# Patient Record
Sex: MALE | Race: WHITE | NOT HISPANIC OR LATINO | Employment: PART TIME
[De-identification: names, ages, dates, MRNs, and addresses within clinical notes are randomized per-mention and may not be internally consistent; named-entity substitution may affect disease eponyms.]

---

## 2017-09-17 ENCOUNTER — HEALTH MAINTENANCE LETTER (OUTPATIENT)
Age: 22
End: 2017-09-17

## 2019-10-25 ENCOUNTER — TELEPHONE (OUTPATIENT)
Dept: PSYCHIATRY | Facility: CLINIC | Age: 24
End: 2019-10-25

## 2019-10-25 NOTE — TELEPHONE ENCOUNTER
"PSYCHIATRY CLINIC PHONE INTAKE     SERVICES REQUESTED / INTERESTED IN          Other:  Nueropsychological Testing    Presenting Problem and Brief History                              What would you like to be seen for? (brief description):  Patients mother conducted the phone screen, after verbal permission from patient was garnered. Patient started a program with [facility]. Patient was referred by them to complete neuropsychological testing. Patient completed TMS at [facility] last summer which resulted in changes physically, and mentally. Patient stated that when he attempts medications now for his anxiety and depression he now experiences aches, and pains to a degree where patient stated \"it feels like hot lava is pouring over my brain\". Patient experiences \"jolts\" in his brain where it feels as if he is being pushed physically forward. Patient feels muscle aches in his arms and quads as well while on medications. Patient will taper off, and discontinue medications due to this now. Patient left his job, and does not go out in public with friends any longer due to this. Patient is wanting to go back to work, and move out, but finds it almost impossible due to the sensory issues he is facing now. Patient has attempted talk therapy, DBT, and several differing medications including Fluoxetine & Buspar after the TMS to alleviate symptoms.         Have you received a mental health diagnosis? Yes   Which one (s): LANNY, OCD  Is there any history of developmental delay?  No   Are you currently seeing a mental health provider?  Yes            Who / month last seen:  [facility]   Do you have mental health records elsewhere?  Yes  Will you sign a release so we can obtain them?  Yes    Have you ever been hospitalized for psychiatric reasons?  No  Describe:  Has had ED visits due to anxiety attacks.     Do you have current thoughts of self-harm?  No /Although patient stated \"I cannot go on living this way\". "   Do you currently have thoughts of harming others?  No       Substance Use History     Do you have any history of alcohol / illicit drug use?  No  Describe:  N?A  Have you ever received treatment for this?  No    Describe:  N/A     Social History     Does the patient have a guardian?  No    Name / number: N/A  Have you had an ACT team in last 12 months?  No  Describe: N/A   Do you have any current or past legal issues?  No  Describe: N/A   OK to leave a detailed voicemail?  Yes    Medical/ Surgical History                                   Patient Active Problem List   Diagnosis     Constipation     Generalized anxiety disorder     Atypical chest pain     Dyspnea     Exertional dyspnea     ADHD (attention deficit hyperactivity disorder)     Major depression in complete remission (H)          Medications             Current Outpatient Medications   Medication Sig Dispense Refill     ALBUTEROL IN one puff every 2 hours as needed       CALCIUM MAGNESIUM OR 1 Tablet daily       FLUoxetine (PROZAC) 10 MG tablet Take 1 tablet by mouth daily. Patient needs to be seen prior to future refills of this medication.  Please call clinic to schedule.    30 tablet 0     methylphenidate (CONCERTA) 36 MG CR tablet Take 1 tablet by mouth every morning. 30 tablet 0     Multiple Vitamin (MULTIVITAMIN OR) Take  by mouth.       VITAMIN D OR 1 Tablet daily           DISPOSITION      Patients phone screen submitted to Dr. Hernández for review.    Landon Shepherd

## 2019-11-19 ENCOUNTER — TRANSFERRED RECORDS (OUTPATIENT)
Dept: HEALTH INFORMATION MANAGEMENT | Facility: CLINIC | Age: 24
End: 2019-11-19

## 2019-12-11 NOTE — TELEPHONE ENCOUNTER
RECORDS RECEIVED FROM: [facility] - Lyme Disease - Per pt    DATE RECEIVED: 01.03.2020   NOTES (Gather within 2 years) STATUS DETAILS   OFFICE NOTE from referring provider   N/A    OFFICE NOTE from other specialist Care Everywhere 11.29.2019   DISCHARGE SUMMARY from hospital N/A    DISCHARGE REPORT from the ER N/A    LABS (any labs) N/A    MEDICATION LIST Care Everywhere    IMAGING  (NEED IMAGES AND REPORTS)     Osteomyelitis: Foot imaging  N/A    Liver Abscess: Abdominal imaging N/A    Other (anything related to diagnoses N/A

## 2020-01-03 ENCOUNTER — PRE VISIT (OUTPATIENT)
Dept: INFECTIOUS DISEASES | Facility: CLINIC | Age: 25
End: 2020-01-03

## 2020-03-01 ENCOUNTER — TRANSFERRED RECORDS (OUTPATIENT)
Dept: HEALTH INFORMATION MANAGEMENT | Facility: CLINIC | Age: 25
End: 2020-03-01

## 2020-04-30 ENCOUNTER — TRANSFERRED RECORDS (OUTPATIENT)
Dept: HEALTH INFORMATION MANAGEMENT | Facility: CLINIC | Age: 25
End: 2020-04-30

## 2020-07-14 ENCOUNTER — TELEPHONE (OUTPATIENT)
Dept: OTHER | Facility: OUTPATIENT CENTER | Age: 25
End: 2020-07-14

## 2020-07-14 NOTE — TELEPHONE ENCOUNTER
Telephone Intake--REST  Date: 2020  Client Name: Domenico    MRN: [id number]  : 1995     Age:  24  Caller Name: Self        Presenting Problem / Reason for Assessment   (Clinical History &Symptoms):     Diagnosed with Lymes Disease 2018   Treated for proctitis 2020 with antibiotics   States since taking antibiotics for the proctitis they have been experiancing issues with urinary retention (pain) and pain with sexual activity (orgasm)  Along with the pain on orgasm they become lightheaded (fuzzy), arm numbness (weakness), metalic taste in mouth. States there has been on occasion where they have passed out.    Length of time experiencing symptoms: 2020 but has become worse in the last month      Seen Other Providers for Gender (if so, where):    M.D:  --If yes, request records from the provider at the 1st session.    Therapist:  [facility]                     [place]  --if yes, request a referral or summary letter from the therapist at the 1st session..    Psychiatrist:  Dr. Sabas Gonsales  --if yes,, request records from the provider at the 1st session..      Other Medical/Mental Health Diagnoses: Lymes Disease          If needed, clarify if patient calling from group home or other supervised living arrangement    Note if patient has no mental health or cognitive diagnosis, but phone behavior suggests impairment      Medications:  Prozac, doxyclcycline, flomax          Primary Care Provider: NO                  --If multiple medical conditions, request recent records from primary doctor at the 1st session..      Referral Source:        Follow Up:        Please Verify Registration    If patient has Trapit or Core Competence, send to .     Prep Welcome Packet and have patient come half hour beforehand to fill out forms in packet (health history form, transgender history, Safety Screening, PHQ9, and LANNY-7.       7/14 Paperwork sent

## 2020-07-24 DIAGNOSIS — R33.9 URINARY RETENTION: Primary | ICD-10-CM

## 2020-08-11 ENCOUNTER — TRANSFERRED RECORDS (OUTPATIENT)
Dept: HEALTH INFORMATION MANAGEMENT | Facility: CLINIC | Age: 25
End: 2020-08-11

## 2020-09-02 ENCOUNTER — TELEPHONE (OUTPATIENT)
Dept: UROLOGY | Facility: CLINIC | Age: 25
End: 2020-09-02

## 2020-09-02 NOTE — TELEPHONE ENCOUNTER
[facility] Call Center    Phone Message    May a detailed message be left on voicemail: no     Reason for Call: Other: Pt is looking to schedule for retention and bladder pain. Pt has had symptoms for 7 months and has had imaging and uro consults with outside health systems virtually, as well as rounds of antibiotics and other medications to help with urination flow. First available I can schedule is in October and they do not want to wait due to how long Pt has had symptoms. They also do not want to do a virtual appt as they state they exhausted all options of treatment virtually. Please call them back to discuss.      Action Taken: Message routed to:  [facility] URO/UA CLINICAL POOL    Travel Screening: Not Applicable

## 2020-09-02 NOTE — TELEPHONE ENCOUNTER
Patient should take the October opening for new patient  He has seen urologist and has been treated no emergency  He no showed in our department in [place] in July . Ene Sahni LPN Staff Nurse

## 2020-09-03 DIAGNOSIS — Z31.41 ENCOUNTER FOR SPERM COUNT FOR FERTILITY TESTING: Primary | ICD-10-CM

## 2020-09-10 DIAGNOSIS — Z31.41 ENCOUNTER FOR SPERM COUNT FOR FERTILITY TESTING: ICD-10-CM

## 2020-09-10 PROCEDURE — 89322 SEMEN ANAL STRICT CRITERIA: CPT

## 2020-09-10 PROCEDURE — 87070 CULTURE OTHR SPECIMN AEROBIC: CPT | Performed by: NURSE PRACTITIONER

## 2020-09-12 LAB
BACTERIA SPEC CULT: NORMAL
SPECIMEN SOURCE: NORMAL

## 2020-09-14 LAB
ABNORMAL SPERM: 97 MORPHOLOGY
ABSTINENCE DAYS: 6 DAYS (ref 2–7)
AGGLUTINATION: NO YES/NO
ANALYSIS TEMP - CENTIGRADE: 23 CENTIGRADE
CELL FRAGMENTS: ABNORMAL %
COLLECTION METHOD: ABNORMAL
COLLECTION SITE: ABNORMAL
CONSENT TO RELEASE TO PARTNER: NO
HEAD DEFECT: 97
IMMATURE SPERM: ABNORMAL %
IMMOTILE: 54 %
LAB RECEIPT TIME: ABNORMAL
LIQUEFIED: YES YES/NO
MIDPIECE DEFECT: 54
NON-PROGRESSIVE MOTILITY: 2 %
NORMAL SPERM: 3 % NORMAL FORMS (ref 4–?)
PROGRESSIVE MOTILITY: 44 % (ref 32–?)
ROUND CELLS: 0.3 MILLION/ML (ref ?–2)
SPECIMEN CONCENTRATION: 23 MILLION/ML (ref 15–?)
SPECIMEN PH: 7.2 PH (ref 7.2–?)
SPECIMEN TYPE: ABNORMAL
SPECIMEN VOL UR: 7 ML (ref 1.5–?)
TAIL DEFECT: 19
TIME OF ANALYSIS: ABNORMAL
TOTAL NUMBER: 161 MILLION (ref 39–?)
TOTAL PROGRESSIVE MOTILE: 71 MILLION (ref 15.6–?)
VISCOUS: NO YES/NO
VITALITY: ABNORMAL % (ref 58–?)
WBC SPECIMEN: ABNORMAL %

## 2020-10-28 NOTE — TELEPHONE ENCOUNTER
FUTURE VISIT INFORMATION      FUTURE VISIT INFORMATION:    Date: 11.17.20    Time: 2:30    [place] [facility] [place]ERRAL INFORMATION:    Referring provider:  N/A    Referring providers clinic:  N/A    Reason for visit/diagnosis  new Pt for alopecia per Pt    RECORDS REQUESTED FROM:       Clinic name Comments Records Status Imaging Status    No previous records related to hairloss

## 2020-11-17 ENCOUNTER — PRE VISIT (OUTPATIENT)
Dept: DERMATOLOGY | Facility: CLINIC | Age: 25
End: 2020-11-17

## 2021-02-03 ENCOUNTER — TRANSCRIBE ORDERS (OUTPATIENT)
Dept: OTHER | Age: 26
End: 2021-02-03

## 2021-02-03 ENCOUNTER — TELEPHONE (OUTPATIENT)
Dept: OTHER | Age: 26
End: 2021-02-03

## 2021-02-03 DIAGNOSIS — R49.0 DYSPHONIA: ICD-10-CM

## 2021-02-03 DIAGNOSIS — R13.19 OTHER DYSPHAGIA: Primary | ICD-10-CM

## 2021-02-03 NOTE — TELEPHONE ENCOUNTER
[facility] Call Center    Phone Message    May a detailed message be left on voicemail: no     Reason for Call: Appointment Intake    Referring Provider Name: Dr. Chon Hicks at [facility] for a consultation and treatment for speech therapy and also video stroboscopy.     Diagnosis and/or Symptoms: Other dysphagia, Dysphonia    Action Taken: Message routed to:  [facility] [phone]    Travel Screening: Not Applicable

## 2021-02-04 NOTE — TELEPHONE ENCOUNTER
FUTURE VISIT INFORMATION      FUTURE VISIT INFORMATION:    Date: 2/5/21    Time: 10 AM    [place] [facility]  REFERRAL INFORMATION:    Referring provider:  Dr. Chon Hicks    Referring providers clinic:  [facility]    Reason for visit/diagnosis: Dysphagia and Dyphonia    RECORDS REQUESTED FROM:       Clinic name Comments Records Status Imaging Status   [facility] 8/11/20 - ENT OV with Dr. Hicks Scanned In    [facility] 8/10/20 -  OV with Dr. Downey [facility] Everywhere

## 2021-02-04 NOTE — TELEPHONE ENCOUNTER
LVM offering next available New Video/Telephone visit with next available or pt choice SLP (Seymour Barfield, Dr. NINA Rodriguez Sarah Zach). *Do NOT schedule with Bruna Ley if [facility] has [facility] for insurance at this time*     Writer explained that SLPs are not currently seeing patients in clinic regularly at this time, however, if after virtual evaluation SLP determines pt needs to come in for strobe, it can be arranged.     Left call center number for scheduling.

## 2021-02-05 ENCOUNTER — VIRTUAL VISIT (OUTPATIENT)
Dept: OTOLARYNGOLOGY | Facility: CLINIC | Age: 26
End: 2021-02-05
Attending: OTOLARYNGOLOGY
Payer: MEDICAID

## 2021-02-05 ENCOUNTER — PRE VISIT (OUTPATIENT)
Dept: OTOLARYNGOLOGY | Facility: CLINIC | Age: 26
End: 2021-02-05

## 2021-02-05 DIAGNOSIS — R06.09 EXERTIONAL DYSPNEA: ICD-10-CM

## 2021-02-05 DIAGNOSIS — R13.19 OTHER DYSPHAGIA: ICD-10-CM

## 2021-02-05 DIAGNOSIS — R49.0 DYSPHONIA: ICD-10-CM

## 2021-02-05 DIAGNOSIS — J38.3 VOCAL CORD DYSFUNCTION: Primary | ICD-10-CM

## 2021-02-05 DIAGNOSIS — R06.09 DYSPNEA ON EXERTION: ICD-10-CM

## 2021-02-05 PROCEDURE — 99207 PR NO CHARGE LOS: CPT | Performed by: SPEECH-LANGUAGE PATHOLOGIST

## 2021-02-05 PROCEDURE — 92524 BEHAVRAL QUALIT ANALYS VOICE: CPT | Mod: GN | Performed by: SPEECH-LANGUAGE PATHOLOGIST

## 2021-02-05 NOTE — PROGRESS NOTES
"Jorge Denton is a 25 year old male who is being evaluated via a billable video visit.      Jorge has been notified and verbally consented to the following:     This video visit will be conducted between you and your provider.    Patient has opted to conduct today's video visit vs an in-person appointment.     Video visits are billed at different rates depending on your insurance coverage. Please reach out to your insurance provider with any questions.     If during the course of the call the provider feels the appointment is not appropriate, you will not be charged for this service.  Provider has received verbal consent for billable virtual visit from the patient? Yes  Will anyone else be joining your video visit? No  Preferred method for receiving information: Email    Call initiated at: 1000   Type of Visit Platform Used: [employer] Video  Location of provider: Home  Location of patient: [facility]  Lencho Loomis Jr., M.D., F.A.C.S.  Mariposa Monahan M.D., M.P.H.  Senait Flor M.D.  Stephie Bedolla, Ph.D., CCC-SLP  Kristen Pardo M.M. (voice), M.A., CCC-SLP  Seymour Barfield M.M. (voice), M.A., CCC-SLP  ELAINE Javed (voice), M.S., CCC-SLP    [facility]  BREATHING DISORDER EVALUATION AND TREATMENT REPORT    Patient: Jorge Denton  Date of Service: 2/5/2021    HISTORY OF PRESENT ILLNESS  Jogre Denton was seen for evaluation and treatment for Vocal Cord Dysfunction (VCD), also known as Paradoxical Vocal Fold Motion (PVFM), today.  He was referred for this visit by Dr Chon Hicks, ENT.  Please refer to Dr. Hicks's dictation elsewhere in this chart for a more complete history of his disorder.     Jorge is a 25 year old year old with a long history of intermittent chest pain, throat pain, and voice problems, but starting in June 2020 he started to feel like his throat muscles were strangling him and it was \"super hard to talk.\" He attempted to do some self massage and rest his voice, " "which seemed to help, but then returned with a vengence and he's had difficulty sleeping, talking, eating. He will try to swallow a sip of water and feel like it gets stuck going down his throat, feeling like his throat is closing. He has to be very careful to take smaller bites and swallow single pills at a time.     His voice impairment has been his longest symptom, stating he began singing in 10th grade (2014) and it would only take a few phrases of singing to \"blow out\" his voice and feel very sore and fatigued. By college, he started to lose his voice when talking at work or in class, and has limited his voice use over time and gave up singing. Domenico reports he's seen multiple ENTs over the years and been told his laryngeal exams were normal, other than inflammation. His voice gets worse with use, so he budgets his voice use for work or specific social activities. He's attempted to find Oceen videos or exercises online to treat him impairment without success.     Domenico also reports difficulty breathing \"all the time\" though it's been worse since June 2020.     Domenico suspects that his muscular hyperfunction is secondary to Lyme Disease and related neuromuscular issues since fall 2019.     The following is a brief synopsis of his reported symptom history:    Initial onset: June 2020    Description: \"feels like I'm being strangled\"    difficulty with inhalation    throat tightness/discomfort    worsened voice quality    difficulty swallowing    a lump in the throat     Episodes occur all the time, but can flare-up and become much worse    Symptoms have worsened in the past 8 months    Onset is typically within 1-2 minutes of triggers.    Episodes resolve within a few minutes.    Strategies that reduce symptoms: sit down, rest, stop activity    Activities/triggers include:  o physical activity (walking, running, stairs and household chores)  o voice use (talking, singing and laughing)  o heightened emotion " "(stress)  o reflux  o laying downwearing masks, random moments with no evident trigger    Additional symptoms include:  o dizziness or lightheadedness  o overall fatigue add nausea, separate dizziness & lightheadedness    Additional factors: Mucus, reflux, anxiety, headaches    Ongoing impact on ADLs includes unable exercise or be even moderately active. Talking, singing and laughing make him feel like he's going to suffocate.     Reason for seeking treatment now: Ongoing and worsening symptoms    Pertinent Medical Hx:     Unremarkable, healthy    Treatment hx of symptoms have included: PCP, ENT, GI, pulmonologist, and cardiology    Pulmonary workup included a viral asthma and CLIFTON, but was otherwise negative and patient reports both albuterol and xopenex don't seem to be helpful.     Cardiac workup is negative    ENT workup is negative by laryngoscopy    GI workup occurred several years ago unrelated to current symptoms, however Domenico endorses \"horrible heartburn\", burning in the back of his throat, and a \"puffed out\" feeling in his stomach. He also doesn't have an appetite.    Current medications which can affect laryngeal system: albuterol, xopenex    PATIENT REPORTED MEASURES  Dyspnea Index Questionnaire:  Dyspnea Index 2/5/2021   1. I have trouble getting air in. 4   2. I feel tightness in my throat when I am having jane breathing problem. 4   3. It takes more effort to breathe than it used to. 4   4. Change in weather affect my breathing problem. 1   5. My breathing gets worse with stress. 4   6. I make sound/noise breathing in 0   7. I have to strain to breathe. 4   8. My shortness of breath gets worse with exercise or physical activity 4   9. My breathing problem makes me feel stressed. 4   10. My breathing problem casuses me to restrict my personal and social life. 4   Dyspnea Index Total Score 33       Patient Specific Metrics:  Dysponia SLP Goals 2/5/2021   How much effort is it to speak, if 0 is no extra " "effort and 10 is maximum effort? 9   How much effort is it to sing, if 0 is no extra effort and 10 is maximum effort? 10   How would you rate your breathing, if 0 is the worst and 10 is the best? 9   How much does your voice problem bother you? Very Much   How much does your breathing problem bother you?         Very much     Patient Supplied Answers To VHI Questionnaire  Voice Handicap Index (VHI-10) 2/5/2021   My voice makes it difficult for people to hear me 4   People have difficulty understanding me in a noisy room 4   My voice difficulties restrict my personal and social life.  4   I feel left out of conversations because of my voice 4   My voice problem causes me to lose income 0   I feel as though I have to strain to produce voice 4   The clarity of my voice is unpredictable 4   My voice problem upsets me 4   My voice makes me feel handicapped 2   People ask, \"What's wrong with your voice?\" 3   VHI-10 33         PERCEPTUAL BREATHING EVALUATION (CPT 80834)  At rest: shallow, thoracic and neck activation, insufficient airflow for duration of phonation  When asked to take a volitional \"deep\" breath: excessive thoracic muscle use pattern, clavicular elevation during inspiration and overall shallow breath pattern  When asked to pant: shoulder and neck involvement  During exertion triggered by jogging around his room, Jorge demonstrated:    elevated and tense shoulders    clavicular elevation for inspiration    reported inability to inhale fully    increased coughing/throat clearing      THERAPEUTIC ACTIVITIES  Prior to eliciting symptoms with exertion a second time, Jorge learned:    Techniques for oral configurations to use during inspiration, to provide better abduction and arrest inhalatory stridor; these included: inhaling through rounded lips or through a straw; inhaling through the nose with closed lips; inhaling with tongue tip lightly touching the roof of his mouth, and inhaling with his tongue slightly " "curled down the center to create a pathway for airflow    Techniques for oral configurations to use during exhalation, to provide increased intra-oral and supraglottic air pressure to decrease vocal fold adduction; these included: a prolonged \"Shhhh\" on exhalation, and a sustained pursed lip exhalation with slight ballooning of his cheeks    During these probes, Jorge reported:   o He is not able to breath well through his nose and the straw breath seemed to trigger a dizzy/weak sensation that Domenico associates with singing and physical exertion.   o He found the tongue tuck and taco tongue with pursed lip exhalation to be most facilitative to improve air flow    Education and Counseling:    I provided education regarding laryngeal anatomy and physiology, including explanation of irritable larynx syndrome and the self-perpetuating cycle of laryngeal irritation and resulting symptoms.     I provided Domenico with an explanation of the therapy plan, as it specifically related to his individual symptoms, and therapeutic rationale and instruction of a practice regimen.     He found this information helpful and states he is excited to attend therapy.     IMPRESSIONS AND PLAN  Based on today's perceptual evaluation and therapy stimuli, it would seem likely that Janenes dyspnea on exertion has been due to both poor laryngeal mechanics (Vocal Cord Dysfunction, 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 His reported diagnosis of Lyme's Disease in fall 2019 may have further impacted his symptoms due to systemic inflammatory processes.    With training of techniques for laryngeal and respiratory mechanics, he reported slightly improvement in his breathing symptoms.  He will require reinforcement and additional respiratory  training, so we will plan on initiating speech therapy next week.   Frequency and " "Duration: Two weekly 1-hour sessions followed by two biweekly 1-hour sessions.     GOALS  Patient goal:   To have normal and comfortable breathing at all times, especially during exerted activities.    Long-term goal:  Within two months, Jorge will participate in an entire week of normal daily activities with no report of any difficulties breathing.  Goals:  Patient goal:   \"I'd like to learn how to breathe better so it doesn't always feel effortful and I don't feel tightness in my throat. I'd also like to learn how to use my voice so I can sing and talk without triggering my throat symptoms.\"    Short-term goal(s): Within the first 4 sessions, Mr. Denton:  will demonstrate assigned laryngeal massage techniques with 80% accuracy or better with no clinician support  will be able to independently list key factors in maintenance of good vocal hygiene with 80% accuracy, and report on their use outside the therapy room.  will utilize silent inhalation with good low-respiratory engagement 75% of the time during therapy tasks with minimal clinician support  will speak into expiratory reserve volume no more than 3 time(s) during a two minute conversation.  will initiate Resonant Voice Therapy (RVT)    Long-term goal(s): In 3 months, Mr. Denton will:  Report a week of typical activities, in which vocal strain and effort does not exceed a level of 3 out of 10, 80% of the time  Report a week of normal physical activity with breathing restriction that does not exceed a level of 3 out of 10, 80% of the time    This treatment plan was developed with the patient who agreed with the recommendations.    PRIMARY ICD-10 code:  J38.3 (Vocal Cord Dysfunction)  SECONDARY ICD-10 code: R06.02 (Dyspnea on Exertion)  TERTIARY ICD-10 code: R49.0 (Dysphonia)    TOTAL SERVICE TIME: 60 minutes  EVALUATION OF VOICE AND RESONANCE: (90749)   NO CHARGE FACILITY FEE (43698)    Chelsey Javed (voice), M.S., CCC-SLP  Speech-Language " Pathologist  [facility]  [phone]  [email or web address]  Pronouns: she/her/hers    *this report was created in part through the use of computerized dictation software, and though reviewed following completion, some typographic errors may persist.  If there is confusion regarding any of this notes contents, please contact me for clarification

## 2021-02-09 ENCOUNTER — TELEPHONE (OUTPATIENT)
Dept: OTOLARYNGOLOGY | Facility: CLINIC | Age: 26
End: 2021-02-09

## 2021-02-09 NOTE — TELEPHONE ENCOUNTER
LVM asking pt to call back and schedule follow-up video visits with ANA LUISA Javed, per message from provider.     Pt should be scheduled for:    2 weekly return video visits (can start this week)    Followed by, 2 biweekly (every other week) return video visits    Left call center number for scheduling.

## 2021-02-16 ENCOUNTER — VIRTUAL VISIT (OUTPATIENT)
Dept: OTOLARYNGOLOGY | Facility: CLINIC | Age: 26
End: 2021-02-16
Payer: MEDICAID

## 2021-02-16 DIAGNOSIS — J38.3 VOCAL CORD DYSFUNCTION: ICD-10-CM

## 2021-02-16 DIAGNOSIS — R49.0 DYSPHONIA: ICD-10-CM

## 2021-02-16 DIAGNOSIS — R06.09 DYSPNEA ON EXERTION: Primary | ICD-10-CM

## 2021-02-16 PROCEDURE — 92507 TX SP LANG VOICE COMM INDIV: CPT | Mod: GN | Performed by: SPEECH-LANGUAGE PATHOLOGIST

## 2021-02-16 PROCEDURE — 99207 PR NO CHARGE LOS: CPT | Performed by: SPEECH-LANGUAGE PATHOLOGIST

## 2021-02-16 NOTE — PATIENT INSTRUCTIONS
"REFLUX MANAGEMENT (ongoing as needed)  -- When reflux comes up the wrong way from your stomach and spills into your throat, you may not feel it come up if the acidity levels are low. However, liquid spilling onto the vocal cords can cause lots of throat irritation, cough and throat clearing, hoarseness, and even difficulty breathing.   1. Elevate the head of your bed about 4 inches, trying to keep the mattress straight to avoid any back or neck aches. Look into building a wood ramp that could either go on the floor or under your box spring/mattress. You can either glue or nail 1x4 and 2x4 under plywood.   2. Try to eat foods with a high PH level (less acidic). Possible reflux triggers include spicy foods, acidic foods, and foods that may relax the esophageal sphincters and allow back-splash, such as raw onions, garlic, or peppers, store-bought tomato sauces/salsas, pizza, greasy foods, orange or lemon juice, chocolate, caffeine, carbonation, alcohol, mints and menthol.   3. Avoid putting pressure on your stomach by slouching, wearing tight clothes, or carrying extra weight that pushes on your stomach.  4. Actively reduce stress. Stress can cause a fight or flight response in your body, even if you feel like you're able to handle stress. Try deep breathing, yoga, or mindfulness meditation to help calm your nervous system (Calm, Headspace, Yoga with Tiffanie on youtube, \"Progressive Body Scan\", etc).    5. See if gargling and sipping 8 oz of alkaline water in the evening makes a difference for you. Call around to [employer] until you find a location with an alkalizing machine where you can fill your own container.   6. Dr. Tim Andrea has some helpful books that explain symptoms and treatment really well.     RESCUE BREATHING (Practice 4-5 breaths every morning and 4-5 breaths every evening. Use if needed for shortness of breath).  1. You can play a large milkshake or boba straw on the center of your tongue, as far " "back as you can without gagging yourself. Keep your tongue sticking a little bit and let your lips close around the straw. Slowly breathe in and out.   2. Taco Tongue: You can mimic this without the straw, by curving your tongue into a hard taco shell shape, breathing as silently as possible.  3. Tongue Tuck: Touch the tip of your tongue to the roof of your mouth, breathing cool, quiet air in and out along the sides of your tongue.   4. Straw Sip: Round lips with a gentle \"sip\" in and out     "

## 2021-02-16 NOTE — LETTER
2/16/2021       RE: Jorge Denton  [address]  [place] [address]     Dear Colleague,    Thank you for referring your patient, Jorge Denton, to the [facility] VOICE CLIN[facility] [place] [facility]. Please see a copy of my visit note below.    Jorge Denton is a 25 year old male who is being evaluated via a billable video visit.      Jorge has been notified and verbally consented to the following:     This video visit will be conducted between you and your provider.    Patient has opted to conduct today's video visit vs an in-person appointment.     Video visits are billed at different rates depending on your insurance coverage. Please reach out to your insurance provider with any questions.     If during the course of the call the provider feels the appointment is not appropriate, you will not be charged for this service.  Provider has received verbal consent for billable virtual visit from the patient? Yes  Will anyone else be joining your video visit? No  Preferred method for receiving information: Email    Call initiated at: 1500   Type of Visit Platform Used: pbsi  Location of provider: Home  Location of patient: [facility] VOICE CLINIC  PROGRESS REPORT (CPT 54943)    Patient: Jorge Denton  Date of Service: 2/16/2021  Date of Last Service: 2/5/2021  Referring Physician: Chon Hicks MD  Impressions from evaluation on 2/5/2021 by Chelsey Javed (voice) MShariSShari, CCC-SLP:  Based on today's perceptual evaluation and therapy stimuli, it would seem likely that Janenes dyspnea on exertion has been due to both poor laryngeal mechanics (Vocal Cord Dysfunction, 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 His reported diagnosis of Lyme's Disease in fall 2019 may have further impacted his symptoms due to systemic inflammatory  processes.    SUBJECTIVE:  Since Jorge's last session, he reports the following:   o Overall symptoms continue to be intermittent, with Domenico stating some days are better than others.   o He is unsure whether he's noticed any difference with use of the tongue tuck and straw sip  o His primary complaint at this point is ongoing vocal fatigue and effort, and intermittent SOB and cough.     OBJECTIVE:  Patient Reported Measures:  Patient Supplied Answers To SLP QOL Questionnaire  Therapy Quality of Life 2/16/2021   Since my l ast session, I used the speech therapy exercises and strategies as recommended by my speech pathologist. Agree   I feel that using my therapy techniques has become a habit Agree   I feel confident in my ability to manage my current and future symptoms. Disagree strongly   Since my last session I feel my symptoms have --------. Improved   Overall, since starting therapy I feel my symptoms are --------. Worse   Overall, how much worse? Almost the same, hardly worse at all     Patient Specific Goal Metrics:  Dysponia SLP Goals 2/5/2021 2/16/2021   How much effort is it to speak, if 0 is no extra effort and 10 is maximum effort? 9 8   How much effort is it to sing, if 0 is no extra effort and 10 is maximum effort? 10 10   How would you rate your breathing, if 0 is the worst and 10 is the best? 9 9   How much does your voice problem bother you? Very Much Very Much   How much does your breathing problem bother you?         Very much Very much       THERAPEUTIC ACTIVITIES  Today Jorge participated in the following therapeutic activities:  Counseling and Education:    Asked questions about the nature of his symptoms, and I answered all of these thoroughly.    Negative practive was used to promote somatosensory awareness to good effect    I provided Jorge with explanation and skilled instruction of:    Concepts and strategies for managing laryngopharyngeal reflux disorder, to reduce laryngeal  "inflammation. This included education regarding the impact of reflux on the laryngeal system and management tasks including diet modification, head of bed elevation, avoidance of oral intake prior to laying down, and stress management.  Rescue breathing strategies using oral configurations to promote improved vocal fold abduction were instructed    Patient reported the tongue tuck, the \"taco tongue\" and pursed lip exhalation was most beneficial    A plan for when and how to implement these strategies was developed, and the patient was encouraged to practice the techniques independent of distress at least twice daily to habituate their use.     Instruction of Home Practice:    I instructed Jorge in the concepts of an optimal practice regimen, including use of an interval schedule with brief periods of practice frequently throughout each day, and concepts of volitional practice to facilitate motor learning.    I emphasized the therapeutic rational and provided an emailed After Visit Summary to reinforce today's therapeutic activities and facilitate home practice.    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  He will continue to work on his exercises on a daily basis, and work on incorporating the techniques into his daily vocal activities. Speech therapy continues to be medically necessary for Jorge to meet personal and professional demands and fully engage in activities of daily living.     Plan:   I will see Jorge in 1 weeks and will plan to reinforce " and modify reflux precautions and rescue breaths, as appropriate. I will also plan to introduce an additional rescue breath, the [place], and introduce phonatory tasks using semi-occlusion. I will provide education about mucus management as time allows.  For practice goals, see AVS.     TOTAL SERVICE TIME: 60 minutes  TREATMENT (46514)  NO CHARGE FACILITY FEE    Chelsey Javed (voice), M.S., CCC-SLP  Speech-Language Pathologist  [facility]  [phone]  [email or web address]  Pronouns: she/her/hers      *this report was created in part through the use of computerized dictation software, and though reviewed following completion, some typographic errors may persist.  If there is confusion regarding any of this notes contents, please contact me for clarification        Again, thank you for allowing me to participate in the care of your patient.      Sincerely,    Dora Ley, SLP

## 2021-02-16 NOTE — PROGRESS NOTES
Jorge Denton is a 25 year old male who is being evaluated via a billable video visit.      Jorge has been notified and verbally consented to the following:     This video visit will be conducted between you and your provider.    Patient has opted to conduct today's video visit vs an in-person appointment.     Video visits are billed at different rates depending on your insurance coverage. Please reach out to your insurance provider with any questions.     If during the course of the call the provider feels the appointment is not appropriate, you will not be charged for this service.  Provider has received verbal consent for billable virtual visit from the patient? Yes  Will anyone else be joining your video visit? No  Preferred method for receiving information: Email    Call initiated at: 1500   Type of Visit Platform Used: [employer] Video  Location of provider: Home  Location of patient: [facility] VOICE CLINIC  PROGRESS REPORT (CPT 46280)    Patient: Jorge Denton  Date of Service: 2/16/2021  Date of Last Service: 2/5/2021  Referring Physician: Chon Hicks MD  Impressions from evaluation on 2/5/2021 by Chelsey Javed (voice), MShariSShari, CCC-SLP:  Based on today's perceptual evaluation and therapy stimuli, it would seem likely that Janenes dyspnea on exertion has been due to both poor laryngeal mechanics (Vocal Cord Dysfunction, 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 His reported diagnosis of Lyme's Disease in fall 2019 may have further impacted his symptoms due to systemic inflammatory processes.    SUBJECTIVE:  Since Jorge's last session, he reports the following:   o Overall symptoms continue to be intermittent, with Domenico stating some days are better than others.   o He is unsure whether he's noticed any difference with use of the tongue tuck and straw sip  o His primary complaint at this  point is ongoing vocal fatigue and effort, and intermittent SOB and cough.     OBJECTIVE:  Patient Reported Measures:  Patient Supplied Answers To SLP QOL Questionnaire  Therapy Quality of Life 2/16/2021   Since my l ast session, I used the speech therapy exercises and strategies as recommended by my speech pathologist. Agree   I feel that using my therapy techniques has become a habit Agree   I feel confident in my ability to manage my current and future symptoms. Disagree strongly   Since my last session I feel my symptoms have --------. Improved   Overall, since starting therapy I feel my symptoms are --------. Worse   Overall, how much worse? Almost the same, hardly worse at all     Patient Specific Goal Metrics:  Dysponia SLP Goals 2/5/2021 2/16/2021   How much effort is it to speak, if 0 is no extra effort and 10 is maximum effort? 9 8   How much effort is it to sing, if 0 is no extra effort and 10 is maximum effort? 10 10   How would you rate your breathing, if 0 is the worst and 10 is the best? 9 9   How much does your voice problem bother you? Very Much Very Much   How much does your breathing problem bother you?         Very much Very much       THERAPEUTIC ACTIVITIES  Today Jorge participated in the following therapeutic activities:  Counseling and Education:    Asked questions about the nature of his symptoms, and I answered all of these thoroughly.    Negative practive was used to promote somatosensory awareness to good effect    I provided Jorge with explanation and skilled instruction of:    Concepts and strategies for managing laryngopharyngeal reflux disorder, to reduce laryngeal inflammation. This included education regarding the impact of reflux on the laryngeal system and management tasks including diet modification, head of bed elevation, avoidance of oral intake prior to laying down, and stress management.  Rescue breathing strategies using oral configurations to promote improved vocal fold  "abduction were instructed    Patient reported the tongue tuck, the \"taco tongue\" and pursed lip exhalation was most beneficial    A plan for when and how to implement these strategies was developed, and the patient was encouraged to practice the techniques independent of distress at least twice daily to habituate their use.     Instruction of Home Practice:    I instructed Jorge in the concepts of an optimal practice regimen, including use of an interval schedule with brief periods of practice frequently throughout each day, and concepts of volitional practice to facilitate motor learning.    I emphasized the therapeutic rational and provided an emailed After Visit Summary to reinforce today's therapeutic activities and facilitate home practice.    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  He will continue to work on his exercises on a daily basis, and work on incorporating the techniques into his daily vocal activities. Speech therapy continues to be medically necessary for Jorge to meet personal and professional demands and fully engage in activities of daily living.     Plan:   I will see Jorge in 1 weeks and will plan to reinforce and modify reflux precautions and rescue breaths, as appropriate. I will also plan to introduce an additional rescue breath, the [place], and introduce phonatory tasks using semi-occlusion. I will provide education about mucus management as time allows.  For practice goals, see AVS.     TOTAL SERVICE TIME: 60 " minutes  TREATMENT (38342)  NO CHARGE FACILITY FEE    Chelsey Javed (voice) M.S., CCC-SLP  Speech-Language Pathologist  [facility]  [phone]  [email or web address]  Pronouns: she/her/hers      *this report was created in part through the use of computerized dictation software, and though reviewed following completion, some typographic errors may persist.  If there is confusion regarding any of this notes contents, please contact me for clarification

## 2021-02-20 ENCOUNTER — HEALTH MAINTENANCE LETTER (OUTPATIENT)
Age: 26
End: 2021-02-20

## 2021-02-23 ENCOUNTER — VIRTUAL VISIT (OUTPATIENT)
Dept: OTOLARYNGOLOGY | Facility: CLINIC | Age: 26
End: 2021-02-23
Payer: MEDICAID

## 2021-02-23 DIAGNOSIS — J38.3 VOCAL CORD DYSFUNCTION: ICD-10-CM

## 2021-02-23 DIAGNOSIS — R06.09 DYSPNEA ON EXERTION: Primary | ICD-10-CM

## 2021-02-23 DIAGNOSIS — R49.0 DYSPHONIA: ICD-10-CM

## 2021-02-23 PROCEDURE — 92507 TX SP LANG VOICE COMM INDIV: CPT | Mod: GN | Performed by: SPEECH-LANGUAGE PATHOLOGIST

## 2021-02-23 PROCEDURE — 99207 PR NO CHARGE LOS: CPT | Performed by: SPEECH-LANGUAGE PATHOLOGIST

## 2021-02-23 NOTE — LETTER
2/23/2021       RE: Jorge Denton  [address]  [place] [address]     Dear Colleague,    Thank you for referring your patient, Jorge Denton, to the [facility] VOICE CLINIC [place] [facility]. Please see a copy of my visit note below.    Jorge Denton is a 25 year old male who is being evaluated via a billable video visit.      Jorge has been notified and verbally consented to the following:     This video visit will be conducted between you and your provider.    Patient has opted to conduct today's video visit vs an in-person appointment.     Video visits are billed at different rates depending on your insurance coverage. Please reach out to your insurance provider with any questions.     If during the course of the call the provider feels the appointment is not appropriate, you will not be charged for this service.  Provider has received verbal consent for billable virtual visit from the patient? Yes  Will anyone else be joining your video visit? No  Preferred method for receiving information: Email    Call initiated at: 1100   Type of Visit Platform Used: [employer]  Location of provider: Home  Location of patient: [facility] VOICE CLINIC  PROGRESS REPORT (CPT 36002)    Patient: Jorge Denton  Date of Service: 2/23/2021  Date of Last Service: 2/16/2021  Referring Physician: Chon Hicks MD  Impressions from evaluation on 2/5/2021 by Chelsey Javed (voice) MShariSShari, CCC-SLP:  Based on today's perceptual evaluation and therapy stimuli, it would seem likely that Janenes dyspnea on exertion has been due to both poor laryngeal mechanics (Vocal Cord Dysfunction, 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 His reported diagnosis of Lyme's Disease in fall 2019 may have further impacted his symptoms due to systemic inflammatory  "processes.      SUBJECTIVE:  Since Jorge's last session, he reports the following:   o Overall symptoms are intermittent. He had a few \"good days\" and now has had a few \"not good days.\"   o Successes: Felt like sipping alkaline water did lead to better evenings.  o Hurdles:  Reports being very careful about managing his GERD doesn't seem to correspond with symptoms.   o His primary complaint at this point is voice impairment, throat discomfort, SOB.     OBJECTIVE:  Patient Reported Measures:  Patient Supplied Answers To SLP QOL Questionnaire  Therapy Quality of Life 2/16/2021   Since my l ast session, I used the speech therapy exercises and strategies as recommended by my speech pathologist. Agree   I feel that using my therapy techniques has become a habit Agree   I feel confident in my ability to manage my current and future symptoms. Disagree strongly   Since my last session I feel my symptoms have --------. Improved   Overall, since starting therapy I feel my symptoms are --------. Worse   Overall, how much worse? Almost the same, hardly worse at all     Patient Specific Goal Metrics:  Dysponia SLP Goals 2/5/2021 2/16/2021   How much effort is it to speak, if 0 is no extra effort and 10 is maximum effort? 9 8   How much effort is it to sing, if 0 is no extra effort and 10 is maximum effort? 10 10   How would you rate your breathing, if 0 is the worst and 10 is the best? 9 9   How much does your voice problem bother you? Very Much Very Much   How much does your breathing problem bother you?         Very much Very much       THERAPEUTIC ACTIVITIES  Today Jorge participated in the following therapeutic activities:  Counseling and Education:    Asked questions about the nature of his symptoms, and I answered all of these thoroughly.    Negative practive was used to promote somatosensory awareness to good effect    I provided Jorge with explanation and skilled instruction of:  Rescue breathing strategies using " "oral configurations to promote improved vocal fold abduction were instructed    Patient reported the \"[place]\" was most beneficial    Patient was able to demonstrate use of these techniques in combination with low respiratory engagement with fair accuracy and moderate clinician support    A plan for when and how to implement these strategies was developed, and the patient was encouraged to practice the techniques independent of distress at least twice daily to habituate their use.     Exercises to coordinate phonation with optimal flowing airstream and reduce phonatory impact.     Semi-occluded vocal tract exercises with a cup of water (\"cup and bubbles\") were most facilitating    He progressed through the warm-up level of phonatory complexity    Instructed to use as a voice warm up, cool down, coordination of breath flow with phonation, and for tissue mobilization.    Specific modifications instructed included more rounded lips and slight jaw opening    Patient demonstrated good learning, but will need practice and additional reinforcement    Concepts and techniques for using saline and plain-water gargling, gel nasal spray, humidification, nasal irrigation, and steam to reduce post-nasal drainage and laryngeal irritation.     Instruction of Home Practice:    I instructed Jorge in the concepts of an optimal practice regimen, including use of an interval schedule with brief periods of practice frequently throughout each day, and concepts of volitional practice to facilitate motor learning.    I emphasized the therapeutic rational and provided an emailed After Visit Summary to reinforce today's therapeutic activities and facilitate home practice.    ASSESSMENT/PLAN  Progress toward long-term goals:  Adequate progress; too early for objective measures    Impressions:  IMPRESSIONS:  (Vocal Cord Dysfunction, J38.3) and non-optimal respiratory mechanics (Dyspnea on Exertion, R06.02), likely due in part to underlying " Dysphonia (R49.0). Jorge had a veryproductive session of therapy today, working on strategies that will help him achieve his goal(s) of acceptable and comfortable voice use, as well as reduced laryngeal sensitivity.  He will continue to work on his exercises on a daily basis, and work on incorporating the techniques into his daily vocal activities. Speech therapy continues to be medically necessary for Jorge to meet personal and professional demands and fully engage in activities of daily living.     Plan:   I will see Jorge in 2 weeks and will plan to reinforce and modify SOVT phonation tasks and rescue breathing strategies, as appropriate. I will also plan to introduce respiratory mechanics.  For practice goals, see AVS.     TOTAL SERVICE TIME: 60 minutes  TREATMENT (30064)  NO CHARGE FACILITY FEE    Chelsey Javed (voice), M.S., CCC-SLP  Speech-Language Pathologist  [facility]  [phone]  [email or web address]  Pronouns: she/her/hers      *this report was created in part through the use of computerized dictation software, and though reviewed following completion, some typographic errors may persist.  If there is confusion regarding any of this notes contents, please contact me for clarification      Again, thank you for allowing me to participate in the care of your patient.      Sincerely,    Dora Ley, SLP

## 2021-02-23 NOTE — PROGRESS NOTES
"Jorge Denton is a 25 year old male who is being evaluated via a billable video visit.      Jorge has been notified and verbally consented to the following:     This video visit will be conducted between you and your provider.    Patient has opted to conduct today's video visit vs an in-person appointment.     Video visits are billed at different rates depending on your insurance coverage. Please reach out to your insurance provider with any questions.     If during the course of the call the provider feels the appointment is not appropriate, you will not be charged for this service.  Provider has received verbal consent for billable virtual visit from the patient? Yes  Will anyone else be joining your video visit? No  Preferred method for receiving information: Email    Call initiated at: 1100   Type of Visit Platform Used: [employer] Video  Location of provider: Home  Location of patient: [facility]  PROGRESS REPORT (CPT 92628)    Patient: Jorge Denton  Date of Service: 2/23/2021  Date of Last Service: 2/16/2021  Referring Physician: Chon Hicks MD  Impressions from evaluation on 2/5/2021 by Chelsey Javed (voice), MShariSShari, CCC-SLP:  Based on today's perceptual evaluation and therapy stimuli, it would seem likely that Janenes dyspnea on exertion has been due to both poor laryngeal mechanics (Vocal Cord Dysfunction, 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 His reported diagnosis of Lyme's Disease in fall 2019 may have further impacted his symptoms due to systemic inflammatory processes.      SUBJECTIVE:  Since Jorge's last session, he reports the following:   o Overall symptoms are intermittent. He had a few \"good days\" and now has had a few \"not good days.\"   o Successes: Felt like sipping alkaline water did lead to better evenings.  o Hurdles:  Reports being very careful about " "managing his GERD doesn't seem to correspond with symptoms.   o His primary complaint at this point is voice impairment, throat discomfort, SOB.     OBJECTIVE:  Patient Reported Measures:  Patient Supplied Answers To SLP QOL Questionnaire  Therapy Quality of Life 2/16/2021   Since my l ast session, I used the speech therapy exercises and strategies as recommended by my speech pathologist. Agree   I feel that using my therapy techniques has become a habit Agree   I feel confident in my ability to manage my current and future symptoms. Disagree strongly   Since my last session I feel my symptoms have --------. Improved   Overall, since starting therapy I feel my symptoms are --------. Worse   Overall, how much worse? Almost the same, hardly worse at all     Patient Specific Goal Metrics:  Dysponia SLP Goals 2/5/2021 2/16/2021   How much effort is it to speak, if 0 is no extra effort and 10 is maximum effort? 9 8   How much effort is it to sing, if 0 is no extra effort and 10 is maximum effort? 10 10   How would you rate your breathing, if 0 is the worst and 10 is the best? 9 9   How much does your voice problem bother you? Very Much Very Much   How much does your breathing problem bother you?         Very much Very much       THERAPEUTIC ACTIVITIES  Today Jorge participated in the following therapeutic activities:  Counseling and Education:    Asked questions about the nature of his symptoms, and I answered all of these thoroughly.    Negative practive was used to promote somatosensory awareness to good effect    I provided Jorge with explanation and skilled instruction of:  Rescue breathing strategies using oral configurations to promote improved vocal fold abduction were instructed    Patient reported the \"[place]\" was most beneficial    Patient was able to demonstrate use of these techniques in combination with low respiratory engagement with fair accuracy and moderate clinician support    A plan for when and how " "to implement these strategies was developed, and the patient was encouraged to practice the techniques independent of distress at least twice daily to habituate their use.     Exercises to coordinate phonation with optimal flowing airstream and reduce phonatory impact.     Semi-occluded vocal tract exercises with a cup of water (\"cup and bubbles\") were most facilitating    He progressed through the warm-up level of phonatory complexity    Instructed to use as a voice warm up, cool down, coordination of breath flow with phonation, and for tissue mobilization.    Specific modifications instructed included more rounded lips and slight jaw opening    Patient demonstrated good learning, but will need practice and additional reinforcement    Concepts and techniques for using saline and plain-water gargling, gel nasal spray, humidification, nasal irrigation, and steam to reduce post-nasal drainage and laryngeal irritation.     Instruction of Home Practice:    I instructed Jorge in the concepts of an optimal practice regimen, including use of an interval schedule with brief periods of practice frequently throughout each day, and concepts of volitional practice to facilitate motor learning.    I emphasized the therapeutic rational and provided an emailed After Visit Summary to reinforce today's therapeutic activities and facilitate home practice.    ASSESSMENT/PLAN  Progress toward long-term goals:  Adequate progress; too early for objective measures    Impressions:  IMPRESSIONS:  (Vocal Cord Dysfunction, J38.3) and non-optimal respiratory mechanics (Dyspnea on Exertion, R06.02), likely due in part to underlying Dysphonia (R49.0). Jorge had a veryproductive session of therapy today, working on strategies that will help him achieve his goal(s) of acceptable and comfortable voice use, as well as reduced laryngeal sensitivity.  He will continue to work on his exercises on a daily basis, and work on incorporating the " techniques into his daily vocal activities. Speech therapy continues to be medically necessary for Jorge to meet personal and professional demands and fully engage in activities of daily living.     Plan:   I will see Jorge in 2 weeks and will plan to reinforce and modify SOVT phonation tasks and rescue breathing strategies, as appropriate. I will also plan to introduce respiratory mechanics.  For practice goals, see AVS.     TOTAL SERVICE TIME: 60 minutes  TREATMENT (26319)  NO CHARGE FACILITY FEE    Chelsey Javed (voice), M.S., CCC-SLP  Speech-Language Pathologist  [facility] Voice [facility]  [phone]  [email or web address]  Pronouns: she/her/hers      *this report was created in part through the use of computerized dictation software, and though reviewed following completion, some typographic errors may persist.  If there is confusion regarding any of this notes contents, please contact me for clarification

## 2021-02-23 NOTE — PATIENT INSTRUCTIONS
"REFLUX MANAGEMENT (ongoing as needed)  1. Elevate the head of your bed about 4 inches, trying to keep the mattress straight to avoid any back or neck aches. Look into building a wood ramp that could either go on the floor or under your box spring/mattress. You can either glue or nail 1x4 and 2x4 under plywood.   2. Try to eat foods with a high PH level (less acidic). Possible reflux triggers include spicy foods, acidic foods, and foods that may relax the esophageal sphincters and allow back-splash, such as raw onions, garlic, or peppers, store-bought tomato sauces/salsas, pizza, greasy foods, orange or lemon juice, chocolate, caffeine, carbonation, alcohol, mints and menthol.   3. Avoid putting pressure on your stomach by slouching, wearing tight clothes, or carrying extra weight that pushes on your stomach.  4. Actively reduce stress. Stress can cause a fight or flight response in your body, even if you feel like you're able to handle stress. Try deep breathing, yoga, or mindfulness meditation to help calm your nervous system (Calm, Headspace, Yoga with Tiffanie on youtube, \"Progressive Body Scan\", etc).    5. See if gargling and sipping 8 oz of alkaline water in the evening makes a difference for you. Call around to [employer] until you find a location with an alkalizing machine where you can fill your own container.   6. Dr. Tim Andrea has some helpful books that explain symptoms and treatment really well.      RESCUE BREATHING (Practice 4-5 breaths every morning and 4-5 breaths every evening. Use if needed for shortness of breath).  1. You can play a large milkshake or boba straw on the center of your tongue, as far back as you can without gagging yourself. Keep your tongue sticking a little bit and let your lips close around the straw. Slowly breathe in and out.   2. MARYAM or \"Hand Breath\": Place an ice-cream cone shaped hand against open, rounded lips with your tongue flat in your mouth. Slowly inhale and " "feel a silent, low breath sweep in and down your throat. Now tighten your hand to a fist and gently blow out, feeling a ballooning stretch through your cheeks and throat. Avoid blowing so hard that you feel strained.   3. Taco Tongue: You can mimic this without the straw, by curving your tongue into a hard taco shell shape, breathing as silently as possible.  4. Tongue Tuck: Touch the tip of your tongue to the roof of your mouth, breathing cool, quiet air in and out along the sides of your tongue.   5. Straw Sip: Round lips with a gentle \"sip\" in and out    VOICE EXERCISES (AM & PM, more if needed)  -- Inhale SILENTLY and feel your belly fill with air  -- Slowly exhale as your belly contracts, ROUND \"OO\" LIPS  -- Straw and 1 inch of water in a cup OR nearly full cup slightly tilted  -- \"Whoooooo\" (smooth bubbles like a motor boat or a simmering pot of water)  1. 3-5x silently with just air for 5-7 seconds  *2.* 3-5x Air > Voice > Air > Voice, very gently and quietly alternating  3. 3-5x foghorn on single pitch for 5-7 seconds  4. 3-5x sliding lower for 5-7 seconds (\"ding dong\")  5. 3-5x sliding higher for 5-7 seconds (\"here comes the bride\")  6. 3-5x sliding up and down like sirens  7. Easy songs/melodies with slurred phrases and no stopping bubbles  -- Double-check that your LIPS are ROUNDED and JAW is SLIGHTLY OPEN & BACK  -- If you need to, place your other thumb and pointer on each side of your lips with a slightly squeeze    MUCUS MANAGEMENT (ongoing as needed)  -- Mucus is your body's way of cleaning out particles of dust, dirt, and allergens that you've breathed in. These particles get stuck in the mucus and it slowly drips out of your sinuses and down the back of your throat, where it is typically swallowed and removed from your body. Mucus is also produced to help protect your sinuses and nasal passages from excessive dryness in the winter, preventing those tissues from drying and cracking (nosebleeds). If " your body is dehydrated, even normal mucus amounts can thicken and get stickier, causing more coughing and throat clearing.  1. Sinus rinse 2-3x/week (NeilMed bottle with warmed distilled water and saline mix); If this doesn't work, you can use 4 sprays of a liquid saline spray in each nostril and then blow your nose.   2. Gargle ~2 oz warm water with 1/8 tsp mixture, each AM/PM (Mix a container of equal parts salt/baking soda and keep next to your toothbrush)  3. Drink about half your body weight in ounces of water each day  4. Gel saline spray to be used AM and/or PM ([employer]- [employer], just make sure it's the gel spray and not tube of gel)  5. Humidifier or bowl of warm water near your bed or in your office in the winter. Aim for about 45-55% humidity to prevent your mucosal tissues from over-drying during the winter. You can find a hygrometer for about $10 on [employer]. [employer] 350W - evaporative and has filters.  6. Steaming can be very helpful, wring out a warm damp washcloth and place it over your nose and mouth.

## 2021-03-09 ENCOUNTER — VIRTUAL VISIT (OUTPATIENT)
Dept: OTOLARYNGOLOGY | Facility: CLINIC | Age: 26
End: 2021-03-09
Payer: MEDICAID

## 2021-03-09 DIAGNOSIS — J38.3 VOCAL CORD DYSFUNCTION: ICD-10-CM

## 2021-03-09 DIAGNOSIS — R06.09 DYSPNEA ON EXERTION: Primary | ICD-10-CM

## 2021-03-09 DIAGNOSIS — R49.0 DYSPHONIA: ICD-10-CM

## 2021-03-09 PROCEDURE — 92507 TX SP LANG VOICE COMM INDIV: CPT | Mod: GN | Performed by: SPEECH-LANGUAGE PATHOLOGIST

## 2021-03-09 PROCEDURE — 99207 PR NO CHARGE LOS: CPT | Performed by: SPEECH-LANGUAGE PATHOLOGIST

## 2021-03-09 NOTE — PROGRESS NOTES
"Jorge Denton is a 25 year old male who is being evaluated via a billable video visit.      Jorge has been notified and verbally consented to the following:     This video visit will be conducted between you and your provider.    Patient has opted to conduct today's video visit vs an in-person appointment.     Video visits are billed at different rates depending on your insurance coverage. Please reach out to your insurance provider with any questions.     If during the course of the call the provider feels the appointment is not appropriate, you will not be charged for this service.  Provider has received verbal consent for billable virtual visit from the patient? Yes  Will anyone else be joining your video visit? No  Preferred method for receiving information: Email    Call initiated at: 1100   Type of Visit Platform Used: [employer] Video  Location of provider: Home  Location of patient: [facility]INIC  PROGRESS REPORT (CPT [id number])    Patient: Jorge Denton  Date of Service: 3/9/2021  Date of Last Service: 2/23/2021  Referring Physician: Chon Hicks MD  Impressions from evaluation on 2/5/2021 by Chelsey Javed (voice) MShariSShari, CCC-SLP:  Based on today's perceptual evaluation and therapy stimuli, it would seem likely that Janenes dyspnea on exertion has been due to both poor laryngeal mechanics (Vocal Cord Dysfunction, 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 His reported diagnosis of Lyme's Disease in fall 2019 may have further impacted his symptoms due to systemic inflammatory processes.      SUBJECTIVE:  Since Jorge's last session, he reports the following:   o Overall symptoms are \"dramatically\" changed since previous session. He's been able to talk \"semi-normally.\"   o He doesn't feel like his throat is tight and choking him all day, every day, but it does still occur at times. "   o His breathing still seems to be a struggle, but he's been working on abdominal breathing and finds the Straw Sip or Tongue Tuck to be most helpful.   o His primary complaint at this point is still needing to rest his voice on occasion and still having some breathing impairment.     OBJECTIVE:  Patient Reported Measures:  Patient Supplied Answers To SLP QOL Questionnaire  Therapy Quality of Life 3/9/2021   Since my l ast session, I used the speech therapy exercises and strategies as recommended by my speech pathologist. Agree strongly   I feel that using my therapy techniques has become a habit Agree   I feel confident in my ability to manage my current and future symptoms. Agree   Since my last session I feel my symptoms have --------. Improved   Overall, since starting therapy I feel my symptoms are --------. Better   Overall, how much worse? -   Overall, how much better? A good deal better     Patient Specific Goal Metrics:  Dysponia SLP Goals 2/5/2021 2/16/2021 3/9/2021   How much effort is it to speak, if 0 is no extra effort and 10 is maximum effort? 9 8 6   How much effort is it to sing, if 0 is no extra effort and 10 is maximum effort? 10 10 8   How would you rate your breathing, if 0 is the worst and 10 is the best? 9 9 2   How much does your voice problem bother you? Very Much Very Much Quite a bit   How much does your breathing problem bother you?         Very much Very much Very much       THERAPEUTIC ACTIVITIES  Today Jorge participated in the following therapeutic activities:  I provided Jorge with explanation and skilled instruction of:  Education and exercises to promote optimal respiratory mechanics were provided:    Explanation of the anatomy and physiology of respiration for phonation; he found this to be helpful    He demonstrated excessive upper thoracic engagement during inhalation    Difficulty allowing abdominal relaxation for inhalation, and audible inhalation prior to  "instruction    Targeted practice of optimal breathing mechanics with patient positioned in a forward leaning position    With clinician support, patient was able to demonstrate improved abdominal relaxation and engagement on inhalation    Diaphragmatic myofacial release was targeted using gentle pressure along the inferior ribcage, with patient stating this made him feel much less restricted in his breathing    Jorge demonstrated adequate accuracy with moderate to maximal clinician support, including verbal explanation and visual demonstration to facilitate awareness of low respiratory engagement.     Laryngeal release technique targeting reduced laryngeal elevation and constriction and improved vocal fold aBduction was instructed    Patient was instructed through negative practice of laryngeal elevation with high-frequency, closed vowel productions and low-frequency, open vowel productions in an effort to develop somato-sensory awareness of the laryngeal musculature    This was combined with aBductory breathing maneuvers    Patient was instructed to utilize a silent, yawny, \"uh\"-shaped inhalation with application to all other therapy tasks    He demonstrated excellent accuracy following minimal to moderate clinician support    Exercises to coordinate phonation with optimal flowing airstream and reduce phonatory impact.     Semi-occluded vocal tract exercises with a cup of water (\"cup and bubbles\") were most facilitating    He progressed through the hum level of phonatory complexity    Instructed to use as a voice warm up, cool down, coordination of breath flow with phonation, and for tissue mobilization.    Specific modifications instructed included avoidance of sternal collapse with focus on continued abdominal pattern    Patient demonstrated good learning, but will need practice and additional reinforcement    Instruction of Home Practice:    A revised regimen for home practice was instructed.    I provided an " AVS of important notes of today's therapeutic activities to facilitate practice.    ASSESSMENT/PLAN  Progress toward long-term goals:  Adequate progress; please see above    Impressions:  IMPRESSIONS: (Vocal Cord Dysfunction, J38.3) and non-optimal respiratory mechanics (Dyspnea on Exertion, R06.02), likely due in part to underlying Dysphonia (R49.0). Jorge had a productive session of therapy today, working on breathing mechanics, laryngeal release, and progression of phonation tasks which will help him achieve his goal(s) of acceptable and comfortable voice use, as well as improved breathing.  He showed excellent dedication to practice since last session, with frequent use of interval practice throughout the day.  He will continue to work on his exercises on a daily basis, and work on incorporating the techniques into his daily activities. Speech therapy continues to be medically necessary for Jorge to meet personal demands and fully engage in activities of daily living.     Plan:   I will see Jorge in 2 weeks and will plan to reinforce and modify breathing tasks and progress phonation tasks, as appropriate. I will also plan to introduce deactivation.  For practice goals, see AVS.     TOTAL SERVICE TIME: 60 minutes  TREATMENT (69659)  NO CHARGE FACILITY FEE    Chelsey Javed (voice) MShariS., CCC-SLP  Speech-Language Pathologist  [facility] Voice [facility]  [phone]  [email or web address]  Pronouns: she/her/hers      *this report was created in part through the use of computerized dictation software, and though reviewed following completion, some typographic errors may persist.  If there is confusion regarding any of this notes contents, please contact me for clarification

## 2021-03-09 NOTE — PATIENT INSTRUCTIONS
"REFLUX MANAGEMENT (ongoing as needed)  1. Elevate the head of your bed about 4 inches, trying to keep the mattress straight to avoid any back or neck aches. Look into building a wood ramp that could either go on the floor or under your box spring/mattress. You can either glue or nail 1x4 and 2x4 under plywood.   2. Try to eat foods with a high PH level (less acidic). Possible reflux triggers include spicy foods, acidic foods, and foods that may relax the esophageal sphincters and allow back-splash, such as raw onions, garlic, or peppers, store-bought tomato sauces/salsas, pizza, greasy foods, orange or lemon juice, chocolate, caffeine, carbonation, alcohol, mints and menthol.   3. Avoid putting pressure on your stomach by slouching, wearing tight clothes, or carrying extra weight that pushes on your stomach.  4. Actively reduce stress. Stress can cause a fight or flight response in your body, even if you feel like you're able to handle stress. Try deep breathing, yoga, or mindfulness meditation to help calm your nervous system (Calm, Headspace, Yoga with Tiffanie on youtube, \"Progressive Body Scan\", etc).    5. See if gargling and sipping 8 oz of alkaline water in the evening makes a difference for you. Call around to [employer] until you find a location with an alkalizing machine where you can fill your own container.   6. Dr. Tim Andrea has some helpful books that explain symptoms and treatment really well.     BELLY BREATHING (3 breaths per hour, until habit)  1. Make your hands into L shapes and place your finger tips just below your lower ribs, leaning forward so the backs of your hands rest on your thighs. Slowly breathe in and out, feeling yourself sink ever so slightly further forward as you exhale. Do this for about 120 seconds, then reset your hands a little wider and repeat. After 120 seconds in the wider position, reset your hands more narrow and repeat.   2. Stretch your arms up and lean toward each " "side, feeling your ribcage lift and expand. Now slowly lean forward and let your head/neck/arms relax over your knees. Count to 30 and let gravity stretch the back of your ribcage. Once you've reached 30, take 3 slow, deep breaths in and focus on feeling your belly expand, then take 3 slow, deep breaths and feel your back/shoulder blades expand and contract.   3. Place your hands on either side of your belly or low ribs  4. Slowly breathe in and feel your belly or ribs expand, like filling a balloon, pushing out against your waistband  5. Slowly breathe out and feel your belly or ribs crunch inward, like zipping tight pants  6. Repeat slowly and take breaks if you get dizzy    HELPFUL HINTS:  -- Tie a string or piece of elastic around your low ribs when you get dressed. You'll be able to feel yourself expand against this as you breathe all day.   -- You may want to practice by laying on your stomach and placing your arms above your head. This will force the expansion into your back/ribs, allowing you to experience where those sensations should be.     RESCUE BREATHING (Practice 4-5 breaths every morning and 4-5 breaths every evening. Use if needed for shortness of breath).  1. You can play a large milkshake or boba straw on the center of your tongue, as far back as you can without gagging yourself. Keep your tongue sticking a little bit and let your lips close around the straw. Slowly breathe in and out.   2. MARYAM or \"Hand Breath\": Place an ice-cream cone shaped hand against open, rounded lips with your tongue flat in your mouth. Slowly inhale and feel a silent, low breath sweep in and down your throat. Now tighten your hand to a fist and gently blow out, feeling a ballooning stretch through your cheeks and throat. Avoid blowing so hard that you feel strained.   3. Taco Tongue: You can mimic this without the straw, by curving your tongue into a hard taco shell shape, breathing as silently as possible.  4. Tongue " "Tuck: Touch the tip of your tongue to the roof of your mouth, breathing cool, quiet air in and out along the sides of your tongue.   5. Straw Sip: Round lips with a gentle \"sip\" in and out     LARYNGEAL RELEASE  Learning Tasks:  1. Place your finger tips along the center of your throat/Brody's apple  2. Swallow and notice your voice box move up and back down  3. Compare a high-pitch EE (voice box goes up slightly) with a silent, yawny sigh on \"Huh\" like the word \"hug\" (voice box goes down slightly)  Homework:  1. Attempt to inhale SILENTLY with a deep, yawny \"uh\" shape in your throat and see if your voice box releases slightly lower.   2. Repeat 3 breaths each hour until this release becomes easy and automatic.    VOICE EXERCISES (AM & PM, more if needed)  -- Inhale with SILENT UH and feel your belly fill with air  -- Slowly exhale as your belly contracts, ROUND \"OO\" LIPS  -- Straw and 1 inch of water in a cup OR nearly full cup slightly tilted  -- \"Whoooooo\" (smooth bubbles like a motor boat or a simmering pot of water)  1. 3-5x silently with just air for 5-7 seconds  *2.* 3-5x Air > Voice > Air > Voice, very gently and quietly alternating  3. 3-5x foghorn on single pitch for 5-7 seconds (whisper and crescendo Q X to find your starting pitch)  4. 3-5x sliding lower for 5-7 seconds (\"ding dong\")  5. 3-5x sliding higher for 5-7 seconds (\"here comes the bride\")  6. 3-5x sliding up and down like sirens  7. Easy songs/melodies with slurred phrases and no stopping bubbles  8. Whooommm... bubble into hum, keep the space in your mouth, keep the ease and flow, play with chewing/scrunching nose/moving your tongue.  Double-check: 1. Relaxed your tongue and UH inhale 2. WATCH OUT FOR STERNAL COLLAPSE, belly does the work 3. Lips rounded, jaw relaxed 4. Check the speed of your bubbles 5. Return to number 1     MUCUS MANAGEMENT (ongoing as needed)  -- Mucus is your body's way of cleaning out particles of dust, dirt, and allergens " that you've breathed in. These particles get stuck in the mucus and it slowly drips out of your sinuses and down the back of your throat, where it is typically swallowed and removed from your body. Mucus is also produced to help protect your sinuses and nasal passages from excessive dryness in the winter, preventing those tissues from drying and cracking (nosebleeds). If your body is dehydrated, even normal mucus amounts can thicken and get stickier, causing more coughing and throat clearing.  1. Sinus rinse 2-3x/week (NeilMed bottle with warmed distilled water and saline mix); If this doesn't work, you can use 4 sprays of a liquid saline spray in each nostril and then blow your nose.   2. Gargle ~2 oz warm water with 1/8 tsp mixture, each AM/PM (Mix a container of equal parts salt/baking soda and keep next to your toothbrush)  3. Drink about half your body weight in ounces of water each day  4. Gel saline spray to be used AM and/or PM ([employer]yr- [employer], just make sure it's the gel spray and not tube of gel)  5. Humidifier or bowl of warm water near your bed or in your office in the winter. Aim for about 45-55% humidity to prevent your mucosal tissues from over-drying during the winter. You can find a hygrometer for about $10 on [employer]. 1stdibs 350W - evaporative and has filters.  6. Steaming can be very helpful, wring out a warm damp washcloth and place it over your nose and mouth.

## 2021-03-09 NOTE — LETTER
3/9/2021       RE: Jorge Denton  [address]  [facility] [address]     Dear Colleague,    Thank you for referring your patient, Jorge Denton, to the [facility] VOICE [facility]IN[facility] [place] [facility]. Please see a copy of my visit note below.    Jorge Denton is a 25 year old male who is being evaluated via a billable video visit.      Jorge has been notified and verbally consented to the following:     This video visit will be conducted between you and your provider.    Patient has opted to conduct today's video visit vs an in-person appointment.     Video visits are billed at different rates depending on your insurance coverage. Please reach out to your insurance provider with any questions.     If during the course of the call the provider feels the appointment is not appropriate, you will not be charged for this service.  Provider has received verbal consent for billable virtual visit from the patient? Yes  Will anyone else be joining your video visit? No  Preferred method for receiving information: Email    Call initiated at: 1100   Type of Visit Platform Used: [employer]  Location of provider: Home  Location of patient: [facility]ICE [facility]INIC  PROGRESS REPORT (CPT 99699)    Patient: Jorge Denton  Date of Service: 3/9/2021  Date of Last Service: 2/23/2021  Referring Physician: Chon Hicks MD  Impressions from evaluation on 2/5/2021 by Chelsey Javed (voice) MShariSShari, CCC-SLP:  Based on today's perceptual evaluation and therapy stimuli, it would seem likely that Janenes dyspnea on exertion has been due to both poor laryngeal mechanics (Vocal Cord Dysfunction, 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 His reported diagnosis of Lyme's Disease in fall 2019 may have further impacted his symptoms due to systemic inflammatory  "processes.      SUBJECTIVE:  Since Jorge's last session, he reports the following:   o Overall symptoms are \"dramatically\" changed since previous session. He's been able to talk \"semi-normally.\"   o He doesn't feel like his throat is tight and choking him all day, every day, but it does still occur at times.   o His breathing still seems to be a struggle, but he's been working on abdominal breathing and finds the Straw Sip or Tongue Tuck to be most helpful.   o His primary complaint at this point is still needing to rest his voice on occasion and still having some breathing impairment.     OBJECTIVE:  Patient Reported Measures:  Patient Supplied Answers To SLP QOL Questionnaire  Therapy Quality of Life 3/9/2021   Since my l ast session, I used the speech therapy exercises and strategies as recommended by my speech pathologist. Agree strongly   I feel that using my therapy techniques has become a habit Agree   I feel confident in my ability to manage my current and future symptoms. Agree   Since my last session I feel my symptoms have --------. Improved   Overall, since starting therapy I feel my symptoms are --------. Better   Overall, how much worse? -   Overall, how much better? A good deal better     Patient Specific Goal Metrics:  Dysponia SLP Goals 2/5/2021 2/16/2021 3/9/2021   How much effort is it to speak, if 0 is no extra effort and 10 is maximum effort? 9 8 6   How much effort is it to sing, if 0 is no extra effort and 10 is maximum effort? 10 10 8   How would you rate your breathing, if 0 is the worst and 10 is the best? 9 9 2   How much does your voice problem bother you? Very Much Very Much Quite a bit   How much does your breathing problem bother you?         Very much Very much Very much       THERAPEUTIC ACTIVITIES  Today Jorge participated in the following therapeutic activities:  I provided oJrge with explanation and skilled instruction of:  Education and exercises to promote optimal respiratory " "mechanics were provided:    Explanation of the anatomy and physiology of respiration for phonation; he found this to be helpful    He demonstrated excessive upper thoracic engagement during inhalation    Difficulty allowing abdominal relaxation for inhalation, and audible inhalation prior to instruction    Targeted practice of optimal breathing mechanics with patient positioned in a forward leaning position    With clinician support, patient was able to demonstrate improved abdominal relaxation and engagement on inhalation    Diaphragmatic myofacial release was targeted using gentle pressure along the inferior ribcage, with patient stating this made him feel much less restricted in his breathing    Jorge demonstrated adequate accuracy with moderate to maximal clinician support, including verbal explanation and visual demonstration to facilitate awareness of low respiratory engagement.     Laryngeal release technique targeting reduced laryngeal elevation and constriction and improved vocal fold aBduction was instructed    Patient was instructed through negative practice of laryngeal elevation with high-frequency, closed vowel productions and low-frequency, open vowel productions in an effort to develop somato-sensory awareness of the laryngeal musculature    This was combined with aBductory breathing maneuvers    Patient was instructed to utilize a silent, yawny, \"uh\"-shaped inhalation with application to all other therapy tasks    He demonstrated excellent accuracy following minimal to moderate clinician support    Exercises to coordinate phonation with optimal flowing airstream and reduce phonatory impact.     Semi-occluded vocal tract exercises with a cup of water (\"cup and bubbles\") were most facilitating    He progressed through the hum level of phonatory complexity    Instructed to use as a voice warm up, cool down, coordination of breath flow with phonation, and for tissue mobilization.    Specific " modifications instructed included avoidance of sternal collapse with focus on continued abdominal pattern    Patient demonstrated good learning, but will need practice and additional reinforcement    Instruction of Home Practice:    A revised regimen for home practice was instructed.    I provided an AVS of important notes of today's therapeutic activities to facilitate practice.    ASSESSMENT/PLAN  Progress toward long-term goals:  Adequate progress; please see above    Impressions:  IMPRESSIONS: (Vocal Cord Dysfunction, J38.3) and non-optimal respiratory mechanics (Dyspnea on Exertion, R06.02), likely due in part to underlying Dysphonia (R49.0). Jorge had a productive session of therapy today, working on breathing mechanics, laryngeal release, and progression of phonation tasks which will help him achieve his goal(s) of acceptable and comfortable voice use, as well as improved breathing.  He showed excellent dedication to practice since last session, with frequent use of interval practice throughout the day.  He will continue to work on his exercises on a daily basis, and work on incorporating the techniques into his daily activities. Speech therapy continues to be medically necessary for Jorge to meet personal demands and fully engage in activities of daily living.     Plan:   I will see Jorge in 2 weeks and will plan to reinforce and modify breathing tasks and progress phonation tasks, as appropriate. I will also plan to introduce deactivation.  For practice goals, see AVS.     TOTAL SERVICE TIME: 60 minutes  TREATMENT (58843)  NO CHARGE FACILITY FEE    Chelsey Javed (voice) M.S., CCC-SLP  Speech-Language Pathologist  [facility]  [phone]  [email or web address]  Pronouns: she/her/hers      *this report was created in part through the use of computerized dictation software, and though reviewed following completion, some typographic errors may persist.  If there is confusion regarding  any of this notes contents, please contact me for clarification      Again, thank you for allowing me to participate in the care of your patient.      Sincerely,    Dora Ley, SLP

## 2021-03-10 ENCOUNTER — TELEPHONE (OUTPATIENT)
Dept: OTOLARYNGOLOGY | Facility: CLINIC | Age: 26
End: 2021-03-10

## 2021-03-10 NOTE — TELEPHONE ENCOUNTER
Mailbox was full, so I could not leave a message. Pt needs to schedule 2 appts with Dora Ley (Video return) with two weeks in between each appt. The first appt would be two weeks after scheduled appt on 3/23, so around 4/7.

## 2021-03-30 ENCOUNTER — TRANSFERRED RECORDS (OUTPATIENT)
Dept: HEALTH INFORMATION MANAGEMENT | Facility: CLINIC | Age: 26
End: 2021-03-30

## 2021-04-06 ENCOUNTER — VIRTUAL VISIT (OUTPATIENT)
Dept: OTOLARYNGOLOGY | Facility: CLINIC | Age: 26
End: 2021-04-06
Payer: COMMERCIAL

## 2021-04-06 DIAGNOSIS — R06.09 DYSPNEA ON EXERTION: ICD-10-CM

## 2021-04-06 DIAGNOSIS — J38.3 VOCAL CORD DYSFUNCTION: ICD-10-CM

## 2021-04-06 DIAGNOSIS — R49.0 DYSPHONIA: Primary | ICD-10-CM

## 2021-04-06 DIAGNOSIS — J38.7 LARYNGEAL HYPERFUNCTION: ICD-10-CM

## 2021-04-06 PROCEDURE — 99207 PR NO CHARGE LOS: CPT | Performed by: SPEECH-LANGUAGE PATHOLOGIST

## 2021-04-06 PROCEDURE — 92507 TX SP LANG VOICE COMM INDIV: CPT | Mod: GN | Performed by: SPEECH-LANGUAGE PATHOLOGIST

## 2021-04-06 NOTE — PROGRESS NOTES
Jorge Denton is a 25 year old male who is being evaluated via a billable video visit.      Jorge has been notified and verbally consented to the following:     This video visit will be conducted between you and your provider.    Patient has opted to conduct today's video visit vs an in-person appointment.     Video visits are billed at different rates depending on your insurance coverage. Please reach out to your insurance provider with any questions.     If during the course of the call the provider feels the appointment is not appropriate, you will not be charged for this service.  Provider has received verbal consent for billable virtual visit from the patient? Yes  Will anyone else be joining your video visit? No    Call initiated at: 1408   Type of Visit Platform Used: [employer]  Location of provider: Home  Location of patient: [facility] VOICE CLINIC  PROGRESS REPORT (CPT 83539)    Patient: Jorge Denton  Date of Service: 4/6/2021  Date of Last Service: 3/9/2021  Referring Physician: Chon Hicks MD  Impressions from evaluation on 2/5/2021 by Chelsey Javed (voice), M.S., CCC-SLP:  Based on today's perceptual evaluation and therapy stimuli, it would seem likely that Janenes dyspnea on exertion has been due to both poor laryngeal mechanics (Vocal Cord Dysfunction, 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 His reported diagnosis of Lyme's Disease in fall 2019 may have further impacted his symptoms due to systemic inflammatory processes.    SUBJECTIVE:  Since Jorge's last session, he reports the following:   o Overall symptoms are going poorly. Domneico reports he's not able to talk very much because his symptoms get really bad.   o He has been doing his daily bubble voice tasks every morning and evening, but continues to feel like his throat muscles occasionally choke him.  o His voice had  previously been feeling better, and he isn't sure why it changed in the past month and he finds it very frustrating that he doesn't know why his symptoms increase.   o He did notice that wearing a gator over his mask it pushes his jaw back and down, and this does seem to aggravate his throat. Positioning his head down to look at his phone also causes some difficulty.     o He tried to use his rescue breaths, but they didn't seem to help and it takes days for him to feel better.     OBJECTIVE:  Patient Reported Measures:  Patient Supplied Answers To SLP QOL Questionnaire  Therapy Quality of Life 3/9/2021   Since my l ast session, I used the speech therapy exercises and strategies as recommended by my speech pathologist. Agree strongly   I feel that using my therapy techniques has become a habit Agree   I feel confident in my ability to manage my current and future symptoms. Agree   Since my last session I feel my symptoms have --------. Improved   Overall, since starting therapy I feel my symptoms are --------. Better   Overall, how much worse? -   Overall, how much better? A good deal better     Patient Specific Goal Metrics:  Dysponia SLP Goals 2/5/2021 2/16/2021 3/9/2021   How much effort is it to speak, if 0 is no extra effort and 10 is maximum effort? 9 8 6   How much effort is it to sing, if 0 is no extra effort and 10 is maximum effort? 10 10 8   How would you rate your breathing, if 0 is the worst and 10 is the best? 9 9 2   How much does your voice problem bother you? Very Much Very Much Quite a bit   How much does your breathing problem bother you?         Very much Very much Very much       THERAPEUTIC ACTIVITIES  Today Jorge participated in the following therapeutic activities:  Counseling and Education:    Asked questions about the nature of his symptoms, and I answered all of these thoroughly.    Domenico is interested in any adjunct treatments to help resolve his throat pain and discomfort more quickly. I  "discussed physical therapy with a colleague of mine who has worked previously with singers and patients with dysphonia. Domenico is interested in pursuing this avenue and I provided contact information.     Domenico is also interested in seeing a second ENT with stroboscopy and he has been reading about botox injections to reduce laryngeal muscle spasms online. I explained that botox is typically reserved for a more specific type of muscle issue, but that the ENT would make the decision of whether medical intervention is appropriate and would discuss options with Domenico once he is seen. I messaged our clinic schedulers to help arrange an evaluation with stroboscopy with one of our laryngologists    I provided Jorge with explanation and skilled instruction of:  Manual laryngeal massage and deactivation of perilaryngeal musculature:  ? Significant tenderness of the SCMs, with patient repeatedly indicating the pathway of the SCM and lower insertion points near the sternum as being most tight and painful.  ? Gentle head tilt, turn, and stretch was instructed with some relief of the anterior neck tightness.   ? Anterior neck stretches did seem to increase jaw pain and discomfort, so additional strategies to release tension of the masseter were instructed.   ? Gentle sternocleidomastoid massage was also instructed.  ? Patient was trained to focus on intentional relaxation of jaw in addition to area of massage during these maneuvers.  ? Self-massage was instructed and patient was able to demonstrate this with acceptable accuracy  ? Due to the level of tenderness and sensitivity, Domenico was advised to apply a soft ice pack following any deactivation strategies or increased symptoms to help reduce inflammation. He endorsed ice feeling helpful.   Maximum semi-occlusion of the vocal tract was instructed, using a semi-tight fist and ballooned cheeks/throat with moderate improvement. He was able to add a voiced \"whooo\" in various pitch glides " and speech patterns with improved air flow and smoothness of voice.     Instruction of Home Practice:    A revised regimen for home practice was instructed.    I provided an AVS of important notes of today's therapeutic activities to facilitate practice.    ASSESSMENT/PLAN  Progress toward long-term goals:  Modest progress to date, therapeutic methods have been altered to account for this; please see above    Impressions:  IMPRESSIONS: (Vocal Cord Dysfunction, J38.3) and non-optimal respiratory mechanics (Dyspnea on Exertion, R06.02), likely due in part to underlying Dysphonia (R49.0)     Jorge had a productive session of therapy today, working on deactivation strategies for perilaryngeal hyperfunction.  He will continue to work on his exercises on a daily basis, and work on incorporating the techniques into his daily activities. Speech therapy continues to be medically necessary for Jorge to participate fully and engage in activities of daily living.     Plan:   I will see Jorge in 5 days and will plan to reinforce and modify deactivation, adding more MFR and laryngeal release, as appropriate.   For practice goals, see AVS.     TOTAL SERVICE TIME: 75 minutes  TREATMENT (08746)  NO CHARGE FACILITY FEE    Chelsey Javed (voice), M.S., CCC-SLP  Speech-Language Pathologist  [facility]  [phone]  [email or web address]  Pronouns: she/her/hers      *this report was created in part through the use of computerized dictation software, and though reviewed following completion, some typographic errors may persist.  If there is confusion regarding any of this notes contents, please contact me for clarification

## 2021-04-06 NOTE — PATIENT INSTRUCTIONS
"DEACTIVATION (as needed throughout the day)  1. Tilt your head straight back, lifting your chin toward the ceiling. Focus on a stretch along the center front of your neck. Breathe and hold for 2 minutes.   2. Keep lifting your chin upward, but add a slight tilt toward your right. Breathe and hold for 2 minutes. You can add a gentle downward stretch with your fingertips.   3. Keep lifting your chin upward, but add a slight tilt toward your left. Breathe and hold for 2 minutes. You can add a gentle downward stretch with your fingertips.   4. Sit with your head neutral. Find the chewing muscle/masseter. Gently massage tiny circles with your fingertips or knuckles, hold steady gentle pressure in a single spot if you find a knot, or gently slide your knuckles from cheekbone downward allowing your jaw to open slightly in appropriate. Keep your mouth hanging open slack, like you're sleeping in a car. If it helps, you can tilt your head back slightly and say \"va va va\" and then let your jaw hang slack.   5. Sticky frog one hand to your sternum at the base of your throat with a downward angle, then use your other hand to grab the back of your neck like a kitten with an upward stretch. You can play with the angle depending on what feels best for you, hold and breathe for 2 minutes.   6. Use your thumb and the flat part of your pointer to gently pinch and hold your SCM (sternocleidomastoid) muscle. Start at the top, just behind your ear, where the SCM attaches to the bump on your skull behind your ear, and work your way down and around to the front. Be gentle and don't pinch hard. You can add a little side to side kneading motion.  7. Repeat on the side.   8. Tilt your right ear toward your right shoulder, then rotate your chin upward. Place your left arm behind your back. Breathe and hold for 2 minutes.   9. Tilt your left ear toward your left shoulder, then rotate your chin upward. Place your right arm behind your back. " "Breathe and hold for 2 minutes.   Apply some cold/ice after your stretches/massage to help reduce inflammation.  10. Gargle a sip of water  11. Gently sniff in and blow out through a fist so that you feel your throat stretching on the inside.  12. Repeat the bullfrog fist, but add a voiced \"whooo\" behind the ballooning.     Play with pitch glides    3x speech phrases, 1x without fist but same ease     Try sipping or gargling ice water throughout the day if it's helpful.  "

## 2021-04-06 NOTE — LETTER
4/6/2021       RE: Jorge Denton  [address]  [place] [address]     Dear Colleague,    Thank you for referring your patient, Jorge Denton, to the [facility] VOICE CLINIC [place] at [facility]. Please see a copy of my visit note below.    Jorge Denton is a 25 year old male who is being evaluated via a billable video visit.      Jorge has been notified and verbally consented to the following:     This video visit will be conducted between you and your provider.    Patient has opted to conduct today's video visit vs an in-person appointment.     Video visits are billed at different rates depending on your insurance coverage. Please reach out to your insurance provider with any questions.     If during the course of the call the provider feels the appointment is not appropriate, you will not be charged for this service.  Provider has received verbal consent for billable virtual visit from the patient? Yes  Will anyone else be joining your video visit? 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 His reported diagnosis of Lyme's Disease in fall 2019 may have further impacted his symptoms due to systemic inflammatory processes.    SUBJECTIVE:  Since Jorge's last session, he  reports the following:   o Overall symptoms are going poorly. [place]ome[place] reports he's not able to talk very much because his symptoms get really bad.   o He has been doing his daily bubble voice tasks every morning and evening, but continues to feel like his throat muscles occasionally choke him.  o His voice had previously been feeling better, and he isn't sure why it changed in the past month and he finds it very frustrating that he doesn't know why his symptoms increase.   o He did notice that wearing a gator over his mask it pushes his jaw back and down, and this does seem to aggravate his throat. Positioning his head down to look at his phone also causes some difficulty.     o He tried to use his rescue breaths, but they didn't seem to help and it takes days for him to feel better.     OBJECTIVE:  Patient Reported Measures:  Patient Supplied Answers To SLP QOL Questionnaire  Therapy Quality of Life 3/9/2021   Since my l ast session, I used the speech therapy exercises and strategies as recommended by my speech pathologist. Agree strongly   I feel that using my therapy techniques has become a habit Agree   I feel confident in my ability to manage my current and future symptoms. Agree   Since my last session I feel my symptoms have --------. Improved   Overall, since starting therapy I feel my symptoms are --------. Better   Overall, how much worse? -   Overall, how much better? A good deal better     Patient Specific Goal Metrics:  Dysponia SLP Goals 2/5/2021 2/16/2021 3/9/2021   How much effort is it to speak, if 0 is no extra effort and 10 is maximum effort? 9 8 6   How much effort is it to sing, if 0 is no extra effort and 10 is maximum effort? 10 10 8   How would you rate your breathing, if 0 is the worst and 10 is the best? 9 9 2   How much does your voice problem bother you? Very Much Very Much Quite a bit   How much does your breathing problem bother you?         Very much Very much Very much       THERAPEUTIC  ACTIVITIES  Today Jorge participated in the following therapeutic activities:  Counseling and Education:    Asked questions about the nature of his symptoms, and I answered all of these thoroughly.    Domenico is interested in any adjunct treatments to help resolve his throat pain and discomfort more quickly. I discussed physical therapy with a colleague of mine who has worked previously with singers and patients with dysphonia. Domenico is interested in pursuing this avenue and I provided contact information.     Domenico is also interested in seeing a second ENT with stroboscopy and he has been reading about botox injections to reduce laryngeal muscle spasms online. I explained that botox is typically reserved for a more specific type of muscle issue, but that the ENT would make the decision of whether medical intervention is appropriate and would discuss options with Domenico once he is seen. I messaged our clinic schedulers to help arrange an evaluation with stroboscopy with one of our laryngologists    I provided Jorge with explanation and skilled instruction of:  Manual laryngeal massage and deactivation of perilaryngeal musculature:  ? Significant tenderness of the SCMs, with patient repeatedly indicating the pathway of the SCM and lower insertion points near the sternum as being most tight and painful.  ? Gentle head tilt, turn, and stretch was instructed with some relief of the anterior neck tightness.   ? Anterior neck stretches did seem to increase jaw pain and discomfort, so additional strategies to release tension of the masseter were instructed.   ? Gentle sternocleidomastoid massage was also instructed.  ? Patient was trained to focus on intentional relaxation of jaw in addition to area of massage during these maneuvers.  ? Self-massage was instructed and patient was able to demonstrate this with acceptable accuracy  ? Due to the level of tenderness and sensitivity, Domenico was advised to apply a soft ice pack following  "any deactivation strategies or increased symptoms to help reduce inflammation. He endorsed ice feeling helpful.   Maximum semi-occlusion of the vocal tract was instructed, using a semi-tight fist and ballooned cheeks/throat with moderate improvement. He was able to add a voiced \"whooo\" in various pitch glides and speech patterns with improved air flow and smoothness of voice.     Instruction of Home Practice:    A revised regimen for home practice was instructed.    I provided an AVS of important notes of today's therapeutic activities to facilitate practice.    ASSESSMENT/PLAN  Progress toward long-term goals:  Modest progress to date, therapeutic methods have been altered to account for this; please see above    Impressions:  IMPRESSIONS: (Vocal Cord Dysfunction, J38.3) and non-optimal respiratory mechanics (Dyspnea on Exertion, R06.02), likely due in part to underlying Dysphonia (R49.0)     Jorge had a productive session of therapy today, working on deactivation strategies for perilaryngeal hyperfunction.  He will continue to work on his exercises on a daily basis, and work on incorporating the techniques into his daily activities. Speech therapy continues to be medically necessary for Jorge to participate fully and engage in activities of daily living.     Plan:   I will see Jorge in 5 days and will plan to reinforce and modify deactivation, adding more MFR and laryngeal release, as appropriate.   For practice goals, see AVS.     TOTAL SERVICE TIME: 75 minutes  TREATMENT (54562)  NO CHARGE FACILITY FEE    Chelsey Javed (voice), M.S., CCC-SLP  Speech-Language Pathologist  [facility]  [phone]  [email or web address]  Pronouns: she/her/hers      *this report was created in part through the use of computerized dictation software, and though reviewed following completion, some typographic errors may persist.  If there is confusion regarding any of this notes contents, please contact me for " clarification      Again, thank you for allowing me to participate in the care of your patient.      Sincerely,    Dora Ley, SLP

## 2021-04-07 ENCOUNTER — TELEPHONE (OUTPATIENT)
Dept: OTOLARYNGOLOGY | Facility: CLINIC | Age: 26
End: 2021-04-07

## 2021-04-07 NOTE — TELEPHONE ENCOUNTER
"[facility] [place] offering next available new appointment with Dr. Monahan or Dr. Flor, per Dora Ley, SLP request.    Dr. Monahan: [facility] New/New Throat/Throat Panel (in person). Dr. Monahan is conducting \"hybrid\" visits. The first half of the appt the pt will be in the exam room and Dr. Monahan will video conference in. The 2nd half of the appt provider will be in the room for physical examination.     Dr. Flor: [facility] New Video/Telephone (virtual) **followed by** [facility] Return/Return Throat (in person). Provider is doing video/telephone consultations first and then pt will be brought into clinic for physical examination at another appt date. **Please schedule in clinic appt as well:  [facility] Return/Return Throat appt any time after New Video/Telephone appt.**    Writer let pt know that Dr. Monahan is very booked out and that he would get in sooner with Dr. Flor. Add to wait list as desired.    Writer will also send [employer] message with this information.    Left call center number for scheduling.  "

## 2021-04-12 ENCOUNTER — VIRTUAL VISIT (OUTPATIENT)
Dept: OTOLARYNGOLOGY | Facility: CLINIC | Age: 26
End: 2021-04-12
Payer: COMMERCIAL

## 2021-04-12 DIAGNOSIS — R06.09 DYSPNEA ON EXERTION: ICD-10-CM

## 2021-04-12 DIAGNOSIS — J38.7 LARYNGEAL HYPERFUNCTION: ICD-10-CM

## 2021-04-12 DIAGNOSIS — R49.0 DYSPHONIA: ICD-10-CM

## 2021-04-12 DIAGNOSIS — J38.3 VOCAL CORD DYSFUNCTION: Primary | ICD-10-CM

## 2021-04-12 PROCEDURE — 92507 TX SP LANG VOICE COMM INDIV: CPT | Mod: GN | Performed by: SPEECH-LANGUAGE PATHOLOGIST

## 2021-04-12 PROCEDURE — 99207 PR NO CHARGE LOS: CPT | Performed by: SPEECH-LANGUAGE PATHOLOGIST

## 2021-04-12 NOTE — LETTER
4/12/2021       RE: Jorge Denton  [address]  [place] [address]     Dear Colleague,    Thank you for referring your patient, Jorge Denton, to the [facility] VOICE CLINIC [place] at [facility]. Please see a copy of my visit note below.    Jorge Denton is a 25 year old male who is being evaluated via a billable video visit.      Jorge has been notified and verbally consented to the following:     This video visit will be conducted between you and your provider.    Patient has opted to conduct today's video visit vs an in-person appointment.     Video visits are billed at different rates depending on your insurance coverage. Please reach out to your insurance provider with any questions.     If during the course of the call the provider feels the appointment is not appropriate, you will not be charged for this service.  Provider has received verbal consent for billable virtual visit from the patient? Yes  Will anyone else be joining your video visit? 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 His reported diagnosis of Lyme's Disease in fall 2019 may have further impacted his symptoms due to systemic inflammatory processes.    SUBJECTIVE:  Since Jorge's last session, he reports  "the following:   o Overall symptoms are ongoing. He has not been talking much at all the past week, stating he's been using hand gestures to communicate with his family.  o Yesterday, he had to go to work where he is required to talk and his voice didn't flare as much as it has. He didn't have to take pain medication.     OBJECTIVE:  Patient Reported Measures:  Patient Supplied Answers To SLP QOL Questionnaire  Therapy Quality of Life 3/9/2021   Since my l ast session, I used the speech therapy exercises and strategies as recommended by my speech pathologist. Agree strongly   I feel that using my therapy techniques has become a habit Agree   I feel confident in my ability to manage my current and future symptoms. Agree   Since my last session I feel my symptoms have --------. Improved   Overall, since starting therapy I feel my symptoms are --------. Better   Overall, how much worse? -   Overall, how much better? A good deal better     Patient Specific Goal Metrics:  Dysponia SLP Goals 2/5/2021 2/16/2021 3/9/2021   How much effort is it to speak, if 0 is no extra effort and 10 is maximum effort? 9 8 6   How much effort is it to sing, if 0 is no extra effort and 10 is maximum effort? 10 10 8   How would you rate your breathing, if 0 is the worst and 10 is the best? 9 9 2   How much does your voice problem bother you? Very Much Very Much Quite a bit   How much does your breathing problem bother you?         Very much Very much Very much       THERAPEUTIC ACTIVITIES  Today Jorge participated in the following therapeutic activities:  I provided Jorge with explanation and skilled instruction of:  Abdominal breathing pattern with rounded lips and pulsed \"shhh\" exhalation    Improved in supine to reduce \"pushing\" or strain in the sternal/SCM area    Instructed for a sternal release stretch with a foam roller or bath towel    Domenico stated this released some of the pain/pressure he almost always feels behind his " Follow Up:  Infected Perinephric Hematoma    Interval History: Overnight underwent transplant nephrectomy, repair of right external iliac artery with a bovine pericardial patch, repair of right external iliac vein and thrombectomy of right lower extremity veins. Afebrile overnight.     REVIEW OF SYSTEMS  [x  ] ROS unobtainable because:  intubated and sedated  [  ] All other systems negative except as noted below    Constitutional:  [ ] fever [ ] chills  [ ] weight loss  [ ] weakness  Skin:  [ ] rash [ ] phlebitis	  Eyes: [ ] icterus [ ] pain  [ ] discharge	  ENMT: [ ] sore throat  [ ] thrush [ ] ulcers [ ] exudates  Respiratory: [ ] dyspnea [ ] hemoptysis [ ] cough [ ] sputum	  Cardiovascular:  [ ] chest pain [ ] palpitations [ ] edema	  Gastrointestinal:  [ ] nausea [ ] vomiting [ ] diarrhea [ ] constipation [ ] pain	  Genitourinary:  [ ] dysuria [ ] frequency [ ] hematuria [ ] discharge [ ] flank pain  [ ] incontinence  Musculoskeletal:  [ ] myalgias [ ] arthralgias [ ] arthritis  [ ] back pain  Neurological:  [ ] headache [ ] seizures  [ ] confusion/altered mental status    Allergies  coconut (Anaphylaxis)  morphine (Pruritus; Rash)        ANTIMICROBIALS:  ciprofloxacin   IVPB 400 every 24 hours  fluconAZOLE IVPB 400 every 24 hours  meropenem  IVPB 500 every 24 hours      OTHER MEDS:  MEDICATIONS  (STANDING):  HYDROmorphone  Injectable 0.5 every 2 hours PRN  methylPREDNISolone sodium succinate Injectable 4 daily  pantoprazole  Injectable 40 daily  propofol Infusion 50 <Continuous>      Vital Signs Last 24 Hrs  T(C): 36.1 (10 [place] 2020 15:00), Max: 36.9 (10 [place] 2020 11:00)  T(F): 97 (10 [place] 2020 15:00), Max: 98.4 (10 [place] 2020 11:00)  HR: 74 (10 [place] 2020 16:28) (66 - 101)  BP: 111/56 (10 [place] 2020 07:00) (111/56 - 222/94)  BP(mean): 77 (10 [place] 2020 07:00) (77 - 135)  RR: 12 (10 [place] 2020 16:28) (12 - 29)  SpO2: 100% (10 [place] 2020 16:28) (92% - 100%)    PHYSICAL EXAMINATION:  General: Intubated and Sedated  HEENT: +ETT  Neck: Supple  Cardiac: RRR, No M/R/G  Resp: CTAB, No Wh/Rh/Ra  Abdomen: RLQ Prior transplant site with wound vac. NBS, NT/ND, No HSM, No rigidity or guarding  MSK: No LE edema. No Calf tenderness  : +martinez  Skin: No rashes or lesions. Skin is warm and dry to the touch.   Neuro: Intubated and Sedated  Psych:Unable to assess - intubated and sedated                          7.7    16.80 )-----------( 261      ( 10 [place] 2020 13:07 )             21.9       01-10    134<L>  |  103  |  24<H>  ----------------------------<  93  4.3   |  21<L>  |  4.79<H>    Ca    7.4<L>      10 [place] 2020 13:07  Phos  5.2     01-10  Mg     2.4     01-10    TPro  3.5<L>  /  Alb  1.4<L>  /  TBili  2.0<H>  /  DBili  1.4<H>  /  AST  7<L>  /  ALT  5<L>  /  AlkPhos  88  01-10          MICROBIOLOGY:  Vancomycin Level, Random: 10.3 ug/mL (01-10-20 @ 04:40)  v  .Tissue Other  01-10-20 --  --    No polymorphonuclear cells seen per low power field  No organisms seen per oil power field      .Blood Blood-Venous  01-07-20   No growth to date.  --  --      .Urine Catheterized  01-07-20   <10,000 CFU/mL Normal Urogenital Gillian  --  --      Abdominal Fl Abdominal Fluid  01-06-20   No growth  --    No polymorphonuclear leukocytes seen  No organisms seen  by cytocentrifuge      .Urine Clean Catch (Midstream)  01-05-20   >100,000 CFU/ml Enterobacter cloacae  --  Enterobacter cloacae      .Blood Blood  01-04-20   No growth at 5 days.  --  --      .Tissue Other  01-01-20   Numerous Enterobacter cloacae complex  --  Enterobacter cloacae complex      .Surgical Swab collection around right kidney  01-01-20   Moderate Enterobacter cloacae complex  --  Enterobacter cloacae complex      .Body Fluid periphrenic collection  01-01-20   **Please Note**: This is a Corrected Report**  Moderate Enterobacter cloacae complex  Moderate Escherichia coli  Previously reported as:  Moderate Enterobacter cloacae (Carbapenem Resistant)  Moderate Escherichia coli  --  Escherichia coli  Enterobacter cloacae complex      .Surgical Swab right kidney abscess  01-01-20   Moderate Enterobacter cloacae complex  --  Enterobacter cloacae complex      .Urine Clean Catch (Midstream)  12-29-19   >100,000 CFU/ml Enterobacter cloacae  --  Enterobacter cloacae      .Blood Blood-Peripheral  12-27-19   No growth at 5 days.  --  --      .Stool Feces  12-14-19   No enteric pathogens isolated.  (Stool culture examined for Salmonella,  Shigella, Campylobacter, Aeromonas, Plesiomonas,  Vibrio, E.coli O157 and Yersinia)  --  --      .Stool moriah johnie  12-14-19   GI PCR Results: NOT detected  *******Please Note:*******  GI panel PCR evaluates for:  Campylobacter, Plesiomonas shigelloides, Salmonella,  Vibrio, Yersinia enterocolitica, Enteroaggregative  Escherichia coli (EAEC), Enteropathogenic E.coli (EPEC),  Enterotoxigenic E. coli (ETEC) lt/st, Shiga-like  toxin-producing E. coli (STEC) stx1/stx2,  Shigella/ Enteroinvasive E. coli (EIEC), Cryptosporidium,  Cyclospora cayetanensis, Entamoeba histolytica,  Giardia lamblia, Adenovirus F 40/41, Astrovirus,  Norovirus GI/GII, Rotavirus A, Sapovirus  --  --                RADIOLOGY:      EXAM:  XR CHEST PORTABLE ROUTINE 1V             PROCEDURE DATE:  01/10/2020    Right basilar linear atelectasis.    EXAM:  CT BRAIN                        PROCEDURE DATE:  01/09/2020    No acute intracranial hemorrhage, mass effect, or other acute intracranial pathology. "sternum    He was able to progress from simple breathing patterns to adding a very gentle sigh and a slight semi-occlusion with pursed lips and puffed cheeks.     He improved his abdominal contraction with reduced sternal compression by using a sustained \"Shhhh\" exhalation    Instruction of Home Practice:    A revised regimen for home practice was instructed.    I provided an AVS of important notes of today's therapeutic activities to facilitate practice.    ASSESSMENT/PLAN  Progress toward long-term goals:  Modest progress to date, therapeutic methods have been altered to account for this; please see above    Impressions:  IMPRESSIONS:(Vocal Cord Dysfunction, J38.3) and non-optimal respiratory mechanics (Dyspnea on Exertion, R06.02), likely due in part to underlying Dysphonia (R49.0)     Jorge continues to experience significant pain and discomfort in his throat and sternal region. He endorsed reduced discomfort with exercises targeted in supine, which I suspect allows for greater relaxation of his neck and upper torso and allows him to isolate appropriate musculature for tasks instructed. He will continue to work on his exercises on a daily basis, and work on incorporating the techniques into his daily activities. Speech therapy continues to be medically necessary for Jorge to participate fully and engage in activities of daily living.     Plan:   I will see Jorge in 4 days and will plan to continue targeting breathing and phonation tasks while in supine, with focus on moving the muscular activity away from his perilaryngeal region, as appropriate.   For practice goals, see AVS.     TOTAL SERVICE TIME: 45 minutes  TREATMENT (17086)  NO CHARGE FACILITY FEE    Chelsey Javed (voice), M.S., CCC-SLP  Speech-Language Pathologist  [facility]  [phone]  [email or web address]  Pronouns: she/her/hers      *this report was created in part through the use of computerized dictation software, and " though reviewed following completion, some typographic errors may persist.  If there is confusion regarding any of this notes contents, please contact me for clarification      Again, thank you for allowing me to participate in the care of your patient.      Sincerely,    Dora Ley, SLP

## 2021-04-12 NOTE — PATIENT INSTRUCTIONS
"DEACTIVATION (as needed throughout the day)  1. Tilt your head straight back, lifting your chin toward the ceiling. Focus on a stretch along the center front of your neck. Breathe and hold for 2 minutes.   2. Keep lifting your chin upward, but add a slight tilt toward your right. Breathe and hold for 2 minutes. You can add a gentle downward stretch with your fingertips.   3. Keep lifting your chin upward, but add a slight tilt toward your left. Breathe and hold for 2 minutes. You can add a gentle downward stretch with your fingertips.   4. Sit with your head neutral. Find the chewing muscle/masseter. Gently massage tiny circles with your fingertips or knuckles, hold steady gentle pressure in a single spot if you find a knot, or gently slide your knuckles from cheekbone downward allowing your jaw to open slightly in appropriate. Keep your mouth hanging open slack, like you're sleeping in a car. If it helps, you can tilt your head back slightly and say \"va va va\" and then let your jaw hang slack.   5. Sticky frog one hand to your sternum at the base of your throat with a downward angle, then use your other hand to grab the back of your neck like a kitten with an upward stretch. You can play with the angle depending on what feels best for you, hold and breathe for 2 minutes.   6. Use your thumb and the flat part of your pointer to gently pinch and hold your SCM (sternocleidomastoid) muscle. Start at the top, just behind your ear, where the SCM attaches to the bump on your skull behind your ear, and work your way down and around to the front. Be gentle and don't pinch hard. You can add a little side to side kneading motion.  7. Repeat on the side.   8. Tilt your right ear toward your right shoulder, then rotate your chin upward. Place your left arm behind your back. Breathe and hold for 2 minutes.   9. Tilt your left ear toward your left shoulder, then rotate your chin upward. Place your right arm behind your back. " "Breathe and hold for 2 minutes.   10. Roll a bath towel into a cylinder. Place it behind your spine, between your shoulder blades. Lay on your back and let your arms relax out to each side. Combine this with your belly breathing, rounded lip inhale and pulsed \"sh sh sh\" exhale.    Apply some cold/ice after your stretches/massage to help reduce inflammation.  10. Gargle a sip of water  11. Gently sniff in and blow out through a fist so that you feel your throat stretching on the inside.     Try sipping or gargling ice water throughout the day if it's helpful.    VOICE EXERCISES 5x/day  Lay on your back for all of these.  1. Place your hands on your lower belly  2. Feel it expand as you breathe in through rounded lips  3. Pulse \"sh sh sh sh...\" to feel your abs tucking up and in. You only need to exhale a comfortable amount, no need to squeeze the last 20% out.   4. Repeat 5 x.    1. Feel your belly fill as you inhale through rounded lips.  2. Gently sigh and allow the air to flow as your belly deflates.   3. Focus on your belly deflating, not \"trying\" to do anything in particular with your voice.  4. Repeat 5    1. Feel your belly fill as you inhale through rounded lips.  2. Gently blow with a slight puff in your cheeks and lips with a \"whooo\".  3. Make sure the effort level is very low. You can add a slight head shake side to side if it helps keep your neck loose.     Air only 3x    Steady pitch 3x  "

## 2021-04-12 NOTE — PROGRESS NOTES
Jorge Denton is a 25 year old male who is being evaluated via a billable video visit.      Jorge has been notified and verbally consented to the following:     This video visit will be conducted between you and your provider.    Patient has opted to conduct today's video visit vs an in-person appointment.     Video visits are billed at different rates depending on your insurance coverage. Please reach out to your insurance provider with any questions.     If during the course of the call the provider feels the appointment is not appropriate, you will not be charged for this service.  Provider has received verbal consent for billable virtual visit from the patient? Yes  Will anyone else be joining your video visit? No    Call initiated at: 1400   Type of Visit Platform Used: [email or web address] Video  Location of provider: Home  Location of patient: [facility]  PROGRESS REPORT (CPT 88900)    Patient: Jorge Denton  Date of Service: 4/12/2021  Date of Last Service: 3  Referring Physician: Chon Hicks MD  Impressions from evaluation on 2/5/2021 by Chelsey Javed (voice) M.SShari, CCC-SLP:  Based on today's perceptual evaluation and therapy stimuli, it would seem likely that Janenes dyspnea on exertion has been due to both poor laryngeal mechanics (Vocal Cord Dysfunction, 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 His reported diagnosis of Lyme's Disease in fall 2019 may have further impacted his symptoms due to systemic inflammatory processes.    SUBJECTIVE:  Since Jorge's last session, he reports the following:   o Overall symptoms are ongoing. He has not been talking much at all the past week, stating he's been using hand gestures to communicate with his family.  o Yesterday, he had to go to work where he is required to talk and his voice didn't flare as much as it has. He didn't have to take pain  "medication.     OBJECTIVE:  Patient Reported Measures:  Patient Supplied Answers To SLP QOL Questionnaire  Therapy Quality of Life 3/9/2021   Since my l ast session, I used the speech therapy exercises and strategies as recommended by my speech pathologist. Agree strongly   I feel that using my therapy techniques has become a habit Agree   I feel confident in my ability to manage my current and future symptoms. Agree   Since my last session I feel my symptoms have --------. Improved   Overall, since starting therapy I feel my symptoms are --------. Better   Overall, how much worse? -   Overall, how much better? A good deal better     Patient Specific Goal Metrics:  Dysponia SLP Goals 2/5/2021 2/16/2021 3/9/2021   How much effort is it to speak, if 0 is no extra effort and 10 is maximum effort? 9 8 6   How much effort is it to sing, if 0 is no extra effort and 10 is maximum effort? 10 10 8   How would you rate your breathing, if 0 is the worst and 10 is the best? 9 9 2   How much does your voice problem bother you? Very Much Very Much Quite a bit   How much does your breathing problem bother you?         Very much Very much Very much       THERAPEUTIC ACTIVITIES  Today Jorge participated in the following therapeutic activities:  I provided Jorge with explanation and skilled instruction of:  Abdominal breathing pattern with rounded lips and pulsed \"shhh\" exhalation    Improved in supine to reduce \"pushing\" or strain in the sternal/SCM area    Instructed for a sternal release stretch with a foam roller or bath towel    Domenico stated this released some of the pain/pressure he almost always feels behind his sternum    He was able to progress from simple breathing patterns to adding a very gentle sigh and a slight semi-occlusion with pursed lips and puffed cheeks.     He improved his abdominal contraction with reduced sternal compression by using a sustained \"Shhhh\" exhalation    Instruction of Home Practice:    A revised " regimen for home practice was instructed.    I provided an AVS of important notes of today's therapeutic activities to facilitate practice.    ASSESSMENT/PLAN  Progress toward long-term goals:  Modest progress to date, therapeutic methods have been altered to account for this; please see above    Impressions:  IMPRESSIONS:(Vocal Cord Dysfunction, J38.3) and non-optimal respiratory mechanics (Dyspnea on Exertion, R06.02), likely due in part to underlying Dysphonia (R49.0)     Jorge continues to experience significant pain and discomfort in his throat and sternal region. He endorsed reduced discomfort with exercises targeted in supine, which I suspect allows for greater relaxation of his neck and upper torso and allows him to isolate appropriate musculature for tasks instructed. He will continue to work on his exercises on a daily basis, and work on incorporating the techniques into his daily activities. Speech therapy continues to be medically necessary for Jorge to participate fully and engage in activities of daily living.     Plan:   I will see Jorge in 4 days and will plan to continue targeting breathing and phonation tasks while in supine, with focus on moving the muscular activity away from his perilaryngeal region, as appropriate.   For practice goals, see AVS.     TOTAL SERVICE TIME: 45 minutes  TREATMENT (11870)  NO CHARGE FACILITY FEE    Chelsey Javed (voice), M.S., CCC-SLP  Speech-Language Pathologist  [facility]  [phone]  [email or web address]  Pronouns: she/her/hers      *this report was created in part through the use of computerized dictation software, and though reviewed following completion, some typographic errors may persist.  If there is confusion regarding any of this notes contents, please contact me for clarification

## 2021-04-14 ENCOUNTER — TELEPHONE (OUTPATIENT)
Dept: OTOLARYNGOLOGY | Facility: CLINIC | Age: 26
End: 2021-04-14

## 2021-04-15 ENCOUNTER — VIRTUAL VISIT (OUTPATIENT)
Dept: OTOLARYNGOLOGY | Facility: CLINIC | Age: 26
End: 2021-04-15
Payer: COMMERCIAL

## 2021-04-15 DIAGNOSIS — R06.09 DYSPNEA ON EXERTION: ICD-10-CM

## 2021-04-15 DIAGNOSIS — J38.3 VOCAL CORD DYSFUNCTION: Primary | ICD-10-CM

## 2021-04-15 DIAGNOSIS — J38.7 LARYNGEAL HYPERFUNCTION: ICD-10-CM

## 2021-04-15 DIAGNOSIS — R49.0 DYSPHONIA: ICD-10-CM

## 2021-04-15 PROCEDURE — 99207 PR NO CHARGE LOS: CPT | Performed by: SPEECH-LANGUAGE PATHOLOGIST

## 2021-04-15 PROCEDURE — 92507 TX SP LANG VOICE COMM INDIV: CPT | Mod: GN | Performed by: SPEECH-LANGUAGE PATHOLOGIST

## 2021-04-15 NOTE — PATIENT INSTRUCTIONS
"When you have bad reflux:     Gargle plain water frequently if it's what you have.    Gargle warm water with 1/4-1/2 tsp of mixture if you can (mixture = equal parts salt & baking soda)    You can fill a water bottle with water and the salt/baking soda mixture (1/4-1/2 tsp per 8 oz) and have it next to you to sip & gargle throughout the day (spit it back out when you're done).    Stop drinking apple cider vinegar    Keep taking your prilosec about 30-60 minutes before dinner.     Alkaline water: call your local [employer] and see if they have the alkalizing machine where you can bring your own container. Sip & gargle about 6 oz in the evening.     Elevate the head of your mattress or at least your body during the night. It's important that your sternal notch is higher than your stomach. Check out the [employer] mattress elevator wedge.     Consider seeing a GI specialist to better address your reflux issues.     DEACTIVATION (as needed throughout the day)  1. Tilt your head straight back, lifting your chin toward the ceiling. Focus on a stretch along the center front of your neck. Breathe and hold for 2 minutes.   2. Keep lifting your chin upward, but add a slight tilt toward your right. Breathe and hold for 2 minutes. You can add a gentle downward stretch with your fingertips.   3. Keep lifting your chin upward, but add a slight tilt toward your left. Breathe and hold for 2 minutes. You can add a gentle downward stretch with your fingertips.   4. Sit with your head neutral. Find the chewing muscle/masseter. Gently massage tiny circles with your fingertips or knuckles, hold steady gentle pressure in a single spot if you find a knot, or gently slide your knuckles from cheekbone downward allowing your jaw to open slightly in appropriate. Keep your mouth hanging open slack, like you're sleeping in a car. If it helps, you can tilt your head back slightly and say \"va va va\" and then let your jaw hang slack.   5. Sticky frog " "one hand to your sternum at the base of your throat with a downward angle, then use your other hand to grab the back of your neck like a kitten with an upward stretch. You can play with the angle depending on what feels best for you, hold and breathe for 2 minutes.   6. Use your thumb and the flat part of your pointer to gently pinch and hold your SCM (sternocleidomastoid) muscle. Start at the top, just behind your ear, where the SCM attaches to the bump on your skull behind your ear, and work your way down and around to the front. Be gentle and don't pinch hard. You can add a little side to side kneading motion.  7. Repeat on the side.   8. Tilt your right ear toward your right shoulder, then rotate your chin upward. Place your left arm behind your back. Breathe and hold for 2 minutes.   9. Tilt your left ear toward your left shoulder, then rotate your chin upward. Place your right arm behind your back. Breathe and hold for 2 minutes.   10. Roll a bath towel into a cylinder. Place it behind your spine, between your shoulder blades. Lay on your back and let your arms relax out to each side. Combine this with your belly breathing, rounded lip inhale and pulsed \"sh sh sh\" exhale.     Apply some cold/ice after your stretches/massage to help reduce inflammation.  10. Gargle a sip of water  11. Gently sniff in and blow out through a fist so that you feel your throat stretching on the inside.     Try sipping or gargling ice water throughout the day if it's helpful.     VOICE EXERCISES 5x/day  Lay on your back OR sit/stand with your arms stretched behind your back:  1. Place your hands on your lower belly  2. Feel it expand as you breathe in through rounded lips  3. Pulse \"sh sh sh sh...\" to feel your abs tucking up and in. You only need to exhale a comfortable amount, no need to squeeze the last 20% out.   4. Repeat 5 x.     1. Feel your belly fill as you inhale through rounded lips.  2. Gently blow with a slight puff in " "your cheeks and lips with a \"whooo\".  3. Make sure the effort level is very low.     Air only 3x    Gentle sigh 3x (does not have to be long)    Steady pitch 3x, 5 seconds each    Mid-to-low pitch 3x, 5 seconds each    Mid-to-high pitch 3x, 5 seconds each  Double-check that the effort level is low and your abs are doing the work.  "

## 2021-04-15 NOTE — LETTER
4/15/2021       RE: Jorge Denton  [address]  [place] [address]     Dear Colleague,    Thank you for referring your patient, Jorge Denton, to the [facility] [place] [facility]. Please see a copy of my visit note below.    Jorge Denton is a 25 year old male who is being evaluated via a billable video visit.      Jorge has been notified and verbally consented to the following:     This video visit will be conducted between you and your provider.    Patient has opted to conduct today's video visit vs an in-person appointment.     Video visits are billed at different rates depending on your insurance coverage. Please reach out to your insurance provider with any questions.     If during the course of the call the provider feels the appointment is not appropriate, you will not be charged for this service.  Provider has received verbal consent for billable virtual visit from the patient? Yes  Will anyone else be joining your video visit? No    Call initiated at: 1000   Type of Visit Platform Used: [employer] Video  Location of provider: Home  Location of patient: [facility]ICE CLINIC  PROGRESS REPORT (CPT 24879)    Patient: Jorge Denton  Date of Service: 4/15/2021  Date of Last Service: 4/12/2021  Referring Physician: Chon Hicks MD  Impressions from evaluation on 2/5/2021 by Chelsey Javed (voice), M.S., CCC-SLP:  Based on today's perceptual evaluation and therapy stimuli, it would seem likely that Janenes dyspnea on exertion has been due to both poor laryngeal mechanics (Vocal Cord Dysfunction, 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 His reported diagnosis of Lyme's Disease in fall 2019 may have further impacted his symptoms due to systemic inflammatory processes.    SUBJECTIVE:  Since Jorge's last session, he  "reports the following:   o Overall symptoms are ongoing.   o He has had fewer vocal issues the past few days, but did have some \"severe asthma and heartburn\" issues last night and can tell that today his throat is feeling more sensitive.   o He woke up during the night last night and had difficulty getting a deep breath and feels a lot of reflux.  o He has been drinking apple cider vinegar to try to treat his reflux.    o He felt the stretches in supine seemed really helpful to reduce the heaviness in his sternal area.  o He saw an ENT on Tuesday who is ordered full neck imaging to r/o a thyroid cartilage fracture.     OBJECTIVE:  Patient Reported Measures:  Patient Supplied Answers To SLP QOL Questionnaire  Therapy Quality of Life 3/9/2021   Since my l ast session, I used the speech therapy exercises and strategies as recommended by my speech pathologist. Agree strongly   I feel that using my therapy techniques has become a habit Agree   I feel confident in my ability to manage my current and future symptoms. Agree   Since my last session I feel my symptoms have --------. Improved   Overall, since starting therapy I feel my symptoms are --------. Better   Overall, how much worse? -   Overall, how much better? A good deal better     Patient Specific Goal Metrics:  Dysponia SLP Goals 2/5/2021 2/16/2021 3/9/2021   How much effort is it to speak, if 0 is no extra effort and 10 is maximum effort? 9 8 6   How much effort is it to sing, if 0 is no extra effort and 10 is maximum effort? 10 10 8   How would you rate your breathing, if 0 is the worst and 10 is the best? 9 9 2   How much does your voice problem bother you? Very Much Very Much Quite a bit   How much does your breathing problem bother you?         Very much Very much Very much       THERAPEUTIC ACTIVITIES  Today Jorge participated in the following therapeutic activities:  Counseling and Education:    Asked questions about the nature of his symptoms, and I " "answered all of these thoroughly.    Further discussion and education regarding reflux and the impact that frequent reflux/regurgitation will have on Domenico's throat tissue.     Strongly stressed recommendation to sleep elevated, not with pillows, and several strategies for how to accomplish this    Strongly stressed recommendation to stop drinking apple cider vinegar    I provided Jorge with explanation and skilled instruction of:    Concepts and strategies for managing laryngopharyngeal reflux disorder were reviewed and reinforced, to reduce laryngeal inflammation. This included education regarding the impact of reflux on the laryngeal system and management tasks including head of bed elevation, avoidance of oral intake prior to laying down, avoidance of apple cider vinegar, increased gargling of salt/baking soda, particularly after regurgitation to his throat.    Progression of semi-occluded vocal tract tasks in a sitting position with pursed lip and slight cheek ballooning  o He was able to experience voice use while sitting with hands clasped behind his back for sternal stretch, stating this felt \"good\" and didn't cause any throat irritation  o He progressed from simple sigh phonation task to various pitch glide patterns  o He was verbally instructed to take breaks between tasks in order to get a full breath without the sternal stretch    Instruction of Home Practice:    A revised regimen for home practice was instructed.    I provided an AVS of important notes of today's therapeutic activities to facilitate practice.    ASSESSMENT/PLAN  Progress toward long-term goals:  Adequate progress; please see above    Impressions:  IMPRESSIONS: (Vocal Cord Dysfunction, J38.3) and non-optimal respiratory mechanics (Dyspnea on Exertion, R06.02), likely due in part to underlying Dysphonia (R49.0)     Jorge had a very productive session of therapy today, working on reflux precautions review and transition of voice and " breathing tasks from supine to sitting. He will continue to work on his exercises on a daily basis, and work on incorporating the techniques into his daily activities. Speech therapy continues to be medically necessary for Jorge to participate fully and engage in activities of daily living.     Plan:   I will see Jorge in 1 weeks and will plan to continue targeting voice and breathing tasks with reduced perilaryngeal hyperfunction, as appropriate.   For practice goals, see AVS.     TOTAL SERVICE TIME: 60 minutes  TREATMENT (70083)  NO CHARGE FACILITY FEE    Chelsey Javed (voice), M.S., CCC-SLP  Speech-Language Pathologist  [facility]  [phone]  [email or web address]  Pronouns: she/her/hers      *this report was created in part through the use of computerized dictation software, and though reviewed following completion, some typographic errors may persist.  If there is confusion regarding any of this notes contents, please contact me for clarification      Again, thank you for allowing me to participate in the care of your patient.      Sincerely,    Dora Ley, SLP

## 2021-04-15 NOTE — PROGRESS NOTES
"Jorge Denton is a 25 year old male who is being evaluated via a billable video visit.      Jorge has been notified and verbally consented to the following:     This video visit will be conducted between you and your provider.    Patient has opted to conduct today's video visit vs an in-person appointment.     Video visits are billed at different rates depending on your insurance coverage. Please reach out to your insurance provider with any questions.     If during the course of the call the provider feels the appointment is not appropriate, you will not be charged for this service.  Provider has received verbal consent for billable virtual visit from the patient? Yes  Will anyone else be joining your video visit? 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 His reported diagnosis of Lyme's Disease in fall 2019 may have further impacted his symptoms due to systemic inflammatory processes.    SUBJECTIVE:  Since Jorge's last session, he reports the following:   o Overall symptoms are ongoing.   o He has had fewer vocal issues the past few days, but did have some \"severe asthma and heartburn\" issues last night and can tell that today his throat is feeling more sensitive.   o He woke up during the night last night and had difficulty " [facility] Short Visit Progress Note    7075 Pt admit to [facility] for Lanreotide ambulatory in stable condition. Assessment completed. No new concerns voiced. Labs drawn peripherally from R AC. Visit Vitals  BP (!) 170/100 (BP Patient Position: Sitting)   Pulse 65   Temp 97.6 °F (36.4 °C)   Resp 18   Ht 6' (1.829 m)   Wt 79.1 kg (174 lb 4.8 oz)   SpO2 100%   BMI 23.64 kg/m²     Recent Results (from the past 12 hour(s))   CBC WITH AUTOMATED DIFF    Collection Time: 01/09/20  9:36 AM   Result Value Ref Range    WBC 4.5 4.1 - 11.1 K/uL    RBC 4.71 4.10 - 5.70 M/uL    HGB 12.5 12.1 - 17.0 g/dL    HCT 37.9 36.6 - 50.3 %    MCV 80.5 80.0 - 99.0 FL    MCH 26.5 26.0 - 34.0 PG    MCHC 33.0 30.0 - 36.5 g/dL    RDW 14.9 (H) 11.5 - 14.5 %    PLATELET 580 (L) 324 - 400 K/uL    MPV 11.0 8.9 - 12.9 FL    NRBC 0.0 0  WBC    ABSOLUTE NRBC 0.00 0.00 - 0.01 K/uL    NEUTROPHILS 64 32 - 75 %    LYMPHOCYTES 20 12 - 49 %    MONOCYTES 11 5 - 13 %    EOSINOPHILS 5 0 - 7 %    BASOPHILS 1 0 - 1 %    IMMATURE GRANULOCYTES 0 0.0 - 0.5 %    ABS. NEUTROPHILS 2.8 1.8 - 8.0 K/UL    ABS. LYMPHOCYTES 0.9 0.8 - 3.5 K/UL    ABS. MONOCYTES 0.5 0.0 - 1.0 K/UL    ABS. EOSINOPHILS 0.2 0.0 - 0.4 K/UL    ABS. BASOPHILS 0.0 0.0 - 0.1 K/UL    ABS. IMM.  GRANS. 0.0 0.00 - 0.04 K/UL    DF AUTOMATED     MAGNESIUM    Collection Time: 01/09/20  9:36 AM   Result Value Ref Range    Magnesium 2.3 1.6 - 2.4 mg/dL   PHOSPHORUS    Collection Time: 01/09/20  9:36 AM   Result Value Ref Range    Phosphorus 2.5 (L) 2.6 - 4.7 MG/DL   METABOLIC PANEL, COMPREHENSIVE    Collection Time: 01/09/20  9:36 AM   Result Value Ref Range    Sodium 143 136 - 145 mmol/L    Potassium 3.2 (L) 3.5 - 5.1 mmol/L    Chloride 111 (H) 97 - 108 mmol/L    CO2 28 21 - 32 mmol/L    Anion gap 4 (L) 5 - 15 mmol/L    Glucose 106 (H) 65 - 100 mg/dL    BUN 20 6 - 20 MG/DL    Creatinine 1.29 0.70 - 1.30 MG/DL    BUN/Creatinine ratio 16 12 - 20      GFR est AA >60 >60 ml/min/1.73m2 GFR est non-AA 55 (L) >60 ml/min/1.73m2    Calcium 8.4 (L) 8.5 - 10.1 MG/DL    Bilirubin, total 0.9 0.2 - 1.0 MG/DL    ALT (SGPT) 33 12 - 78 U/L    AST (SGOT) 24 15 - 37 U/L    Alk. phosphatase 65 45 - 117 U/L    Protein, total 7.0 6.4 - 8.2 g/dL    Albumin 3.5 3.5 - 5.0 g/dL    Globulin 3.5 2.0 - 4.0 g/dL    A-G Ratio 1.0 (L) 1.1 - 2.2         Medications:  Lanreotide L buttock    0945 - Pt tolerated treatment well. BP rechecked and remains 170/100. D/c home ambulatory in no distress. Pt aware of next appointment scheduled for 2/6/20 at 0900. getting a deep breath and feels a lot of reflux.  o He has been drinking apple cider vinegar to try to treat his reflux.    o He felt the stretches in supine seemed really helpful to reduce the heaviness in his sternal area.  o He saw an ENT on Tuesday who is ordered full neck imaging to r/o a thyroid cartilage fracture.     OBJECTIVE:  Patient Reported Measures:  Patient Supplied Answers To SLP QOL Questionnaire  Therapy Quality of Life 3/9/2021   Since my l ast session, I used the speech therapy exercises and strategies as recommended by my speech pathologist. Agree strongly   I feel that using my therapy techniques has become a habit Agree   I feel confident in my ability to manage my current and future symptoms. Agree   Since my last session I feel my symptoms have --------. Improved   Overall, since starting therapy I feel my symptoms are --------. Better   Overall, how much worse? -   Overall, how much better? A good deal better     Patient Specific Goal Metrics:  Dysponia SLP Goals 2/5/2021 2/16/2021 3/9/2021   How much effort is it to speak, if 0 is no extra effort and 10 is maximum effort? 9 8 6   How much effort is it to sing, if 0 is no extra effort and 10 is maximum effort? 10 10 8   How would you rate your breathing, if 0 is the worst and 10 is the best? 9 9 2   How much does your voice problem bother you? Very Much Very Much Quite a bit   How much does your breathing problem bother you?         Very much Very much Very much       THERAPEUTIC ACTIVITIES  Today Jorge participated in the following therapeutic activities:  Counseling and Education:    Asked questions about the nature of his symptoms, and I answered all of these thoroughly.    Further discussion and education regarding reflux and the impact that frequent reflux/regurgitation will have on Domenico's throat tissue.     Strongly stressed recommendation to sleep elevated, not with pillows, and several strategies for how to accomplish  "this    Strongly stressed recommendation to stop drinking apple cider vinegar    I provided Jorge with explanation and skilled instruction of:    Concepts and strategies for managing laryngopharyngeal reflux disorder were reviewed and reinforced, to reduce laryngeal inflammation. This included education regarding the impact of reflux on the laryngeal system and management tasks including head of bed elevation, avoidance of oral intake prior to laying down, avoidance of apple cider vinegar, increased gargling of salt/baking soda, particularly after regurgitation to his throat.    Progression of semi-occluded vocal tract tasks in a sitting position with pursed lip and slight cheek ballooning  o He was able to experience voice use while sitting with hands clasped behind his back for sternal stretch, stating this felt \"good\" and didn't cause any throat irritation  o He progressed from simple sigh phonation task to various pitch glide patterns  o He was verbally instructed to take breaks between tasks in order to get a full breath without the sternal stretch    Instruction of Home Practice:    A revised regimen for home practice was instructed.    I provided an AVS of important notes of today's therapeutic activities to facilitate practice.    ASSESSMENT/PLAN  Progress toward long-term goals:  Adequate progress; please see above    Impressions:  IMPRESSIONS: (Vocal Cord Dysfunction, J38.3) and non-optimal respiratory mechanics (Dyspnea on Exertion, R06.02), likely due in part to underlying Dysphonia (R49.0)     Jorge had a very productive session of therapy today, working on reflux precautions review and transition of voice and breathing tasks from supine to sitting. He will continue to work on his exercises on a daily basis, and work on incorporating the techniques into his daily activities. Speech therapy continues to be medically necessary for Jorge to participate fully and engage in activities of daily living. "     Plan:   I will see Jorge in 1 weeks and will plan to continue targeting voice and breathing tasks with reduced perilaryngeal hyperfunction, as appropriate.   For practice goals, see AVS.     TOTAL SERVICE TIME: 60 minutes  TREATMENT (28330)  NO CHARGE FACILITY FEE    Chelsey Javed (voice), M.S., CCC-SLP  Speech-Language Pathologist  [facility]  [phone]  [email or web address]  Pronouns: she/her/hers      *this report was created in part through the use of computerized dictation software, and though reviewed following completion, some typographic errors may persist.  If there is confusion regarding any of this notes contents, please contact me for clarification

## 2021-04-20 ENCOUNTER — VIRTUAL VISIT (OUTPATIENT)
Dept: OTOLARYNGOLOGY | Facility: CLINIC | Age: 26
End: 2021-04-20
Payer: COMMERCIAL

## 2021-04-20 DIAGNOSIS — R06.09 DYSPNEA ON EXERTION: ICD-10-CM

## 2021-04-20 DIAGNOSIS — R06.09 EXERTIONAL DYSPNEA: ICD-10-CM

## 2021-04-20 DIAGNOSIS — J38.3 VOCAL CORD DYSFUNCTION: Primary | ICD-10-CM

## 2021-04-20 DIAGNOSIS — J38.7 LARYNGEAL HYPERFUNCTION: ICD-10-CM

## 2021-04-20 DIAGNOSIS — R49.0 DYSPHONIA: ICD-10-CM

## 2021-04-20 PROCEDURE — 99207 PR NO CHARGE LOS: CPT | Performed by: SPEECH-LANGUAGE PATHOLOGIST

## 2021-04-20 PROCEDURE — 92507 TX SP LANG VOICE COMM INDIV: CPT | Mod: GN | Performed by: SPEECH-LANGUAGE PATHOLOGIST

## 2021-04-20 NOTE — LETTER
4/20/2021       RE: Jorge Denton  [address]  [place] [facility] [address]     Dear Colleague,    Thank you for referring your patient, Jorge Denton, to the [facility]ICE [facility] [place] at [facility]. Please see a copy of my visit note below.    Jorge Denton is a 25 year old male who is being evaluated via a billable video visit.      Jorge has been notified and verbally consented to the following:     This video visit will be conducted between you and your provider.    Patient has opted to conduct today's video visit vs an in-person appointment.     Video visits are billed at different rates depending on your insurance coverage. Please reach out to your insurance provider with any questions.     If during the course of the call the provider feels the appointment is not appropriate, you will not be charged for this service.  Provider has received verbal consent for billable virtual visit from the patient? Yes  Will anyone else be joining your video visit? No    Call initiated at: 1500   Type of Visit Platform Used: [employer] Video  Location of provider: Home  Location of patient: [facility]ICE [facility]  PROGRESS REPORT (CPT 13598)    Patient: Jorge Denton  Date of Service: 4/20/2021  Date of Last Service: 4/15/2021  Referring Physician: Chon Hicks MD  Impressions from evaluation on 2/5/2021 by Chelsey Javed (voice), M.S., CCC-SLP:  Based on today's perceptual evaluation and therapy stimuli, it would seem likely that Janenes dyspnea on exertion has been due to both poor laryngeal mechanics (Vocal Cord Dysfunction, 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 His reported diagnosis of Lyme's Disease in fall 2019 may have further impacted his symptoms due to systemic inflammatory processes.    SUBJECTIVE:  Since Jorge's last session, he  "reports the following:   o Overall, every day seems to be different. Some days seem to be okay and other days he puts himself on vocal rest all day.   o Last week he had difficulty swallowing and felt like the muscles in his throat gave out. A piece of bread felt like it was lodged in his throat (points at sternal notch) and he wasn't able to get it to move despite drinking water and swallowing repeatedly. It took about 20 seconds before it moved.   o He eventually took an advil a few days ago due to the pain and discomfort in his throat. He's able to talk a little bit, but singing \"even one note\" will really cause things to flare up.   o He has been sleeping with his mattress on pillows to elevate the head which helps intermittently. Gargling plain water seems to help, but he hasn't obtained the ingredients for salt & baking soda yet. He stopped drinking apple cider vinegar and continues taking his Prilosec.    OBJECTIVE:  Patient Specific Goal Metrics:  Dysponia SLP Goals 2/5/2021 2/16/2021 3/9/2021   How much effort is it to speak, if 0 is no extra effort and 10 is maximum effort? 9 8 6   How much effort is it to sing, if 0 is no extra effort and 10 is maximum effort? 10 10 8   How would you rate your breathing, if 0 is the worst and 10 is the best? 9 9 2   How much does your voice problem bother you? Very Much Very Much Quite a bit   How much does your breathing problem bother you?         Very much Very much Very much       THERAPEUTIC ACTIVITIES  Today Jorge participated in the following therapeutic activities:  Counseling and Education:    Asked questions about the nature of his symptoms, and I answered all of these thoroughly.    We discussed the complexity of symptoms neck and throat impairments, and the likely overlapping and intertwining nature of his impairments    I explained that attempted therapy strategies are unlikely to eliminate all of Domenico's discomfort at once, and that he may need to be patient " "as we target 1 area of discomfort before moving onto another    Domenico verbalized understanding after this explanation and discussion was completed    I provided Jorge with explanation and skilled instruction of:  A single strategy to help reduce internal throat discomfort and irritation, specifically gargling a mixture of salt and baking soda with warm water periodically throughout the day    This strategy has previously been recommended but Domenico has struggled to obtain the necessary ingredients and utilize this strategy    I have Domenico go to the kitchen during today's visit and obtained salt and baking soda with a small amount of warm water, and gargle with a mixture at the beginning and end of today's session    Hopefully having the mixture now obtained will facilitate ongoing use of gargling periodically throughout the day as Domenico has repeatedly described pain and discomfort in his throat at each session  Resonant Voice Therapy (RVT) exercises to promote forward locus of resonance and optimized pattern of laryngeal adduction  ? Easy descending glide on /m/ and /n/ utilized in conjunction with relaxed jaw, tongue, and lightly closed lips to facilitate forward resonant sound  ? Use of the carrier phrase \"hmm\" instructed to promote generalization to everyday speech  ? Syllable level exercises featuring nasal continuant loaded stimuli  ? Special care was taken to maintain sense of open pharyngeal space for broadened resonance in conjunction with forward resonant principles  ? Use of inhalation to \"set the instrument\" for the remainder of the statement was facilitating.  ? Phrase level exercises featuring nasal continuants in more complex phonemic contexts were employed    Good accuracy with moderate support    Tendency towards lower than optimal pitch    Discussion and demonstration of lower pitch versus lower resonance versus a pressed resonance were provided  Coordination and structural positioning of the oral mechanism " was provided including at rest breathing posture and initial swallow posture    Instructed Domenico in a method to reduce jaw tension and misalignment with chewing and swallowing by using the tongue as a third stability point    This tongue posture was then instructed in at rest breathing with the goal of reducing overall perilaryngeal hyperfunction    Domenico endorsed feeling this reduced tension in his neck and jaw    Application of this posture was provided with small sips of liquid and swallowing his own saliva  Instruction of Home Practice:    A revised regimen for home practice was instructed.    I provided an AVS of important notes of today's therapeutic activities to facilitate practice.    ASSESSMENT/PLAN  Progress toward long-term goals:  Modest progress to date, therapeutic methods have been altered to account for this; please see above    Impressions:  IMPRESSIONS: (Vocal Cord Dysfunction, J38.3) and non-optimal respiratory mechanics (Dyspnea on Exertion, R06.02), likely due in part to underlying Dysphonia (R49.0)      Jorge had a productive session of therapy today, working on salt and baking soda gargle, forward resonance tasks targeting a slightly elevated habitual pitch with reduced pressed phonation, and improved structural coordination and posture of the oral mechanism both at rest and during swallowing.  We also spent a considerable portion of today's session discussing the various components of Paul discomfort, specifically focusing on the need to focus on a single element while realizing that other components may continue feeling uncomfortable.  He will continue to work on his exercises on a daily basis, and work on incorporating the techniques into his daily activities. Speech therapy continues to be medically necessary for Jorge to participate fully and engage in activities of daily living.     Plan:   I will see Jorge in 1 week and will plan to continue targeting forward resonance in M/N initial  words with a D3 target pitch.  I will also check to see whether Domenico has been implementing his salt and baking soda gargle periodically.  For practice goals, see AVS.     TOTAL SERVICE TIME: 60 minutes  TREATMENT (96311)  NO CHARGE FACILITY FEE    Chelsey Javed (voice), M.S., CCC-SLP  Speech-Language Pathologist  [facility]  [phone]  [email or web address]  Pronouns: she/her/hers      *this report was created in part through the use of computerized dictation software, and though reviewed following completion, some typographic errors may persist.  If there is confusion regarding any of this notes contents, please contact me for clarification      Again, thank you for allowing me to participate in the care of your patient.      Sincerely,    Dora Ley, SLP

## 2021-04-20 NOTE — PROGRESS NOTES
Jorge Denton is a 25 year old male who is being evaluated via a billable video visit.      Jorge has been notified and verbally consented to the following:     This video visit will be conducted between you and your provider.    Patient has opted to conduct today's video visit vs an in-person appointment.     Video visits are billed at different rates depending on your insurance coverage. Please reach out to your insurance provider with any questions.     If during the course of the call the provider feels the appointment is not appropriate, you will not be charged for this service.  Provider has received verbal consent for billable virtual visit from the patient? Yes  Will anyone else be joining your video visit? No    Call initiated at: 1500   Type of Visit Platform Used: ROKT Video  Location of provider: Home  Location of patient: [facility]  PROGRESS REPORT (CPT 56978)    Patient: Jorge Denton  Date of Service: 4/20/2021  Date of Last Service: 4/15/2021  Referring Physician: Chon Hicks MD  Impressions from evaluation on 2/5/2021 by Chelsey Javed (voice), M.S., CCC-SLP:  Based on today's perceptual evaluation and therapy stimuli, it would seem likely that Janenes dyspnea on exertion has been due to both poor laryngeal mechanics (Vocal Cord Dysfunction, 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 His reported diagnosis of Lyme's Disease in fall 2019 may have further impacted his symptoms due to systemic inflammatory processes.    SUBJECTIVE:  Since Jorge's last session, he reports the following:   o Overall, every day seems to be different. Some days seem to be okay and other days he puts himself on vocal rest all day.   o Last week he had difficulty swallowing and felt like the muscles in his throat gave out. A piece of bread felt like it was lodged in his throat (points at  "sternal notch) and he wasn't able to get it to move despite drinking water and swallowing repeatedly. It took about 20 seconds before it moved.   o He eventually took an advil a few days ago due to the pain and discomfort in his throat. He's able to talk a little bit, but singing \"even one note\" will really cause things to flare up.   o He has been sleeping with his mattress on pillows to elevate the head which helps intermittently. Gargling plain water seems to help, but he hasn't obtained the ingredients for salt & baking soda yet. He stopped drinking apple cider vinegar and continues taking his Prilosec.    OBJECTIVE:  Patient Specific Goal Metrics:  Dysponia SLP Goals 2/5/2021 2/16/2021 3/9/2021   How much effort is it to speak, if 0 is no extra effort and 10 is maximum effort? 9 8 6   How much effort is it to sing, if 0 is no extra effort and 10 is maximum effort? 10 10 8   How would you rate your breathing, if 0 is the worst and 10 is the best? 9 9 2   How much does your voice problem bother you? Very Much Very Much Quite a bit   How much does your breathing problem bother you?         Very much Very much Very much       THERAPEUTIC ACTIVITIES  Today Jorge participated in the following therapeutic activities:  Counseling and Education:    Asked questions about the nature of his symptoms, and I answered all of these thoroughly.    We discussed the complexity of symptoms neck and throat impairments, and the likely overlapping and intertwining nature of his impairments    I explained that attempted therapy strategies are unlikely to eliminate all of Domenico's discomfort at once, and that he may need to be patient as we target 1 area of discomfort before moving onto another    Domenico verbalized understanding after this explanation and discussion was completed    I provided Jorge with explanation and skilled instruction of:  A single strategy to help reduce internal throat discomfort and irritation, specifically " "gargling a mixture of salt and baking soda with warm water periodically throughout the day    This strategy has previously been recommended but Domenico has struggled to obtain the necessary ingredients and utilize this strategy    I have Domenico go to the kitchen during today's visit and obtained salt and baking soda with a small amount of warm water, and gargle with a mixture at the beginning and end of today's session    Hopefully having the mixture now obtained will facilitate ongoing use of gargling periodically throughout the day as Domenico has repeatedly described pain and discomfort in his throat at each session  Resonant Voice Therapy (RVT) exercises to promote forward locus of resonance and optimized pattern of laryngeal adduction  ? Easy descending glide on /m/ and /n/ utilized in conjunction with relaxed jaw, tongue, and lightly closed lips to facilitate forward resonant sound  ? Use of the carrier phrase \"hmm\" instructed to promote generalization to everyday speech  ? Syllable level exercises featuring nasal continuant loaded stimuli  ? Special care was taken to maintain sense of open pharyngeal space for broadened resonance in conjunction with forward resonant principles  ? Use of inhalation to \"set the instrument\" for the remainder of the statement was facilitating.  ? Phrase level exercises featuring nasal continuants in more complex phonemic contexts were employed    Good accuracy with moderate support    Tendency towards lower than optimal pitch    Discussion and demonstration of lower pitch versus lower resonance versus a pressed resonance were provided  Coordination and structural positioning of the oral mechanism was provided including at rest breathing posture and initial swallow posture    Instructed Domenico in a method to reduce jaw tension and misalignment with chewing and swallowing by using the tongue as a third stability point    This tongue posture was then instructed in at rest breathing with the goal " of reducing overall perilaryngeal hyperfunction    Domenico endorsed feeling this reduced tension in his neck and jaw    Application of this posture was provided with small sips of liquid and swallowing his own saliva  Instruction of Home Practice:    A revised regimen for home practice was instructed.    I provided an AVS of important notes of today's therapeutic activities to facilitate practice.    ASSESSMENT/PLAN  Progress toward long-term goals:  Modest progress to date, therapeutic methods have been altered to account for this; please see above    Impressions:  IMPRESSIONS: (Vocal Cord Dysfunction, J38.3) and non-optimal respiratory mechanics (Dyspnea on Exertion, R06.02), likely due in part to underlying Dysphonia (R49.0)      Jorge had a productive session of therapy today, working on salt and baking soda gargle, forward resonance tasks targeting a slightly elevated habitual pitch with reduced pressed phonation, and improved structural coordination and posture of the oral mechanism both at rest and during swallowing.  We also spent a considerable portion of today's session discussing the various components of Paul discomfort, specifically focusing on the need to focus on a single element while realizing that other components may continue feeling uncomfortable.  He will continue to work on his exercises on a daily basis, and work on incorporating the techniques into his daily activities. Speech therapy continues to be medically necessary for Jorge to participate fully and engage in activities of daily living.     Plan:   I will see Jorge in 1 week and will plan to continue targeting forward resonance in M/N initial words with a D3 target pitch.  I will also check to see whether Domenico has been implementing his salt and baking soda gargle periodically.  For practice goals, see AVS.     TOTAL SERVICE TIME: 60 minutes  TREATMENT (93492)  NO CHARGE FACILITY FEE    Chelsey Javed (voice), M.S.,  CCC-SLP  Speech-Language Pathologist  [facility]  [phone]  [email or web address]  Pronouns: she/her/hers      *this report was created in part through the use of computerized dictation software, and though reviewed following completion, some typographic errors may persist.  If there is confusion regarding any of this notes contents, please contact me for clarification

## 2021-04-20 NOTE — PATIENT INSTRUCTIONS
"When you have bad reflux:     Gargle plain water frequently if it's what you have.    Gargle warm water with 1/4-1/2 tsp of mixture if you can (mixture = equal parts salt & baking soda)    You can fill a water bottle with water and the salt/baking soda mixture (1/4-1/2 tsp per 8 oz) and have it next to you to sip & gargle throughout the day (spit it back out when you're done).    Stop drinking apple cider vinegar    Keep taking your prilosec about 30-60 minutes before dinner.     Alkaline water: call your local [employer] and see if they have the alkalizing machine where you can bring your own container. Sip & gargle about 6 oz in the evening.     Elevate the head of your mattress or at least your body during the night. It's important that your sternal notch is higher than your stomach. Check out the [employer] mattress elevator wedge.     Consider seeing a GI specialist to better address your reflux issues.      DEACTIVATION (as needed throughout the day)  1. Tilt your head straight back, lifting your chin toward the ceiling. Focus on a stretch along the center front of your neck. Breathe and hold for 2 minutes.   2. Keep lifting your chin upward, but add a slight tilt toward your right. Breathe and hold for 2 minutes. You can add a gentle downward stretch with your fingertips.   3. Keep lifting your chin upward, but add a slight tilt toward your left. Breathe and hold for 2 minutes. You can add a gentle downward stretch with your fingertips.   4. Sit with your head neutral. Find the chewing muscle/masseter. Gently massage tiny circles with your fingertips or knuckles, hold steady gentle pressure in a single spot if you find a knot, or gently slide your knuckles from cheekbone downward allowing your jaw to open slightly in appropriate. Keep your mouth hanging open slack, like you're sleeping in a car. If it helps, you can tilt your head back slightly and say \"va va va\" and then let your jaw hang slack.   5. Sticky frog " "one hand to your sternum at the base of your throat with a downward angle, then use your other hand to grab the back of your neck like a kitten with an upward stretch. You can play with the angle depending on what feels best for you, hold and breathe for 2 minutes.   6. Use your thumb and the flat part of your pointer to gently pinch and hold your SCM (sternocleidomastoid) muscle. Start at the top, just behind your ear, where the SCM attaches to the bump on your skull behind your ear, and work your way down and around to the front. Be gentle and don't pinch hard. You can add a little side to side kneading motion.  7. Repeat on the other side.   8. Tilt your right ear toward your right shoulder, then rotate your chin upward. Place your left arm behind your back. Breathe and hold for 2 minutes.   9. Tilt your left ear toward your left shoulder, then rotate your chin upward. Place your right arm behind your back. Breathe and hold for 2 minutes.   10. Roll a bath towel into a cylinder. Place it behind your spine, between your shoulder blades. Lay on your back and let your arms relax out to each side. Combine this with your belly breathing, rounded lip inhale and pulsed \"sh sh sh\" exhale.     Apply some cold/ice after your stretches/massage to help reduce inflammation.  10. Gargle a sip of water  11. Gently sniff in and blow out through a fist so that you feel your throat stretching on the inside.     Try sipping or gargling ice water throughout the day if it's helpful.     VOICE EXERCISES 5x/day  Lay on your back OR sit/stand with your arms stretched behind your back:  1. Place your hands on your lower belly  2. Feel it expand as you breathe in through rounded lips  3. Pulse \"sh sh sh sh...\" to feel your abs tucking up and in. You only need to exhale a comfortable amount, no need to squeeze the last 20% out.   4. Repeat 5 x.     1. Feel your belly fill as you inhale through rounded lips.  2. Gently blow with a slight " "puff in your cheeks and lips with a \"whooo\".  3. Make sure the effort level is very low.     Air only 3x    Gentle sigh 3x (does not have to be long)    Steady pitch 3x, 5 seconds each    Mid-to-low pitch 3x, 5 seconds each    Mid-to-high pitch 3x, 5 seconds each  Double-check that the effort level is low and your abs are doing the work.    VOICE EXERCISES (periodically throughout the day, no longer then 1-2 minutes at a time, no more frequently than every 30 minutes)  1. Gently hum on \"hmm\" with a puff of air out your nose at the beginning, on D3/W, chew/scrunch/tongue circles and focus on a buzzy, twangy feeling in your nose  2. Do 3-5 hums   3. Hmm... mememe, neeneenee, moomoomoo, newnewnew, nonono, momomo, nanana, mamama, mymymy, nynyny  Double-check that you aren't sliding up to the D3 and that you're starting with air puffing out your nose.     Swallow Coordination  1. Suction your tongue to the roof of your mouth. Slowly glide your jaw open and closed (only a comfortable amount) 10 times.   2. Gently touch the tip of your tongue to the roof of your mouth, just behind your top two teeth. Relax your jaw and breathe, holding for 20 seconds every hour.   3. Now touch the tip to \"the spot\", keep your teeth together, and focus on suctioning all the saliva from between your teeth/cheeks and collect it on the center of your tongue like a bowl. When you're ready, focus on a wave/worm-like motion of your tongue squeezing upward in a front-to-back propulsion.   4. Now repeat #3, but with tiny sips/spoonfuls of liquid.     For now, take smaller bites of solids. Chew extra well, then collect everything into the center of your tongue, then do the wave. Also try to use as many sauces, gravies, moist add-ons to make solids slide easier. Bread, meat, and crumbly things like peanuts and popcorn are particularly in need of some additional liquid.  "

## 2021-04-21 ENCOUNTER — TRANSFERRED RECORDS (OUTPATIENT)
Dept: HEALTH INFORMATION MANAGEMENT | Facility: CLINIC | Age: 26
End: 2021-04-21

## 2021-04-29 ENCOUNTER — VIRTUAL VISIT (OUTPATIENT)
Dept: OTOLARYNGOLOGY | Facility: CLINIC | Age: 26
End: 2021-04-29
Payer: COMMERCIAL

## 2021-04-29 DIAGNOSIS — R06.09 DYSPNEA ON EXERTION: ICD-10-CM

## 2021-04-29 DIAGNOSIS — R06.09 EXERTIONAL DYSPNEA: ICD-10-CM

## 2021-04-29 DIAGNOSIS — J38.3 VOCAL CORD DYSFUNCTION: Primary | ICD-10-CM

## 2021-04-29 DIAGNOSIS — J38.7 LARYNGEAL HYPERFUNCTION: ICD-10-CM

## 2021-04-29 DIAGNOSIS — R49.0 DYSPHONIA: ICD-10-CM

## 2021-04-29 PROCEDURE — 99207 PR NO CHARGE LOS: CPT | Performed by: SPEECH-LANGUAGE PATHOLOGIST

## 2021-04-29 PROCEDURE — 92507 TX SP LANG VOICE COMM INDIV: CPT | Mod: GN | Performed by: SPEECH-LANGUAGE PATHOLOGIST

## 2021-04-29 NOTE — PATIENT INSTRUCTIONS
"When you have bad reflux:     Gargle plain water frequently if it's what you have.    Gargle warm water with 1/4-1/2 tsp of mixture if you can (mixture = equal parts salt & baking soda)    You can fill a water bottle with water and the salt/baking soda mixture (1/4-1/2 tsp per 8 oz) and have it next to you to sip & gargle throughout the day (spit it back out when you're done).    Stop drinking apple cider vinegar    Keep taking your prilosec about 30-60 minutes before dinner.     Alkaline water: call your local [employer] and see if they have the alkalizing machine where you can bring your own container. Sip & gargle about 6 oz in the evening.     Elevate the head of your mattress or at least your body during the night. It's important that your sternal notch is higher than your stomach. Check out the [employer] mattress elevator wedge.     Consider seeing a GI specialist to better address your reflux issues.      DEACTIVATION (as needed throughout the day)  1. Tilt your head straight back, lifting your chin toward the ceiling. Focus on a stretch along the center front of your neck. Breathe and hold for 2 minutes.   2. Keep lifting your chin upward, but add a slight tilt toward your right. Breathe and hold for 2 minutes. You can add a gentle downward stretch with your fingertips.   3. Keep lifting your chin upward, but add a slight tilt toward your left. Breathe and hold for 2 minutes. You can add a gentle downward stretch with your fingertips.   4. Sit with your head neutral. Find the chewing muscle/masseter. Gently massage tiny circles with your fingertips or knuckles, hold steady gentle pressure in a single spot if you find a knot, or gently slide your knuckles from cheekbone downward allowing your jaw to open slightly in appropriate. Keep your mouth hanging open slack, like you're sleeping in a car. If it helps, you can tilt your head back slightly and say \"va va va\" and then let your jaw hang slack.   5. Sticky frog " "one hand to your sternum at the base of your throat with a downward angle, then use your other hand to grab the back of your neck like a kitten with an upward stretch. You can play with the angle depending on what feels best for you, hold and breathe for 2 minutes.   6. Use your thumb and the flat part of your pointer to gently pinch and hold your SCM (sternocleidomastoid) muscle. Start at the top, just behind your ear, where the SCM attaches to the bump on your skull behind your ear, and work your way down and around to the front. Be gentle and don't pinch hard. You can add a little side to side kneading motion.  7. Repeat on the other side.   8. Tilt your right ear toward your right shoulder, then rotate your chin upward. Place your left arm behind your back. Breathe and hold for 2 minutes.   9. Tilt your left ear toward your left shoulder, then rotate your chin upward. Place your right arm behind your back. Breathe and hold for 2 minutes.   10. Roll a bath towel into a cylinder. Place it behind your spine, between your shoulder blades. Lay on your back and let your arms relax out to each side. Combine this with your belly breathing, rounded lip inhale and pulsed \"sh sh sh\" exhale.     Apply some cold/ice after your stretches/massage to help reduce inflammation.  10. Gargle a sip of water  11. Gently sniff in and blow out through a fist so that you feel your throat stretching on the inside.     Try sipping or gargling ice water throughout the day if it's helpful.     VOICE EXERCISES (periodically throughout the day, no longer then 1-2 minutes at a time, no more frequently than every 30 minutes)  1. Start with a yawn then gently make a twangy, nasal airplane noise on \"Nnnn\" with a downward massage of your voicebox, do 3-5 hums   3. Nnn... neeneenee, newnewnew, nonono, nanana, nynyny  4. Nnn...noon, nine, name, noun, kneeling, Look up the N section of the dictionary and go through shorter words  Double-check that " "you aren't sliding up to the D3 and that you're starting with air puffing out your nose.      Swallow Coordination  1. Suction your tongue to the roof of your mouth. Slowly glide your jaw open and closed (only a comfortable amount) 10 times.   2. Gently touch the tip of your tongue to the roof of your mouth, just behind your top two teeth. Relax your jaw and breathe, holding for 20 seconds every hour.   3. Now touch the tip to \"the spot\", keep your teeth together, and focus on suctioning all the saliva from between your teeth/cheeks and collect it on the center of your tongue like a bowl. When you're ready, focus on a wave/worm-like motion of your tongue squeezing upward in a front-to-back propulsion.   4. Now repeat #3, but with tiny sips/spoonfuls of liquid.     For now, take smaller bites of solids. Chew extra well, then collect everything into the center of your tongue, then do the wave. Also try to use as many sauces, gravies, moist add-ons to make solids slide easier. Bread, meat, and crumbly things like peanuts and popcorn are particularly in need of some additional liquid.  "

## 2021-04-29 NOTE — LETTER
4/29/2021       RE: Jorge Denton  [address]  [place] [address]     Dear Colleague,    Thank you for referring your patient, Jorge Denton, to the [facility] [place] [facility]. Please see a copy of my visit note below.    Jorge Denton is a 25 year old male who is being evaluated via a billable video visit.      Jorge has been notified and verbally consented to the following:     This video visit will be conducted between you and your provider.    Patient has opted to conduct today's video visit vs an in-person appointment.     Video visits are billed at different rates depending on your insurance coverage. Please reach out to your insurance provider with any questions.     If during the course of the call the provider feels the appointment is not appropriate, you will not be charged for this service.  Provider has received verbal consent for billable virtual visit from the patient? Yes  Will anyone else be joining your video visit? No    Call initiated at: 1000   Type of Visit Platform Used: [employer] Video  Location of provider: Home  Location of patient: [facility] VOICE CLINIC  PROGRESS REPORT (CPT 55129)    Patient: Jorge Denton  Date of Service: 4/29/2021  Date of Last Service: 4/20/2021  Referring Physician: Chon Hicks MD  Impressions from evaluation on 2/5/2021 by Chelsey Javed (voice), M.S., CCC-SLP:  Based on today's perceptual evaluation and therapy stimuli, it would seem likely that Janenes dyspnea on exertion has been due to both poor laryngeal mechanics (Vocal Cord Dysfunction, 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 His reported diagnosis of Lyme's Disease in fall 2019 may have further impacted his symptoms due to systemic inflammatory processes.    SUBJECTIVE:  Since Jorge's last session, he  "reports the following:   o Overall symptoms are very bad. He reports he's been doing his therapy all day and reports he's tried changing how he talks or not talking at all, but continues to be very frustrated and angry about his voice and ongoing pain in his throat.   o He finally took an advil last night and reports his voice sounded clear and easy for 3 hours, with less pain, but then it wore off and \"cinched back up.\" He reports being excited that he could participate in conversation during those 3 hours rather than avoiding contributing to conversation like he has been.   o He reports focusing on breathing into his belly and back, doing the hums and forward resonant tasks at a higher pitch, but he hates how it sounds.     OBJECTIVE:  Patient Specific Goal Metrics:  Dysponia SLP Goals 2/5/2021 2/16/2021 3/9/2021   How much effort is it to speak, if 0 is no extra effort and 10 is maximum effort? 9 8 6   How much effort is it to sing, if 0 is no extra effort and 10 is maximum effort? 10 10 8   How would you rate your breathing, if 0 is the worst and 10 is the best? 9 9 2   How much does your voice problem bother you? Very Much Very Much Quite a bit   How much does your breathing problem bother you?         Very much Very much Very much       THERAPEUTIC ACTIVITIES  Today Jorge participated in the following therapeutic activities:  Counseling and Education:    Asked questions about the nature of his symptoms, and I answered all of these thoroughly.    Discussed Domenico's concern that tasks attempted in therapy seem to aggravate his symptoms, but that there are likely multiple components to his symptoms and we may need to address one area at a time.     Discussed potentially having Domenico see my colleague, Seymour Barfield, CCC-SLP in order to have a male voice model. Domenico is open to this suggestion.     I provided Jorge with explanation and skilled instruction of:  Resonant Voice Therapy (RVT) exercises to promote forward " "locus of resonance and optimized pattern of laryngeal adduction  ? Easy descending glide on /n/ utilized in conjunction with relaxed jaw, tongue, and lightly closed lips to facilitate forward resonant sound  ? Use of the carrier phrase \"mmhmm\" instructed to promote generalization to everyday speech  ? Word level exercises featuring nasal continuant loaded stimuli  ? Special care was taken to maintain sense of open pharyngeal space for broadened resonance in conjunction with forward resonant principles  ? Use of inhalation to \"set the instrument\" for the remainder of the statement was facilitating.  ? Phrase level exercises featuring nasal continuants in more complex phonemic contexts were employed    Good accuracy with moderate support    Tendency towards lower than optimal pitch    Instruction of Home Practice:    A revised regimen for home practice was instructed.    I provided an AVS of important notes of today's therapeutic activities to facilitate practice.    ASSESSMENT/PLAN  Progress toward long-term goals:  Modest progress to date, therapeutic methods have been altered to account for this; please see above    Impressions:  IMPRESSIONS: (Vocal Cord Dysfunction, J38.3) and non-optimal respiratory mechanics (Dyspnea on Exertion, R06.02), likely due in part to underlying Dysphonia (R49.0)      Jorge had a limited session of therapy today, working on forward resonance tasks and overall elevated habitual pitch.  He will continue to work on his exercises on a daily basis, and work on incorporating the techniques into his daily activities. Speech therapy continues to be medically necessary for Jorge to participate fully and engage in activities of daily living.     Plan:   I will see Jorge in 1 weeks and will plan to discuss possible transition to my colleague Seymour, versus waiting until after his laryngology visit next week, versus other potential interventions such as physical therapy and myofascial release. "   For practice goals, see AVS.     TOTAL SERVICE TIME: 60 minutes  TREATMENT (62940)  NO CHARGE FACILITY FEE    Chelsey Javed (voice), M.S., CCC-SLP  Speech-Language Pathologist  [facility]  [phone]  [email or web address]  Pronouns: she/her/hers      *this report was created in part through the use of computerized dictation software, and though reviewed following completion, some typographic errors may persist.  If there is confusion regarding any of this notes contents, please contact me for clarification      Again, thank you for allowing me to participate in the care of your patient.      Sincerely,    Dora Ley, SLP

## 2021-04-29 NOTE — PROGRESS NOTES
Jorge Denton is a 25 year old male who is being evaluated via a billable video visit.      Jorge has been notified and verbally consented to the following:     This video visit will be conducted between you and your provider.    Patient has opted to conduct today's video visit vs an in-person appointment.     Video visits are billed at different rates depending on your insurance coverage. Please reach out to your insurance provider with any questions.     If during the course of the call the provider feels the appointment is not appropriate, you will not be charged for this service.  Provider has received verbal consent for billable virtual visit from the patient? Yes  Will anyone else be joining your video visit? No    Call initiated at: 1000   Type of Visit Platform Used: [employer] Video  Location of provider: Home  Location of patient: [facility] VOICE CLINIC  PROGRESS REPORT (CPT 44957)    Patient: Jorge Denton  Date of Service: 4/29/2021  Date of Last Service: 4/20/2021  Referring Physician: Chon Hicks MD  Impressions from evaluation on 2/5/2021 by Chelsey Javed (voice), M.S., CCC-SLP:  Based on today's perceptual evaluation and therapy stimuli, it would seem likely that Janenes dyspnea on exertion has been due to both poor laryngeal mechanics (Vocal Cord Dysfunction, 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 His reported diagnosis of Lyme's Disease in fall 2019 may have further impacted his symptoms due to systemic inflammatory processes.    SUBJECTIVE:  Since Jorge's last session, he reports the following:   o Overall symptoms are very bad. He reports he's been doing his therapy all day and reports he's tried changing how he talks or not talking at all, but continues to be very frustrated and angry about his voice and ongoing pain in his throat.   o He finally took an advil last night  "and reports his voice sounded clear and easy for 3 hours, with less pain, but then it wore off and \"cinched back up.\" He reports being excited that he could participate in conversation during those 3 hours rather than avoiding contributing to conversation like he has been.   o He reports focusing on breathing into his belly and back, doing the hums and forward resonant tasks at a higher pitch, but he hates how it sounds.     OBJECTIVE:  Patient Specific Goal Metrics:  Dysponia SLP Goals 2/5/2021 2/16/2021 3/9/2021   How much effort is it to speak, if 0 is no extra effort and 10 is maximum effort? 9 8 6   How much effort is it to sing, if 0 is no extra effort and 10 is maximum effort? 10 10 8   How would you rate your breathing, if 0 is the worst and 10 is the best? 9 9 2   How much does your voice problem bother you? Very Much Very Much Quite a bit   How much does your breathing problem bother you?         Very much Very much Very much       THERAPEUTIC ACTIVITIES  Today Jorge participated in the following therapeutic activities:  Counseling and Education:    Asked questions about the nature of his symptoms, and I answered all of these thoroughly.    Discussed Domenico's concern that tasks attempted in therapy seem to aggravate his symptoms, but that there are likely multiple components to his symptoms and we may need to address one area at a time.     Discussed potentially having Domenico see my colleague, Seymour Barfield, CCC-SLP in order to have a male voice model. Domenico is open to this suggestion.     I provided Jorge with explanation and skilled instruction of:  Resonant Voice Therapy (RVT) exercises to promote forward locus of resonance and optimized pattern of laryngeal adduction  ? Easy descending glide on /n/ utilized in conjunction with relaxed jaw, tongue, and lightly closed lips to facilitate forward resonant sound  ? Use of the carrier phrase \"mmhmm\" instructed to promote generalization to everyday speech  ? Word " "level exercises featuring nasal continuant loaded stimuli  ? Special care was taken to maintain sense of open pharyngeal space for broadened resonance in conjunction with forward resonant principles  ? Use of inhalation to \"set the instrument\" for the remainder of the statement was facilitating.  ? Phrase level exercises featuring nasal continuants in more complex phonemic contexts were employed    Good accuracy with moderate support    Tendency towards lower than optimal pitch    Instruction of Home Practice:    A revised regimen for home practice was instructed.    I provided an AVS of important notes of today's therapeutic activities to facilitate practice.    ASSESSMENT/PLAN  Progress toward long-term goals:  Modest progress to date, therapeutic methods have been altered to account for this; please see above    Impressions:  IMPRESSIONS: (Vocal Cord Dysfunction, J38.3) and non-optimal respiratory mechanics (Dyspnea on Exertion, R06.02), likely due in part to underlying Dysphonia (R49.0)      Jorge had a limited session of therapy today, working on forward resonance tasks and overall elevated habitual pitch.  He will continue to work on his exercises on a daily basis, and work on incorporating the techniques into his daily activities. Speech therapy continues to be medically necessary for Jorge to participate fully and engage in activities of daily living.     Plan:   I will see Jorge in 1 weeks and will plan to discuss possible transition to my colleague Seymour, versus waiting until after his laryngology visit next week, versus other potential interventions such as physical therapy and myofascial release.   For practice goals, see AVS.     TOTAL SERVICE TIME: 60 minutes  TREATMENT (08521)  NO CHARGE FACILITY FEE    Chelsey Javed (voice), M.S., CCC-SLP  Speech-Language Pathologist  [facility]  [phone]  [email or web address]  Pronouns: she/her/hers      *this report was created in part " through the use of computerized dictation software, and though reviewed following completion, some typographic errors may persist.  If there is confusion regarding any of this notes contents, please contact me for clarification

## 2021-04-29 NOTE — TELEPHONE ENCOUNTER
FUTURE VISIT INFORMATION      FUTURE VISIT INFORMATION:    Date: 5/14/2021    Time: 8AM    Location: [facility]  REFERRAL INFORMATION:    Referring provider:      Referring providers clinic:      Reason for visit/diagnosis  Ref'd for Vocal Cord Dysfunction,non-optimal respiratory mechanics (Dyspnea on Exertion), likely due in part to underlying Dysphonia per pt    RECORDS REQUESTED FROM:       Clinic name Comments Records Status Imaging Status   ealth 2/5/21 to  ([facility]) 5/25/21 - SPEECH OVs with ANA LUISA Javed [facility] 8/11/20 - ENT OV with Dr. Hicks Scanned In     [facility] 3/3/21 - GI TV with Sabrina Acharya NP Care Everywhere     [facility] 6/13/20 - UC OV with Dr. Chavez Care Everywhere     [facility] - Imaging 6/13/20 - XR Chest Care Everywhere 4/8 Req - In PACs   [facility] 4/4/21 - ED OV with KAREN Carey  8/10/20 - ED OV with Dr. Downey Care Everywhere     [facility] - Imaging 12/11/19 - XR Chest Care Everywhere 4/8 Req - In PACs   [facility] 12/6/19 - ED OV with Dr. Paul Care Everywhere     [facility] - Imaging 12/6/19 - XR Neck  12/5/19 - XR Chest  11/30/19 - XR Chest  6/4/19 - XR Chest Care Everywhere 4/8 Req - In PACs

## 2021-05-04 ENCOUNTER — VIRTUAL VISIT (OUTPATIENT)
Dept: OTOLARYNGOLOGY | Facility: CLINIC | Age: 26
End: 2021-05-04
Payer: COMMERCIAL

## 2021-05-04 DIAGNOSIS — J38.3 VOCAL CORD DYSFUNCTION: Primary | ICD-10-CM

## 2021-05-04 DIAGNOSIS — J38.7 LARYNGEAL HYPERFUNCTION: ICD-10-CM

## 2021-05-04 DIAGNOSIS — R49.0 DYSPHONIA: ICD-10-CM

## 2021-05-04 DIAGNOSIS — R07.0 THROAT PAIN: ICD-10-CM

## 2021-05-04 PROCEDURE — 99207 PR NO BILLABLE SERVICE THIS VISIT: CPT | Performed by: SPEECH-LANGUAGE PATHOLOGIST

## 2021-05-04 NOTE — PROGRESS NOTES
[facility] VOICE CLINIC  Lencho Loomis Jr., M.D., F.A.C.S.  Mariposa Monahan M.D., M.P.H.  Senait Flor M.D.  Stephie Bedolla, Ph.D., CCC-SLP  Kristen Pardo M.M. (voice), M.A., CCC-SLP  Seymour Barfield M.M. (voice), MANNMARIE., CCC-SLP  ELAINE Javed (voice), MShariS., CCC-SLP    [facility]  DISCHARGE SUMMARY    Patient: Jorge Denton  Date of Service: 5/4/2021    DISCHARGE SUMMARY  Jorge Denton has had an initial evaluation on 2/5/2021 and 8 subsequent therapy sessions.  Evaluation showed insufficient airflow, poor coordination of respiration and phonation, laryngeal hyperfunction, and vocal strain, and therefore it was determined to be medically necessary to undergo a course of functional speech therapy.  Unfortunately, Domenico has not found voice therapy to be beneficial at this time given the combination of internal throat pain and external muscular pain in hyperfunction, which have impacted his ability to target 1 area without experiencing discomfort from the other.  He has also required additional cueing and reinforcement of therapy recommendations for accurate and consistent demonstration.    Of note, he has been going to physical therapy for his pelvic floor pain and reports he saw a colleague of his pelvic floor therapy who worked on his throat. She spent about 45-50 minutes working on his neck and surrounding musculature last Friday. He reports feeling like this was very helpful. His effort with speaking has decreased from 10/10 to a 5-6/10 now four days later.     He is in agreement with the plan to hold off on further speech therapy until after his clinic appointment with Dr. Flor and laryngoscopy next Friday, 5/14/21, and will pursue additional physical therapy with the PTs at [facility], who have experience in addressing muscular hyperfunction in the neck and upper body which impacts voice production. After additional sessions of PT, Domenico will then plan to continue voice therapy with my colleague, Seymour  Lico CCC-SLP, as a male voice model may be more beneficial to Domenico. Goals at this time are largely unmet and will be discontinued at this time.  I will plan to allow my colleague to reevaluate Domenico and determine his own treatment goals at the time of clinical evaluation, as sufficient time has passed since my own evaluation and Domenico's symptoms may be largely changed pending outcomes of physical therapy.    I will plan to notify Dr. Flor and Seymour about this plan and see Domenico next Friday in conjunction with Dr. Flor. His remaining sessions with me will be cancelled.     PRIMARY ICD-10 code: Dysphonia (R49.0)  SECONDARY ICD-10 code: Throat Pain (R07.0)  TERTIARY ICD-10 code: Laryngeal Hyperfunction (J38.7)    NO CHARGE FACILITY FEE (17919)    Chelsey Javed (voice), M.S., CCC-SLP  Speech-Language Pathologist  [facility] Voice [facility]  [phone]  [email or web address]  Pronouns: she/her/hers

## 2021-05-04 NOTE — LETTER
5/4/2021       RE: Jorge Denton  [address]  [place] [address]     Dear Colleague,    Thank you for referring your patient, Jorge Denton, to the [facility] VOICE CLINIC [place] at [facility]. Please see a copy of my visit note below.    [facility]  Lencho Loomis Jr., M.D., F.A.C.S.  Mariposa Monahan M.D., M.P.H.  Senait Flor M.D.  Stephie Bedolla, Ph.D., CCC-SLP  Kristen Pardo M.M. (voice), M.A., CCC-SLP  Seymour Barfield M.M. (voice), MANNMARIE., CCC-SLP  ELAINE Javed (voice), M.S., CCC-SLP    [facility] VOICE [facility]  DISCHARGE SUMMARY    Patient: Jorge Denton  Date of Service: 5/4/2021    DISCHARGE SUMMARY  Jorge Denton has had an initial evaluation on 2/5/2021 and 8 subsequent therapy sessions.  Evaluation showed insufficient airflow, poor coordination of respiration and phonation, laryngeal hyperfunction, and vocal strain, and therefore it was determined to be medically necessary to undergo a course of functional speech therapy.  Unfortunately, Domenico has not found voice therapy to be beneficial at this time given the combination of internal throat pain and external muscular pain in hyperfunction, which have impacted his ability to target 1 area without experiencing discomfort from the other.  He has also required additional cueing and reinforcement of therapy recommendations for accurate and consistent demonstration.    Of note, he has been going to physical therapy for his pelvic floor pain and reports he saw a colleague of his pelvic floor therapy who worked on his throat. She spent about 45-50 minutes working on his neck and surrounding musculature last Friday. He reports feeling like this was very helpful. His effort with speaking has decreased from 10/10 to a 5-6/10 now four days later.     He is in agreement with the plan to hold off on further speech therapy until after his clinic appointment with Dr. 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 After additional sessions of PT, Domenico will then plan to continue voice therapy with my colleague, Seymour Barfield, CCC-SLP, as a male voice model may be more beneficial to Domenico. Goals at this time are largely unmet and will be discontinued at this time.  I will plan to allow my colleague to reevaluate Domenico and determine his own treatment goals at the time of clinical evaluation, as sufficient time has passed since my own evaluation and Domenico's symptoms may be largely changed pending outcomes of physical therapy.    I will plan to notify Dr. Flor and Seymour about this plan and see Domenico next Friday in conjunction with Dr. Flor. His remaining sessions with me will be cancelled.     PRIMARY ICD-10 code: Dysphonia (R49.0)  SECONDARY ICD-10 code: Throat Pain (R07.0)  TERTIARY ICD-10 code: Laryngeal Hyperfunction (J38.7)    NO CHARGE FACILITY FEE (74293)    Chelsey Javed (voice), M.S., CCC-SLP  Speech-Language Pathologist  [facility]  [phone]  [email or web address]  Pronouns: she/her/hers        Again, thank you for allowing me to participate in the care of your patient.      Sincerely,    Dora Ley, SLP

## 2021-05-14 ENCOUNTER — PRE VISIT (OUTPATIENT)
Dept: OTOLARYNGOLOGY | Facility: CLINIC | Age: 26
End: 2021-05-14

## 2021-06-01 ENCOUNTER — PRE VISIT (OUTPATIENT)
Dept: OTOLARYNGOLOGY | Facility: CLINIC | Age: 26
End: 2021-06-01
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
## 2021-06-24 ENCOUNTER — VIRTUAL VISIT (OUTPATIENT)
Dept: OTOLARYNGOLOGY | Facility: CLINIC | Age: 26
End: 2021-06-24
Payer: COMMERCIAL

## 2021-06-24 ENCOUNTER — THERAPY VISIT (OUTPATIENT)
Dept: SPEECH THERAPY | Facility: CLINIC | Age: 26
End: 2021-06-24
Payer: COMMERCIAL

## 2021-06-24 ENCOUNTER — OFFICE VISIT (OUTPATIENT)
Dept: OTOLARYNGOLOGY | Facility: CLINIC | Age: 26
End: 2021-06-24
Payer: COMMERCIAL

## 2021-06-24 VITALS
OXYGEN SATURATION: 98 % | HEIGHT: 73 IN | WEIGHT: 203 LBS | HEART RATE: 80 BPM | BODY MASS INDEX: 26.9 KG/M2 | TEMPERATURE: 98.8 F

## 2021-06-24 DIAGNOSIS — R13.10 DYSPHAGIA, UNSPECIFIED TYPE: Primary | ICD-10-CM

## 2021-06-24 DIAGNOSIS — R49.0 DYSPHONIA: Primary | ICD-10-CM

## 2021-06-24 DIAGNOSIS — R07.0 THROAT PAIN: ICD-10-CM

## 2021-06-24 PROCEDURE — 92613 ENDOSCOPY SWALLOW (FEES) I&R: CPT | Performed by: OTOLARYNGOLOGY

## 2021-06-24 PROCEDURE — 92610 EVALUATE SWALLOWING FUNCTION: CPT | Mod: GN | Performed by: SPEECH-LANGUAGE PATHOLOGIST

## 2021-06-24 PROCEDURE — 99207 PR NO CHARGE LOS: CPT | Performed by: SPEECH-LANGUAGE PATHOLOGIST

## 2021-06-24 PROCEDURE — 99203 OFFICE O/P NEW LOW 30 MIN: CPT | Mod: 25 | Performed by: OTOLARYNGOLOGY

## 2021-06-24 PROCEDURE — 92612 ENDOSCOPY SWALLOW (FEES) VID: CPT | Mod: GN | Performed by: OTOLARYNGOLOGY

## 2021-06-24 PROCEDURE — 31579 LARYNGOSCOPY TELESCOPIC: CPT | Performed by: OTOLARYNGOLOGY

## 2021-06-24 PROCEDURE — 92524 BEHAVRAL QUALIT ANALYS VOICE: CPT | Mod: GN | Performed by: SPEECH-LANGUAGE PATHOLOGIST

## 2021-06-24 ASSESSMENT — PAIN SCALES - GENERAL: PAINLEVEL: MODERATE PAIN (5)

## 2021-06-24 ASSESSMENT — MIFFLIN-ST. JEOR: SCORE: 1964.68

## 2021-06-24 NOTE — PATIENT INSTRUCTIONS
1.  You were seen in the ENT Clinic today by . If you have any questions or concerns after your appointment, please call [phone]. Press option #1 for scheduling related needs. Press option #3 for Nurse advice.    2.   has recommended  the following:   - voice therapy    3.  Plan is to return to clinic in 5-6 months      Emma Mackey LPN  [phone]  [facility] - Otolaryngology

## 2021-06-24 NOTE — NURSING NOTE
"Chief Complaint   Patient presents with     Consult     [facility] New       Pulse 80, temperature 98.8  F (37.1  C), temperature source Temporal, height 1.854 m (6' 1\"), weight 92.1 kg (203 lb), SpO2 98 %.    [place] Partida, EMT  "
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
## 2021-06-25 NOTE — PROGRESS NOTES
"     Speech-Language Pathology Department   EVALUATION  [facility]  Clinical Swallow Evaluation Under Endoscopy Completed by MD    06/24/21 1200   General Information   Type Of Visit Initial   Start Of Care Date 06/24/21   Referring Physician Dr. Senait Petty   Orders Evaluate And Treat   Orders Comment Clinical Swallow Evaluation   Medical Diagnosis Dysphagia, unspecified   Precautions/limitations No Known Precautions/limitations   Hearing Functional in 1:1 setting   Pertinent History of Current Problem/OT: Additional Occupational Profile Info Jorge Denton is a 25 year old male with a PMH significant for dysphonia and throat pain. He has been working with Dora Ley CCC-SLP for a course of voice therapy. He presents today in conjunction with ENT clinic visit and is seen per MD request. Reports feeling his throat muscles \"give out\" when he is eating. Endorses feeling of \"choking\" or solid foods getting stuck. Denies recent pnuemonias.    Respiratory Status Room air   Prior Level Of Function Swallowing   Prior Level Of Function Comment Regular textures/thin liquids   General Observations Pt is pleasant and cooperative throughout evaluation   Patient/family Goals To evaluate swallow function given self report of \"choking\"   Clinical Swallow Evaluation   Oral Musculature generally intact   Structural Abnormalities none present   Dentition present and adequate   Mucosal Quality adequate   Mandibular Strength and Mobility intact   Oral Labial Strength and Mobility WFL   Lingual Strength and Mobility WFL   Velar Elevation intact   Buccal Strength and Mobility intact   Laryngeal Function Cough;Swallow;Voicing initiated   Oral Musculature Comments WFL   Additional Documentation Yes   Clinical Swallow Eval: Thin Liquid Texture Trial   Mode of Presentation, Thin Liquids straw;fed by clinician   Volume of Liquid or Food Presented 3oz   Oral Phase of Swallow WFL   Pharyngeal Phase " "of Swallow intact   Diagnostic Statement PO trials evaluated under endoscopy completed by MD. No penetration/aspiration or significant residuals.    Clinical Swallow Eval: Puree Solid Texture Trial   Mode of Presentation, Puree spoon;fed by clinician   Volume of Puree Presented ~3 tablespoons   Oral Phase, Puree WFL   Pharyngeal Phase, Puree intact   Diagnostic Statement PO trials evaluated under endoscopy completed by MD. No penetration/aspiration or significant residuals. Pt reports feeling \"my swallowing muscles are tired.\"    Clinical Swallow Eval: Solid Food Texture Trial   Mode of Presentation, Solid self-fed   Volume of Solid Food Presented 1 Jessica Doone   Oral Phase, Solid WFL   Pharyngeal Phase, Solid other (see comments)  (feeling of difficulty initiating swallow)   Diagnostic Statement PO trials evaluated under endoscopy completed by MD. No penetration/aspiration or significant residuals. Pt endorses feeling difficulty initiating the swallow stating that his swallowing muscles \"feel fatigued.\"    Swallow Compensations   Swallow Compensations No compensations were used   Educational Assessment   Barriers to Learning No barriers   Swallow Eval: Clinical Impressions   Skilled Criteria for Therapy Intervention No problems identified which require skilled intervention   Dysphagia Outcome Severity Scale (SHIRA) Level 6 - SHIRA   Treatment Diagnosis Safe, functional oropharyngeal swallow; suspect intermittent muscle tension dysphagia   Diet texture recommendations Regular diet;Thin liquids  (*pt to self select softer solids)   Recommended Feeding/Eating Techniques alternate between small bites and sips of food/liquid;maintain upright posture during/after eating for 30 mins;small sips/bites   Demonstrates Need for Referral to Another Service other (see comments)  (Voice focused SLP)   Anticipated Discharge Disposition home   Risks and Benefits of Treatment have been explained. Yes   Patient, family and/or staff in " "agreement with Plan of Care Yes   Clinical Impression Comments Pt presents today with a safe and functional oropharyngeal swallow mechanism. Suspect reported intermittent difficulty is likely related to muscle tension dysphagia/dysphonia. Pt assessed with thin liquid, puree and solid PO trials under endoscopy completed by MD. No penetration/aspiration or significant residuals with any trial textures. However, with puree pt reports feeling \"my swallowing muscles are tired.\" During solid trials, pt endorses feeling difficulty initiating the swallow stating swallowing muscles \"feel fatigued.\" Recommend pt continue with regular, moist textures and thin liquids; pt to self select softer solids when he feels reported \"flare ups\" of muscle tightness/discomfort. Trained pt on safe swallow strategies and option for liquid nutritional supplements to aid in caloric intake. Recommend pt work with voice focused SLP for alicia-laryngeal muscle tension reduction and muscle tension dysphonia. Should dysphagia symptoms persist after therapy is completed, swallowing re-evaluation would be warranted.    Total Session Time   SLP Eval: oral/pharyngeal swallow function, clinical minutes (82818) 14   Total Evaluation Time 14     Thank you for the referral of Jorge Denton. If you have any questions about this report, please contact me using the information below.     TYLER Bishop (KAUSHAL white, CCC-SLP   Speech Language Pathologist  [facility] Trained Vocologist   [facility] [place]  Dept. of Otolaryngology  Department of Rehabilitation Services  [address] [place] [id number]  Email: [email or web address]   Pronouns: she/her/hers    "

## 2021-07-08 ENCOUNTER — DOCUMENTATION ONLY (OUTPATIENT)
Dept: OTOLARYNGOLOGY | Facility: CLINIC | Age: 26
End: 2021-07-08

## 2021-07-08 DIAGNOSIS — R29.898 NECK TIGHTNESS: ICD-10-CM

## 2021-07-08 DIAGNOSIS — R07.0 THROAT PAIN: Primary | ICD-10-CM

## 2021-07-08 NOTE — PROGRESS NOTES
physicial therapy order for myofacial release faxed to [phone]. 2 [facility] faxed.

## 2021-07-12 ENCOUNTER — TELEPHONE (OUTPATIENT)
Dept: OTOLARYNGOLOGY | Facility: CLINIC | Age: 26
End: 2021-07-12

## 2021-07-12 NOTE — TELEPHONE ENCOUNTER
This order has been faxed by Emma Mackey LPN on 7/8/21    Called patient to confirm this information

## 2021-07-12 NOTE — CONFIDENTIAL NOTE
[facility] Call Center    Phone Message    May a detailed message be left on voicemail: yes     Reason for Call: Pt is seeing physical therapist for my throat and they have been the only thing that helps me and gives me the ability to speak, they would need some sort of updated order or request from you, my current physician who overlooks my vocal care, now that i've switched to seeing you. He is being seen through [facility]     Please fax to [phone]    Action Taken: Message routed to:  [facility] ([facility] ENT    Travel Screening: Not Applicable

## 2021-07-30 ENCOUNTER — VIRTUAL VISIT (OUTPATIENT)
Dept: OTOLARYNGOLOGY | Facility: CLINIC | Age: 26
End: 2021-07-30
Payer: COMMERCIAL

## 2021-07-30 DIAGNOSIS — R07.0 THROAT PAIN: Primary | ICD-10-CM

## 2021-07-30 DIAGNOSIS — J38.7 LARYNGEAL HYPERFUNCTION: ICD-10-CM

## 2021-07-30 DIAGNOSIS — R49.0 DYSPHONIA: ICD-10-CM

## 2021-07-30 PROCEDURE — 92507 TX SP LANG VOICE COMM INDIV: CPT | Mod: GN | Performed by: SPEECH-LANGUAGE PATHOLOGIST

## 2021-07-30 NOTE — PATIENT INSTRUCTIONS
"Hi Domenico,    It was nice to meet you today and get things started.  I have attached handouts that describe the stretching massage exercises that we were utilizing during the session today.  Ultimately as a reiteration the goal is to tie some of the ease you can recognize with manual therapy techniques to the motor pattern associated with voice use and for that reason I would love free to humming gently within the context of stretches and massages after you establish the initial position of the stretch for the initial portion of the massage.  The goal will be to set the stage with ease, and later in voice on exhalation.  Use a hmmm or a neutral vowel like \"uh\" with a focus only on maintaining the same sense of muscular ease you feel from the massage (do not focus on quality or you are likely to start to work too hard).  I had like for you to do this approximately 3 times a day for no more than about 10 minutes at ago.  You can also incorporate the gentle humming with your physical therapy exercises that you continue to do for the same purpose.  I look forward to hearing how things are going the next time I see you.    -Seymour  "

## 2021-07-30 NOTE — PROGRESS NOTES
"Jorge Denton is a 26 year old male who is being evaluated via a billable video visit.      The patient has been notified and verbally consented to the following:     This video visit will be conducted between you and your provider.    Patient has opted to conduct today's video visit vs an in-person appointment, and is not able to attend due to possible exposure to COVID-19.      If during the course of the call the provider feels a video visit is not appropriate, you will not be charged for this service.    Call initiated at: 11:00  Type of Video Platform Used: [employer]  Location of provider: Residence  Location of patient: Residence    [facility] VOICE CLINIC  THERAPY NOTE (CPT 63676)    Patient: Jorge Denton  Date of Service: 7/30/2021  Referring physician: Dr. Flor  Impressions from most recent evaluation by my colleague Stephie Bedolla, PhD-SLP on 6/24/2021:  \"Dysphonia (R49.0)  Throat Pain (R07.0)  ? Laryngeal evaluation demonstrated obvious muscle tension,. Imbalance, and lack of coordination for all phonatory tasks  ? Perceptual evaluation demonstrated WNL quality; he states this is because it is still morning\"    SUBJECTIVE:  Since the patient's last session, they report the following:     Overall symptoms are about the same    The past 2 weeks were better but then it worsened for no rhyme or reason    OBJECTIVE:  PATIENT REPORTED MEASURES:    Patient Specific Goal Metrics:  Dysponia SLP Goals 2/16/2021 3/9/2021 7/30/2021   How would you rate your speaking voice quality, if 0 is worst voice quality, and 10 is best voice? - - 2   How much effort is it to speak, if 0 is no extra effort and 10 is maximum effort? 8 6 9   How much effort is it to sing, if 0 is no extra effort and 10 is maximum effort? 10 8 -   How how severe is your [Throat Discomfort - or use patient specific description], if 0 is no [discomfort] at all and 10 is the worst [discomfort]? - - 8   How would you rate your breathing, if 0 is the worst " "and 10 is the best? 9 2 -   How much does your voice problem bother you? Very Much Quite a bit Very Much   How much does your breathing problem bother you?         Very much Very much -   How much does your throat discomfort bother you?     - - Very Much     Patient Supplied Answers To Last 2 VHI Questionnaires  Voice Handicap Index (VHI-10) 2/5/2021 7/30/2021   My voice makes it difficult for people to hear me 4 4   People have difficulty understanding me in a noisy room 4 4   My voice difficulties restrict my personal and social life.  4 4   I feel left out of conversations because of my voice 4 4   My voice problem causes me to lose income 0 4   I feel as though I have to strain to produce voice 4 4   The clarity of my voice is unpredictable 4 4   My voice problem upsets me 4 4   My voice makes me feel handicapped 2 4   People ask, \"What's wrong with your voice?\" 3 4   VHI-10 33 40     THERAPEUTIC ACTIVITIES    Counseling and Education:    Emphasized the extensive overlap between the patient's symptoms of voice quality, vocal effort, and throat discomfort and entreated him to focus on delineating changes across any given area as clearly as possible. Also stressed that progress will not be binary in nature (success or failure) but rather gradual movement in a given direction.    Exercises to promote reduced perilaryngeal muscle tension    Four way neck stretch instructed    Modifications for additional extension instructed    Base of tongue stretch instructed    Proper form for each stretch was emphasized, including:    Maintenance of posture for 10 breaths (~20-30 seconds)    Awareness of tension on inhalation with volitional relaxation into the stretch on exhalation    Avoidance of \"forcing\" a posture, only progressing far enough to feel the stretch    He reported awareness of significant tension during anterior and modified anterior lateral stretch    He noted increased sense of muscular relaxation following " completion of the stretches    Humming was incorporated to tie ease to the motor pattern of voicing with a simple form of phonation    Manual Laryngeal massage was performed in combination with gentle forward resonant sounds    Significant tenderness of the thyrohyoid space with corresponding reduction of space     Thyrohyoid space, and base of tongue were targeted with gentle circular massage    Gentle sternocleidomastoid massage was also performed.    Patient was trained to focus on intentional relaxation of jaw and tongue in addition to area of massage during these maneuvers.    Comfortably quiet forward resonant /m/ on descending glides was utilized throughout massage    Self-massage was instructed and patient was able to demonstrate this with acceptable accuracy    A regimen for home practice was instructed.    I provided handouts of today's therapeutic activities to facilitate practice.    ASSESSMENT/PLAN  PROGRESS TOWARD LONG TERM GOALS:   Modest progress to date, therapeutic methods have been altered to account for this; please see above    IMPRESSIONS: Dysphonia increased vocal effort and throat pain in the context of laryngeal hyperfunction and a history of Lyme Disease near the time of onset. Domenico reports that his symptoms are essentially unchanged and the only thing that has offered benefit was PT.  This has since stopped being covered with the previous provider, and benefit that he has received from it was limited in duration.  Given this benefit we began with focus on manual therapy techniques with concurrent use of voice to tie temporary relaxation to target motor patterns. Time will be required to gauge efficacy, but emphasis was placed on carefully evaluating progress vs. Seeking a binary (success or Failure) during their use    PLAN: I will see Jorge in approximately 2 weeks, at which point we will continue to advance reduction of perilaryngeal tension and discomfort with voice use based on  progress in the interim.   For practice goals see AVS.       TOTAL SERVICE TIME: 60 minutes  TREATMENT (47480)  NO CHARGE FACILITY FEE (09492)    Louis Barfield M.M., M.A., CCC-SLP  Speech-Language Pathologist  Certificate of Vocology  [phone]    *this report was created in part through the use of computerized dictation software, and though reviewed following completion, some typographic errors may persist.  If there is confusion regarding any of this notes contents, please contact me for clarification.*

## 2021-07-30 NOTE — LETTER
"7/30/2021       RE: Jorge Denton  [address]  [facility] [address]     Dear Colleague,    Thank you for referring your patient, Jorge Denton, to the [facility] VOICE CLINIC [place] at [facility]. Please see a copy of my visit note below.    Jorge Denton is a 26 year old male who is being evaluated via a billable video visit.      The patient has been notified and verbally consented to the following:     This video visit will be conducted between you and your provider.    Patient has opted to conduct today's video visit vs an in-person appointment, and is not able to attend due to possible exposure to COVID-19.      If during the course of the call the provider feels a video visit is not appropriate, you will not be charged for this service.    Call initiated at: 11:00  Type of Video Platform Used: vLine  Location of provider: Residence  Location of patient: [facility] VOICE [facility]  THERAPY NOTE (CPT 60676)    Patient: Jorge Denton  Date of Service: 7/30/2021  Referring physician: Dr. Flor  Impressions from most recent evaluation by my colleague Stephie Bedolla, PhD-SLP on 6/24/2021:  \"Dysphonia (R49.0)  Throat Pain (R07.0)  ? Laryngeal evaluation demonstrated obvious muscle tension,. Imbalance, and lack of coordination for all phonatory tasks  ? Perceptual evaluation demonstrated WNL quality; he states this is because it is still morning\"    SUBJECTIVE:  Since the patient's last session, they report the following:     Overall symptoms are about the same    The past 2 weeks were better but then it worsened for no rhyme or reason    OBJECTIVE:  PATIENT REPORTED MEASURES:    Patient Specific Goal Metrics:  Dysponia SLP Goals 2/16/2021 3/9/2021 7/30/2021   How would you rate your speaking voice quality, if 0 is worst voice quality, and 10 is best voice? - - 2   How much effort is it to speak, if 0 is no extra effort and 10 is maximum effort? " "8 6 9   How much effort is it to sing, if 0 is no extra effort and 10 is maximum effort? 10 8 -   How how severe is your [Throat Discomfort - or use patient specific description], if 0 is no [discomfort] at all and 10 is the worst [discomfort]? - - 8   How would you rate your breathing, if 0 is the worst and 10 is the best? 9 2 -   How much does your voice problem bother you? Very Much Quite a bit Very Much   How much does your breathing problem bother you?         Very much Very much -   How much does your throat discomfort bother you?     - - Very Much     Patient Supplied Answers To Last 2 VHI Questionnaires  Voice Handicap Index (VHI-10) 2/5/2021 7/30/2021   My voice makes it difficult for people to hear me 4 4   People have difficulty understanding me in a noisy room 4 4   My voice difficulties restrict my personal and social life.  4 4   I feel left out of conversations because of my voice 4 4   My voice problem causes me to lose income 0 4   I feel as though I have to strain to produce voice 4 4   The clarity of my voice is unpredictable 4 4   My voice problem upsets me 4 4   My voice makes me feel handicapped 2 4   People ask, \"What's wrong with your voice?\" 3 4   VHI-10 33 40     THERAPEUTIC ACTIVITIES    Counseling and Education:    Emphasized the extensive overlap between the patient's symptoms of voice quality, vocal effort, and throat discomfort and entreated him to focus on delineating changes across any given area as clearly as possible. Also stressed that progress will not be binary in nature (success or failure) but rather gradual movement in a given direction.    Exercises to promote reduced perilaryngeal muscle tension    Four way neck stretch instructed    Modifications for additional extension instructed    Base of tongue stretch instructed    Proper form for each stretch was emphasized, including:    Maintenance of posture for 10 breaths (~20-30 seconds)    Awareness of tension on inhalation " "with volitional relaxation into the stretch on exhalation    Avoidance of \"forcing\" a posture, only progressing far enough to feel the stretch    He reported awareness of significant tension during anterior and modified anterior lateral stretch    He noted increased sense of muscular relaxation following completion of the stretches    Humming was incorporated to tie ease to the motor pattern of voicing with a simple form of phonation    Manual Laryngeal massage was performed in combination with gentle forward resonant sounds    Significant tenderness of the thyrohyoid space with corresponding reduction of space     Thyrohyoid space, and base of tongue were targeted with gentle circular massage    Gentle sternocleidomastoid massage was also performed.    Patient was trained to focus on intentional relaxation of jaw and tongue in addition to area of massage during these maneuvers.    Comfortably quiet forward resonant /m/ on descending glides was utilized throughout massage    Self-massage was instructed and patient was able to demonstrate this with acceptable accuracy    A regimen for home practice was instructed.    I provided handouts of today's therapeutic activities to facilitate practice.    ASSESSMENT/PLAN  PROGRESS TOWARD LONG TERM GOALS:   Modest progress to date, therapeutic methods have been altered to account for this; please see above    IMPRESSIONS: Dysphonia increased vocal effort and throat pain in the context of laryngeal hyperfunction and a history of Lyme Disease near the time of onset. Domenico reports that his symptoms are essentially unchanged and the only thing that has offered benefit was PT.  This has since stopped being covered with the previous provider, and benefit that he has received from it was limited in duration.  Given this benefit we began with focus on manual therapy techniques with concurrent use of voice to tie temporary relaxation to target motor patterns. Time will be required to " gauge efficacy, but emphasis was placed on carefully evaluating progress vs. Seeking a binary (success or Failure) during their use    PLAN: I will see Jorge in approximately 2 weeks, at which point we will continue to advance reduction of perilaryngeal tension and discomfort with voice use based on progress in the interim.   For practice goals see AVS.       TOTAL SERVICE TIME: 60 minutes  TREATMENT (85263)  NO CHARGE FACILITY FEE (82166)    Louis Barfield M.M., M.A., CCC-SLP  Speech-Language Pathologist  Certificate of Vocology  [phone]    *this report was created in part through the use of computerized dictation software, and though reviewed following completion, some typographic errors may persist.  If there is confusion regarding any of this notes contents, please contact me for clarification.*        Again, thank you for allowing me to participate in the care of your patient.      Sincerely,    Seymour Barfield, SLP

## 2021-08-09 ENCOUNTER — VIRTUAL VISIT (OUTPATIENT)
Dept: OTOLARYNGOLOGY | Facility: CLINIC | Age: 26
End: 2021-08-09
Payer: COMMERCIAL

## 2021-08-09 DIAGNOSIS — R49.0 DYSPHONIA: ICD-10-CM

## 2021-08-09 DIAGNOSIS — J38.7 LARYNGEAL HYPERFUNCTION: ICD-10-CM

## 2021-08-09 DIAGNOSIS — R07.0 THROAT PAIN: Primary | ICD-10-CM

## 2021-08-09 PROCEDURE — 92507 TX SP LANG VOICE COMM INDIV: CPT | Mod: GN | Performed by: SPEECH-LANGUAGE PATHOLOGIST

## 2021-08-09 NOTE — LETTER
"8/9/2021       RE: Jorge Denton  [address]  [facility] [address]     Dear Colleague,    Thank you for referring your patient, Jorge Denton, to the [facility] VOICE CLINIC [place] at [facility]. Please see a copy of my visit note below.    Jorge Denton is a 26 year old male who is being evaluated via a billable video visit.      The patient has been notified and verbally consented to the following:     This video visit will be conducted between you and your provider.    Patient has opted to conduct today's video visit vs an in-person appointment, and is not able to attend due to possible exposure to COVID-19.      If during the course of the call the provider feels a video visit is not appropriate, you will not be charged for this service.    Call initiated at: 2:01    Type of Video Platform Used: [employer]  Location of provider: Residence  Location of patient: [facility] VOICE [facility]  THERAPY NOTE (CPT 32636)    Patient: Jorge Denton  Date of Service: 8/9/2021  Impressions from most recent evaluation by my colleague Stephie Bedolla, PhD-SLP on 6/24/2021:  \"Dysphonia (R49.0)  Throat Pain (R07.0)  ? Laryngeal evaluation demonstrated obvious muscle tension,. Imbalance, and lack of coordination for all phonatory tasks  ? Perceptual evaluation demonstrated WNL quality; he states this is because it is still morning\"    SUBJECTIVE:    **Patient was tentatively for today's appointment as he was returning from the urgent care**    Since the patient's last session, they report the following:     Woke up on Saturday with body aches and it has progressed into a sinus infection sense    Feeling a constant sense of drainage with heavy throat clearing    Has noted improvement with his muscles since he was last seen    He's not sure what made the difference, but does feel that the previously trained stretches and massages with voice use seems to have " helped    Was able to use his voice for singing for quite some time and has not noticed the muscular discomfort as a result which is a great step    OBJECTIVE:  PATIENT REPORTED MEASURES:  Patient Supplied Answers To SLP QOL Questionnaire  Therapy Quality of Life 8/9/2021   Since my l ast session, I used the speech therapy exercises and strategies as recommended by my speech pathologist. Agree strongly   I feel that using my therapy techniques has become a habit Agree   I feel confident in my ability to manage my current and future symptoms. Neither agree nor disagree   Since my last session I feel my symptoms have --------. Improved   Overall, since starting therapy I feel my symptoms are --------. Better   Overall, how much worse? -   Overall, how much better? Moderately better     Patient Specific Goal Metrics:  Dysponia SLP Goals 2/16/2021 3/9/2021 7/30/2021   How would you rate your speaking voice quality, if 0 is worst voice quality, and 10 is best voice? - - 2   How much effort is it to speak, if 0 is no extra effort and 10 is maximum effort? 8 6 9   How much effort is it to sing, if 0 is no extra effort and 10 is maximum effort? 10 8 -   How how severe is your [Throat Discomfort - or use patient specific description], if 0 is no [discomfort] at all and 10 is the worst [discomfort]? - - 8   How would you rate your breathing, if 0 is the worst and 10 is the best? 9 2 -   How much does your voice problem bother you? Very Much Quite a bit Very Much   How much does your breathing problem bother you?         Very much Very much -   How much does your throat discomfort bother you?     - - Very Much     THERAPEUTIC ACTIVITIES    Counseling and Education:    Asked many questions about the nature of their symptoms, and I answered all of these thoroughly.    Instructed concepts and techniques for optimal vocal hygiene including:    Systemic hydration, including strategies for increasing daily water intake    Topical  hydration - Gargling, saline nasal irrigation, humidification, steam, guaifenesin    Environmental barriers to healthy voicing - noise, inhaled irritants, room acoustics    Awareness and reduction of phonotraumatic behaviors    Moderating voice use    Substituting non-voice alternative behaviors    Avoiding cough and throat clearing    Semi-Occluded Vocal Tract (SOVT) exercises instructed to reduce laryngeal tension, promote vocal fold pliability, and coordinate respiration and phonation    /w/ phoneme to promote forward locus of resonance     Application of these techniques to straw phonation and other semiclosed posture is also discussed    Sustained phonation, and voice vs. voiceless productions used to promote easy voicing and raise awareness of laryngeal tension    Ascending and descending glides utilized to promote vocal fold pliability    Instructed to use these exercises as a warm-up / cooldown, and to re-calibrate the voice throughout the day.    Good accuracy with minimal to moderate clinician support      A regimen for home practice was instructed.    I provided handouts of today's therapeutic activities to facilitate practice.    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 Domenico feels that the muscular discomfort that he was experiencing is notably reduced, to the extent that he was able to sing extensively and use his voice without incurring greater discomfort.  In the past few days he contracted what sounds like an upper respiratory infection, but he worries was possibly COVID-19.  He was recently tested but is still feeling the overall effects of the illness.  These manifest in slightly different ways than previous voice issues and we discussed ways of addressing them with hygiene and  semioccluded vocal tract exercises to help in dissipating swelling.    PLAN: I will see Domenico in approximately 2 weeks, at which point we will begin to target optimization of patterns of speaking voice pending ongoing improvement in discomfort.   For practice goals see AVS.       TOTAL SERVICE TIME: 35 minutes  TREATMENT (12197)  NO CHARGE FACILITY FEE (66582)    Louis Barfield M.M., M.A., CCC-SLP  Speech-Language Pathologist  Certificate of Vocology  [phone]    *this report was created in part through the use of computerized dictation software, and though reviewed following completion, some typographic errors may persist.  If there is confusion regarding any of this notes contents, please contact me for clarification.*        Again, thank you for allowing me to participate in the care of your patient.      Sincerely,    Seymour Barfield, SLP

## 2021-08-09 NOTE — PATIENT INSTRUCTIONS
"Hi Domenico,    It is good to see you today and I am glad that the muscular discomfort seems like moving in the right direction.  With regards to your cold we discussed some basic techniques that may be able to move things in the right direction.  I have attached the handout on \"semioccluded vocal tract exercises\" which can be done with any techniques that slightly blocks the airflow at your lips, gaining the most potential benefit from straw in water.  You can do those techniques on and off during your day in small increments (no more than 1 minute at a time 3-5 times a day) and this can help dissipate transient swelling that may come up as a result of increased throat clearing or illness.  I would love for you to gargle the salt water baking soda mixture on and off during your day as well.  The recipe is 1/4 teaspoon of salt and 1/4 teaspoon of baking soda in 8 ounces of water.  You can make this up in a larger bottle and then have it sitting in your bathroom, and just gargle anytime you use the restroom over the course of the day.  You only need to use a couple of sips each time.  You could also use the salt packs that, with the sinus rinse bottles if you would like to try that out.  I do think that it can be beneficial especially to help sinus infections samy more rapidly, but I know that not everyone can tolerate it.  Lastly you can use steam when you feel gunky and find that it is not clearing with sips of liquid.  Breathing the steam through your mouth and humming gently on the way out.  Do this for 2 to 3 minutes and this can give you like up for a while.    Otherwise keep doing what you are doing and I hope you start to feel better soon!    -Data      "

## 2021-08-09 NOTE — PROGRESS NOTES
"Jorge Denton is a 26 year old male who is being evaluated via a billable video visit.      The patient has been notified and verbally consented to the following:     This video visit will be conducted between you and your provider.    Patient has opted to conduct today's video visit vs an in-person appointment, and is not able to attend due to possible exposure to COVID-19.      If during the course of the call the provider feels a video visit is not appropriate, you will not be charged for this service.    Call initiated at: 2:01    Type of Video Platform Used: [employer]  Location of provider: Residence  Location of patient: Residence    [facility] VOICE CLINIC  THERAPY NOTE (CPT 61752)    Patient: Jorge Denton  Date of Service: 8/9/2021  Impressions from most recent evaluation by my colleague Stephie Bedolla, PhD-SLP on 6/24/2021:  \"Dysphonia (R49.0)  Throat Pain (R07.0)  ? Laryngeal evaluation demonstrated obvious muscle tension,. Imbalance, and lack of coordination for all phonatory tasks  ? Perceptual evaluation demonstrated WNL quality; he states this is because it is still morning\"    SUBJECTIVE:    **Patient was tentatively for today's appointment as he was returning from the urgent care**    Since the patient's last session, they report the following:     Woke up on Saturday with body aches and it has progressed into a sinus infection sense    Feeling a constant sense of drainage with heavy throat clearing    Has noted improvement with his muscles since he was last seen    He's not sure what made the difference, but does feel that the previously trained stretches and massages with voice use seems to have helped    Was able to use his voice for singing for quite some time and has not noticed the muscular discomfort as a result which is a great step    OBJECTIVE:  PATIENT REPORTED MEASURES:  Patient Supplied Answers To SLP QOL Questionnaire  Therapy Quality of Life 8/9/2021   Since my l ast session, I used the " speech therapy exercises and strategies as recommended by my speech pathologist. Agree strongly   I feel that using my therapy techniques has become a habit Agree   I feel confident in my ability to manage my current and future symptoms. Neither agree nor disagree   Since my last session I feel my symptoms have --------. Improved   Overall, since starting therapy I feel my symptoms are --------. Better   Overall, how much worse? -   Overall, how much better? Moderately better     Patient Specific Goal Metrics:  Dysponia SLP Goals 2/16/2021 3/9/2021 7/30/2021   How would you rate your speaking voice quality, if 0 is worst voice quality, and 10 is best voice? - - 2   How much effort is it to speak, if 0 is no extra effort and 10 is maximum effort? 8 6 9   How much effort is it to sing, if 0 is no extra effort and 10 is maximum effort? 10 8 -   How how severe is your [Throat Discomfort - or use patient specific description], if 0 is no [discomfort] at all and 10 is the worst [discomfort]? - - 8   How would you rate your breathing, if 0 is the worst and 10 is the best? 9 2 -   How much does your voice problem bother you? Very Much Quite a bit Very Much   How much does your breathing problem bother you?         Very much Very much -   How much does your throat discomfort bother you?     - - Very Much     THERAPEUTIC ACTIVITIES    Counseling and Education:    Asked many questions about the nature of their symptoms, and I answered all of these thoroughly.    Instructed concepts and techniques for optimal vocal hygiene including:    Systemic hydration, including strategies for increasing daily water intake    Topical hydration - Gargling, saline nasal irrigation, humidification, steam, guaifenesin    Environmental barriers to healthy voicing - noise, inhaled irritants, room acoustics    Awareness and reduction of phonotraumatic behaviors    Moderating voice use    Substituting non-voice alternative behaviors    Avoiding  cough and throat clearing    Semi-Occluded Vocal Tract (SOVT) exercises instructed to reduce laryngeal tension, promote vocal fold pliability, and coordinate respiration and phonation    /w/ phoneme to promote forward locus of resonance     Application of these techniques to straw phonation and other semiclosed posture is also discussed    Sustained phonation, and voice vs. voiceless productions used to promote easy voicing and raise awareness of laryngeal tension    Ascending and descending glides utilized to promote vocal fold pliability    Instructed to use these exercises as a warm-up / cooldown, and to re-calibrate the voice throughout the day.    Good accuracy with minimal to moderate clinician support      A regimen for home practice was instructed.    I provided handouts of today's therapeutic activities to facilitate practice.    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 Domenico feels that the muscular discomfort that he was experiencing is notably reduced, to the extent that he was able to sing extensively and use his voice without incurring greater discomfort.  In the past few days he contracted what sounds like an upper respiratory infection, but he worries was possibly COVID-19.  He was recently tested but is still feeling the overall effects of the illness.  These manifest in slightly different ways than previous voice issues and we discussed ways of addressing them with hygiene and semioccluded vocal tract exercises to help in dissipating swelling.    PLAN: I will see Domenico in approximately 2 weeks, at which point we will begin to target optimization of patterns of speaking voice pending ongoing improvement in discomfort.   For practice goals see AVS.       TOTAL SERVICE TIME: 35  minutes  TREATMENT (33701)  NO CHARGE FACILITY FEE (54569)    Louis Barfield M.M., M.A., CCC-SLP  Speech-Language Pathologist  Certificate of Vocology  [phone]    *this report was created in part through the use of computerized dictation software, and though reviewed following completion, some typographic errors may persist.  If there is confusion regarding any of this notes contents, please contact me for clarification.*

## 2021-08-25 ENCOUNTER — VIRTUAL VISIT (OUTPATIENT)
Dept: OTOLARYNGOLOGY | Facility: CLINIC | Age: 26
End: 2021-08-25
Payer: COMMERCIAL

## 2021-08-25 DIAGNOSIS — J38.7 LARYNGEAL HYPERFUNCTION: ICD-10-CM

## 2021-08-25 DIAGNOSIS — R49.0 DYSPHONIA: ICD-10-CM

## 2021-08-25 DIAGNOSIS — R07.0 THROAT PAIN: Primary | ICD-10-CM

## 2021-08-25 PROCEDURE — 92507 TX SP LANG VOICE COMM INDIV: CPT | Mod: GN | Performed by: SPEECH-LANGUAGE PATHOLOGIST

## 2021-08-25 NOTE — PROGRESS NOTES
"Jorge Denton is a 26 year old male who is being evaluated via a billable video visit.      The patient has been notified and verbally consented to the following:     This video visit will be conducted between you and your provider.    Patient has opted to conduct today's video visit vs an in-person appointment, and is not able to attend due to possible exposure to COVID-19.      If during the course of the call the provider feels a video visit is not appropriate, you will not be charged for this service.    Call initiated at: 11:01  Type of Video Platform Used: [employer]  Location of provider: Residence  Location of patient: Residence    [facility]  THERAPY NOTE (CPT 79993)    Patient: Jorge Denton  Date of Service: 8/25/2021  Impressions from most recent evaluation by my colleague Stephie Bedolla, PhD-SLP on 6/24/2021:  \"Dysphonia (R49.0)  Throat Pain (R07.0)  ? Laryngeal evaluation demonstrated obvious muscle tension,. Imbalance, and lack of coordination for all phonatory tasks  ? Perceptual evaluation demonstrated WNL quality; he states this is because it is still morning\"    SUBJECTIVE:  Since the patient's last session, they report the following:     Overall symptoms are moderately better    Still has the illness he had a few weeks ago    Antibiotics trialed without benefit    Has a CT sinus today    Voice discomfort has maintained at reduced level    Feels like it's back to where he \"used to be\"    Will have transient flares and discomfort that resolve on their own    OBJECTIVE:  PATIENT REPORTED MEASURES:  Patient Supplied Answers To SLP QOL Questionnaire  Therapy Quality of Life 8/25/2021   Since my l ast session, I used the speech therapy exercises and strategies as recommended by my speech pathologist. Agree   I feel that using my therapy techniques has become a habit Agree   I feel confident in my ability to manage my current and future symptoms. Agree   Since my last session I feel my symptoms " "have --------. Stayed the same   Overall, since starting therapy I feel my symptoms are --------. Better   Overall, how much worse? -   Overall, how much better? Moderately better     Patient Specific Goal Metrics:  Dysponia SLP Goals 3/9/2021 7/30/2021 8/25/2021   How would you rate your speaking voice quality, if 0 is worst voice quality, and 10 is best voice? - 2 6   How much effort is it to speak, if 0 is no extra effort and 10 is maximum effort? 6 9 5   How much effort is it to sing, if 0 is no extra effort and 10 is maximum effort? 8 - -   How how severe is your [Throat Discomfort - or use patient specific description], if 0 is no [discomfort] at all and 10 is the worst [discomfort]? - 8 3   How would you rate your breathing, if 0 is the worst and 10 is the best? 2 - -   How much does your voice problem bother you? Quite a bit Very Much Somewhat   How much does your breathing problem bother you?         Very much - -   How much does your throat discomfort bother you?     - Very Much Somewhat     Patient Supplied Answers To Last 2 VHI Questionnaires  Voice Handicap Index (VHI-10) 7/30/2021 8/25/2021   My voice makes it difficult for people to hear me 4 3   People have difficulty understanding me in a noisy room 4 3   My voice difficulties restrict my personal and social life.  4 2   I feel left out of conversations because of my voice 4 2   My voice problem causes me to lose income 4 2   I feel as though I have to strain to produce voice 4 3   The clarity of my voice is unpredictable 4 3   My voice problem upsets me 4 3   My voice makes me feel handicapped 4 2   People ask, \"What's wrong with your voice?\" 4 2   VHI-10 40 25     THERAPEUTIC ACTIVITIES    Counseling and Education:    The value of continuing to pursue improved quality versus allowing current level of function to become habitual was discussed.  o Patient elected to move forward with additional techniques, but reported understanding, and will step " back from new techniques should discomfort recur    Techniques to improve ease and efficiency of voicing    Balance onset techniques    Exploration of aspirate versus glottal onset on single pitches    Patient reported significantly less discomfort with glottal versus aspirate onset    Possible mechanisms for this were discussed    Applied to cardinal vowels    Patient required moderate support to achieve glottal onset    Good accuracy with support    Application to 3 note scales    Fair accuracy with moderate support    Glottal onset and speech    Therapeutic rationales stressed    Glottal onset stimuli used at word and short phrase level with fair accuracy and moderate support      A regimen for home practice was instructed.    I provided handouts of today's therapeutic activities to facilitate practice.    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 Domenico reports that he has been able to maintain decreased sense of discomfort in his throat for approximately the last month with the benefit of therapeutic exercises.  Today focused on trying to continue to improve vocal efficiency, though we discussed the importance of establishing comfort and consistency to his current level of function is the most important current goal.  He will monitor the use of new techniques, and step away from them should he note any leaning toward regression.    PLAN: I will see Domenico in approximately 3 weeks, at which point we will continue to advance efficiency and ease in voicing.   For practice goals see AVS.       TOTAL SERVICE TIME: 40 minutes  TREATMENT (19161)  NO CHARGE FACILITY FEE (54909)    Louis Barfield M.M., M.A., CCC-SLP  Speech-Language Pathologist  Certificate of Vocology  [phone]    *this report was created in part  through the use of computerized dictation software, and though reviewed following completion, some typographic errors may persist.  If there is confusion regarding any of this notes contents, please contact me for clarification.*

## 2021-08-25 NOTE — LETTER
"8/25/2021       RE: Jorge Denton  [address]  [place] [facility] [address]     Dear Colleague,    Thank you for referring your patient, Jorge Denton, to the [facility] VOICE CLINIC [place] at [facility]. Please see a copy of my visit note below.    Jorge Denton is a 26 year old male who is being evaluated via a billable video visit.      The patient has been notified and verbally consented to the following:     This video visit will be conducted between you and your provider.    Patient has opted to conduct today's video visit vs an in-person appointment, and is not able to attend due to possible exposure to COVID-19.      If during the course of the call the provider feels a video visit is not appropriate, you will not be charged for this service.    Call initiated at: 11:01  Type of Video Platform Used: [employer]  Location of provider: Residence  Location of patient: [facility] VOICE [facility]  THERAPY NOTE (CPT 98220)    Patient: Jorge Denton  Date of Service: 8/25/2021  Impressions from most recent evaluation by my colleague Stephie Bedolla, PhD-SLP on 6/24/2021:  \"Dysphonia (R49.0)  Throat Pain (R07.0)  ? Laryngeal evaluation demonstrated obvious muscle tension,. Imbalance, and lack of coordination for all phonatory tasks  ? Perceptual evaluation demonstrated WNL quality; he states this is because it is still morning\"    SUBJECTIVE:  Since the patient's last session, they report the following:     Overall symptoms are moderately better    Still has the illness he had a few weeks ago    Antibiotics trialed without benefit    Has a CT sinus today    Voice discomfort has maintained at reduced level    Feels like it's back to where he \"used to be\"    Will have transient flares and discomfort that resolve on their own    OBJECTIVE:  PATIENT REPORTED MEASURES:  Patient Supplied Answers To SLP QOL Questionnaire  Therapy Quality of Life 8/25/2021   Since " "my l ast session, I used the speech therapy exercises and strategies as recommended by my speech pathologist. Agree   I feel that using my therapy techniques has become a habit Agree   I feel confident in my ability to manage my current and future symptoms. Agree   Since my last session I feel my symptoms have --------. Stayed the same   Overall, since starting therapy I feel my symptoms are --------. Better   Overall, how much worse? -   Overall, how much better? Moderately better     Patient Specific Goal Metrics:  Dysponia SLP Goals 3/9/2021 7/30/2021 8/25/2021   How would you rate your speaking voice quality, if 0 is worst voice quality, and 10 is best voice? - 2 6   How much effort is it to speak, if 0 is no extra effort and 10 is maximum effort? 6 9 5   How much effort is it to sing, if 0 is no extra effort and 10 is maximum effort? 8 - -   How how severe is your [Throat Discomfort - or use patient specific description], if 0 is no [discomfort] at all and 10 is the worst [discomfort]? - 8 3   How would you rate your breathing, if 0 is the worst and 10 is the best? 2 - -   How much does your voice problem bother you? Quite a bit Very Much Somewhat   How much does your breathing problem bother you?         Very much - -   How much does your throat discomfort bother you?     - Very Much Somewhat     Patient Supplied Answers To Last 2 VHI Questionnaires  Voice Handicap Index (VHI-10) 7/30/2021 8/25/2021   My voice makes it difficult for people to hear me 4 3   People have difficulty understanding me in a noisy room 4 3   My voice difficulties restrict my personal and social life.  4 2   I feel left out of conversations because of my voice 4 2   My voice problem causes me to lose income 4 2   I feel as though I have to strain to produce voice 4 3   The clarity of my voice is unpredictable 4 3   My voice problem upsets me 4 3   My voice makes me feel handicapped 4 2   People ask, \"What's wrong with your voice?\" 4 2 "   VHI-10 40 25     THERAPEUTIC ACTIVITIES    Counseling and Education:    The value of continuing to pursue improved quality versus allowing current level of function to become habitual was discussed.  o Patient elected to move forward with additional techniques, but reported understanding, and will step back from new techniques should discomfort recur    Techniques to improve ease and efficiency of voicing    Balance onset techniques    Exploration of aspirate versus glottal onset on single pitches    Patient reported significantly less discomfort with glottal versus aspirate onset    Possible mechanisms for this were discussed    Applied to cardinal vowels    Patient required moderate support to achieve glottal onset    Good accuracy with support    Application to 3 note scales    Fair accuracy with moderate support    Glottal onset and speech    Therapeutic rationales stressed    Glottal onset stimuli used at word and short phrase level with fair accuracy and moderate support      A regimen for home practice was instructed.    I provided handouts of today's therapeutic activities to facilitate practice.    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 Domenico reports that he has been able to maintain decreased sense of discomfort in his throat for approximately the last month with the benefit of therapeutic exercises.  Today focused on trying to continue to improve vocal efficiency, though we discussed the importance of establishing comfort and consistency to his current level of function is the most important current goal.  He will monitor the use of new techniques, and step away from them should he note any leaning toward regression.    PLAN: I will see Domenico in approximately 3 weeks, at which point we will continue  to advance efficiency and ease in voicing.   For practice goals see AVS.       TOTAL SERVICE TIME: 40 minutes  TREATMENT (85819)  NO CHARGE FACILITY FEE (99066)    Louis Barfield M.M., M.A., CCC-SLP  Speech-Language Pathologist  Certificate of Vocology  [phone]    *this report was created in part through the use of computerized dictation software, and though reviewed following completion, some typographic errors may persist.  If there is confusion regarding any of this notes contents, please contact me for clarification.*        Again, thank you for allowing me to participate in the care of your patient.      Sincerely,    Seymour Barfield, SLP

## 2021-08-25 NOTE — PATIENT INSTRUCTIONS
Donny Acuña,    It was good to see you today and I am glad that things have stayed in a more positive place since the last time we chatted.  Today we explored the idea of trying to find improved efficiency of low effort with your voicing.  Although high flow is often associated with these contexts we found that a more glottal onset actually felt less straining to you, likely because he avoids some of the guarding tendencies that you may have built up over time.  We did this across 2 different tasks    First, you can alternate on a single pitch between breathy onset, and glottal onset, letting that pendulum swing back and forth and trying to find the place where things balance in between, this is ultimately the most efficient way of using your voice and should come with a clear low impact manner of voicing.  An /u/ vowel was the easiest for you, but I love free to practice it across all of the cardinal vowels (a,e,i,o,u) to make sure that you can do it regardless of the context.  You can apply the same idea to 3 note scales or simple arpeggios if you are knocking it out of apart on single pitches    Second you can apply the same idea with in words and phrases.  I have attached a list of glottal attack words and phrases that you can use as a way of finding it in an easy way.    This is about locating and recognizing a new type of efficiency, and is not about practicing with significant frequency, but rather practicing with good intent and focus when you do.  Trying them out once or even twice a day for short amount of time (approximately 5 minutes) before going on into other voice use for singing more just speaking should be all that is required to get your awareness tuned in the right direction.    If you find that any of this feels like it may be destabilizing your new normal set aside for the time being.  The most important thing from my perspective is that we allow the ground to settle underneath you and help you feel  comfortably established and your new normal, and we may need to give that some time before moving onto other techniques.  You were doing the hard work at this point, and what ever you are doing seems to be helping so just keep on keeping on.    Tano Velazquez

## 2021-09-08 ENCOUNTER — VIRTUAL VISIT (OUTPATIENT)
Dept: OTOLARYNGOLOGY | Facility: CLINIC | Age: 26
End: 2021-09-08
Payer: COMMERCIAL

## 2021-09-08 DIAGNOSIS — R07.0 THROAT PAIN: ICD-10-CM

## 2021-09-08 DIAGNOSIS — J38.7 LARYNGEAL HYPERFUNCTION: ICD-10-CM

## 2021-09-08 DIAGNOSIS — R49.0 DYSPHONIA: Primary | ICD-10-CM

## 2021-09-08 PROCEDURE — 99207 PR NO CHARGE LOS: CPT | Performed by: SPEECH-LANGUAGE PATHOLOGIST

## 2021-09-08 NOTE — LETTER
"9/8/2021       RE: Jorge Denton  [address]  [place] [address]     Dear Colleague,    Thank you for referring your patient, Jorge Denton, to the [facility] VOICE CLINIC [place] at [facility]. Please see a copy of my visit note below.    Jorge Denton is a 26 year old male who is being evaluated via a billable video visit.      The patient has been notified and verbally consented to the following:     This video visit will be conducted between you and your provider.    Patient has opted to conduct today's video visit vs an in-person appointment, and is not able to attend due to possible exposure to COVID-19.      If during the course of the call the provider feels a video visit is not appropriate, you will not be charged for this service.    Call initiated at: 2:04  Type of Video Platform Used: [employer]  Location of provider: Residence  Location of patient: [facility] VOICE [facility]  THERAPY NOTE (CPT 43954)    Patient: Jorge Denton  Date of Service: 9/8/2021  Impressions from most recent evaluation by my colleague Stephie Bedolla, PhD-SLP on 6/24/2021:  \"Dysphonia (R49.0)  Throat Pain (R07.0)  ? Laryngeal evaluation demonstrated obvious muscle tension,. Imbalance, and lack of coordination for all phonatory tasks  ? Perceptual evaluation demonstrated WNL quality; he states this is because it is still morning\"    SUBJECTIVE:  Since the patient's last session, they report the following:     Overall symptoms are about the same / slightly worse    Has been having what he calls \"mini-flares\" that are happening more frequently    Is still dealing with a chronic sinus infection     Has been restricted to mostly \"lying in bed\" for the past several weeks and has not been able to practice consistently as a result of illness.    Based on this we discussed what would provide the most benefit and he has elected to postpone today's session.  No skilled " "services provided a part of today's brief encounter and there is correspondingly no charge.  Intake metrics as listed below.      OBJECTIVE:  PATIENT REPORTED MEASURES:  Patient Specific Goal Metrics:  Dysponia SLP Goals 3/9/2021 7/30/2021 8/25/2021   How would you rate your speaking voice quality, if 0 is worst voice quality, and 10 is best voice? - 2 6   How much effort is it to speak, if 0 is no extra effort and 10 is maximum effort? 6 9 5   How much effort is it to sing, if 0 is no extra effort and 10 is maximum effort? 8 - -   How how severe is your [Throat Discomfort - or use patient specific description], if 0 is no [discomfort] at all and 10 is the worst [discomfort]? - 8 3   How would you rate your breathing, if 0 is the worst and 10 is the best? 2 - -   How much does your voice problem bother you? Quite a bit Very Much Somewhat   How much does your breathing problem bother you?         Very much - -   How much does your throat discomfort bother you?     - Very Much Somewhat     Patient Supplied Answers To Last 2 VHI Questionnaires  Voice Handicap Index (VHI-10) 8/25/2021 9/8/2021   My voice makes it difficult for people to hear me 3 3   People have difficulty understanding me in a noisy room 3 3   My voice difficulties restrict my personal and social life.  2 3   I feel left out of conversations because of my voice 2 3   My voice problem causes me to lose income 2 3   I feel as though I have to strain to produce voice 3 3   The clarity of my voice is unpredictable 3 3   My voice problem upsets me 3 3   My voice makes me feel handicapped 2 3   People ask, \"What's wrong with your voice?\" 2 3   VHI-10 25 30       TOTAL SERVICE TIME: 7 minutes  NO CHARGE FACILITY FEE (89650)    Louis Barfield M.M., M.A., CCC-SLP  Speech-Language Pathologist  Certificate of Vocology  [phone]    *this report was created in part through the use of computerized dictation software, and though reviewed following completion, " some typographic errors may persist.  If there is confusion regarding any of this notes contents, please contact me for clarification.*        Again, thank you for allowing me to participate in the care of your patient.      Sincerely,    Seymour Barfield, SLP

## 2021-09-08 NOTE — PROGRESS NOTES
"Jorge Denton is a 26 year old male who is being evaluated via a billable video visit.      The patient has been notified and verbally consented to the following:     This video visit will be conducted between you and your provider.    Patient has opted to conduct today's video visit vs an in-person appointment, and is not able to attend due to possible exposure to COVID-19.      If during the course of the call the provider feels a video visit is not appropriate, you will not be charged for this service.    Call initiated at: 2:04  Type of Video Platform Used: [employer]  Location of provider: Residence  Location of patient: Residence    [facility] VOICE CLINIC  THERAPY NOTE (CPT 93721)    Patient: Jorge Denton  Date of Service: 9/8/2021  Impressions from most recent evaluation by my colleague Stephie Bedolla, PhD-SLP on 6/24/2021:  \"Dysphonia (R49.0)  Throat Pain (R07.0)  ? Laryngeal evaluation demonstrated obvious muscle tension,. Imbalance, and lack of coordination for all phonatory tasks  ? Perceptual evaluation demonstrated WNL quality; he states this is because it is still morning\"    SUBJECTIVE:  Since the patient's last session, they report the following:     Overall symptoms are about the same / slightly worse    Has been having what he calls \"mini-flares\" that are happening more frequently    Is still dealing with a chronic sinus infection     Has been restricted to mostly \"lying in bed\" for the past several weeks and has not been able to practice consistently as a result of illness.    Based on this we discussed what would provide the most benefit and he has elected to postpone today's session.  No skilled services provided a part of today's brief encounter and there is correspondingly no charge.  Intake metrics as listed below.      OBJECTIVE:  PATIENT REPORTED MEASURES:  Patient Specific Goal Metrics:  Dysponia SLP Goals 3/9/2021 7/30/2021 8/25/2021   How would you rate your speaking voice quality, if 0 is " "worst voice quality, and 10 is best voice? - 2 6   How much effort is it to speak, if 0 is no extra effort and 10 is maximum effort? 6 9 5   How much effort is it to sing, if 0 is no extra effort and 10 is maximum effort? 8 - -   How how severe is your [Throat Discomfort - or use patient specific description], if 0 is no [discomfort] at all and 10 is the worst [discomfort]? - 8 3   How would you rate your breathing, if 0 is the worst and 10 is the best? 2 - -   How much does your voice problem bother you? Quite a bit Very Much Somewhat   How much does your breathing problem bother you?         Very much - -   How much does your throat discomfort bother you?     - Very Much Somewhat     Patient Supplied Answers To Last 2 VHI Questionnaires  Voice Handicap Index (VHI-10) 8/25/2021 9/8/2021   My voice makes it difficult for people to hear me 3 3   People have difficulty understanding me in a noisy room 3 3   My voice difficulties restrict my personal and social life.  2 3   I feel left out of conversations because of my voice 2 3   My voice problem causes me to lose income 2 3   I feel as though I have to strain to produce voice 3 3   The clarity of my voice is unpredictable 3 3   My voice problem upsets me 3 3   My voice makes me feel handicapped 2 3   People ask, \"What's wrong with your voice?\" 2 3   VHI-10 25 30       TOTAL SERVICE TIME: 7 minutes  NO CHARGE FACILITY FEE (08900)    Louis Barfield M.M., M.A., CCC-SLP  Speech-Language Pathologist  Certificate of Vocology  [phone]    *this report was created in part through the use of computerized dictation software, and though reviewed following completion, some typographic errors may persist.  If there is confusion regarding any of this notes contents, please contact me for clarification.*    "

## 2021-09-15 ENCOUNTER — TRANSFERRED RECORDS (OUTPATIENT)
Dept: HEALTH INFORMATION MANAGEMENT | Facility: CLINIC | Age: 26
End: 2021-09-15

## 2021-09-25 ENCOUNTER — HEALTH MAINTENANCE LETTER (OUTPATIENT)
Age: 26
End: 2021-09-25

## 2021-10-13 ENCOUNTER — VIRTUAL VISIT (OUTPATIENT)
Dept: OTOLARYNGOLOGY | Facility: CLINIC | Age: 26
End: 2021-10-13
Payer: COMMERCIAL

## 2021-10-13 ENCOUNTER — TELEPHONE (OUTPATIENT)
Dept: OTOLARYNGOLOGY | Facility: CLINIC | Age: 26
End: 2021-10-13

## 2021-10-13 DIAGNOSIS — J38.7 LARYNGEAL HYPERFUNCTION: ICD-10-CM

## 2021-10-13 DIAGNOSIS — R07.0 THROAT PAIN: ICD-10-CM

## 2021-10-13 DIAGNOSIS — R49.0 DYSPHONIA: Primary | ICD-10-CM

## 2021-10-13 PROCEDURE — 92507 TX SP LANG VOICE COMM INDIV: CPT | Mod: GN | Performed by: SPEECH-LANGUAGE PATHOLOGIST

## 2021-10-13 NOTE — TELEPHONE ENCOUNTER
LVM for patient regarding scheduling for 2 additional appointments with approximately 14 days between each. Virtual or in person as preferred. Can be scheduled virtual in in person times. Waitlist as desired. Provided ENT/Voice Clinic number for scheduling.

## 2021-10-13 NOTE — LETTER
"10/13/2021       RE: Jorge Denton  [address]  [facility] [address]     Dear Colleague,    Thank you for referring your patient, Jorge Denton, to the [facility]ICE [facility]IN[facility] [place] [facility]. Please see a copy of my visit note below.    Jorge Denton is a 26 year old male who is being evaluated via a billable video visit.      The patient has been notified and verbally consented to the following:     This video visit will be conducted between you and your provider.    Patient has opted to conduct today's video visit vs an in-person appointment, and is not able to attend due to possible exposure to COVID-19.      If during the course of the call the provider feels a video visit is not appropriate, you will not be charged for this service.    Call initiated at: 2:00  Type of Video Platform Used: Combined [employer]  Location of provider: Residence  Location of patient: [facility] VOICE [facility]  THERAPY NOTE (CPT 22700)    Patient: Jorge Denton  Date of Service: 10/13/2021  Referring physician: Dr. Flor  Impressions from most recent evaluation by my colleague Stephie Bedolla, PhD-SLP on 6/24/2021:  \"Dysphonia (R49.0)  Throat Pain (R07.0)  ? Laryngeal evaluation demonstrated obvious muscle tension,. Imbalance, and lack of coordination for all phonatory tasks  ? 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 Agree   I feel that using my therapy techniques has become a habit Agree   I feel confident in my ability to manage my current and future symptoms. Agree   Since my last session I feel my symptoms have --------. Stayed the same   Overall, since starting therapy I feel my symptoms are --------. Better   Overall, how much worse? -   Overall, how much better? Moderately better     Patient Specific Goal Metrics:  Dysponia SLP Goals 7/30/2021 8/25/2021 10/13/2021   How would you rate your speaking voice quality, if 0 is worst voice quality, and 10 is best voice? 2 6 4   How much effort is it to speak, if 0 is no extra effort and 10 is maximum effort? 9 5 7   How much effort is it to sing, if 0 is no extra effort and 10 is maximum effort? - - -   How how severe is your [Throat Discomfort - or use patient specific description], if 0 is no [discomfort] at all and 10 is the worst [discomfort]? 8 3 7   How would you rate your breathing, if 0 is the worst and 10 is the best? - - -   How much does your voice problem bother you? Very Much Somewhat A little bit   How much does your breathing problem bother you?         - - -   How much does your throat discomfort bother you?     Very Much Somewhat Quite a bit       THERAPEUTIC ACTIVITIES    Counseling and Education:    Attentional mechanisms for recent exacerbation of symptoms discussed  o Patient reported removing his incline from his bed prior to symptoms beginning, and does feel that acid reflux is playing a role in resurgence    Instructed concepts and techniques for optimal vocal hygiene including:    Topical hydration -steam    Exercises to promote reduced perilaryngeal muscle tension    6 way neck stretch    Modifications  "for additional extension instructed    Proper form for each stretch was emphasized, including:    Maintenance of posture for 10 breaths (~20-30 seconds)    Awareness of tension on inhalation with volitional relaxation into the stretch on exhalation    Avoidance of \"forcing\" a posture, only progressing far enough to feel the stretch    He reported awareness of significant tension during all directions of neck stretch    Comfortably quiet forward resonant /m/ on descending glides was utilized throughout    He noted increased sense of muscular relaxation following completion of the stretches    Manual Laryngeal massage was performed in combination with gentle forward resonant sounds    Gentle lateralization of the larynx, and sternocleidomastoid massage was also performed.    Patient was trained to focus on intentional relaxation of jaw and tongue in addition to area of massage during these maneuvers.    Comfortably quiet forward resonant /m/ on descending glides was utilized throughout massage    Self-massage was instructed and patient was able to demonstrate this with acceptable accuracy    A regimen for home practice was instructed.    I provided handouts of today's therapeutic activities to facilitate practice.    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 Domenico reports that symptoms were by enlarge in a very good state, but recently became exacerbated.  Exact reasons for this exacerbation are not clear, though possible acid reflux is 1 likely candidate.  We focused both on soothing the surface level irritation as well as timing ease to voicing through manual therapy techniques.  Patient reported that this felt good and relieving during the session.    PLAN: I will see Domenico in 2 to 4 weeks to schedule permits at " which point we will gauge maintenance of gains and advance low impact and irritation voicing.   For practice goals see AVS.       TOTAL SERVICE TIME: 45 minutes  TREATMENT (12535)  NO CHARGE FACILITY FEE (59529)    Louis Barfield M.M., M.A., CCC-SLP  Speech-Language Pathologist  Certificate of Vocology  [phone]    *this report was created in part through the use of computerized dictation software, and though reviewed following completion, some typographic errors may persist.  If there is confusion regarding any of this notes contents, please contact me for clarification.*        Again, thank you for allowing me to participate in the care of your patient.      Sincerely,    Seymour Barfield, SLP

## 2021-10-13 NOTE — PATIENT INSTRUCTIONS
Donny Acuña,    I'm glad that things were going well until recently and that we have a gameplan to get them back that direction.  Our approaches can be twofold.  First word in a try and address any lingering irritation the level of the larynx if you had a flareup from reflux.  Try breathing in steam a few times a day.  You can sit in the bathroom with shower on hot, or boil a pot of water and pulled off the stove drip a towel over it and breathe in the steam.  Breathing through your mouth and out on a gentle hum focusing on easy airflow and sensation of buzz at the lips.  The benefits for this are somewhat short-lived, but may offer relief for surface level irritation and that may in turn reduce urge for the muscles to guard.    The second angle of approach is going to be decreasing tension and reminding your body what it feels like to use your voice with less muscular recruitment.  We did a 6 way neck stretch.  Remember to hold each position for 10 slow breaths to allow the fascia that surrounds her muscles to start to release.  After you find relaxation in the position let yourself hum gently on a comfortable pitch with that same forward focus.  You can also massage the strap muscles of your neck with the backs of your knuckles, walk your fingers along the muscles, or just dragged 2 fingers or thumb along the muscles as feels best.  I have copied the instructions from before below.  Reach out if you have any questions!  Someone will be in touch about scheduling but if you have not heard within the next 24 hours please call [phone] to schedule your convenience.    -Seymour    Exercises:    Neck, and Tongue Stretches    Maintain a comfortably upright posture for the whole time. Don t let yourself slouch in the direction of the stretch    Hold each posture for 10 low, slow breaths.     On each inhalation recognize where you feel tension.     On each exhalation allow yourself to release whatever tension you can both in  the muscle you re stretching, but also secondary muscles.    On the last exhalation of each set gently let your muscles return to their baseline position    Neck:  1. Let your head fall gently to the right, as though you are trying to touch your ear to your shoulder  a. If you need an additional stretch, reach your opposite hand (left) down toward the floor  b. If you need still MORE stretch use your right hand s weight to gently encourage your head toward your right shoulder  2. Let your head fall gently to the left, as though you are trying to touch your ear to your shoulder  a. If you need an additional stretch, reach your opposite hand (right) down toward the floor  b. If you need still MORE stretch use your left hand s weight to gently encourage your head toward your right shoulder  3. Let your head fall forward as if you are trying to touch your chin to your sternum  a. If you need an additional stretch use the weight of one or both hands to gently encourage your head downward  4. Lift your head up like you are trying to look at the ceiling  a. If you need an additional stretch extend your jaw forward (like the drawer of a cash register opening)  b. You can also stretch the front /sides of your neck by looking back over your shoulder at the far corner of the room.  i. Do this for both right and left after the upward stretch    Massage  Lateralization of the Larynx  1) Using the first 3 fingers of your hand gently lateralize the larynx toward your shoulder  2) You may feel some slight bumps or pops when you shift it to the side, but this is normal  3) If you feel your pulse very strongly, shift your fingers toward the front of your throat    Sternocleidomastoid massage  1) Starting with the muscle directly below / behind your ear follow the track of the SCM down at an angle toward the sternal notch  2) Use gentle circles with the pads of your first two fingers, drag down using the backs of your knuckles, or  walk your fingers along the muscles as feels best

## 2021-10-13 NOTE — PROGRESS NOTES
"Jorge Denton is a 26 year old male who is being evaluated via a billable video visit.      The patient has been notified and verbally consented to the following:     This video visit will be conducted between you and your provider.    Patient has opted to conduct today's video visit vs an in-person appointment, and is not able to attend due to possible exposure to COVID-19.      If during the course of the call the provider feels a video visit is not appropriate, you will not be charged for this service.    Call initiated at: 2:00  Type of Video Platform Used: [email or web address]  Location of provider: Residence  Location of patient: Residence    [facility] VOICE CLINIC  THERAPY NOTE (CPT 68372)    Patient: Jorge Denton  Date of Service: 10/13/2021  Referring physician: Dr. Flor  Impressions from most recent evaluation by my colleague Stephie Bedolla, PhD-SLP on 6/24/2021:  \"Dysphonia (R49.0)  Throat Pain (R07.0)  ? Laryngeal evaluation demonstrated obvious muscle tension,. Imbalance, and lack of coordination for all phonatory tasks  ? Perceptual evaluation demonstrated WNL quality; he states this is because it is still morning\"    SUBJECTIVE:  Since the patient's last session, they report the following:     Overall symptoms are worse    Feels better with regard to illness    Working with someone regarding sinus    Throat discomfort was largely gone from August until just two days ago    Describes it a \"cinching up\"    Has been trying to do manual therapy techniques    Swallowing concerns    Worsens with throat tension    Solid foods are worst    Above the sternal notch (sensorilly right near the level of the airway)    OBJECTIVE:  PATIENT REPORTED MEASURES:  Patient Supplied Answers To SLP QOL Questionnaire  Therapy Quality of Life 8/25/2021   Since my l ast session, I used the speech therapy exercises and strategies as recommended by my speech pathologist. Agree   I feel that using my therapy techniques has become a habit " "Agree   I feel confident in my ability to manage my current and future symptoms. Agree   Since my last session I feel my symptoms have --------. Stayed the same   Overall, since starting therapy I feel my symptoms are --------. Better   Overall, how much worse? -   Overall, how much better? Moderately better     Patient Specific Goal Metrics:  Dysponia SLP Goals 7/30/2021 8/25/2021 10/13/2021   How would you rate your speaking voice quality, if 0 is worst voice quality, and 10 is best voice? 2 6 4   How much effort is it to speak, if 0 is no extra effort and 10 is maximum effort? 9 5 7   How much effort is it to sing, if 0 is no extra effort and 10 is maximum effort? - - -   How how severe is your [Throat Discomfort - or use patient specific description], if 0 is no [discomfort] at all and 10 is the worst [discomfort]? 8 3 7   How would you rate your breathing, if 0 is the worst and 10 is the best? - - -   How much does your voice problem bother you? Very Much Somewhat A little bit   How much does your breathing problem bother you?         - - -   How much does your throat discomfort bother you?     Very Much Somewhat Quite a bit       THERAPEUTIC ACTIVITIES    Counseling and Education:    Attentional mechanisms for recent exacerbation of symptoms discussed  o Patient reported removing his incline from his bed prior to symptoms beginning, and does feel that acid reflux is playing a role in resurgence    Instructed concepts and techniques for optimal vocal hygiene including:    Topical hydration -steam    Exercises to promote reduced perilaryngeal muscle tension    6 way neck stretch    Modifications for additional extension instructed    Proper form for each stretch was emphasized, including:    Maintenance of posture for 10 breaths (~20-30 seconds)    Awareness of tension on inhalation with volitional relaxation into the stretch on exhalation    Avoidance of \"forcing\" a posture, only progressing far enough to feel " the stretch    He reported awareness of significant tension during all directions of neck stretch    Comfortably quiet forward resonant /m/ on descending glides was utilized throughout    He noted increased sense of muscular relaxation following completion of the stretches    Manual Laryngeal massage was performed in combination with gentle forward resonant sounds    Gentle lateralization of the larynx, and sternocleidomastoid massage was also performed.    Patient was trained to focus on intentional relaxation of jaw and tongue in addition to area of massage during these maneuvers.    Comfortably quiet forward resonant /m/ on descending glides was utilized throughout massage    Self-massage was instructed and patient was able to demonstrate this with acceptable accuracy    A regimen for home practice was instructed.    I provided handouts of today's therapeutic activities to facilitate practice.    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 Domenico reports that symptoms were by enlarge in a very good state, but recently became exacerbated.  Exact reasons for this exacerbation are not clear, though possible acid reflux is 1 likely candidate.  We focused both on soothing the surface level irritation as well as timing ease to voicing through manual therapy techniques.  Patient reported that this felt good and relieving during the session.    PLAN: I will see Domenico in 2 to 4 weeks to schedule permits at which point we will gauge maintenance of gains and advance low impact and irritation voicing.   For practice goals see AVS.       TOTAL SERVICE TIME: 45 minutes  TREATMENT (11215)  NO CHARGE FACILITY FEE (89877)    Louis Barfield M.M., M.A., CCC-SLP  Speech-Language Pathologist  Certificate of  Vocology  [phone]    *this report was created in part through the use of computerized dictation software, and though reviewed following completion, some typographic errors may persist.  If there is confusion regarding any of this notes contents, please contact me for clarification.*

## 2021-10-26 ENCOUNTER — TELEPHONE (OUTPATIENT)
Dept: OTOLARYNGOLOGY | Facility: CLINIC | Age: 26
End: 2021-10-26

## 2021-10-29 ENCOUNTER — TELEPHONE (OUTPATIENT)
Dept: OTOLARYNGOLOGY | Facility: CLINIC | Age: 26
End: 2021-10-29

## 2021-10-29 NOTE — TELEPHONE ENCOUNTER
[facility] Call Center    Phone Message    May a detailed message be left on voicemail: yes     Reason for Call: Other: Mary with [facility] called in requesting a referral be sent over from Dr. Flor. Pt has an Appt later this afternoon and needs that referral. Please send to Fax # [phone]. Thank you.     Action Taken: Message routed to:  [facility] ENT    Travel Screening: Not Applicable

## 2021-11-01 ENCOUNTER — TRANSFERRED RECORDS (OUTPATIENT)
Dept: HEALTH INFORMATION MANAGEMENT | Facility: CLINIC | Age: 26
End: 2021-11-01
Payer: COMMERCIAL

## 2021-11-03 ENCOUNTER — TELEPHONE (OUTPATIENT)
Dept: OTOLARYNGOLOGY | Facility: CLINIC | Age: 26
End: 2021-11-03

## 2021-11-03 NOTE — TELEPHONE ENCOUNTER
LVM after being unable to reach patient. Left ENT scheduling number for patient to call. Stated Seymour CLARKE in [facility] has opening on 11/04 at 8 for a video visit and if interested patient should call previously mentioned number to be scheduled in that slot.

## 2021-12-02 ENCOUNTER — THERAPY VISIT (OUTPATIENT)
Dept: SPEECH THERAPY | Facility: CLINIC | Age: 26
End: 2021-12-02
Payer: COMMERCIAL

## 2021-12-02 ENCOUNTER — OFFICE VISIT (OUTPATIENT)
Dept: OTOLARYNGOLOGY | Facility: CLINIC | Age: 26
End: 2021-12-02
Payer: COMMERCIAL

## 2021-12-02 VITALS — WEIGHT: 188 LBS | HEIGHT: 75 IN | BODY MASS INDEX: 23.38 KG/M2

## 2021-12-02 DIAGNOSIS — R13.19 OTHER DYSPHAGIA: Primary | ICD-10-CM

## 2021-12-02 DIAGNOSIS — R07.0 THROAT PAIN: ICD-10-CM

## 2021-12-02 DIAGNOSIS — R49.0 MUSCLE TENSION DYSPHONIA: ICD-10-CM

## 2021-12-02 DIAGNOSIS — R29.898 NECK TIGHTNESS: ICD-10-CM

## 2021-12-02 DIAGNOSIS — R13.12 OROPHARYNGEAL DYSPHAGIA: ICD-10-CM

## 2021-12-02 DIAGNOSIS — R49.0 DYSPHONIA: Primary | ICD-10-CM

## 2021-12-02 DIAGNOSIS — J38.3 VOCAL CORD DYSFUNCTION: ICD-10-CM

## 2021-12-02 DIAGNOSIS — R13.10 DYSPHAGIA, UNSPECIFIED TYPE: ICD-10-CM

## 2021-12-02 PROCEDURE — 99213 OFFICE O/P EST LOW 20 MIN: CPT | Mod: 25 | Performed by: OTOLARYNGOLOGY

## 2021-12-02 PROCEDURE — 92524 BEHAVRAL QUALIT ANALYS VOICE: CPT | Mod: GN | Performed by: SPEECH-LANGUAGE PATHOLOGIST

## 2021-12-02 PROCEDURE — 92612 ENDOSCOPY SWALLOW (FEES) VID: CPT | Mod: GN | Performed by: OTOLARYNGOLOGY

## 2021-12-02 PROCEDURE — 92507 TX SP LANG VOICE COMM INDIV: CPT | Mod: GN | Performed by: SPEECH-LANGUAGE PATHOLOGIST

## 2021-12-02 PROCEDURE — 92613 ENDOSCOPY SWALLOW (FEES) I&R: CPT | Performed by: OTOLARYNGOLOGY

## 2021-12-02 PROCEDURE — 92610 EVALUATE SWALLOWING FUNCTION: CPT | Mod: GN | Performed by: SPEECH-LANGUAGE PATHOLOGIST

## 2021-12-02 ASSESSMENT — PAIN SCALES - GENERAL: PAINLEVEL: MILD PAIN (2)

## 2021-12-02 ASSESSMENT — MIFFLIN-ST. JEOR: SCORE: 1923.39

## 2021-12-02 NOTE — PROGRESS NOTES
[facility]   at the [facility]   Otolaryngology Clinic     Patient: Jorge Denton    MRN: [id number]    : 1995    Age/Gender: 26 year old male  Date of Service: 2021  Rendering Provider:   Senait Flor MD     Chief Complaint   Dysphonia  Interval History   HISTORY OF PRESENT ILLNESS: Mr. Denton is a 26 year old male is being followed for dysphonia. he was initially seen on 2021. Please refer to this note for full history.     Today, he presents for follow up. he reports:  - voice is improving  - speech therapy with Seymour was very helpful  - however, sometimes the voice will shut down completely  - 1-2 times per month the voice shuts down but can typically can somewhat control this  - always some tension, never fully better  - has moved past not being able to talk at all  - swallow, getting worse and feels like solids are difficult  - difficult to swallow pills  - started last January and has more recently had more difficulty  - at these times, with things like peanut butter, liquids, or bread, he has choked   - he has to be careful with the trigger foods  - initiating the swallow, has to swallow 3 times to get the food down  - slowly goes down   - sometimes feels like there is something stuck for hours after he eats  - dysphagia once per week and has to drink water  - smaller bites at a time   - did not feel as though the speech therapy helped with the swallow  - had to stop myofacial release due to provider leaving  - pain with swallowing     PAST MEDICAL HISTORY:   Past Medical History:   Diagnosis Date     Allergic rhinitis     allergy testing positive for dustmites     Eczema     in earlier childhood       PAST SURGICAL HISTORY:   Past Surgical History:   Procedure Laterality Date     MASTECTOMY SUBCUTANEOUS MALE BILATERAL (GYNECOMASTIA)       CURRENT MEDICATIONS:   Current Outpatient Medications:      ALBUTEROL IN, one puff every 2 hours as needed, Disp: , Rfl:       FLUoxetine (PROZAC) 10 MG tablet, Take 1 tablet by mouth daily. Patient needs to be seen prior to future refills of this medication.  Please call clinic to schedule.  , Disp: 30 tablet, Rfl: 0     VITAMIN D OR, 1 Tablet daily, Disp: , Rfl:      CALCIUM MAGNESIUM OR, 1 Tablet daily, Disp: , Rfl:      methylphenidate (CONCERTA) 36 MG CR tablet, Take 1 tablet by mouth every morning., Disp: 30 tablet, Rfl: 0     Multiple Vitamin (MULTIVITAMIN OR), Take  by mouth., Disp: , Rfl:     ALLERGIES: Dust mites and Lactose intolerance [beta-galactosidase]    SOCIAL HISTORY:    Social History     Socioeconomic History     Marital status: Single     Spouse name: Not on file     Number of children: Not on file     Years of education: Not on file     Highest education level: Not on file   Occupational History     Not on file   Tobacco Use     Smoking status: Never Smoker     Smokeless tobacco: Not on file     Tobacco comment: no passive exposure   Substance and Sexual Activity     Alcohol use: No     Drug use: No     Sexual activity: Never   Other Topics Concern      Service Not Asked     Blood Transfusions No     Caffeine Concern No     Occupational Exposure Not Asked     Hobby Hazards No     Sleep Concern No     Stress Concern Yes     Comment: pretty high lately     Weight Concern No     Special Diet Yes     Comment: Lactose intolerant     Back Care Yes     Comment: Chiro     Exercise Yes     Bike Helmet Yes     Seat Belt Yes     Self-Exams Not Asked   Social History Narrative    Jorge participates in martial arts and enjoys skiing.     Social Determinants of Health     Financial Resource Strain: Not on file   Food Insecurity: Not on file   Transportation Needs: Not on file   Physical Activity: Not on file   Stress: Not on file   Social Connections: Not on file   Intimate Partner Violence: Not on file   Housing Stability: Not on file         FAMILY HISTORY:   Family History   Problem Relation Age of Onset     Heart  Disease Maternal Grandmother      Lipids Maternal Grandmother      Heart Disease Maternal Grandfather      Lipids Maternal Grandfather      Heart Disease Paternal Grandfather      Lipids Paternal Grandfather      Lipids Mother      Lipids Father      Lipids Paternal Grandmother      Thyroid Disease Maternal Grandmother         Thyroid nodule      Non-contributory for problems with anesthesia    REVIEW OF SYSTEMS:   The patient was asked a 14 point review of systems regarding constitutional symptoms, eye symptoms, ears, nose, mouth, throat symptoms, cardiovascular symptoms, respiratory symptoms, gastrointestinal symptoms, genitourinary symptoms, musculoskeletal symptoms, integumentary symptoms, neurological symptoms, psychiatric symptoms, endocrine symptoms, hematologic/lymphatic symptoms, and allergic/ immunologic symptoms.   The pertinent factors have been included in the HPI and below.  Patient Supplied Answers to Review of Systems  [facility] ROS 11/27/2021   Ears, Nose, Throat Nasal congestion or drainage, Trouble swallowing   Hematologic Lymph node swelling       Physical Examination   The patient underwent a physical examination as described below. The pertinent positive and negative findings are summarized after the description of the examination.  Constitutional: The patient's developmental and nutritional status was assessed. The patient's voice quality was assessed.  Head and Face: The head and face were inspected for deformities. The sinuses were palpated. The salivary glands were palpated. Facial muscle strength was assessed bilaterally.  Eyes: Extraocular movements and primary gaze alignment were assessed.  Ears, Nose, Mouth and Throat: The ears and nose were examined for deformities. The nasal septum, mucosa, and turbinates were inspected by anterior rhinoscopy. The lips, teeth, and gums were examined for abnormalities. The oral mucosa, tongue, palate, tonsils, lateral and posterior pharynx were inspected  for the presence of asymmetry or mucosal lesions.    Neck: The tracheal position was noted, and the neck mass palpated to determine if there were any asymmetries, abnormal neck masses, thyromegally, or thyroid nodules.  Respiratory: The nature of the breathing and chest expansion/symmetry was observed.  Cardiovascular: The patient was examined to determine the presence of any edema or jugular venous distension.  Abdomen: The contour of the abdomen was noted.  Lymphatic: The patient was examined for infraclavicular lymphadenopathy.  Musculoskeletal: The patient was inspected for the presence of skeletal deformities.  Extremities: The extremities were examined for any clubbing or cyanosis.  Skin: The skin was examined for inflammatory or neoplastic conditions.  Neurologic: The patient's orientation, mood, and affect were noted. The cranial nerve  functions were examined.  Other pertinent positive and negative findings on physical examination:   OC/OP: no lesions, uvula midline, soft palate elevates symmetrically   Neck: no lesions, bilateralTH tenderness to palpation and bilateral SCM pain  All other physical examination findings were within normal limits and noncontributory.    Procedures   BEHAVIORAL & QUALITATIVE EVALUATION OF VOICE AND RESONENCE   Comments: F0 117 Hz Vocal Quality: normal    Pitch Range:  Normal   Phrase Length:  Normal  Vocal Loudness: Normal  Dysarthria: No    Flexible laryngoscopy (CPT 86848)      Pre-procedure diagnosis: Dysphonia and dysphagia  Post-procedure diagnosis: same as above  Indication for procedure: Mr. Denton is a 26 year old male with see above  Procedure(s): Fiberoptic Laryngoscopy    Details of Procedure: After informed consent was obtained, the patient was seated in the examination chair.  The areas of the nasopharynx as well as the hypopharynx were anesthetized with topical 4% lidocaine with 0.25% phenylephrine atomizer.  Examination of the base of tongue was performed first.   Attention was directed to any evidence of masses in the area or evidence of leukoplakia or candidal infection.  Attention was directed to the epiglottis where its size and position was determined and its movement on phonation of the vowel  e .  The piriform sinuses were then inspected for any mass lesions or pooling of secretions.  Attention was then directed to the larynx. The vocal folds were inspected for infection or any areas of leukoplakia, for masses, polypoid degeneration, or hemorrhage.  Having done this, the arytenoids and vocal processes were inspected for erythema or evidence of granuloma formation.  The posterior commissure was then inspected for evidence of inflammatory changes in the mucosa and heaping up of mucosal tissue. The patient was then instructed to say the vowel  e .  Adduction of vocal folds to the midline was observed for any evidence of paresis or paralysis of the larynx or asymmetry in rotation of the larynx to the left or right. The patient was asked to breathe and the degree of abduction was noted bilaterally.  Subglottic view of the larynx was obtained for any additional mass lesions or mucosal changes.  Finally the post cricoid was examined for evidence of pooling of secretions, as well as the pharyngeal wall mucosa.   Anesthesia type: 0.25% phenylephrine    Findings:  Anatomic/physiological deviations: LNC, bilateral vocal process ulceration stable, supraglottic hyperfunction    Right vocal process: No restriction of mobility   Left vocal process: No restriction of mobility  Glottal gap: Complete glottal closure  Supraglottic structures: Normal  Hypopharynx: Normal     Estimated Blood Loss: minimal  Complications: None  Disposition: Patient tolerated the procedure well            Fiberoptic Endoscopic Evaluation of Swallowing (CPT 24177)  and Interpretation of Swallowing (CPT 07519)    Indications: See above notes for complete history and physical. Patient complains of dysphagia to  both solids and liquids and/or there is suggestion on history and endoscopic exam of the presence of dysphagia causing medical complaints.  Swallowing evaluation is being performed to assess the presence and degree of dysphagia, and to recommend a safe diet.     Pulmonary Status:  No PNA   Current Diet:              regular                                        thin liquids      Consistency Amounts:  Thin Liquid: sip   Puree: 1 tsp  Solid: cookies            Positioning: upright in a chair  Oral Peripheral Exam: See physical exam section.  Anatomic Notes: See Videostroboscopy report for assessment of anatomy and laryngeal functioning  Pharyngeal secretions prior to administration of liquid or food: Yes  Oral Phase Abnormal Findings: No abnormal behavior observed  Behavioral Adaptations: No abnormal behavior observed  Pharyngeal Phase Abnormal Findings: no penetration, no aspiration, no residue and felt residue     Recommended Diet:  regular                                        thin liquids             Review of Relevant Clinical Data   I personally reviewed:  Notes:      Seymour Barfield, 10/13/2021  - last therapy virtual visit  - at that time, symptoms had worsened again   - also having difficulty with throat tension making swallowing of solid foods worse     Isaac Madsen PA-C, 08/09/2021  - COVID symptoms  Radiology:    XR Chest (10/16/2021)  IMPRESSION:   1. Normal examination of the chest.    Labs:  Lab Results   Component Value Date    TSH 2.32 11/28/2012     Lab Results   Component Value Date     12/30/2008    CO2 26 12/30/2008    BUN 14 12/30/2008     Lab Results   Component Value Date    WBC 5.1 11/28/2012    HGB 15.8 (A) 11/28/2012    HCT 48 11/28/2012    MCV 89 11/28/2012     11/28/2012     Lab Results   Component Value Date    INR 1.02 05/09/2008     No results found for: WILMAN  No components found for: RHEUMATOIDFACTOR,  RF  Lab Results   Component Value Date    CRP 4.1 09/26/2008    CRP 7.1  "05/09/2008     No components found for: CKTOT, URICACID  No components found for: C3, C4, DSDNAAB, NDNAABIFA  No results found for: MPOAB    Patient reported Quality of Life (QOL) Measures   Patient Supplied Answers To VHI Questionnaire  Voice Handicap Index (VHI-10) 9/8/2021   My voice makes it difficult for people to hear me 3   People have difficulty understanding me in a noisy room 3   My voice difficulties restrict my personal and social life.  3   I feel left out of conversations because of my voice 3   My voice problem causes me to lose income 3   I feel as though I have to strain to produce voice 3   The clarity of my voice is unpredictable 3   My voice problem upsets me 3   My voice makes me feel handicapped 3   People ask, \"What's wrong with your voice?\" 3   VHI-10 30         Patient Supplied Answers To EAT Questionnaire  Eating Assessment Tool (EAT-10) 10/13/2021   My swallowing problem has caused me to lose weight 0   My swallowing problem interferes with my ability to go out for meals 0   Swallowing liquids takes extra effort 2   Swallowing solids takes extra effort 2   Swallowing pills takes extra effort 1   Swallowing is painful 2   The pleasure of eating is affected by my swallowing 1   When I swallow food sticks in my throat 3   I cough when I eat 0   Swallowing is stressful 2   EAT-10 13         Patient Supplied Answers To CSI Questionnaire  Cough Severity Index (CSI) 7/30/2021   My cough is worse when I lie down 1   My coughing problem causes me to restrict my personal and social life 1   I tend to avoid places because of my cough problem 1   I feel embarrassed because of my coughing problem 1   People ask, ''What's wrong?'' because I cough a lot 1   I run out of air when I cough 3   My coughing problem affects my voice 1   My coughing problem limits my physical activity 1   My coughing problem upsets me 1   People ask me if I am sick because I cough a lot 1   CSI Score 12         Patient Supplied " Answers to Dyspnea Index Questionnaire:  Dyspnea Index 2021   1. I have trouble getting air in. 4   2. I feel tightness in my throat when I am having jane breathing problem. 4   3. It takes more effort to breathe than it used to. 4   4. Change in weather affect my breathing problem. 4   5. My breathing gets worse with stress. 4   6. I make sound/noise breathing in 3   7. I have to strain to breathe. 4   8. My shortness of breath gets worse with exercise or physical activity 4   9. My breathing problem makes me feel stressed. 4   10. My breathing problem casuses me to restrict my personal and social life. 4   Dyspnea Index Total Score 39       Impression & Plan     IMPRESSION: Mr. Denton is a 25 year old male who is being seen for the followin. 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  - symptoms consistent with severe muscle tension dysphonia  - speech therapy helped  - states that his voice still goes out 1-2 times per month but he has been able to control this more   - scope today, 2021, shows bilateral vocal process ulceration stable, supraglottic hyperfunction   - still has persistent muscle tension dysphonia  Plan  - continue voice therapy    2. Dysphagia  - states that since January of last year, he has had intermittent issues with swallowing  - more recently the swallowing problems have intensified and become more frequent  - speech therapy did not help with the swallowing   - having difficulty with both solids and liquids  - also having difficulty with swallowing pills  - FEES today, 2021, shows no penetration or aspiration, feels residue though there is scant residue  - would benefit from behavioral  FEES  Plan:  - behavioral FEES with Trixie Thakkar  - myofacial release      RETURN VISIT: as needed after therapy    Scribe Disclosure:  Lucy GEORGE, am serving as a scribe to document services personally performed by Senait Flor MD at this visit, based upon the provider's statements to me. All documentation has been reviewed by the aforementioned provider prior to being entered into the official medical record.     Scribe Preparation Attestation:  Lucy GEORGE, a scribe, prepared the chart for today's encounter.      Senait Flor MD    Laryngology    [facility]  Department of  Otolaryngology - Head and Neck Surgery  Clinics & Surgery Center  [address] [place] [address]  Appointment line: [phone]  Fax: [phone]  [email or web address]

## 2021-12-02 NOTE — LETTER
"12/2/2021       RE: Jorge Denton  [address]  [facility] [address]     Dear Colleague,    Thank you for referring your patient, Jorge Denton, to the [facility] [place] at [facility]. Please see a copy of my visit note below.        [facility]  Lencho Loomis Jr., M.D., F.A.C.S.  Mariposa Monahan M.D., M.P.H.  Senait Flor M.D.  Stephie Bedolla, Ph.D., CCC-SLP  Cecilio Tee, Ph.D., [facility]SLP  Kristen Pardo M.M. (voice), M.LUCÍA., CCC-SLP  Seymour Barfield M.M. (voice), M.LUCÍA., CCC-SLP  ELAINE Javed (voice), M.S., CCC-SLP    [facility]  VOICE/SPEECH/BREATHING THERAPY  CLINICAL FOLLOW-UP AND LARYNGEAL EXAMINATION REPORT - IN PERSON    Clinician: Kristen Pardo M.M. (voice), M.PRATEEK, CCC-SLP  Seen in conjunction with: Dr. Senait Flor  Referring physician:  Self  Patient: Jorge Denton  Date of Visit: 12/2/2021    HISTORY  PATIENT INFORMATION  Jorge Denton was seen for follow-up in conjunction with a visit to Dr. Senait Flor today.  Please also refer to Dr. Flor's dictation.  He was last seen on 10/13/2021 by Mr. Seymour Barfield.    \"IMPRESSIONS from 10/12/21: Dysphonia increased vocal effort and throat pain in the context of laryngeal hyperfunction and a history of Lyme Disease near the time of onset. Domenico reports that symptoms were by enlarge in a very good state, but recently became exacerbated.  Exact reasons for this exacerbation are not clear, though possible acid reflux is 1 likely candidate.  We focused both on soothing the surface level irritation as well as timing ease to voicing through manual therapy techniques.  Patient reported that this felt good and relieving during the session.\"    PROGRESS SINCE LAST VISIT  Mr. Denton reports:    Continues to experience throat discomfort, and would like to continue speech therapy  -  worked to get the muscles relaxed - therapy was helpful, but did not get a call back to schedule " "with Mr. Barfield.       #1 swallowing is still an issue   -  Feels that the biggest issues is difficulty initiating the swallow; no aspiration - no penetration - feels stuck. Also feels that way with saliva.   - seems to be getting worse.     - does not feel easy to get textures down   - thicker textures are now a huge problem   - pills are also difficult   - - does not practice the exericses unless symptoms flare up from his Hx of Lyme's disease   - tries not to focus on it, because it seems to make it worse   - trying to de-stress    - no unintentional weight loss    PROGRESS ON LONG-TERM GOALS: set-back in progress with worsening swallow function.     PATIENT REPORTED MEASURES  Patient Supplied Answers To VHI Questionnaire  Voice Handicap Index (VHI-10) 9/8/2021   My voice makes it difficult for people to hear me 3   People have difficulty understanding me in a noisy room 3   My voice difficulties restrict my personal and social life.  3   I feel left out of conversations because of my voice 3   My voice problem causes me to lose income 3   I feel as though I have to strain to produce voice 3   The clarity of my voice is unpredictable 3   My voice problem upsets me 3   My voice makes me feel handicapped 3   People ask, \"What's wrong with your voice?\" 3   VHI-10 30       Patient Supplied Answers To CSI Questionnaire  Cough Severity Index (CSI) 7/30/2021   My cough is worse when I lie down 1   My coughing problem causes me to restrict my personal and social life 1   I tend to avoid places because of my cough problem 1   I feel embarrassed because of my coughing problem 1   People ask, ''What's wrong?'' because I cough a lot 1   I run out of air when I cough 3   My coughing problem affects my voice 1   My coughing problem limits my physical activity 1   My coughing problem upsets me 1   People ask me if I am sick because I cough a lot 1   CSI Score 12       Patient Supplied Answers To EAT Questionnaire  Eating " Assessment Tool (EAT-10) 10/13/2021   My swallowing problem has caused me to lose weight 0   My swallowing problem interferes with my ability to go out for meals 0   Swallowing liquids takes extra effort 2   Swallowing solids takes extra effort 2   Swallowing pills takes extra effort 1   Swallowing is painful 2   The pleasure of eating is affected by my swallowing 1   When I swallow food sticks in my throat 3   I cough when I eat 0   Swallowing is stressful 2   EAT-10 13       OBJECTIVE FINDINGS  PERCEPTUAL EVALUATION (CPT 38133)  POSTURE / TENSION:     upper body    neck and shoulders    BREATHING:     appears within normal limits and adequate     phonation is not consistently coordinated with respiration    LARYNGEAL PALPATION: n/a    VOICE:    Roughness: WNL    Breathiness: WNL    Strain: WNL    Loudness    Conversational speech:  WNL    Projected speech:  N/a    Pitch:    F0/ Habitual Pitch: 117 Hz    Pitch glide: neurologically normal    CAPE-V Overall Severity:  05/100    COUGH/THROAT CLEARING:    Not observed    LARYNGEAL FUNCTION STUDIES (CPT 49435)  n/a    LARYNGEAL EXAMINATION  Procedure: Flexible endoscopy with chip-tip technology without stroboscopy, left nostril; topical anesthesia with 3% Lidocaine and 0.25% phenylephrine was applied.   Performed by: Dr. Flor  The laryngeal and pharyngeal structures were evaluated for gross appearance, mobility, function, and focal lesions / abnormalities of the associated mucosa.    All findings were within normal limits with the exception of the following salient features:     This exam shows:    Laryngeal and Vocal Fold Mucosa    Essentially healthy laryngeal mucosa    Mild erythema of the medial arytenoid walls     minimal presence of thickened secretions on the vocal folds and throughout the laryngeal area    Status of vocal fold mucosa:   o Within normal limits, with no lesions and straight vibratory margins    Neurological and Functional Integrity of the  Larynx    Vocal folds are mobile and meet at midline; movement is brisk and symmetric; exam is neurologically normal     Airway is patent    Elongation of the vocal folds for pitch increase is WNL    On phonation, glottic closure is WNL    Supraglottic Function and Therapy Probes    moderate anterior-posterior constriction of the supraglottic larynx during connected speech    THERAPY PROBES: Improvement was elicited with use of forward resonant stimuli, coordination of respiration and phonation and use of yawn sigh       Abducted view with visible irritation of the medial arytenoid mucosa.    The laryngeal exam was reviewed with Mr. Denton, and I provided pertinent explanations, as well as written and oral information.    Please see Ms. Luciano's report of completed FEES evaluation.     IMPRESSIONS/ RECOMMENDATIONS/ PLAN  IMPRESSIONS: Jorge Denton is a 26 year old, presenting today for follow-up with Dysphonia increased vocal effort and throat pain in the context of laryngeal hyperfunction and a history of Lyme Disease near the time of onset.  Remarkable findings included:    Perceptual evaluation demonstrated: Essentially WNL    Laryngeal exam demonstrated: mild irritation of the medial arytenoid mucosal walls, and mild to moderate 4-way constriction    Primary complaint of patient today included: ongoing throat discomfort that he would like to continue to reduce with the help of speech therapy, and also a recent decline in his ability to safely swallow all textures.  Therefore, we recommended he continue a course of speech therapy to address throat discomfort.      FEES today, 12/02/2021, shows no penetration or aspiration, feels residue though there is scant residue.  Muscle tension dysphagia is suspected, and a course of behavioral FEES treatment was recommended.    STIMULABILITY: results of therapy probes during perceptual and laryngeal evaluation demonstrate improvement with use of forward resonant stimuli,  coordination of respiration and phonation and use of yawn sigh.    RECOMMENDATIONS:     An ongoing course of speech therapy is recommended to optimize vocal technique and improve voice quality.  Enjoys singing, and would like to improve his ability to breathe comfortably and sing with limited issues.  Also continue to reduce throat discomfort.    Recommended PT and clinical massage therapy was also offered for him to consider.    Dr. Flor recommended working with behavior FEES treatment for possible muscle tension dysphagia.  He was encouraged to bring food for the sessions and watch and see the food go down (4 sessions, 2 weeks apart).      Positive indicators: positive response to therapy probes diagnosis is known to respond to treatment high level of comittment    Negative indicators: ongoing symptoms related to Hx of Lyme's disease and occasional flare ups of symptoms.    DURATION / FREQUENCY: three one-hour sessions 2-3 weeks apart with Mr. Barfield.  A total of 6-8 sessions may be necessary.    GOALS:  Patient goal:   1. To understand the problem and fix it as much as possible    Long-term goal(s): In 6 months, Mr. Denton will:  1. Report resolution of symptoms, and use of optimal voice quality and comfort to meet personal, social, and professional needs, 90% of the time during a typical week of vocal activities    This treatment plan was developed with the patient who agreed with the recommendations.    _______________________________________________________________________  THERAPY NOTE (CPT 02163)  Date of Service: 12/2/2021    SUBJECTIVE / OBJECTIVE:  Please refer to my evaluation report from today's encounter for full details regarding subjective data, patient reported measures, and diagnostic findings.    THERAPEUTIC ACTIVITIES  Exercises to promote optimal respiratory mechanics    With clinician support, patient was able to demonstrate improved abdominal relaxation and engagement on  inhalation    [place] a respiratory coordination exercise that alternates breath with phrases of increasing length; this was helpful    acceptable improvement in airflow and respiratory mechanics    Counseling and Education    Asked many questions about the nature of his symptoms, and I answered all of these thoroughly.  o Provided basic safe swallow strategies to support a safe swallow until his appointments to address functional dysphagia.  o Provided further explanation of MTD and how his dysphagia symptoms are related.      Concepts of an optimal regimen for practice were instructed.  o He should use an interval schedule of practice, with brief periods of practice frequently throughout each day  o [place] concepts of volitional practice to facilitate motor learning.    A revised regimen for home practice was instructed.    I provided an AVS and handouts of today's therapeutic activities to facilitate practice.    ASSESSMENT/PLAN  PROGRESS TOWARD LONG TERM GOALS:   Adequate progress; too early for objective measures    IMPRESSIONS: Dysphonia increased vocal effort and throat pain in the context of laryngeal hyperfunction and a history of Lyme Disease near the time of onset. Mr. Denton demonstrates good learning of strategies to optimize his respiratory mechanics and coordination while speaking and singing longer phrases, and also supporting improved ease, coordination and ease while swallowing all textures, including his own saliva.  He will continue to practice these techniques until his next appointments with Mr. Barfield and Ms. Washington.     PLAN: Mr. Denton will continue with speech therapy in the upcoming weeks.    TOTAL SERVICE TIME: 60 minutes  EVALUATION OF VOICE AND RESONANCE (75536)  TREATMENT (71204)  NO CHARGE FACILITY FEE (78777)    Kristen Pardo M.M. (voice), M.A., CCC/SLP  Speech-Language Pathologist  Certificate of Vocology  [facility]  [facility]  [phone]  [email or web address]  Pronouns: she/her

## 2021-12-02 NOTE — NURSING NOTE
"Chief Complaint   Patient presents with     RECHECK     5 month follow up       Height 1.905 m (6' 3\"), weight 85.3 kg (188 lb).    Casandra Mckee, EMT    "

## 2021-12-02 NOTE — PATIENT INSTRUCTIONS
- JULIA  In [place] or [place] or other closer to [place] - nithin will check.     MTD  Excuse me miss phrases to help coordinate breath flow with phonation  Basic safe swallow    Muscle Tension Dysphonia    One of the most common voice disorders we treat at the [facility] is muscle tension dysphonia (MTD).  The root word phon means  sound .  Phonation refers to the sound made by the voice.  The term dysphonia means there is something wrong with the voice.  However, muscle tension dysphonia can also refer to a voice that sounds normal but causes pain, discomfort, or fatigue to the voice user.  MTD is known as a functional disorder; that is, there is nothing structurally wrong with the voice.  Rather, the muscles do not function properly, which causes poor sound, discomfort, or increased effort.    Symptoms of MTD    Different individuals may have very different symptoms of MTD.  Possible voice characteristics include      rough, hoarse, gravelly, raspy      weak, breathy, airy, leaky, backward, hollow      strained, pressed, squeezed, tight, tense, choked, effortful      jerky, shaky, halting,      suddenly cutting out, squeezing shut, breaking off, changing pitch, or fading away      giving out gradually, or becoming weaker or more tense as voice use continues      excessively high or low pitch      inability to produce a loud or clear voice      inability to sing notes that used to be easy    Possible sensations of MTD include      pain or discomfort anywhere in the throat  area associated with voice use      a tight choking sensation with voice use      fatigue or effort that increases with voice use      some area of the neck becoming tender to the touch      feeling a need to clear the throat frequently      a feeling of a lump in the throat    Causes of Muscle Tension Dysphonia  There are many specific, individual reasons why use of the vocal mechanism becomes abnormal.  Some common causes are  prolonged illness, prolonged voice overuse, prolonged voice underuse (such as after a surgery), and trauma, such as an injury, chemical exposure, or an emotionally traumatic event.  These lead to an abnormal vocal response, causing the individual to compensate by using extra effort while talking.    The onset of MTD can be very subtle.  The individual is usually unaware of the extra effort, but this extra effort typically recruits muscles that are not part of the larynx (also known as the voice box).  The result may or may not be a stronger voice, but a vicious cycle usually starts in which more and more effort is required.  This cycle may continue for months or even years before the individual becomes aware that the voice is abnormal.    Treatment of MTD  Functional voice therapy is usually the only treatment available for MTD.  The amount of time required to complete functional therapy varies from only a few sessions to many months, but generally some relief is gained in the first 4 to 6 sessions.  In cases of emotional stress, counseling or stress management may be helpful or even necessary.    Occasionally, medical or surgical treatments may be tried.  Botox injections may be useful in severe cases.  Surgery has been tried in very few cases but is not done at the [facility].  Muscle relaxants are NOT useful for muscle tension dysphonia.  The action of these drugs is not localized to the vocal mechanism, so in order to provide enough relaxation for the vocal mechanism, the individual is often unable to function for day-to-day living.    Types of Muscle Tension Dysphonia  Muscle tension in the vocal mechanism can be exhibited in many ways.  Each individual is different, but a few common patterns are:      Anterior-posterior constriction      Hyperabduction      Hyperadduction      Pharyngeal constriction      Ventricular phonation      Vocal fold bowing (explained in another brochure)     back  "          r  i  g  h  t       front   A cross-section of the larynx (voice box) as seen from above.  This diagram may be helpful for visualizing the descriptions below.    Anterior-Posterior Constriction  In anterior-posterior constriction, the arytenoid cartilages bend forward during voice use, and/or the epiglottis bends backwards, causing the larynx to squeeze from front to back.  As effort increases, squeezing continues until the vocal folds (also called vocal cords) cannot be seen and may be unable to vibrate.  In extreme cases, especially in children, the arytenoids may actually vibrate against the epiglottis. The sound of the voice for someone with anterior-posterior constriction could range from normal to extremely squeezed and tight.  The voice may sound rough if the squeezing causes irregular vibration of the vocal folds.  Some experience a \"froggy\" sound if the arytenoids and epiglottis vibrate.  Someone with anterior-posterior constriction may complain of discomfort, increasing pain during voice use that may remain during rest, or fatigue and decline of voice quality as the voice is used more and more.    Hyperabduction  Abduction is the term for the vocal folds coming apart during breathing.  When a person has hyperabduction, the vocal folds do not sufficiently come together to produce voice.  They may appear to be pulled apart as the person phonates.  An emotional component may be present with this type of MTD.    A person with hyperabduction may have a weak, breathy, airy, very soft, or hollow voice, sometimes with breaks in phonation.  He or she may experience effort and fatigue.  Often times, functional therapy is combined with psychotherapy to treat hyperabduction.  In very severe cases, Botox injections are also a useful treatment.    Hyperadduction  In hyperadduction, the vocal folds adduct (come together) excessively tightly, restricting airflow during phonation.  The larynx may look normal in " an exam, but the sound and sensation are not.    The sound of a voice with hyperadduction ranges from normal to extremely tight, pressed, squeezed, strangled, forced or effortful.  The muscle tension may be irregular, causing a stopping/starting or shaking effect.  A person with hyperadduction may experience pain, discomfort, and effort and fatigue, usually increasing with continued voice use.  In very severe cases, Botox injections are helpful    Pharyngeal Constriction  During pharyngeal constriction, muscles of the pharynx (throat) contract excessively during voice use, making the throat very constricted.  The sound of the voice ranges from normal to very tight or squeezed.  It may be tremulous or throaty sounding.  A person with pharyngeal constriction may experience discomfort, pain, fatigue, or declining voice quality with use.  Pain usually increases with voice use but may remain during rest.  Sometimes emotional stress can provoke pharyngeal constriction.    Ventricular Phonation  This type of MTD is also called plica ventricularis, ventricular dysphonia, or false cord phonation.  The ventricular folds come together and vibrate instead of, or along with, the vocal folds.  The ventricular folds, also known as the false vocal cords, are mounds of fleshy tissue just above the vocal folds.  Though the ventricular folds are not muscular, they can be brought together and vibrated.  However, they were not meant to vibrate, so they cannot produce high pitches or loud sounds.  Pressure from the ventricular folds is usually strong enough to keep the true vocal folds from vibrating.  Most often, ventricular phonation is caused by continued voice use while true vocal folds are impaired, though sometimes extreme strain in response to a trauma is the cause.    Ventricular phonation sounds very rough and strained, sometimes inhuman, limited in pitch and volume.  One who uses ventricular phonation may experience fatigue  (especially with attempts at loud voice use), pain or dryness with phonation.  However, sometimes no discomfort will be sensed at all.  In extreme cases, medical or surgical treatments may be tried to treat ventricular phonation, but only after functional therapy has failed.  In some cases, ventricular phonation is the best alternative for persons whose true vocal folds will always be too impaired to vibrate.             Lencho Loomis Jr., JULIO Monahan M.D.            Stephie Bedolla, Ph.D.  Kristen Pardo M.M., MANNMARIE.                 Seymour Barfield M.M., M.A        ELAINE Javed, M.S.        [phone]       Basic Strategies for a Safe Swallow    Take a sip of liquid before beginning a meal, as well as afterwards to moisten the mouth and esophagus.    Slow down and focus on eating.    Sit upright (near 90 degrees) while eating.      Take small bites/ sips.    Avoid talking while eating.  Respond after your mouth is clear of food/ liquid.    Alternate liquids after (at least) every 3-4 bites of food.    Take a sip of liquid with dry foods to help moisten the bolus (food in your mouth).    Add extra moisture to foods with milk, water, extra gravy, etc.    Use straws to help manage the amount of liquid taken per sip (Caution: this strategy is not recommended for patients with decreased mental ability, or reduced sensation in their mouth and throat, which includes: those with dementia, history of stroke, or radiation treatment due to a history of cancer).    Ensure dentures (if any) are well fitted.    Complete oral care after meals, as necessary.    Remain upright for 45-60 min to ensure food/medication does not regurgitate into the esophagus; especially if you have a history of reflux.    Strategies for Safely Swallowing Medications    Take a sip of liquid before swallowing pills, as well as afterwards to moisten the mouth and esophagus.    Place pills on the very back of the tongue  to eliminate the work of moving the tablet through the mouth.    Try taking large pills with a teaspoon of applesauce, pudding, or yogurt.    Some medications can be crushed, or obtained in liquid form.   Consult your physician.    Effortful (Hard) Swallow (Do not use if you have Muscle Tension Dysphagia)  Instructions:    As you swallow, squeeze as hard as you can with all of your tongue and throat muscles.    This can be done with or without food.  This technique may help decrease the amount of food or liquid that is stuck in your throat after you swallow.  It can reduce the chances of food going down the wrong way.    Chin Tuck (Chin Down)  Use this position technique at meal times, and/ or when drinking small sips of liquids.  Instructions:    Put food or liquid in your mouth.    Tip chin down tightly toward chest before you swallow.    Hold this position while you swallow.  This technique is indicated for individuals who may have delayed swallow, diminished tongue control for food or liquids, and/or food in the throat after the swallow.  ? Helps to prevent spillage of liquids over the back of tongue into the airway before you swallow.  ? Helps to narrow the airway entrance, so that food or liquids are less likely to spill into it.  ? Helps position the base of the tongue to help clear food or liquids that are stuck in the throat.                                                                       2012 Kristen Pardo

## 2021-12-02 NOTE — LETTER
2021       RE: Jorge Denton  [address]  [facility] [address]     Dear Colleague,    Thank you for referring your patient, Jorge Denton, to the [facility] [place] [facility]. Please see a copy of my visit note below.        [facility]   at the [facility]   Otolaryngology Clinic     Patient: Jorge Denton    MRN: [id number]    : 1995    Age/Gender: 26 year old male  Date of Service: 2021  Rendering Provider:   Senait Flor MD     Chief Complaint   Dysphonia  Interval History   HISTORY OF PRESENT ILLNESS: Mr. Denton is a 26 year old male is being followed for dysphonia. he was initially seen on 2021. Please refer to this note for full history.     Today, he presents for follow up. he reports:  - voice is improving  - speech therapy with Seymour was very helpful  - however, sometimes the voice will shut down completely  - 1-2 times per month the voice shuts down but can typically can somewhat control this  - always some tension, never fully better  - has moved past not being able to talk at all  - swallow, getting worse and feels like solids are difficult  - difficult to swallow pills  - started last January and has more recently had more difficulty  - at these times, with things like peanut butter, liquids, or bread, he has choked   - he has to be careful with the trigger foods  - initiating the swallow, has to swallow 3 times to get the food down  - slowly goes down   - sometimes feels like there is something stuck for hours after he eats  - dysphagia once per week and has to drink water  - smaller bites at a time   - did not feel as though the speech therapy helped with the swallow  - had to stop myofacial release due to provider leaving  - pain with swallowing     PAST MEDICAL HISTORY:   Past Medical History:   Diagnosis Date     Allergic rhinitis     allergy testing positive for  dustmites     Eczema     in earlier childhood       PAST SURGICAL HISTORY:   Past Surgical History:   Procedure Laterality Date     MASTECTOMY SUBCUTANEOUS MALE BILATERAL (GYNECOMASTIA)       CURRENT MEDICATIONS:   Current Outpatient Medications:      ALBUTEROL IN, one puff every 2 hours as needed, Disp: , Rfl:      FLUoxetine (PROZAC) 10 MG tablet, Take 1 tablet by mouth daily. Patient needs to be seen prior to future refills of this medication.  Please call clinic to schedule.  , Disp: 30 tablet, Rfl: 0     VITAMIN D OR, 1 Tablet daily, Disp: , Rfl:      CALCIUM MAGNESIUM OR, 1 Tablet daily, Disp: , Rfl:      methylphenidate (CONCERTA) 36 MG CR tablet, Take 1 tablet by mouth every morning., Disp: 30 tablet, Rfl: 0     Multiple Vitamin (MULTIVITAMIN OR), Take  by mouth., Disp: , Rfl:     ALLERGIES: Dust mites and Lactose intolerance [beta-galactosidase]    SOCIAL HISTORY:    Social History     Socioeconomic History     Marital status: Single     Spouse name: Not on file     Number of children: Not on file     Years of education: Not on file     Highest education level: Not on file   Occupational History     Not on file   Tobacco Use     Smoking status: Never Smoker     Smokeless tobacco: Not on file     Tobacco comment: no passive exposure   Substance and Sexual Activity     Alcohol use: No     Drug use: No     Sexual activity: Never   Other Topics Concern      Service Not Asked     Blood Transfusions No     Caffeine Concern No     Occupational Exposure Not Asked     Hobby Hazards No     Sleep Concern No     Stress Concern Yes     Comment: pretty high lately     Weight Concern No     Special Diet Yes     Comment: Lactose intolerant     Back Care Yes     Comment: Chiro     Exercise Yes     Bike Helmet Yes     Seat Belt Yes     Self-Exams Not Asked   Social History Narrative    Jorge participates in martial arts and enjoys skiing.     Social Determinants of Health     Financial Resource Strain: Not on file    Food Insecurity: Not on file   Transportation Needs: Not on file   Physical Activity: Not on file   Stress: Not on file   Social Connections: Not on file   Intimate Partner Violence: Not on file   Housing Stability: Not on file         FAMILY HISTORY:   Family History   Problem Relation Age of Onset     Heart Disease Maternal Grandmother      Lipids Maternal Grandmother      Heart Disease Maternal Grandfather      Lipids Maternal Grandfather      Heart Disease Paternal Grandfather      Lipids Paternal Grandfather      Lipids Mother      Lipids Father      Lipids Paternal Grandmother      Thyroid Disease Maternal Grandmother         Thyroid nodule      Non-contributory for problems with anesthesia    REVIEW OF SYSTEMS:   The patient was asked a 14 point review of systems regarding constitutional symptoms, eye symptoms, ears, nose, mouth, throat symptoms, cardiovascular symptoms, respiratory symptoms, gastrointestinal symptoms, genitourinary symptoms, musculoskeletal symptoms, integumentary symptoms, neurological symptoms, psychiatric symptoms, endocrine symptoms, hematologic/lymphatic symptoms, and allergic/ immunologic symptoms.   The pertinent factors have been included in the HPI and below.  Patient Supplied Answers to Review of Systems  [facility] E[facility] ROS 11/27/2021   Ears, Nose, Throat Nasal congestion or drainage, Trouble swallowing   Hematologic Lymph node swelling       Physical Examination   The patient underwent a physical examination as described below. The pertinent positive and negative findings are summarized after the description of the examination.  Constitutional: The patient's developmental and nutritional status was assessed. The patient's voice quality was assessed.  Head and Face: The head and face were inspected for deformities. The sinuses were palpated. The salivary glands were palpated. Facial muscle strength was assessed bilaterally.  Eyes: Extraocular movements and primary gaze alignment were  assessed.  Ears, Nose, Mouth and Throat: The ears and nose were examined for deformities. The nasal septum, mucosa, and turbinates were inspected by anterior rhinoscopy. The lips, teeth, and gums were examined for abnormalities. The oral mucosa, tongue, palate, tonsils, lateral and posterior pharynx were inspected for the presence of asymmetry or mucosal lesions.    Neck: The tracheal position was noted, and the neck mass palpated to determine if there were any asymmetries, abnormal neck masses, thyromegally, or thyroid nodules.  Respiratory: The nature of the breathing and chest expansion/symmetry was observed.  Cardiovascular: The patient was examined to determine the presence of any edema or jugular venous distension.  Abdomen: The contour of the abdomen was noted.  Lymphatic: The patient was examined for infraclavicular lymphadenopathy.  Musculoskeletal: The patient was inspected for the presence of skeletal deformities.  Extremities: The extremities were examined for any clubbing or cyanosis.  Skin: The skin was examined for inflammatory or neoplastic conditions.  Neurologic: The patient's orientation, mood, and affect were noted. The cranial nerve  functions were examined.  Other pertinent positive and negative findings on physical examination:   OC/OP: no lesions, uvula midline, soft palate elevates symmetrically   Neck: no lesions, bilateralTH tenderness to palpation and bilateral SCM pain  All other physical examination findings were within normal limits and noncontributory.    Procedures   BEHAVIORAL & QUALITATIVE EVALUATION OF VOICE AND RESONENCE   Comments: F0 117 Hz Vocal Quality: normal    Pitch Range:  Normal   Phrase Length:  Normal  Vocal Loudness: Normal  Dysarthria: No    Flexible laryngoscopy (CPT 01115)      Pre-procedure diagnosis: Dysphonia and dysphagia  Post-procedure diagnosis: same as above  Indication for procedure: Mr. Denton is a 26 year old male with see above  Procedure(s): Fiberoptic  Laryngoscopy    Details of Procedure: After informed consent was obtained, the patient was seated in the examination chair.  The areas of the nasopharynx as well as the hypopharynx were anesthetized with topical 4% lidocaine with 0.25% phenylephrine atomizer.  Examination of the base of tongue was performed first.  Attention was directed to any evidence of masses in the area or evidence of leukoplakia or candidal infection.  Attention was directed to the epiglottis where its size and position was determined and its movement on phonation of the vowel  e .  The piriform sinuses were then inspected for any mass lesions or pooling of secretions.  Attention was then directed to the larynx. The vocal folds were inspected for infection or any areas of leukoplakia, for masses, polypoid degeneration, or hemorrhage.  Having done this, the arytenoids and vocal processes were inspected for erythema or evidence of granuloma formation.  The posterior commissure was then inspected for evidence of inflammatory changes in the mucosa and heaping up of mucosal tissue. The patient was then instructed to say the vowel  e .  Adduction of vocal folds to the midline was observed for any evidence of paresis or paralysis of the larynx or asymmetry in rotation of the larynx to the left or right. The patient was asked to breathe and the degree of abduction was noted bilaterally.  Subglottic view of the larynx was obtained for any additional mass lesions or mucosal changes.  Finally the post cricoid was examined for evidence of pooling of secretions, as well as the pharyngeal wall mucosa.   Anesthesia type: 0.25% phenylephrine    Findings:  Anatomic/physiological deviations: LNC, bilateral vocal process ulceration stable, supraglottic hyperfunction    Right vocal process: No restriction of mobility   Left vocal process: No restriction of mobility  Glottal gap: Complete glottal closure  Supraglottic structures: Normal  Hypopharynx: Normal      Estimated Blood Loss: minimal  Complications: None  Disposition: Patient tolerated the procedure well            Fiberoptic Endoscopic Evaluation of Swallowing (CPT 88812)  and Interpretation of Swallowing (CPT 95681)    Indications: See above notes for complete history and physical. Patient complains of dysphagia to both solids and liquids and/or there is suggestion on history and endoscopic exam of the presence of dysphagia causing medical complaints.  Swallowing evaluation is being performed to assess the presence and degree of dysphagia, and to recommend a safe diet.     Pulmonary Status:  No PNA   Current Diet:              regular                                        thin liquids      Consistency Amounts:  Thin Liquid: sip   Puree: 1 tsp  Solid: cookies            Positioning: upright in a chair  Oral Peripheral Exam: See physical exam section.  Anatomic Notes: See Videostroboscopy report for assessment of anatomy and laryngeal functioning  Pharyngeal secretions prior to administration of liquid or food: Yes  Oral Phase Abnormal Findings: No abnormal behavior observed  Behavioral Adaptations: No abnormal behavior observed  Pharyngeal Phase Abnormal Findings: no penetration, no aspiration, no residue and felt residue     Recommended Diet:  regular                                        thin liquids             Review of Relevant Clinical Data   I personally reviewed:  Notes:      Seymour Barfield, 10/13/2021  - last therapy virtual visit  - at that time, symptoms had worsened again   - also having difficulty with throat tension making swallowing of solid foods worse     Isaac Madsen PA-C, 08/09/2021  - COVID symptoms  Radiology:    XR Chest (10/16/2021)  IMPRESSION:   1. Normal examination of the chest.    Labs:  Lab Results   Component Value Date    TSH 2.32 11/28/2012     Lab Results   Component Value Date     12/30/2008    CO2 26 12/30/2008    BUN 14 12/30/2008     Lab Results   Component Value Date  "   WBC 5.1 11/28/2012    HGB 15.8 (A) 11/28/2012    HCT 48 11/28/2012    MCV 89 11/28/2012     11/28/2012     Lab Results   Component Value Date    INR 1.02 05/09/2008     No results found for: WILMAN  No components found for: RHEUMATOIDFACTOR,  RF  Lab Results   Component Value Date    CRP 4.1 09/26/2008    CRP 7.1 05/09/2008     No components found for: CKTOT, URICACID  No components found for: C3, C4, DSDNAAB, NDNAABIFA  No results found for: MPOAB    Patient reported Quality of Life (QOL) Measures   Patient Supplied Answers To VHI Questionnaire  Voice Handicap Index (VHI-10) 9/8/2021   My voice makes it difficult for people to hear me 3   People have difficulty understanding me in a noisy room 3   My voice difficulties restrict my personal and social life.  3   I feel left out of conversations because of my voice 3   My voice problem causes me to lose income 3   I feel as though I have to strain to produce voice 3   The clarity of my voice is unpredictable 3   My voice problem upsets me 3   My voice makes me feel handicapped 3   People ask, \"What's wrong with your voice?\" 3   VHI-10 30         Patient Supplied Answers To EAT Questionnaire  Eating Assessment Tool (EAT-10) 10/13/2021   My swallowing problem has caused me to lose weight 0   My swallowing problem interferes with my ability to go out for meals 0   Swallowing liquids takes extra effort 2   Swallowing solids takes extra effort 2   Swallowing pills takes extra effort 1   Swallowing is painful 2   The pleasure of eating is affected by my swallowing 1   When I swallow food sticks in my throat 3   I cough when I eat 0   Swallowing is stressful 2   EAT-10 13         Patient Supplied Answers To CSI Questionnaire  Cough Severity Index (CSI) 7/30/2021   My cough is worse when I lie down 1   My coughing problem causes me to restrict my personal and social life 1   I tend to avoid places because of my cough problem 1   I feel embarrassed because of my " coughing problem 1   People ask, ''What's wrong?'' because I cough a lot 1   I run out of air when I cough 3   My coughing problem affects my voice 1   My coughing problem limits my physical activity 1   My coughing problem upsets me 1   People ask me if I am sick because I cough a lot 1   CSI Score 12         Patient Supplied Answers to Dyspnea Index Questionnaire:  Dyspnea Index 2021   1. I have trouble getting air in. 4   2. I feel tightness in my throat when I am having jane breathing problem. 4   3. It takes more effort to breathe than it used to. 4   4. Change in weather affect my breathing problem. 4   5. My breathing gets worse with stress. 4   6. I make sound/noise breathing in 3   7. I have to strain to breathe. 4   8. My shortness of breath gets worse with exercise or physical activity 4   9. My breathing problem makes me feel stressed. 4   10. My breathing problem casuses me to restrict my personal and social life. 4   Dyspnea Index Total Score 39       Impression & Plan     IMPRESSION: Mr. Denton is a 25 year old male who is being seen for the followin. 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  - symptoms consistent with severe muscle tension dysphonia  - speech therapy helped  - states that his voice still goes out 1-2 times per month but he has been able to control this more   - scope today, 2021, shows bilateral vocal process ulceration stable, supraglottic hyperfunction   - still has persistent muscle tension dysphonia  Plan  - continue voice therapy    2. Dysphagia  - states that since January of last year, he has had intermittent issues with swallowing  - more recently the swallowing problems have  intensified and become more frequent  - speech therapy did not help with the swallowing   - having difficulty with both solids and liquids  - also having difficulty with swallowing pills  - FEES today, 12/02/2021, shows no penetration or aspiration, feels residue though there is scant residue  - would benefit from behavioral FEES  Plan:  - behavioral FEES with Trixie Thakkar  - myofacial release      RETURN VISIT: as needed after therapy    Scribe Disclosure:  Lucy GEORGE, am serving as a scribe to document services personally performed by Senait Flor MD at this visit, based upon the provider's statements to me. All documentation has been reviewed by the aforementioned provider prior to being entered into the official medical record.     Scribe Preparation Attestation:  Lucy GEORGE, a scribe, prepared the chart for today's encounter.      Senait Flor MD    Laryngology    [facility]  Department of  Otolaryngology - Head and Neck Surgery  Clinics & Surgery Center  [address] [place] [address]  Appointment line: [phone]  Fax: [phone]  [email or web address]-voice-[facility]

## 2021-12-02 NOTE — PROGRESS NOTES
"    [facility]  Lencho Loomis Jr., M.D., F.A.C.S.  Mariposa Monahan M.D., M.P.H.  Senait Flor M.D.  Stephie Bedolla, Ph.D., CCC-SLP  Cecilio Tee, Ph.D., [facility]-SLP  Kristen Pardo M.M. (voice), M.A., CCC-SLP  Seymour Barfield M.M. (voice), MANNMARIE., CCC-SLP  ELAINE Javed (voice), M.S., CCC-SLP    [facility]  VOICE/SPEECH/BREATHING THERAPY  CLINICAL FOLLOW-UP AND LARYNGEAL EXAMINATION REPORT - IN PERSON    Clinician: Kristen Pardo M.M. (voice), M.A., CCC-SLP  Seen in conjunction with: Dr. Senait Flor  Referring physician:  Self  Patient: Jorge Denton  Date of Visit: 12/2/2021    HISTORY  PATIENT INFORMATION  Jorge Denton was seen for follow-up in conjunction with a visit to Dr. Senait Flor today.  Please also refer to Dr. Flor's dictation.  He was last seen on 10/13/2021 by Mr. Seymour Barfield.    \"IMPRESSIONS from 10/12/21: Dysphonia increased vocal effort and throat pain in the context of laryngeal hyperfunction and a history of Lyme Disease near the time of onset. Domenico reports that symptoms were by enlarge in a very good state, but recently became exacerbated.  Exact reasons for this exacerbation are not clear, though possible acid reflux is 1 likely candidate.  We focused both on soothing the surface level irritation as well as timing ease to voicing through manual therapy techniques.  Patient reported that this felt good and relieving during the session.\"    PROGRESS SINCE LAST VISIT  Mr. Denton reports:    Continues to experience throat discomfort, and would like to continue speech therapy  -  worked to get the muscles relaxed - therapy was helpful, but did not get a call back to schedule with Mr. Barfield.       #1 swallowing is still an issue   -  Feels that the biggest issues is difficulty initiating the swallow; no aspiration - no penetration - feels stuck. Also feels that way with saliva.   - seems to be getting worse.     - does not feel easy to get textures down   - thicker " "textures are now a huge problem   - pills are also difficult   - - does not practice the exericses unless symptoms flare up from his Hx of Lyme's disease   - tries not to focus on it, because it seems to make it worse   - trying to de-stress    - no unintentional weight loss    PROGRESS ON LONG-TERM GOALS: set-back in progress with worsening swallow function.     PATIENT REPORTED MEASURES  Patient Supplied Answers To VHI Questionnaire  Voice Handicap Index (VHI-10) 9/8/2021   My voice makes it difficult for people to hear me 3   People have difficulty understanding me in a noisy room 3   My voice difficulties restrict my personal and social life.  3   I feel left out of conversations because of my voice 3   My voice problem causes me to lose income 3   I feel as though I have to strain to produce voice 3   The clarity of my voice is unpredictable 3   My voice problem upsets me 3   My voice makes me feel handicapped 3   People ask, \"What's wrong with your voice?\" 3   VHI-10 30       Patient Supplied Answers To CSI Questionnaire  Cough Severity Index (CSI) 7/30/2021   My cough is worse when I lie down 1   My coughing problem causes me to restrict my personal and social life 1   I tend to avoid places because of my cough problem 1   I feel embarrassed because of my coughing problem 1   People ask, ''What's wrong?'' because I cough a lot 1   I run out of air when I cough 3   My coughing problem affects my voice 1   My coughing problem limits my physical activity 1   My coughing problem upsets me 1   People ask me if I am sick because I cough a lot 1   CSI Score 12       Patient Supplied Answers To EAT Questionnaire  Eating Assessment Tool (EAT-10) 10/13/2021   My swallowing problem has caused me to lose weight 0   My swallowing problem interferes with my ability to go out for meals 0   Swallowing liquids takes extra effort 2   Swallowing solids takes extra effort 2   Swallowing pills takes extra effort 1   Swallowing is " painful 2   The pleasure of eating is affected by my swallowing 1   When I swallow food sticks in my throat 3   I cough when I eat 0   Swallowing is stressful 2   EAT-10 13       OBJECTIVE FINDINGS  PERCEPTUAL EVALUATION (CPT 25477)  POSTURE / TENSION:     upper body    neck and shoulders    BREATHING:     appears within normal limits and adequate     phonation is not consistently coordinated with respiration    LARYNGEAL PALPATION: n/a    VOICE:    Roughness: WNL    Breathiness: WNL    Strain: WNL    Loudness    Conversational speech:  WNL    Projected speech:  N/a    Pitch:    F0/ Habitual Pitch: 117 Hz    Pitch glide: neurologically normal    CAPE-V Overall Severity:  05/100    COUGH/THROAT CLEARING:    Not observed    LARYNGEAL FUNCTION STUDIES (CPT 41611)  n/a    LARYNGEAL EXAMINATION  Procedure: Flexible endoscopy with chip-tip technology without stroboscopy, left nostril; topical anesthesia with 3% Lidocaine and 0.25% phenylephrine was applied.   Performed by: Dr. Flor  The laryngeal and pharyngeal structures were evaluated for gross appearance, mobility, function, and focal lesions / abnormalities of the associated mucosa.    All findings were within normal limits with the exception of the following salient features:     This exam shows:    Laryngeal and Vocal Fold Mucosa    Essentially healthy laryngeal mucosa    Mild erythema of the medial arytenoid walls     minimal presence of thickened secretions on the vocal folds and throughout the laryngeal area    Status of vocal fold mucosa:   o Within normal limits, with no lesions and straight vibratory margins    Neurological and Functional Integrity of the Larynx    Vocal folds are mobile and meet at midline; movement is brisk and symmetric; exam is neurologically normal     Airway is patent    Elongation of the vocal folds for pitch increase is WNL    On phonation, glottic closure is WNL    Supraglottic Function and Therapy Probes    moderate  anterior-posterior constriction of the supraglottic larynx during connected speech    THERAPY PROBES: Improvement was elicited with use of forward resonant stimuli, coordination of respiration and phonation and use of yawn sigh       Abducted view with visible irritation of the medial arytenoid mucosa.    The laryngeal exam was reviewed with Mr. Denton, and I provided pertinent explanations, as well as written and oral information.    Please see Ms. Luciano's report of completed FEES evaluation.     IMPRESSIONS/ RECOMMENDATIONS/ PLAN  IMPRESSIONS: Jorge Denton is a 26 year old, presenting today for follow-up with Dysphonia increased vocal effort and throat pain in the context of laryngeal hyperfunction and a history of Lyme Disease near the time of onset.  Remarkable findings included:    Perceptual evaluation demonstrated: Essentially WNL    Laryngeal exam demonstrated: mild irritation of the medial arytenoid mucosal walls, and mild to moderate 4-way constriction    Primary complaint of patient today included: ongoing throat discomfort that he would like to continue to reduce with the help of speech therapy, and also a recent decline in his ability to safely swallow all textures.  Therefore, we recommended he continue a course of speech therapy to address throat discomfort.      FEES today, 12/02/2021, shows no penetration or aspiration, feels residue though there is scant residue.  Muscle tension dysphagia is suspected, and a course of behavioral FEES treatment was recommended.    STIMULABILITY: results of therapy probes during perceptual and laryngeal evaluation demonstrate improvement with use of forward resonant stimuli, coordination of respiration and phonation and use of yawn sigh.    RECOMMENDATIONS:     An ongoing course of speech therapy is recommended to optimize vocal technique and improve voice quality.  Enjoys singing, and would like to improve his ability to breathe comfortably and sing with limited  issues.  Also continue to reduce throat discomfort.    Recommended PT and clinical massage therapy was also offered for him to consider.    Dr. Flor recommended working with behavior FEES treatment for possible muscle tension dysphagia.  He was encouraged to bring food for the sessions and watch and see the food go down (4 sessions, 2 weeks apart).      Positive indicators: positive response to therapy probes diagnosis is known to respond to treatment high level of comittment    Negative indicators: ongoing symptoms related to Hx of Lyme's disease and occasional flare ups of symptoms.    DURATION / FREQUENCY: three one-hour sessions 2-3 weeks apart with Mr. Barfield.  A total of 6-8 sessions may be necessary.    GOALS:  Patient goal:   1. To understand the problem and fix it as much as possible    Long-term goal(s): In 6 months, Mr. Denton will:  1. Report resolution of symptoms, and use of optimal voice quality and comfort to meet personal, social, and professional needs, 90% of the time during a typical week of vocal activities    This treatment plan was developed with the patient who agreed with the recommendations.    _______________________________________________________________________  THERAPY NOTE (CPT 03836)  Date of Service: 12/2/2021    SUBJECTIVE / OBJECTIVE:  Please refer to my evaluation report from today's encounter for full details regarding subjective data, patient reported measures, and diagnostic findings.    THERAPEUTIC ACTIVITIES  Exercises to promote optimal respiratory mechanics    With clinician support, patient was able to demonstrate improved abdominal relaxation and engagement on inhalation    [place] a respiratory coordination exercise that alternates breath with phrases of increasing length; this was helpful    acceptable improvement in airflow and respiratory mechanics    Counseling and Education    Asked many questions about the nature of his symptoms, and I answered all of these  thoroughly.  o Provided basic safe swallow strategies to support a safe swallow until his appointments to address functional dysphagia.  o Provided further explanation of MTD and how his dysphagia symptoms are related.      Concepts of an optimal regimen for practice were instructed.  o He should use an interval schedule of practice, with brief periods of practice frequently throughout each day  o [place] concepts of volitional practice to facilitate motor learning.    A revised regimen for home practice was instructed.    I provided an AVS and handouts of today's therapeutic activities to facilitate practice.    ASSESSMENT/PLAN  PROGRESS TOWARD LONG TERM GOALS:   Adequate progress; too early for objective measures    IMPRESSIONS: Dysphonia increased vocal effort and throat pain in the context of laryngeal hyperfunction and a history of Lyme Disease near the time of onset. Mr. Denton demonstrates good learning of strategies to optimize his respiratory mechanics and coordination while speaking and singing longer phrases, and also supporting improved ease, coordination and ease while swallowing all textures, including his own saliva.  He will continue to practice these techniques until his next appointments with Mr. Barfield and Ms. Washington.     PLAN: Mr. Denton will continue with speech therapy in the upcoming weeks.    TOTAL SERVICE TIME: 60 minutes  EVALUATION OF VOICE AND RESONANCE (64989)  TREATMENT (24603)  NO CHARGE FACILITY FEE (17378)    Kristen Pardo M.M. (voice), MShariA., CCC/SLP  Speech-Language Pathologist  Certificate of Vocology  [facility]  [phone]  [email or web address]  Pronouns: she/her

## 2021-12-02 NOTE — PATIENT INSTRUCTIONS
1.  You were seen in the ENT Clinic today by . If you have any questions or concerns after your appointment, please call [phone]. Press option #1 for scheduling related needs. Press option #3 for Nurse advice.    2.   has recommended  the following:   - PT referral, with [facility],for myofascial release therapy. They will contact you for scheduling.   - swallow therapy with Trixie RICO   - continue voice therapy    3.  Plan is to return to clinic as needed after completing therapy    Emma Mackey LPN  [phone]  [facility] Otolaryngology
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
## 2022-01-12 ENCOUNTER — DOCUMENTATION ONLY (OUTPATIENT)
Dept: OTOLARYNGOLOGY | Facility: CLINIC | Age: 27
End: 2022-01-12
Payer: COMMERCIAL

## 2022-01-12 NOTE — PROGRESS NOTES
Signed PT plan of care was faxed to Tanisha Duarte at [facility]. Fax number [phone]. 6 pages faxed.

## 2022-01-13 ENCOUNTER — VIRTUAL VISIT (OUTPATIENT)
Dept: OTOLARYNGOLOGY | Facility: CLINIC | Age: 27
End: 2022-01-13
Payer: COMMERCIAL

## 2022-01-13 DIAGNOSIS — R07.0 THROAT PAIN: ICD-10-CM

## 2022-01-13 DIAGNOSIS — R49.0 DYSPHONIA: Primary | ICD-10-CM

## 2022-01-13 PROCEDURE — 92507 TX SP LANG VOICE COMM INDIV: CPT | Mod: GN | Performed by: SPEECH-LANGUAGE PATHOLOGIST

## 2022-01-13 NOTE — PATIENT INSTRUCTIONS
"Donny Acuña,    It was good to see you today though I am sorry things have not kept their course in a good direction.  Big recommendations for today were that I love you to do some easy humming or other gentle voice use when you are having your chiropractic work done to tie the sense of relaxation and physical release to the active using your voice itself.  With regards to voice specific exercises we dove into reducing effort and discomfort with voice use during singing.  We followed the following sequence:      Start with gentle breath in and out trying to feel a sense of your vocal tract feeling like a big wide-open pipe, and breath just moving in and out like waves of the shore    Find a balance and how much air you are using when you are using your voice by taking in small breaths and counting 1 number at a time (or 2 or 3) but taking just the right amount of air to let yourself use your voice in a comfortable forward resonant way.  Keep counting up until you find that sense of balance    Now focus on forward resonance and buzzing sensations with more singing type tasks doing a 531 (E.G. FDBb)  descending arpeggio on \"yum yum yum\" focus on both the buzz at the lips and that zinging sensation with the \"ya\"    Carryover the same physical sense in your throat and your body into some singing.  Remember do not try and be quiet nor should you worry about being loud, just let the voice come out what ever volume feels natural and easy.    Give the things that and recheck me if you have any questions!  I have recopied some of our previous exercises below as well just so you have it    -Seymour    Exercises:    Neck, and Tongue Stretches    Maintain a comfortably upright posture for the whole time. Don't let yourself slouch in the direction of the stretch    Hold each posture for 10 low, slow breaths.     On each inhalation recognize where you feel tension.     On each exhalation allow yourself to release whatever tension you can " both in the muscle you're stretching, but also secondary muscles.    On the last exhalation of each set gently let your muscles return to their baseline position    Neck:  1. Let your head fall gently to the right, as though you are trying to touch your ear to your shoulder  a. If you need an additional stretch, reach your opposite hand (left) down toward the floor  b. If you need still MORE stretch use your right hand's weight to gently encourage your head toward your right shoulder  2. Let your head fall gently to the left, as though you are trying to touch your ear to your shoulder  a. If you need an additional stretch, reach your opposite hand (right) down toward the floor  b. If you need still MORE stretch use your left hand's weight to gently encourage your head toward your right shoulder  3. Let your head fall forward as if you are trying to touch your chin to your sternum  a. If you need an additional stretch use the weight of one or both hands to gently encourage your head downward  4. Lift your head up like you are trying to look at the ceiling  a. If you need an additional stretch extend your jaw forward (like the drawer of a cash register opening)  b. You can also stretch the front /sides of your neck by looking back over your shoulder at the far corner of the room.    Tongue:  1. Put the tip of your tongue just behind your bottom teeth.  Roll the body of your tongue forward so that it is extending out toward your open mouth  2. Let yourself yawn and gently hold that position while continuing to breath  3. You can also massage this area, by pedaling your thumbs under your chin (anywhere  paralel to the floor).  You can test if it is released with voice use by gently humming while placing a finger against this area to see if it engages    Massage!    Each of these massages can be performed for 30 to 60 seconds.  I often find it easier to calm breaths versus seconds, so think about doing that for 10-20  slow breaths in and out.  It is helpful to tie voice use to the sense of relaxation particularly if discomfort comes with voice use in your case.  Spend the first half of a given massage focusing on a sense of relaxation, and then allow yourself to add gentle voice use on the exhale using a hum on a comfy pitch.    Laryngeal Massage  1) Using the index finger, find the Brody's / Jada's apple. From there, slide the index finger and  thumb along the side of the larynx until you find the thyrohyoid space. These are little pockets  on each side of the larynx.  2) Use enough inward pressure to get traction on the cartilage, then with a rocking motion push down on the cartilage and up on the bone.  This will be a small motion in the grand scheme of things.    Tongue Base Massage X per day  1) Using the knuckle of your index finger or thumb, begin massaging in small, gentle circles right  under the chin. You can also hold your chin with your index finger and use your thumb.  2) Work along the sides, middle, and tip of the bottom of the chin with a gentle pressure.  3) It is important to keep the chin level or down to avoid putting strain on the neck muscles.    While you are doing these sounds I want  you to make a gentle  mmmm  sound on a comfy pitch  You can keep your fingers gently on your voice box and make the sound.  The voice box shouldn't move much up or down (it's ok if you feel vibrations)    Lateralization of the Larynx  1) Using the first 3 fingers of your hand gently lateralize the larynx toward your shoulder  2) You may feel some slight bumps or pops when you shift it to the side, but this is normal  3) If you feel your pulse very strongly, shift your fingers toward the front of your throat    Sternocleidomastoid massage  1) Starting with the muscle directly below / behind your ear follow the track of the SCM down at an angle toward the sternal notch  2) Use gentle circles with the pads of your first two  fingers, drag down using the backs of your knuckles, or walk your fingers along the muscles as feels best

## 2022-01-13 NOTE — LETTER
"1/13/2022       RE: Jorge Denton  [address]  [facility] [address]     Dear Colleague,    Thank you for referring your patient, Jorge Denton, to the [facility]ICE [facility] [place] at [facility]. Please see a copy of my visit note below.    Jorge Denton is a 26 year old male who is being evaluated via a billable video visit.      The patient has been notified and verbally consented to the following:     This video visit will be conducted between you and your provider.    Patient has opted to conduct today's video visit vs an in-person appointment, and is not able to attend due to possible exposure to COVID-19.      If during the course of the call the provider feels a video visit is not appropriate, you will not be charged for this service.    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  Remarkable findings included:    Perceptual evaluation demonstrated: Essentially WNL    Laryngeal exam demonstrated: mild irritation of the medial arytenoid mucosal walls, and mild to moderate 4-way constriction    Primary complaint of patient today included: ongoing throat discomfort that he would like to continue to reduce with the help of speech therapy, and also a recent decline in his ability to safely swallow all textures.    Therefore, we recommended he continue a course of speech therapy to address throat discomfort.\"    SUBJECTIVE:  Since the patient's last session, they report " "the following:     Overall symptoms are the same or worse    Working with a chiropractor on neck tension    Last week voice was good     This week it has degraded in terms of strain    Started a new medication that seemed to flare    OBJECTIVE:  PATIENT REPORTED MEASURES:    Patient Specific Goal Metrics:  Dysponia SLP Goals 8/25/2021 10/13/2021 1/13/2022   How would you rate your speaking voice quality, if 0 is worst voice quality, and 10 is best voice? 6 4 4   How much effort is it to speak, if 0 is no extra effort and 10 is maximum effort? 5 7 7   How much effort is it to sing, if 0 is no extra effort and 10 is maximum effort? - - -   How how severe is your [Throat Discomfort - or use patient specific description], if 0 is no [discomfort] at all and 10 is the worst [discomfort]? 3 7 6   How would you rate your breathing, if 0 is the worst and 10 is the best? - - -   How much does your voice problem bother you? Somewhat A little bit Quite a bit   How much does your breathing problem bother you?         - - -   How much does your throat discomfort bother you?     Somewhat Quite a bit Somewhat     Patient Supplied Answers To Last 2 VHI Questionnaires  Voice Handicap Index (VHI-10) 12/3/2021 1/13/2022   My voice makes it difficult for people to hear me 3 3   People have difficulty understanding me in a noisy room 3 3   My voice difficulties restrict my personal and social life.  3 3   I feel left out of conversations because of my voice 3 3   My voice problem causes me to lose income 3 2   I feel as though I have to strain to produce voice 4 3   The clarity of my voice is unpredictable 4 4   My voice problem upsets me 4 4   My voice makes me feel handicapped 2 3   People ask, \"What's wrong with your voice?\" 2 2   VHI-10 31 30     Patient Supplied Answers To Last 2 EAT Questionnaires  Eating Assessment Tool (EAT-10) 12/3/2021 1/13/2022   My swallowing problem has caused me to lose weight 0 0   My swallowing problem " interferes with my ability to go out for meals 2 0   Swallowing liquids takes extra effort 3 2   Swallowing solids takes extra effort 4 2   Swallowing pills takes extra effort 4 2   Swallowing is painful 2 1   The pleasure of eating is affected by my swallowing 1 0   When I swallow food sticks in my throat 4 3   I cough when I eat 2 0   Swallowing is stressful 4 2   EAT-10 26 12     THERAPEUTIC ACTIVITIES    Counseling and Education:    Principles of voice associated with manual therapy techniques discussed, and application to work with chiropractic care was outlined    Exercises to promote decreased tension and strain in voice use    Abbreviated grounded breathing exercise was utilized initially    Significant breathiness and excessive degree of airflow was noted    A counting task was used with short breath groups to raise awareness of optimal amount of inhalation    This was helpful in avoiding hyperinflation and excessive airflow    Good accuracy with minimal to moderate support    Forward resonant sounds were helpful also and focusing    Emphasis was placed on comfortable loudness to avoid guarding    /jam/most facilitating syllable    Techniques applied to extended vocal tasks with the patient's music    He was able to consistently access voice at a high tessitura (C4-F4) without visible strain    Patient reported significantly greater ease with the use of these techniques      A regimen for home practice was instructed.    I provided handouts of today's therapeutic activities to facilitate practice.    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 Domenico reports that his symptoms have exacerbated recently.  Given the frequent presentation of the symptoms in association with more extended voice use this was  targeted directly during today's session.  Forward resonant sounds and balanced airflow (versus tendency towards hyperinflation and excessive airflow) was used to good effect.    PLAN: I will see Domenico in 2 to 4 weeks as schedule permits at which point we will continue to advance optimize patterns of voicing and extended tasks.  For practice goals see AVS.       TOTAL SERVICE TIME: 50 minutes  TREATMENT (56001)  NO CHARGE FACILITY FEE (32162)    Louis Barfield M.M., M.A., CCC-SLP  Speech-Language Pathologist  Certificate of Vocology  [phone]    *this report was created in part through the use of computerized dictation software, and though reviewed following completion, some typographic errors may persist.  If there is confusion regarding any of this notes contents, please contact me for clarification.*        Again, thank you for allowing me to participate in the care of your patient.      Sincerely,    Seymour Barfield, SLP

## 2022-02-04 ENCOUNTER — VIRTUAL VISIT (OUTPATIENT)
Dept: OTOLARYNGOLOGY | Facility: CLINIC | Age: 27
End: 2022-02-04
Payer: COMMERCIAL

## 2022-02-04 DIAGNOSIS — R07.0 THROAT PAIN: Primary | ICD-10-CM

## 2022-02-04 DIAGNOSIS — R49.0 DYSPHONIA: ICD-10-CM

## 2022-02-04 PROCEDURE — 92507 TX SP LANG VOICE COMM INDIV: CPT | Mod: GN | Performed by: SPEECH-LANGUAGE PATHOLOGIST

## 2022-02-04 NOTE — PROGRESS NOTES
"Jorge Denton is a 26 year old male who is being evaluated via a billable video visit.      The patient has been notified and verbally consented to the following:     This video visit will be conducted between you and your provider.    Patient has opted to conduct today's video visit vs an in-person appointment, and is not able to attend due to possible exposure to COVID-19.      If during the course of the call the provider feels a video visit is not appropriate, you will not be charged for this service.    Call initiated at: 1:00  Type of Video Platform Used: [employer]  Location of provider: Residence  Location of patient: Residence    [facility] VOICE [facility]INIC  THERAPY NOTE (CPT 10272)    Patient: Jorge Denton  Date of Service: 2/4/2022  Referring Physician: Dr. 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  Remarkable findings included:    Perceptual evaluation demonstrated: Essentially WNL    Laryngeal exam demonstrated: mild irritation of the medial arytenoid mucosal walls, and mild to moderate 4-way constriction    Primary complaint of patient today included: ongoing throat discomfort that he would like to continue to reduce with the help of speech therapy, and also a recent decline in his ability to safely swallow all textures.    Therefore, we recommended he continue a course of speech therapy to address throat discomfort.\"    SUBJECTIVE:  Since the patient's last session, they report the following:     Voice is a work in progress    Allowing himself to sing more full voice    Finds that he fatigues quickly, though this a longstanding issue    Primarily bothered by the swallowing    An everyday problem at this point    Feels unsafe - \"like I'm on the " "verge of choking all the time\", worse during meal times    He feels reflux in correlation to this. Is interested in following up with GI    OBJECTIVE:  PATIENT REPORTED MEASURES:  Patient Supplied Answers To SLP QOL Questionnaire  Therapy Quality of Life 8/25/2021   Since my l ast session, I used the speech therapy exercises and strategies as recommended by my speech pathologist. Agree   I feel that using my therapy techniques has become a habit Agree   I feel confident in my ability to manage my current and future symptoms. Agree   Since my last session I feel my symptoms have --------. Stayed the same   Overall, since starting therapy I feel my symptoms are --------. Better   Overall, how much worse? -   Overall, how much better? Moderately better     Patient Specific Goal Metrics:  Dysponia SLP Goals 10/13/2021 1/13/2022 2/4/2022   How would you rate your speaking voice quality, if 0 is worst voice quality, and 10 is best voice? 4 4 6   How much effort is it to speak, if 0 is no extra effort and 10 is maximum effort? 7 7 6   How much effort is it to sing, if 0 is no extra effort and 10 is maximum effort? - - -   How how severe is your [Throat Discomfort - or use patient specific description], if 0 is no [discomfort] at all and 10 is the worst [discomfort]? 7 6 4   How would you rate your breathing, if 0 is the worst and 10 is the best? - - -   How much does your voice problem bother you? A little bit Quite a bit Somewhat   How much does your breathing problem bother you?         - - -   How much does your throat discomfort bother you?     Quite a bit Somewhat Somewhat     THERAPEUTIC ACTIVITIES    Counseling and Education:    Education regarding tissue versus muscular fatigue with regards to voice quality    Instructed concepts and techniques for optimal vocal hygiene including:    Topical hydration    Education regarding physiologic function of topical hydration via steam and potential benefits relative to his " symptoms.    Exercise to promote low impact easy voicing    Balanced glottic closure targeted via negative practiced    Pressed versus breathy voicing    Using forward resonant stimuli    Patient was able to demonstrate breath voicing with greater ease    Increased clinician support required for more efficient closure    Glottal onset versus breathy onset    3 note  pattern utilized    Good accuracy with moderate to maximal support    Previously trained forward resonant sounds demonstrated    Good accuracy with minimal support    Transition between forward resonant sounds and cardinal vowels to improve carryover into singing and speech tasks      A regimen for home practice was instructed.    I provided handouts of today's therapeutic activities to facilitate practice.    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 Domenico feels that voice techniques have been helpful in allowing him to return to using his voice more for singing and speech without significant discomfort, though with more free full voice use he has begun to experience some fatigue of a different nature.  By description sounds consistent with tissue fatigue versus muscular fatigue, and balancing the level of activation to reduce this was targeted today via a variety of negative practice exercises.    PLAN: I will see Domenico in approximately 4 weeks after permits at which point we will gauge maintenance of gains and continue to bolster efficiency in voicing  For practice goals see AVS.       TOTAL SERVICE TIME: 55 minutes  TREATMENT (14218)  NO CHARGE FACILITY FEE (44771)    Louis Barfield M.M., M.A., CCC-SLP  Speech-Language Pathologist  Certificate of Vocology  [phone]    *this report was created in part through the use of  computerized dictation software, and though reviewed following completion, some typographic errors may persist.  If there is confusion regarding any of this notes contents, please contact me for clarification.*

## 2022-02-04 NOTE — LETTER
"2/4/2022       RE: Jorge Denton  [address]  [facility] [address]     Dear Colleague,    Thank you for referring your patient, Jorge Denton, to the [facility] VOICE CLINIC [place] at [facility]. Please see a copy of my visit note below.    Jorge Denton is a 26 year old male who is being evaluated via a billable video visit.      The patient has been notified and verbally consented to the following:     This video visit will be conducted between you and your provider.    Patient has opted to conduct today's video visit vs an in-person appointment, and is not able to attend due to possible exposure to COVID-19.      If during the course of the call the provider feels a video visit is not appropriate, you will not be charged for this service.    Call initiated at: 1:00  Type of Video Platform Used: Workspace  Location of provider: Residence  Location of patient: [facility] VOICE [facility]  THERAPY NOTE (CPT 37653)    Patient: Jorge Denton  Date of Service: 2/4/2022  Referring Physician: Dr. 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  Remarkable findings included:    Perceptual evaluation demonstrated: Essentially WNL    Laryngeal exam demonstrated: mild irritation of the medial arytenoid mucosal walls, and mild to moderate 4-way constriction    Primary complaint of patient today included: ongoing throat discomfort that he would like to continue to reduce with the help of speech therapy, and also a recent decline in his ability to safely swallow all textures.    Therefore, we recommended he continue a course of speech therapy to address throat discomfort.\"    SUBJECTIVE:  Since the " "patient's last session, they report the following:     Voice is a work in progress    Allowing himself to sing more full voice    Finds that he fatigues quickly, though this a longstanding issue    Primarily bothered by the swallowing    An everyday problem at this point    Feels unsafe - \"like I'm on the verge of choking all the time\", worse during meal times    He feels reflux in correlation to this. Is interested in following up with GI    OBJECTIVE:  PATIENT REPORTED MEASURES:  Patient Supplied Answers To SLP QOL Questionnaire  Therapy Quality of Life 8/25/2021   Since my l ast session, I used the speech therapy exercises and strategies as recommended by my speech pathologist. Agree   I feel that using my therapy techniques has become a habit Agree   I feel confident in my ability to manage my current and future symptoms. Agree   Since my last session I feel my symptoms have --------. Stayed the same   Overall, since starting therapy I feel my symptoms are --------. Better   Overall, how much worse? -   Overall, how much better? Moderately better     Patient Specific Goal Metrics:  Dysponia SLP Goals 10/13/2021 1/13/2022 2/4/2022   How would you rate your speaking voice quality, if 0 is worst voice quality, and 10 is best voice? 4 4 6   How much effort is it to speak, if 0 is no extra effort and 10 is maximum effort? 7 7 6   How much effort is it to sing, if 0 is no extra effort and 10 is maximum effort? - - -   How how severe is your [Throat Discomfort - or use patient specific description], if 0 is no [discomfort] at all and 10 is the worst [discomfort]? 7 6 4   How would you rate your breathing, if 0 is the worst and 10 is the best? - - -   How much does your voice problem bother you? A little bit Quite a bit Somewhat   How much does your breathing problem bother you?         - - -   How much does your throat discomfort bother you?     Quite a bit Somewhat Somewhat     THERAPEUTIC ACTIVITIES    Counseling and " Education:    Education regarding tissue versus muscular fatigue with regards to voice quality    Instructed concepts and techniques for optimal vocal hygiene including:    Topical hydration    Education regarding physiologic function of topical hydration via steam and potential benefits relative to his symptoms.    Exercise to promote low impact easy voicing    Balanced glottic closure targeted via negative practiced    Pressed versus breathy voicing    Using forward resonant stimuli    Patient was able to demonstrate breath voicing with greater ease    Increased clinician support required for more efficient closure    Glottal onset versus breathy onset    3 note  pattern utilized    Good accuracy with moderate to maximal support    Previously trained forward resonant sounds demonstrated    Good accuracy with minimal support    Transition between forward resonant sounds and cardinal vowels to improve carryover into singing and speech tasks      A regimen for home practice was instructed.    I provided handouts of today's therapeutic activities to facilitate practice.    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 Domenico feels that voice techniques have been helpful in allowing him to return to using his voice more for singing and speech without significant discomfort, though with more free full voice use he has begun to experience some fatigue of a different nature.  By description sounds consistent with tissue fatigue versus muscular fatigue, and balancing the level of activation to reduce this was targeted today via a variety of negative practice exercises.    PLAN: I will see Domenico in approximately 4 weeks after permits at which point we will gauge maintenance of gains and continue to bolster  efficiency in voicing  For practice goals see AVS.       TOTAL SERVICE TIME: 55 minutes  TREATMENT (25323)  NO CHARGE FACILITY FEE (84844)    Louis Barfield M.M., M.A., CCC-SLP  Speech-Language Pathologist  Certificate of Vocology  [phone]    *this report was created in part through the use of computerized dictation software, and though reviewed following completion, some typographic errors may persist.  If there is confusion regarding any of this notes contents, please contact me for clarification.*        Again, thank you for allowing me to participate in the care of your patient.      Sincerely,    Seymour Barfield, SLP

## 2022-02-04 NOTE — PATIENT INSTRUCTIONS
"Donny Acuña,    I am glad that you have been able to use your voice more freely since I last saw you.  I do think that what you have been experiencing as a slight increase in hoarseness/fatigue despite less discomfort represents tissue fatigue versus the muscular strain you are experiencing previously.  Today we focused on finding a balance in how you are activating your voice to avoid any excessive tissue fatigue through improved efficiency.    Forward resonant humming type sounds continue to be the most efficient for you so by all means keep exploring them as you have been.  Today we used both those sounds as well as onset exercises alternating between breathy, glottal, and balanced versions to help you recognize what that feels like at the level of your larynx and ultimately find the improved ease in voicing.    Try a 3 note scale (do re mi re do) first with a breathy onset \"he he he he he\" then with a glottal onset (like what you hear the word \"eat\") \"e e e e e\" and then do a third attempt where you try and find the balance between these 2 placements.  Recognize how it feels in your throat as you do so I was trying to find the best sense of ease you are able.  Try to this prior to seeing to see if you can find the most efficiency and ease.    -Seymour      "

## 2022-02-09 ENCOUNTER — TELEPHONE (OUTPATIENT)
Dept: GASTROENTEROLOGY | Facility: CLINIC | Age: 27
End: 2022-02-09
Payer: COMMERCIAL

## 2022-02-14 ENCOUNTER — TELEPHONE (OUTPATIENT)
Dept: GASTROENTEROLOGY | Facility: CLINIC | Age: 27
End: 2022-02-14
Payer: COMMERCIAL

## 2022-02-16 ENCOUNTER — TRANSFERRED RECORDS (OUTPATIENT)
Dept: HEALTH INFORMATION MANAGEMENT | Facility: CLINIC | Age: 27
End: 2022-02-16

## 2022-02-21 ENCOUNTER — TELEPHONE (OUTPATIENT)
Dept: OTOLARYNGOLOGY | Facility: CLINIC | Age: 27
End: 2022-02-21
Payer: COMMERCIAL

## 2022-02-21 NOTE — TELEPHONE ENCOUNTER
Reached out to patient to discuss tomorrow's appointment. Left voicemail inquiring as to why the appointment was scheduled - in plan of care, Dr. Flor was planning to see the patient after he finished voice therapy.    Lisbeth Contreras RN on 2/21/2022 at 10:13 AM

## 2022-03-12 ENCOUNTER — HEALTH MAINTENANCE LETTER (OUTPATIENT)
Age: 27
End: 2022-03-12

## 2022-04-13 ENCOUNTER — VIRTUAL VISIT (OUTPATIENT)
Dept: OTOLARYNGOLOGY | Facility: CLINIC | Age: 27
End: 2022-04-13
Payer: COMMERCIAL

## 2022-04-13 DIAGNOSIS — R49.0 DYSPHONIA: Primary | ICD-10-CM

## 2022-04-13 DIAGNOSIS — R07.0 THROAT PAIN: ICD-10-CM

## 2022-04-13 DIAGNOSIS — J38.7 LARYNGEAL HYPERFUNCTION: ICD-10-CM

## 2022-04-13 PROCEDURE — 92507 TX SP LANG VOICE COMM INDIV: CPT | Mod: GN | Performed by: SPEECH-LANGUAGE PATHOLOGIST

## 2022-04-13 NOTE — LETTER
"4/13/2022       RE: Jorge Denton  [address]  [facility] [address]     Dear Colleague,    Thank you for referring your patient, Jorge Denton, to the [facility] VOICE CLINIC [place] at [facility]. Please see a copy of my visit note below.    Jorge Denton is a 26 year old male who is being evaluated via a billable video visit.      The patient has been notified and verbally consented to the following:     This video visit will be conducted between you and your provider.    Patient has opted to conduct today's video visit vs an in-person appointment, and is not able to attend due to possible exposure to COVID-19.      If during the course of the call the provider feels a video visit is not appropriate, you will not be charged for this service.    Call initiated at: 3:00  Type of Video Platform Used: Vertascale  Location of provider: Residence  Location of patient: [facility] VOICE [facility]  THERAPY NOTE (CPT [id number])    Patient: Jorge Denton  Date of Service: 4/13/2022  Referring Physician: Dr. 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  Remarkable findings included:    Perceptual evaluation demonstrated: Essentially WNL    Laryngeal exam demonstrated: mild irritation of the medial arytenoid mucosal walls, and mild to moderate 4-way constriction    Primary complaint of patient today included: ongoing throat discomfort that he would like to continue to reduce with the help of speech therapy, and also a recent decline in his ability to safely swallow all textures.    Therefore, we recommended he continue a course of speech therapy to address throat discomfort.\"    SUBJECTIVE:  Since the " patient's last session, they report the following:     Overall symptoms are about the same    Throat pain / tension has been managed pretty well    Feels like he is noting some raspiness in his voice quality, describes this as weaker / hoarser    Finds that the forward focus and lip trills are helpful    Has fewer options to do full voice singing    OBJECTIVE:  PATIENT REPORTED MEASURES:  Patient Supplied Answers To SLP QOL Questionnaire  Therapy Quality of Life 8/25/2021   Since my l ast session, I used the speech therapy exercises and strategies as recommended by my speech pathologist. Agree   I feel that using my therapy techniques has become a habit Agree   I feel confident in my ability to manage my current and future symptoms. Agree   Since my last session I feel my symptoms have --------. Stayed the same   Overall, since starting therapy I feel my symptoms are --------. Better   Overall, how much worse? -   Overall, how much better? Moderately better     Patient Specific Goal Metrics:  Dysponia SLP Goals 1/13/2022 2/4/2022 4/13/2022   How would you rate your speaking voice quality, if 0 is worst voice quality, and 10 is best voice? 4 6 6   How much effort is it to speak, if 0 is no extra effort and 10 is maximum effort? 7 6 5   How much effort is it to sing, if 0 is no extra effort and 10 is maximum effort? - - -   How how severe is your [Throat Discomfort - or use patient specific description], if 0 is no [discomfort] at all and 10 is the worst [discomfort]? 6 4 2   How would you rate your breathing, if 0 is the worst and 10 is the best? - - -   How much does your voice problem bother you? Quite a bit Somewhat Somewhat   How much does your breathing problem bother you?         - - -   How much does your throat discomfort bother you?     Somewhat Somewhat A little bit     THERAPEUTIC ACTIVITIES    Counseling and Education:    Given the relative maintenance of symptoms discussion of initiating singing voice  "training with a /vocal  was discussed.  Patient stated that he was interested in this and I will work with one of my colleagues to help facilitate    Exercise to promote access to ease in phonation during extended tasks    Promotion of airflow continuity    Lip trill utilized with good effect    Alternative use of tongue bubble to reduce anterior/base of tongue tension -greater ease noted with this technique    Accessing anterior resonance    Forward/nasal continuant sounds [jam] was used    Cues to \"slide\" between notes of arpeggio were helpful for maintaining airflow continuity and ease    Carryover to varied vowel contexts    Vowel blend exercise using cardinal vowels while maintaining sense of ease and forward focus    Good accuracy with minimal support    Brief carryover to more projected speech    While maintaining these elements focus on respiratory drive was able to yield easy projection with reduced fatigue per patient report      A regimen for home practice was instructed.    I provided an email discussing today's therapeutic activities to facilitate practice.    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 Domenico has maintained decreased throat discomfort since he was last seen and is feeling more confident in his ability to maintain this moving forward.  He continues to note vocal fatigue and feel that his voice does not have the power that had historically.  We were able to target this using a combination of respiratory drive, forward focus and laryngeal release..    PLAN: I will see Domenico in approximately 6 weeks, at which point we will gauge maintenance of gains and determine the need and direction for ongoing therapy.   For practice goals see AVS.       TOTAL " SERVICE TIME: 50 minutes  TREATMENT (11167)  NO CHARGE FACILITY FEE (35205)    Louis Barfield M.M., M.A., CCC-SLP  Speech-Language Pathologist  Certificate of Vocology  [phone]    *this report was created in part through the use of computerized dictation software, and though reviewed following completion, some typographic errors may persist.  If there is confusion regarding any of this notes contents, please contact me for clarification.*

## 2022-04-13 NOTE — PROGRESS NOTES
"Jorge Denton is a 26 year old male who is being evaluated via a billable video visit.      The patient has been notified and verbally consented to the following:     This video visit will be conducted between you and your provider.    Patient has opted to conduct today's video visit vs an in-person appointment, and is not able to attend due to possible exposure to COVID-19.      If during the course of the call the provider feels a video visit is not appropriate, you will not be charged for this service.    Call initiated at: 3:00  Type of Video Platform Used: BIOeCON  Location of provider: Residence  Location of patient: Residence    [facility] VOICE CLINIC  THERAPY NOTE (CPT [id number])    Patient: Jorge Denton  Date of Service: 4/13/2022  Referring Physician: Dr. 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  Remarkable findings included:    Perceptual evaluation demonstrated: Essentially WNL    Laryngeal exam demonstrated: mild irritation of the medial arytenoid mucosal walls, and mild to moderate 4-way constriction    Primary complaint of patient today included: ongoing throat discomfort that he would like to continue to reduce with the help of speech therapy, and also a recent decline in his ability to safely swallow all textures.    Therefore, we recommended he continue a course of speech therapy to address throat discomfort.\"    SUBJECTIVE:  Since the patient's last session, they report the following:     Overall symptoms are about the same    Throat pain / tension has been managed pretty well    Feels like he is noting some raspiness in his voice quality, describes this as weaker / hoarser    Finds that the forward focus and lip trills are helpful    Has " fewer options to do full voice singing    OBJECTIVE:  PATIENT REPORTED MEASURES:  Patient Supplied Answers To SLP QOL Questionnaire  Therapy Quality of Life 8/25/2021   Since my l ast session, I used the speech therapy exercises and strategies as recommended by my speech pathologist. Agree   I feel that using my therapy techniques has become a habit Agree   I feel confident in my ability to manage my current and future symptoms. Agree   Since my last session I feel my symptoms have --------. Stayed the same   Overall, since starting therapy I feel my symptoms are --------. Better   Overall, how much worse? -   Overall, how much better? Moderately better     Patient Specific Goal Metrics:  Dysponia SLP Goals 1/13/2022 2/4/2022 4/13/2022   How would you rate your speaking voice quality, if 0 is worst voice quality, and 10 is best voice? 4 6 6   How much effort is it to speak, if 0 is no extra effort and 10 is maximum effort? 7 6 5   How much effort is it to sing, if 0 is no extra effort and 10 is maximum effort? - - -   How how severe is your [Throat Discomfort - or use patient specific description], if 0 is no [discomfort] at all and 10 is the worst [discomfort]? 6 4 2   How would you rate your breathing, if 0 is the worst and 10 is the best? - - -   How much does your voice problem bother you? Quite a bit Somewhat Somewhat   How much does your breathing problem bother you?         - - -   How much does your throat discomfort bother you?     Somewhat Somewhat A little bit     THERAPEUTIC ACTIVITIES    Counseling and Education:    Given the relative maintenance of symptoms discussion of initiating singing voice training with a /vocal  was discussed.  Patient stated that he was interested in this and I will work with one of my colleagues to help facilitate    Exercise to promote access to ease in phonation during extended tasks    Promotion of airflow continuity    Lip trill utilized with good  "effect    Alternative use of tongue bubble to reduce anterior/base of tongue tension -greater ease noted with this technique    Accessing anterior resonance    Forward/nasal continuant sounds [jam] was used    Cues to \"slide\" between notes of arpeggio were helpful for maintaining airflow continuity and ease    Carryover to varied vowel contexts    Vowel blend exercise using cardinal vowels while maintaining sense of ease and forward focus    Good accuracy with minimal support    Brief carryover to more projected speech    While maintaining these elements focus on respiratory drive was able to yield easy projection with reduced fatigue per patient report      A regimen for home practice was instructed.    I provided an email discussing today's therapeutic activities to facilitate practice.    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 Domenico has maintained decreased throat discomfort since he was last seen and is feeling more confident in his ability to maintain this moving forward.  He continues to note vocal fatigue and feel that his voice does not have the power that had historically.  We were able to target this using a combination of respiratory drive, forward focus and laryngeal release..    PLAN: I will see Domenico in approximately 6 weeks, at which point we will gauge maintenance of gains and determine the need and direction for ongoing therapy.   For practice goals see AVS.       TOTAL SERVICE TIME: 50 minutes  TREATMENT (95935)  NO CHARGE FACILITY FEE (55470)    Louis Barfield M.M., M.A., CCC-SLP  Speech-Language Pathologist  Certificate of Vocology  [phone]    *this report was created in part through the use of computerized dictation software, and though reviewed following " completion, some typographic errors may persist.  If there is confusion regarding any of this notes contents, please contact me for clarification.*

## 2022-04-14 NOTE — PATIENT INSTRUCTIONS
"Hi Domenico,    It was good to see you yesterday and I am glad that you have been able to keep things moving in a good direction.  Our focus was on layering a few key techniques to help get you to a clearer stronger voice without building fatigue.  We used 3 note arpeggios (351) sliding between the notes initially to improve airflow continuity on lip trills or tongue bubbles (raspberries).  Then moved onto maintaining that sensation while focusing on forward resonance with \"yum\".  Once these pieces were in place we targeted carryover to different vowel sounds by using \"yum\" then transitioning between the cardinal vowels /i,e,a,o,u/.  Finally we try to do the same thing with increased projection.    I will be in touch soon to connect you and DD regarding voice lessons.    Stay in touch with any questions in the interim and otherwise I will see you in May!    -Data      "

## 2022-05-20 ENCOUNTER — TELEPHONE (OUTPATIENT)
Dept: PULMONOLOGY | Facility: CLINIC | Age: 27
End: 2022-05-20

## 2022-05-20 NOTE — TELEPHONE ENCOUNTER
Contacted [facility] medical imaging release and requested pts CXR from 04/23/2022 be pushed to  PACS.    Sharath Ferrell LPN  Pulmonary Medicine:  [facility]  Phone: [phone] Fax: [phone]

## 2022-05-27 NOTE — PROGRESS NOTES
[facility]   at the [facility]   Otolaryngology Clinic     Patient: Jorge Denton    MRN: [id number]    : 1995    Age/Gender: 26 year old male  Date of Service: 2022  Rendering Provider:   Senait Flor MD     Chief Complaint   Dysphonia  Interval History   HISTORY OF PRESENT ILLNESS: Mr. Denton is a 26 year old male is being followed for dysphonia. he was initially seen on 21. Please refer to this note for full history.     Today, he presents for follow up. he reports:  - he does voice therapy sessions with Seymour Barfield M.M. (voice), M.A., CCC/SLP    - voice exercises have not been able to completely resolve his symptoms  - feels soreness and pain in his throat when talking and swallowing  - voice is hoarse  - was able to sing and talk for a long time before  - had a respiratory infection in April but tested negative for COVID-19  - he has had body aches and fatigue since then  - has not done Xray Video Swallow Exam   - the patient had was unable to have myofacial release due to provider leaving  - pelvic floor dysfunction and chest wall inflammation   - has a history of chronic pain   - uses netti pots twice a week and nasal spray  - getting oral thrush from inhaler use  - was given tablets that dissolve in mouth, but thrush recurs  - no pain in mouth  - thrush causes him to feel like he is wheezing    PAST MEDICAL HISTORY:   Past Medical History:   Diagnosis Date     Allergic rhinitis     allergy testing positive for dustmites     Eczema     in earlier childhood       PAST SURGICAL HISTORY:   Past Surgical History:   Procedure Laterality Date     MASTECTOMY SUBCUTANEOUS MALE BILATERAL (GYNECOMASTIA)         CURRENT MEDICATIONS:   Current Outpatient Medications:      ALBUTEROL IN, one puff every 2 hours as needed, Disp: , Rfl:      CALCIUM MAGNESIUM OR, 1 Tablet daily, Disp: , Rfl:      FLUoxetine (PROZAC) 10 MG tablet, Take 1 tablet by mouth daily. Patient needs to be  seen prior to future refills of this medication.  Please call clinic to schedule.  , Disp: 30 tablet, Rfl: 0     methylphenidate (CONCERTA) 36 MG CR tablet, Take 1 tablet by mouth every morning., Disp: 30 tablet, Rfl: 0     Multiple Vitamin (MULTIVITAMIN OR), Take  by mouth., Disp: , Rfl:      VITAMIN D OR, 1 Tablet daily, Disp: , Rfl:     ALLERGIES: Dust mites and Lactose intolerance [beta-galactosidase]    SOCIAL HISTORY:    Social History     Socioeconomic History     Marital status: Single     Spouse name: Not on file     Number of children: Not on file     Years of education: Not on file     Highest education level: Not on file   Occupational History     Not on file   Tobacco Use     Smoking status: Never Smoker     Smokeless tobacco: Not on file     Tobacco comment: no passive exposure   Substance and Sexual Activity     Alcohol use: No     Drug use: No     Sexual activity: Never   Other Topics Concern      Service Not Asked     Blood Transfusions No     Caffeine Concern No     Occupational Exposure Not Asked     Hobby Hazards No     Sleep Concern No     Stress Concern Yes     Comment: pretty high lately     Weight Concern No     Special Diet Yes     Comment: Lactose intolerant     Back Care Yes     Comment: Chiro     Exercise Yes     Bike Helmet Yes     Seat Belt Yes     Self-Exams Not Asked   Social History Narrative    Jorge participates in [employer] and enjoys skiing.     Social Determinants of Health     Financial Resource Strain: Not on file   Food Insecurity: Not on file   Transportation Needs: Not on file   Physical Activity: Not on file   Stress: Not on file   Social Connections: Not on file   Intimate Partner Violence: Not on file   Housing Stability: Not on file         FAMILY HISTORY:   Family History   Problem Relation Age of Onset     Heart Disease Maternal Grandmother      Lipids Maternal Grandmother      Heart Disease Maternal Grandfather      Lipids Maternal Grandfather      Heart  Disease Paternal Grandfather      Lipids Paternal Grandfather      Lipids Mother      Lipids Father      Lipids Paternal Grandmother      Thyroid Disease Maternal Grandmother         Thyroid nodule      Non-contributory for problems with anesthesia    REVIEW OF SYSTEMS:   The patient was asked a 14 point review of systems regarding constitutional symptoms, eye symptoms, ears, nose, mouth, throat symptoms, cardiovascular symptoms, respiratory symptoms, gastrointestinal symptoms, genitourinary symptoms, musculoskeletal symptoms, integumentary symptoms, neurological symptoms, psychiatric symptoms, endocrine symptoms, hematologic/lymphatic symptoms, and allergic/ immunologic symptoms.   The pertinent factors have been included in the HPI and below.  Patient Supplied Answers to Review of Systems  [facility] E[facility] ROS 11/27/2021   Ears, Nose, Throat: Nasal congestion or drainage, Trouble swallowing   Hematologic: Lymph node swelling       Physical Examination   The patient underwent a physical examination as described below. The pertinent positive and negative findings are summarized after the description of the examination.  Constitutional: The patient's developmental and nutritional status was assessed. The patient's voice quality was assessed.  Head and Face: The head and face were inspected for deformities. The sinuses were palpated. The salivary glands were palpated. Facial muscle strength was assessed bilaterally.  Eyes: Extraocular movements and primary gaze alignment were assessed.  Ears, Nose, Mouth and Throat: The ears and nose were examined for deformities. The nasal septum, mucosa, and turbinates were inspected by anterior rhinoscopy. The lips, teeth, and gums were examined for abnormalities. The oral mucosa, tongue, palate, tonsils, lateral and posterior pharynx were inspected for the presence of asymmetry or mucosal lesions.    Neck: The tracheal position was noted, and the neck mass palpated to determine if there  were any asymmetries, abnormal neck masses, thyromegally, or thyroid nodules.  Respiratory: The nature of the breathing and chest expansion/symmetry was observed.  Cardiovascular: The patient was examined to determine the presence of any edema or jugular venous distension.  Abdomen: The contour of the abdomen was noted.  Lymphatic: The patient was examined for infraclavicular lymphadenopathy.  Musculoskeletal: The patient was inspected for the presence of skeletal deformities.  Extremities: The extremities were examined for any clubbing or cyanosis.  Skin: The skin was examined for inflammatory or neoplastic conditions.  Neurologic: The patient's orientation, mood, and affect were noted. The cranial nerve  functions were examined.  Other pertinent positive and negative findings on physical examination:   OC/OP: no lesions, uvula midline, soft palate elevates symmetrically  Neck: no lesions, no TH tenderness to palpation    All other physical examination findings were within normal limits and noncontributory.    Procedures   Video Laryngoscopy with Stroboscopy (CPT 10522) and Behavioral & Qualitative Evaluation of Voice and Resonence     Preoperative Diagnosis:  Dysphonia and throat symptoms  Postoperative Diagnosis: Dysphonia and throat symptoms  Indication:  Patient has new or persistent dysphonia and throat symptoms.  This requires evaluation by stroboscopy to fully delineate the laryngeal functioning; especially mucosal wave assessment, ultrasharp visualization of lesions on the vocal folds, and overall functioning of the larynx.  Details of Procedure: A 70 degree rigid telescopic laryngoscope or a distal chip flexible scope was used. The lighting was obtained via a light cable connected to a stroboscopic unit. The telescope was inserted either transorally or transnasally until the vocal folds could be visualized. The patient was instructed to sustain the vowel  ee  at a comfortable pitch and loudness as the  voice was monitored by a microphone connected to a fundamental frequency tracker. This circuit tracked vocal periodicity, allowing the light to flash in synchrony with the glottal cycles. A setting on the stroboscope was set to change the phase of light strobing with relation to the vocal fundamental frequency, producing an image of 1 to 2 glottal cycles every second. The video images were recorded for analysis. Use of the variable speed, slow and stop scan allowed careful study of pertinent segments of laryngeal function to increase accuracy of clinical assessments of function and dysfunction.  In particular, features of glottal closure, mucosal wave on the vocal fold cover and laryngeal symmetry were analyzed. Lastly, the patient was asked to phonate speech samples and auditory/perceptual evaluation of voice and resonance were performed.  The vocal quality was carefully evaluated for hoarseness, breathiness, loudness, phrase length and intelligibility to determine the source of dysphonia.    Findings:   B. LARYNGOVIDEOSTROBOSCOPY   Anatomic/Physiological Deviations:  RNC, mild mucus, supraglottic hyperfunction  Mucosal wave: Right:  No restriction     Left: No restriction  Bilateral Vocal Fold Vibration: Symmetric  Vocal Process: Right: No restriction    Left:  No restriction  Vocal Fold closure: Complete glottal closure    Complication(s): None  Disposition: Patient tolerated the procedure well                  Review of Relevant Clinical Data   I personally reviewed:  Notes: 4/23/22 Seymour Barfield M.M. (voice), M.A., CCC/SLP   ? Overall symptoms are about the same  ? Throat pain / tension has been managed pretty well  Labs:  Lab Results   Component Value Date    TSH 2.32 11/28/2012     Lab Results   Component Value Date     12/30/2008    CO2 26 12/30/2008    BUN 14 12/30/2008     Lab Results   Component Value Date    WBC 5.1 11/28/2012    HGB 15.8 (A) 11/28/2012    HCT 48 11/28/2012    MCV 89 11/28/2012     " 11/28/2012     Lab Results   Component Value Date    INR 1.02 05/09/2008     No results found for: WILMAN  No components found for: RHEUMATOIDFACTOR,  RF  Lab Results   Component Value Date    CRP 4.1 09/26/2008    CRP 7.1 05/09/2008     No components found for: CKTOT, URICACID  No components found for: C3, C4, DSDNAAB, NDNAABIFA  No results found for: MPOAB    Patient reported Quality of Life (QOL) Measures   Patient Supplied Answers To VHI Questionnaire  Voice Handicap Index (VHI-10) 1/13/2022   My voice makes it difficult for people to hear me 3   People have difficulty understanding me in a noisy room 3   My voice difficulties restrict my personal and social life.  3   I feel left out of conversations because of my voice 3   My voice problem causes me to lose income 2   I feel as though I have to strain to produce voice 3   The clarity of my voice is unpredictable 4   My voice problem upsets me 4   My voice makes me feel handicapped 3   People ask, \"What's wrong with your voice?\" 2   VHI-10 30         Patient Supplied Answers To EAT Questionnaire  Eating Assessment Tool (EAT-10) 2/4/2022   My swallowing problem has caused me to lose weight 0   My swallowing problem interferes with my ability to go out for meals 1   Swallowing liquids takes extra effort 1   Swallowing solids takes extra effort 2   Swallowing pills takes extra effort 2   Swallowing is painful 0   The pleasure of eating is affected by my swallowing 1   When I swallow food sticks in my throat 2   I cough when I eat 0   Swallowing is stressful 2   EAT-10 11         Patient Supplied Answers To CSI Questionnaire  Cough Severity Index (CSI) 7/30/2021   My cough is worse when I lie down 1   My coughing problem causes me to restrict my personal and social life 1   I tend to avoid places because of my cough problem 1   I feel embarrassed because of my coughing problem 1   People ask, ''What's wrong?'' because I cough a lot 1   I run out of air when I " cough 3   My coughing problem affects my voice 1   My coughing problem limits my physical activity 1   My coughing problem upsets me 1   People ask me if I am sick because I cough a lot 1   CSI Score 12         Patient Supplied Answers to Dyspnea Index Questionnaire:  Dyspnea Index 12/3/2021   1. I have trouble getting air in. 4   2. I feel tightness in my throat when I am having jane breathing problem. 4   3. It takes more effort to breathe than it used to. 4   4. Change in weather affect my breathing problem. 1   5. My breathing gets worse with stress. 4   6. I make sound/noise breathing in 4   7. I have to strain to breathe. 4   8. My shortness of breath gets worse with exercise or physical activity 4   9. My breathing problem makes me feel stressed. 4   10. My breathing problem casuses me to restrict my personal and social life. 4   Dyspnea Index Total Score 37       Impression & Plan     IMPRESSION: Mr. Denton is a 26 year old male who is being seen for the followin. 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  - symptoms consistent with severe muscle tension dysphonia  - speech therapy helped  - states that his voice still goes out 1-2 times per month but he has been able to control this more   - scope today, 2021, shows bilateral vocal process ulceration stable, supraglottic hyperfunction   - still has persistent muscle tension dysphonia  - has chronic pain that has been going on    scope shows mild mucus and persistent supraglottic hyperfunction  - discussed doing myofacial release  - discussed botox injections or augmentation  - symptoms 22 are worsening after respiratory infection in April  - therapy helps with some of  the muscular pain, but none of the voice changes   Plan  - voice therapy  - increase frequency of neti pot use  - myofacial release  - talk with Seymour about procedure     2. Dysphagia  - states that since January of last year, he has had intermittent issues with swallowing  - more recently the swallowing problems have intensified and become more frequent  - speech therapy did not help with the swallowing   - having difficulty with both solids and liquids  - also having difficulty with swallowing pills  - FEES today, 12/02/2021, shows no penetration or aspiration, feels residue though there is scant residue  - would benefit from behavioral FEES  Plan  - recommend following up with GI for esophageal testing    3. Oral Thrush  - getting oral thrush from inhaler use  - was given tablets that dissolve in mouth, but thrush recurs  - no pain in mouth  - thrush causes him to feel like he is wheezing  Plan  - prescribed nystatin swish and spit    RETURN VISIT: after myofascial release therapy    Scribe Disclosure:  Maggie GEORGE, am serving as a scribe to document services personally performed by Senait Flor MD at this visit, based upon the provider's statements to me. All documentation has been reviewed by the aforementioned provider prior to being entered into the official medical record.     Scribe Preparation Attestation:  Maggie GEORGE, a scribe, prepared the chart for today's encounter.     Senait Flor MD    Laryngology    [facility]  Department of  Otolaryngology - Head and Neck Surgery  Clinics & Surgery Center  [address] [place] [id number]  Appointment line: [phone]  Fax: [phone]  [email or web address]

## 2022-06-02 ENCOUNTER — OFFICE VISIT (OUTPATIENT)
Dept: OTOLARYNGOLOGY | Facility: CLINIC | Age: 27
End: 2022-06-02
Payer: COMMERCIAL

## 2022-06-02 VITALS
WEIGHT: 188 LBS | BODY MASS INDEX: 23.38 KG/M2 | HEART RATE: 72 BPM | SYSTOLIC BLOOD PRESSURE: 118 MMHG | DIASTOLIC BLOOD PRESSURE: 64 MMHG | HEIGHT: 75 IN | OXYGEN SATURATION: 97 % | TEMPERATURE: 98.2 F

## 2022-06-02 DIAGNOSIS — R07.0 THROAT PAIN: Primary | ICD-10-CM

## 2022-06-02 DIAGNOSIS — J38.3 VOCAL CORD DYSFUNCTION: ICD-10-CM

## 2022-06-02 DIAGNOSIS — R49.0 DYSPHONIA: Primary | ICD-10-CM

## 2022-06-02 DIAGNOSIS — R07.0 THROAT PAIN: ICD-10-CM

## 2022-06-02 DIAGNOSIS — R29.898 NECK TIGHTNESS: ICD-10-CM

## 2022-06-02 DIAGNOSIS — J38.7 LARYNGEAL HYPERFUNCTION: ICD-10-CM

## 2022-06-02 PROCEDURE — 99214 OFFICE O/P EST MOD 30 MIN: CPT | Mod: 25 | Performed by: OTOLARYNGOLOGY

## 2022-06-02 PROCEDURE — 31579 LARYNGOSCOPY TELESCOPIC: CPT | Performed by: OTOLARYNGOLOGY

## 2022-06-02 PROCEDURE — 92524 BEHAVRAL QUALIT ANALYS VOICE: CPT | Mod: GN | Performed by: SPEECH-LANGUAGE PATHOLOGIST

## 2022-06-02 RX ORDER — NYSTATIN 100000/ML
SUSPENSION, ORAL (FINAL DOSE FORM) ORAL
Qty: 600 ML | Refills: 3 | Status: SHIPPED | OUTPATIENT
Start: 2022-06-02 | End: 2022-06-09

## 2022-06-02 ASSESSMENT — PAIN SCALES - GENERAL: PAINLEVEL: EXTREME PAIN (8)

## 2022-06-02 NOTE — PATIENT INSTRUCTIONS
Kristen Pardo M.M. (voice), M.A., CCC/SLP  Speech-Language Pathologist  NCKAVITHA Trained Vocologist  [facility]  [email or web address] for fastest response  she/her  [email or web address]    Plan:   - Restart therapy with Seymour Barfield:   Continue to work on reducing laryngeal hypersensitivity - discussed gargling and use of saline to help promote an optional PH balance in the mouth and throat.  Discussed how gargling also requires a relaxed throat. Discussed that respiratory mechanics are not well coordinated and agreed that work on this would be helpful.  Also discussed that you would likely benefit from in-person appts to see if it helps improve learning/ carryover.     -Gargle with saline morning and evening, plain water after meals until further directed by Seymour.     - consider pursuing a course of acupuncture, as it was found to be very helpful in the past     - Dr. Flor will send order for PT with myofascial release     - You will be called for therapy - 3 biweekly and one a month later (may be modified by Seymour)

## 2022-06-02 NOTE — PATIENT INSTRUCTIONS
1.  You were seen in the ENT Clinic today by . If you have any questions or concerns after your appointment, please call [phone]. Press option #1 for scheduling related needs. Press option #3 for Nurse advice.    2.   has recommended  the following:   - use nystatin swish and swallow as directed. Prescription sent to your pharmacy   - voice therapy   - PT referral for myofascial release. They will contact you for scheduling   - increase neti pot use   - follow up with GI for testing    3.  Plan is to return to clinic as needed.      Emma Mackey LPN  [phone]  [facility] - Otolaryngology
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
## 2022-06-02 NOTE — NURSING NOTE
"Chief Complaint   Patient presents with     RECHECK     6 month follow up    Blood pressure 118/64, pulse 72, temperature 98.2  F (36.8  C), temperature source Temporal, height 1.905 m (6' 3\"), weight 85.3 kg (188 lb), SpO2 97 %. Nidhi Cody, EMT  "

## 2022-06-02 NOTE — LETTER
2022       RE: Jorge Denton  [address]  [facility] [address]     Dear Colleague,    Thank you for referring your patient, Jorge Denton, to the [facility] [place] at [facility]. Please see a copy of my visit note below.        [facility]   at the [facility]   Otolaryngology Clinic     Patient: Jorge Denton    MRN: [id number]    : 1995    Age/Gender: 26 year old male  Date of Service: 2022  Rendering Provider:   Senait Flor MD     Chief Complaint   Dysphonia  Interval History   HISTORY OF PRESENT ILLNESS: Mr. Denton is a 26 year old male is being followed for dysphonia. he was initially seen on 21. Please refer to this note for full history.     Today, he presents for follow up. he reports:  - he does voice therapy sessions with Seymour Barfield M.M. (voice)MORTEZAAShari, CCC/SLP    - voice exercises have not been able to completely resolve his symptoms  - feels soreness and pain in his throat when talking and swallowing  - voice is hoarse  - was able to sing and talk for a long time before  - had a respiratory infection in April but tested negative for COVID-19  - he has had body aches and fatigue since then  - has not done Xray Video Swallow Exam   - the patient had was unable to have myofacial release due to provider leaving  - pelvic floor dysfunction and chest wall inflammation   - has a history of chronic pain   - uses netti pots twice a week and nasal spray  - getting oral thrush from inhaler use  - was given tablets that dissolve in mouth, but thrush recurs  - no pain in mouth  - thrush causes him to feel like he is wheezing    PAST MEDICAL HISTORY:   Past Medical History:   Diagnosis Date     Allergic rhinitis     allergy testing positive for dustmites     Eczema     in earlier childhood       PAST SURGICAL HISTORY:   Past Surgical History:   Procedure Laterality Date     MASTECTOMY  SUBCUTANEOUS MALE BILATERAL (GYNECOMASTIA)         CURRENT MEDICATIONS:   Current Outpatient Medications:      ALBUTEROL IN, one puff every 2 hours as needed, Disp: , Rfl:      CALCIUM MAGNESIUM OR, 1 Tablet daily, Disp: , Rfl:      FLUoxetine (PROZAC) 10 MG tablet, Take 1 tablet by mouth daily. Patient needs to be seen prior to future refills of this medication.  Please call clinic to schedule.  , Disp: 30 tablet, Rfl: 0     methylphenidate (CONCERTA) 36 MG CR tablet, Take 1 tablet by mouth every morning., Disp: 30 tablet, Rfl: 0     Multiple Vitamin (MULTIVITAMIN OR), Take  by mouth., Disp: , Rfl:      VITAMIN D OR, 1 Tablet daily, Disp: , Rfl:     ALLERGIES: Dust mites and Lactose intolerance [beta-galactosidase]    SOCIAL HISTORY:    Social History     Socioeconomic History     Marital status: Single     Spouse name: Not on file     Number of children: Not on file     Years of education: Not on file     Highest education level: Not on file   Occupational History     Not on file   Tobacco Use     Smoking status: Never Smoker     Smokeless tobacco: Not on file     Tobacco comment: no passive exposure   Substance and Sexual Activity     Alcohol use: No     Drug use: No     Sexual activity: Never   Other Topics Concern      Service Not Asked     Blood Transfusions No     Caffeine Concern No     Occupational Exposure Not Asked     Hobby Hazards No     Sleep Concern No     Stress Concern Yes     Comment: pretty high lately     Weight Concern No     Special Diet Yes     Comment: Lactose intolerant     Back Care Yes     Comment: Chiro     Exercise Yes     Bike Helmet Yes     Seat Belt Yes     Self-Exams Not Asked   Social History Narrative    Jorge participates in [employer] arts and enjoys skiing.     Social Determinants of Health     Financial Resource Strain: Not on file   Food Insecurity: Not on file   Transportation Needs: Not on file   Physical Activity: Not on file   Stress: Not on file   Social  Connections: Not on file   Intimate Partner Violence: Not on file   Housing Stability: Not on file         FAMILY HISTORY:   Family History   Problem Relation Age of Onset     Heart Disease Maternal Grandmother      Lipids Maternal Grandmother      Heart Disease Maternal Grandfather      Lipids Maternal Grandfather      Heart Disease Paternal Grandfather      Lipids Paternal Grandfather      Lipids Mother      Lipids Father      Lipids Paternal Grandmother      Thyroid Disease Maternal Grandmother         Thyroid nodule      Non-contributory for problems with anesthesia    REVIEW OF SYSTEMS:   The patient was asked a 14 point review of systems regarding constitutional symptoms, eye symptoms, ears, nose, mouth, throat symptoms, cardiovascular symptoms, respiratory symptoms, gastrointestinal symptoms, genitourinary symptoms, musculoskeletal symptoms, integumentary symptoms, neurological symptoms, psychiatric symptoms, endocrine symptoms, hematologic/lymphatic symptoms, and allergic/ immunologic symptoms.   The pertinent factors have been included in the HPI and below.  Patient Supplied Answers to Review of Systems   ENT ROS 11/27/2021   Ears, Nose, Throat: Nasal congestion or drainage, Trouble swallowing   Hematologic: Lymph node swelling       Physical Examination   The patient underwent a physical examination as described below. The pertinent positive and negative findings are summarized after the description of the examination.  Constitutional: The patient's developmental and nutritional status was assessed. The patient's voice quality was assessed.  Head and Face: The head and face were inspected for deformities. The sinuses were palpated. The salivary glands were palpated. Facial muscle strength was assessed bilaterally.  Eyes: Extraocular movements and primary gaze alignment were assessed.  Ears, Nose, Mouth and Throat: The ears and nose were examined for deformities. The nasal septum, mucosa, and turbinates  were inspected by anterior rhinoscopy. The lips, teeth, and gums were examined for abnormalities. The oral mucosa, tongue, palate, tonsils, lateral and posterior pharynx were inspected for the presence of asymmetry or mucosal lesions.    Neck: The tracheal position was noted, and the neck mass palpated to determine if there were any asymmetries, abnormal neck masses, thyromegally, or thyroid nodules.  Respiratory: The nature of the breathing and chest expansion/symmetry was observed.  Cardiovascular: The patient was examined to determine the presence of any edema or jugular venous distension.  Abdomen: The contour of the abdomen was noted.  Lymphatic: The patient was examined for infraclavicular lymphadenopathy.  Musculoskeletal: The patient was inspected for the presence of skeletal deformities.  Extremities: The extremities were examined for any clubbing or cyanosis.  Skin: The skin was examined for inflammatory or neoplastic conditions.  Neurologic: The patient's orientation, mood, and affect were noted. The cranial nerve  functions were examined.  Other pertinent positive and negative findings on physical examination:   OC/OP: no lesions, uvula midline, soft palate elevates symmetrically  Neck: no lesions, no TH tenderness to palpation    All other physical examination findings were within normal limits and noncontributory.    Procedures   Video Laryngoscopy with Stroboscopy (CPT 65586) and Behavioral & Qualitative Evaluation of Voice and Resonence     Preoperative Diagnosis:  Dysphonia and throat symptoms  Postoperative Diagnosis: Dysphonia and throat symptoms  Indication:  Patient has new or persistent dysphonia and throat symptoms.  This requires evaluation by stroboscopy to fully delineate the laryngeal functioning; especially mucosal wave assessment, ultrasharp visualization of lesions on the vocal folds, and overall functioning of the larynx.  Details of Procedure: A 70 degree rigid telescopic laryngoscope  or a distal chip flexible scope was used. The lighting was obtained via a light cable connected to a stroboscopic unit. The telescope was inserted either transorally or transnasally until the vocal folds could be visualized. The patient was instructed to sustain the vowel  ee  at a comfortable pitch and loudness as the voice was monitored by a microphone connected to a fundamental frequency tracker. This circuit tracked vocal periodicity, allowing the light to flash in synchrony with the glottal cycles. A setting on the stroboscope was set to change the phase of light strobing with relation to the vocal fundamental frequency, producing an image of 1 to 2 glottal cycles every second. The video images were recorded for analysis. Use of the variable speed, slow and stop scan allowed careful study of pertinent segments of laryngeal function to increase accuracy of clinical assessments of function and dysfunction.  In particular, features of glottal closure, mucosal wave on the vocal fold cover and laryngeal symmetry were analyzed. Lastly, the patient was asked to phonate speech samples and auditory/perceptual evaluation of voice and resonance were performed.  The vocal quality was carefully evaluated for hoarseness, breathiness, loudness, phrase length and intelligibility to determine the source of dysphonia.    Findings:   B. LARYNGOVIDEOSTROBOSCOPY   Anatomic/Physiological Deviations:  RNC, mild mucus, supraglottic hyperfunction  Mucosal wave: Right:  No restriction     Left: No restriction  Bilateral Vocal Fold Vibration: Symmetric  Vocal Process: Right: No restriction    Left:  No restriction  Vocal Fold closure: Complete glottal closure    Complication(s): None  Disposition: Patient tolerated the procedure well                  Review of Relevant Clinical Data   I personally reviewed:  Notes: 4/23/22 Seymour Barfield M.M. (voice), M.A., CCC/SLP   ? Overall symptoms are about the same  ? Throat pain / tension has been  "managed pretty well  Labs:  Lab Results   Component Value Date    TSH 2.32 11/28/2012     Lab Results   Component Value Date     12/30/2008    CO2 26 12/30/2008    BUN 14 12/30/2008     Lab Results   Component Value Date    WBC 5.1 11/28/2012    HGB 15.8 (A) 11/28/2012    HCT 48 11/28/2012    MCV 89 11/28/2012     11/28/2012     Lab Results   Component Value Date    INR 1.02 05/09/2008     No results found for: WILMAN  No components found for: RHEUMATOIDFACTOR,  RF  Lab Results   Component Value Date    CRP 4.1 09/26/2008    CRP 7.1 05/09/2008     No components found for: CKTOT, URICACID  No components found for: C3, C4, DSDNAAB, NDNAABIFA  No results found for: MPOAB    Patient reported Quality of Life (QOL) Measures   Patient Supplied Answers To VHI Questionnaire  Voice Handicap Index (VHI-10) 1/13/2022   My voice makes it difficult for people to hear me 3   People have difficulty understanding me in a noisy room 3   My voice difficulties restrict my personal and social life.  3   I feel left out of conversations because of my voice 3   My voice problem causes me to lose income 2   I feel as though I have to strain to produce voice 3   The clarity of my voice is unpredictable 4   My voice problem upsets me 4   My voice makes me feel handicapped 3   People ask, \"What's wrong with your voice?\" 2   VHI-10 30         Patient Supplied Answers To EAT Questionnaire  Eating Assessment Tool (EAT-10) 2/4/2022   My swallowing problem has caused me to lose weight 0   My swallowing problem interferes with my ability to go out for meals 1   Swallowing liquids takes extra effort 1   Swallowing solids takes extra effort 2   Swallowing pills takes extra effort 2   Swallowing is painful 0   The pleasure of eating is affected by my swallowing 1   When I swallow food sticks in my throat 2   I cough when I eat 0   Swallowing is stressful 2   EAT-10 11         Patient Supplied Answers To CSI Questionnaire  Cough Severity " Index (CSI) 2021   My cough is worse when I lie down 1   My coughing problem causes me to restrict my personal and social life 1   I tend to avoid places because of my cough problem 1   I feel embarrassed because of my coughing problem 1   People ask, ''What's wrong?'' because I cough a lot 1   I run out of air when I cough 3   My coughing problem affects my voice 1   My coughing problem limits my physical activity 1   My coughing problem upsets me 1   People ask me if I am sick because I cough a lot 1   CSI Score 12         Patient Supplied Answers to Dyspnea Index Questionnaire:  Dyspnea Index 12/3/2021   1. I have trouble getting air in. 4   2. I feel tightness in my throat when I am having jane breathing problem. 4   3. It takes more effort to breathe than it used to. 4   4. Change in weather affect my breathing problem. 1   5. My breathing gets worse with stress. 4   6. I make sound/noise breathing in 4   7. I have to strain to breathe. 4   8. My shortness of breath gets worse with exercise or physical activity 4   9. My breathing problem makes me feel stressed. 4   10. My breathing problem casuses me to restrict my personal and social life. 4   Dyspnea Index Total Score 37       Impression & Plan     IMPRESSION: Mr. Denton is a 26 year old male who is being seen for the followin. 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  - symptoms consistent with severe muscle tension dysphonia  - speech therapy helped  - states that his voice still goes out 1-2 times per month but he has been able to control this more   - scope today, 2021, shows bilateral vocal process ulceration stable, supraglottic hyperfunction   - still has  persistent muscle tension dysphonia  - has chronic pain that has been going on    scope shows mild mucus and persistent supraglottic hyperfunction  - discussed doing myofacial release  - discussed botox injections or augmentation  - symptoms 6/2/22 are worsening after respiratory infection in April  - therapy helps with some of the muscular pain, but none of the voice changes   Plan  - voice therapy  - increase frequency of neti pot use  - myofacial release  - talk with Seymour about procedure     2. Dysphagia  - states that since January of last year, he has had intermittent issues with swallowing  - more recently the swallowing problems have intensified and become more frequent  - speech therapy did not help with the swallowing   - having difficulty with both solids and liquids  - also having difficulty with swallowing pills  - FEES today, 12/02/2021, shows no penetration or aspiration, feels residue though there is scant residue  - would benefit from behavioral FEES  Plan  - recommend following up with GI for esophageal testing    3. Oral Thrush  - getting oral thrush from inhaler use  - was given tablets that dissolve in mouth, but thrush recurs  - no pain in mouth  - thrush causes him to feel like he is wheezing  Plan  - prescribed nystatin swish and spit    RETURN VISIT: after myofascial release therapy    Scribe Disclosure:  Maggie GEORGE, am serving as a scribe to document services personally performed by Senait Flor MD at this visit, based upon the provider's statements to me. All documentation has been reviewed by the aforementioned provider prior to being entered into the official medical record.     Scribe Preparation Attestation:  Maggie GEORGE, a scribe, prepared the chart for today's encounter.     Senait Flor MD    Laryngology    [facility]  Department of  Otolaryngology - Head and Neck Surgery  [facility]  [address] [place] [address]  [address]  Appointment line: [phone]  Fax: [phone]  [email or web address]

## 2022-06-06 ENCOUNTER — TELEPHONE (OUTPATIENT)
Dept: OTOLARYNGOLOGY | Facility: CLINIC | Age: 27
End: 2022-06-06
Payer: COMMERCIAL

## 2022-06-06 NOTE — TELEPHONE ENCOUNTER
[facility] Call Center    Phone Message    May a detailed message be left on voicemail: yes     Reason for Call: Medication Question or concern regarding medication   Prescription Clarification  Name of Medication:nystatin (MYCOSTATIN) 520089 UNIT/ML suspension   Prescribing Provider: Dr. Flor   Pharmacy: [id number] [address] [address]   What on the order needs clarification? Pharmacy needs clarification on quantity. Please reach out to discuss. Thank you           Action Taken: Message routed to:  [facility] ([facility]): ENT    Travel Screening: Not Applicable

## 2022-06-06 NOTE — TELEPHONE ENCOUNTER
Spoke to pharmacist at [facility] pharmacy and clarified prescription details. RX will be filled and pt will be contacted.

## 2022-06-13 DIAGNOSIS — K21.9 GASTROESOPHAGEAL REFLUX DISEASE, UNSPECIFIED WHETHER ESOPHAGITIS PRESENT: Primary | ICD-10-CM

## 2022-06-14 ENCOUNTER — TELEPHONE (OUTPATIENT)
Dept: GASTROENTEROLOGY | Facility: CLINIC | Age: 27
End: 2022-06-14
Payer: COMMERCIAL

## 2022-06-14 NOTE — TELEPHONE ENCOUNTER
OBEDM and sent mychart. Patient missed 5/20 appt with Dr. iL, provider wants him to r/s. Reschedule to next available held slot. Can use held slots on 7/19 or 8/2, etc.

## 2022-07-19 ENCOUNTER — VIRTUAL VISIT (OUTPATIENT)
Dept: GASTROENTEROLOGY | Facility: CLINIC | Age: 27
End: 2022-07-19
Payer: COMMERCIAL

## 2022-07-19 DIAGNOSIS — R13.19 ESOPHAGEAL DYSPHAGIA: ICD-10-CM

## 2022-07-19 DIAGNOSIS — K21.9 GASTROESOPHAGEAL REFLUX DISEASE, UNSPECIFIED WHETHER ESOPHAGITIS PRESENT: Primary | ICD-10-CM

## 2022-07-19 DIAGNOSIS — F41.1 GENERALIZED ANXIETY DISORDER: ICD-10-CM

## 2022-07-19 PROCEDURE — 99204 OFFICE O/P NEW MOD 45 MIN: CPT | Mod: GC | Performed by: INTERNAL MEDICINE

## 2022-07-19 NOTE — LETTER
7/19/2022         RE: Jorge Denton  [address]  [facility] [address]        Dear Colleague,    Thank you for referring your patient, Jorge Denton, to the [facility] [place]. Please see a copy of my visit note below.    Gastroenterology Visit for: Jorge Denton 1995   MRN: [id number]     Reason for Visit:  dysphagia    Referred by: [place]  / [facility] / [facility] [id number]  Patient Care Team:  Courtney Gill MD as PCP - General  Lacey Forbes MD as MD (Dermatology)  Dora Ley SLP as Therapist (Speech Language/Path)  Senait Flor MD as MD (Otolaryngology)  Senait Flor MD as Assigned Surgical Provider    History of Present Illness:   Jorge Denton is a 26 year old male with history of allergic rhinitis, eczema, asthma, MDD, LANNY, ADHD who is presenting as a new patient in consultation at the request of Dr. Flor with a chief complaint of dysphagia.    Jorge Denton states he has had off and on dysphagia, odynophagia, and reflux starting 3 years ago - denies any particular trigger at the time. He would feel solid and liquid foods getting stuck in upper mid and lower areas. He was evaluated by [facility] via endoscopy in 2019, which was normal per patient. He has not seen [facility] since then, although he underwent FEES in December 2021 showing no penetrance or aspiration.  He has since tried OTC digestive enzymes ('s Best, Digest Gold) and is following a low carb diet, both of which have significantly improved his dysphagia, odynophagia. He also reports accompanying intermittent abdominal bloating, which has also improved with low carb diet.    Over the last year he reports worsening of reflux symptoms, which he describes as a midline burning sensation, worse in the mornings and has associated chest tightness with this. He also has had worsening of his asthma necessitating daily use of his Breo inhaler. He has had recurrent oral thrush and has been  intermittently on nystatin mouthwashes though not currently using. Patient was then evaluated on 6/2/22 by ENT for dysphonia where patient mentioned his dysphagia and was referred to GI.       Today, he is most concerned about his worsening reflux symptoms and off and on dysphagia/odynophagia. He has a history of allergic rhinitis, eczema, and pt reports having had allergy testing in the past, which was negative. No nickel allergy. Denies any history of alcohol or tobacco use. No recreational drug use. Denies nausea, vomiting, abdominal pain, diarrhea, constipation. No unintentional weight loss.     Family history of colon cancer: not reported  Wt Readings from Last 5 Encounters:   06/02/22 85.3 kg (188 lb)   12/02/21 85.3 kg (188 lb)   06/24/21 92.1 kg (203 lb)   11/09/12 96.4 kg (212 lb 8.4 oz) (98 %, Z= 2.15)*   02/01/11 69.6 kg (153 lb 8 oz) (90 %, Z= 1.27)*     * Growth percentiles are based on CDC (Boys, 2-20 Years) data.        Esophageal Questionnaire(s)    BEDQ Questionnaire  BEDQ Questionnaire: How Often Have You Had the Following? 7/19/2022   Trouble eating solid food (meat, bread, vegetables) 1   Trouble eating soft foods (yogurt, jello, pudding) 0   Trouble swallowing liquids 1   Pain while swallowing 1   Coughing or choking while swallowing foods or liquids 0   Total Score: 3     No flowsheet data found.    Eckardt Questionnaire  Eckardt Questionnaire 7/19/2022   Dysphagia 1   Regurgitation 0   Retrosternal Pain 2   Weight Loss (kg) 0   Total Score:  3       Promis 10 Questionnaire  No flowsheet data found.      STUDIES & PROCEDURES:     FEES December 2021:  Indications: Patient complains of dysphagia to both solids and liquids and/or there is suggestion on history and endoscopic exam of the presence of dysphagia causing medical complaints.  Swallowing evaluation is being performed to assess the presence and degree of dysphagia, and to recommend a safe diet.  Consistency Amounts:  Thin Liquid: sip    Puree: 1 tsp  Solid: cookies            Positioning: upright in a chair  Oral Peripheral Exam: See physical exam section.  Anatomic Notes: See Videostroboscopy report for assessment of anatomy and laryngeal functioning  Pharyngeal secretions prior to administration of liquid or food: Yes  Oral Phase Abnormal Findings: No abnormal behavior observed  Behavioral Adaptations: No abnormal behavior observed  Pharyngeal Phase Abnormal Findings: no penetration, no aspiration, no residue and felt residue     MR Enterography 11/14/17:  FINDINGS:   No bowel wall thickening or abnormal contrast enhancement is identified. No mesenteric edema is identified to suggest active bowel inflammation. There is a moderate amount of stool throughout the colon. No abscess, fistula or free intraperitoneal fluid is evident. No perianal disease is noted. No lymphadenopathy is demonstrated.     The visualized portions of the liver and spleen are within normal limits. The bile ducts are normal is caliber. The adrenal glands and pancreas are within normal limits. The kidneys are unremarkable. The bladder is grossly negative. The prostate is unremarkable.     IMPRESSION:   Negative MR enterography except for possible constipation.        No prior CT, PH/Impedance, Bravo, esophagram, colonoscopy available. [facility] Endoscopy report from 2019 not available.    Prior medical records were reviewed including, but not limited to, notes from referring providers, lab work, radiographic tests, and other diagnostic tests. Pertinent results were summarized above.     History     Medical History  Allergic Rhinitis  Eczema  Oral Thrush  MDD  LANNY  ADHD    Surgical History  Bilateral subcutaneous mastectomy    Social History:  Never Smoker  No alcohol use  Currently between jobs - used to work at a   Attended the [employer] for a couple years    Family History   Problem Relation Age of Onset     Heart Disease Maternal Grandmother      Lipids  Maternal Grandmother      Heart Disease Maternal Grandfather      Lipids Maternal Grandfather      Heart Disease Paternal Grandfather      Lipids Paternal Grandfather      Lipids Mother      Lipids Father      Lipids Paternal Grandmother      Thyroid Disease Maternal Grandmother         Thyroid nodule     Family history reviewed and edited as appropriate    Medications and Allergies:     Medications  Concerta  Prozac  Albuterol Inhaler  Breo Ellipta Inhaler    Allergies:  Dust mites  Lactose intolerance     Review of systems:  A full 10 point review of systems was obtained and was negative except for the pertinent positives and negatives stated within the HPI.    Objective Findings:   Physical Exam:  Virtual Visit, formal exam not performed  General: Alert, cooperative, no distress, well-appearing, conversant  Head: Atraumatic, normocephalic, no obvious abnormalities   Respiratory: unlabored breathing  Skin: No jaundice  Neurologic: AAOx3, no obvious neurologic abnormality  Psychiatric: Normal Affect, appropriate mood  Extremities: No obvious edema, moving bilateral upper extremities equally     Labs, Radiology, Pathology     Lab Results   Component Value Date    WBC 5.1 11/28/2012    WBC 4.9 10/16/2009    WBC 6.7 12/30/2008    HGB 15.8 (A) 11/28/2012    HGB 14.6 10/16/2009    HGB 14.3 12/30/2008     11/28/2012     10/16/2009     12/30/2008    CHOL 248 (A) 11/28/2012    TRIG 325 11/28/2012    HDL 38 11/28/2012    ALT 28 12/30/2008    ALT 32 05/09/2008    AST 32 12/30/2008    AST 36 05/09/2008     12/30/2008     05/09/2008    BUN 14 12/30/2008    BUN 13 05/09/2008    CO2 26 12/30/2008    CO2 26 05/09/2008    TSH 2.32 11/28/2012    TSH 1.95 10/16/2009    TSH 1.71 01/12/2009    INR 1.02 05/09/2008        Liver Function Studies - No results for input(s): PROTTOTAL, ALBUMIN, BILITOTAL, ALKPHOS, AST, ALT, BILIDIRECT in the last 74822 hours.       Patient Active Problem List    Diagnosis  Date Noted     Exertional dyspnea 04/14/2009     Priority: High     Esophageal dysphagia 07/19/2022     Priority: Medium     Gastroesophageal reflux disease, unspecified whether esophagitis present 07/19/2022     Priority: Medium     Major depression in complete remission (H) 02/01/2011     Priority: Medium     On fluoxetine 10 mg daily. SARA NGUYEN MD taking over medication prescription        ADHD (attention deficit hyperactivity disorder) 10/16/2009     Priority: Medium     Based on neuropsychological testing 7/09  Seeing therapist every two weeks-[facility] [place]   SARA NGUYEN MD taking over prescription for concerta       Generalized anxiety disorder 11/25/2008     Priority: Medium     Diagnosis updated by automated process. Provider to review and confirm.       Atypical chest pain 11/25/2008     Priority: Medium     4.29.09 Treadmill. Mild to moderate expiratory and inspiratory air-flow obstruction, consistent with a fixed central airway obstruction.        Constipation 09/16/2008     Priority: Medium     Problem list name updated by automated process. Provider to review       Dyspnea 11/25/2008     Priority: Low     4.29.09 exercise spirometry. Mild to moderate expiratory and inspiratory air-flow obstruction, consistent with a fixed central airway obstruction.          Assessment and Plan   Assessment:    Jorge Denton is 26 year old male with history of allergic rhinitis, eczema, oral thrush, MDD, LANNY, ADHD who is presenting as a new patient in consultation at the request of Dr. Flor with a chief complaint of dysphagia.    Patient with years of dysphagia now with subjective improvement following OTC digestive enzymes and low carb diet though with continuation, worsening of heartburn/reflux despite intermittent PPI therapy. He had a prior work up with [facility] regarding his dysphagia, which was unremarkable. He does have a history of asthma that has been worse lately necessitating  increased use of his Breo inhaler, from which he has had intermittent oral thrush treated with nystatin rinses. Differential diagnosis at this time would include GERD, Tapia's esophagus, eosinophilic esophagitis, infectious esophagitis (candida) given recurrent thrush, or less likely a motility issue such as distal esophageal spasm. Given longstanding symptoms of reflux and also history of allergic rhinitis, eczema, asthma, GERD vs EoE would be at the top of the differential, though lack of resolution of reflux with PPI therapy would alternatively suggest EoE. Tapia's esophagus seems less likely given younger age. We would recommend EGD with biopsies to assess for EoE and also r/o other diagnoses including Tapia's, erosive esophagitis. Depending on the EGD examination, we discussed the possibility of placing a Bravo monitor to evaluate for reflux to which the patient agrees to.      1. Gastroesophageal reflux disease, unspecified whether esophagitis present    2. Esophageal dysphagia    3. Generalized anxiety disorder       Orders Placed This Encounter   Procedures     Adult GI  Referral - Procedure Only      Plan:  - schedule EGD with biopsies and possible Bravo placement  - hold PPI therapy at least 2 weeks prior to EGD    Tunde Bonner MS    Attending Attestation:    I was present with the medical student who participated in the service and in the documentation of the note. I  have verified the history and personally performed the physical exam and medical decision making. I agree with the  assessment and plan of care as documented in the note and have edited as necessary. I saw and evaluated the patient and agree with the findings and plan of care as documented in the note.    In summary, the patient is an 26 year old male with a history of allergic rhinitis, eczema, oral thrush, MDD, LANNY, ADHD who is presenting as a new patient in consultation at the request of Dr. Flor with a chief complaint of  dysphagia and reflux.     The patient was seen in video telemedicine consultation with the medical student regarding his dysphagia and reflux. In order to evaluate an endoscopy and Bravo OFF PPI was recommended to the patient. Given his age and symptoms it is possible he has EoE vs reflux disease. Alternatively he does not have pathologic reflux and a Bravo will help us to determine how much reflux he has. Next steps will be determined based on the recommended studies.     Overall time spent discussing, thinking, reviewing the chart including available imaging and labs, and evaluating the patient was 46 minutes of which greater than 50% of the time was spent on counseling and coordination of care.    Patient was seen July 19, 2022    Joey Li DO   of Medicine  Director, Esophageal Disorders Program  Division of Gastroenterology, Hepatology, and Nutrition  [facility]      Follow up plan:   Return to clinic 3 months and as needed.    The risks and benefits of my recommendations, as well as other treatment options were discussed with the patient and any available family today. All questions were answered.     o Follow up: As planned above. Today, I personally spent 35 minutes in direct face to face time with the patient, of which greater than 50% of the time was spent in patient education and counseling as described above. Approximately 16 minutes were spent on indirect care associated with the patient's consultation including but not limited to review of: patient medical records to date, clinic visits, hospital records, lab results, imaging studies, procedural documentation, and coordinating care with other providers. The findings from this review are summarized in the above note. All of the above accounted for a cumulative time of 46 minutes and was performed on the date of service.     The patient verbalized understanding of the plan and was appreciative for the time spent and  information provided during the office visit.       Author:   Joey Li DO   of Medicine  Director, Esophageal Disorders Program  Division of Gastroenterology, Hepatology, and Nutrition  [facility]     Documentation assisted by voice recognition and documentation system.

## 2022-07-19 NOTE — PROGRESS NOTES
Domenico is a 26 year old who is being evaluated via a billable video visit.      How would you like to obtain your AVS? [employer]  If the video visit is dropped, the invitation should be resent by: Send to e-mail at: [email or web address]  Will anyone else be joining your video visit? No        Video-Visit Details    Video Start Time: 9:13 AM    Type of service:  Video Visit    Video End Time:9:48 AM    Originating Location (pt. Location): Home    Distant Location (provider location):  [facility] [place]     Platform used for Video Visit: "[employer]"

## 2022-07-19 NOTE — PROGRESS NOTES
Gastroenterology Visit for: Jorge Denton 1995   MRN: [id number]     Reason for Visit:  dysphagia    Referred by: [place]  / [facility] [id number]  Patient Care Team:  Courtney Gill MD as PCP - General  Lacey Forbes MD as MD (Dermatology)  Dora Ley, ANA LUISA as Therapist (Speech Language/Path)  Senait Flor MD as MD (Otolaryngology)  Senait Flor MD as Assigned Surgical Provider    History of Present Illness:   Jorge Denton is a 26 year old male with history of allergic rhinitis, eczema, asthma, MDD, LANNY, ADHD who is presenting as a new patient in consultation at the request of Dr. Flor with a chief complaint of dysphagia.    Jorge Denton states he has had off and on dysphagia, odynophagia, and reflux starting 3 years ago - denies any particular trigger at the time. He would feel solid and liquid foods getting stuck in upper mid and lower areas. He was evaluated by [facility] via endoscopy in 2019, which was normal per patient. He has not seen [facility] since then, although he underwent FEES in December 2021 showing no penetrance or aspiration.  He has since tried OTC digestive enzymes ('s Best, Digest Gold) and is following a low carb diet, both of which have significantly improved his dysphagia, odynophagia. He also reports accompanying intermittent abdominal bloating, which has also improved with low carb diet.    Over the last year he reports worsening of reflux symptoms, which he describes as a midline burning sensation, worse in the mornings and has associated chest tightness with this. He also has had worsening of his asthma necessitating daily use of his Breo inhaler. He has had recurrent oral thrush and has been intermittently on nystatin mouthwashes though not currently using. Patient was then evaluated on 6/2/22 by ENT for dysphonia where patient mentioned his dysphagia and was referred to GI.       Today, he is most concerned about his worsening reflux symptoms and off and on  dysphagia/odynophagia. He has a history of allergic rhinitis, eczema, and pt reports having had allergy testing in the past, which was negative. No nickel allergy. Denies any history of alcohol or tobacco use. No recreational drug use. Denies nausea, vomiting, abdominal pain, diarrhea, constipation. No unintentional weight loss.     Family history of colon cancer: not reported  Wt Readings from Last 5 Encounters:   06/02/22 85.3 kg (188 lb)   12/02/21 85.3 kg (188 lb)   06/24/21 92.1 kg (203 lb)   11/09/12 96.4 kg (212 lb 8.4 oz) (98 %, Z= 2.15)*   02/01/11 69.6 kg (153 lb 8 oz) (90 %, Z= 1.27)*     * Growth percentiles are based on [facility] (Boys, 2-20 Years) data.        Esophageal Questionnaire(s)    BEDQ Questionnaire  BEDQ Questionnaire: How Often Have You Had the Following? 7/19/2022   Trouble eating solid food (meat, bread, vegetables) 1   Trouble eating soft foods (yogurt, jello, pudding) 0   Trouble swallowing liquids 1   Pain while swallowing 1   Coughing or choking while swallowing foods or liquids 0   Total Score: 3     No flowsheet data found.    Eckardt Questionnaire  Eckardt Questionnaire 7/19/2022   Dysphagia 1   Regurgitation 0   Retrosternal Pain 2   Weight Loss (kg) 0   Total Score:  3       Promis 10 Questionnaire  No flowsheet data found.      STUDIES & PROCEDURES:     FEES December 2021:  Indications: Patient complains of dysphagia to both solids and liquids and/or there is suggestion on history and endoscopic exam of the presence of dysphagia causing medical complaints.  Swallowing evaluation is being performed to assess the presence and degree of dysphagia, and to recommend a safe diet.  Consistency Amounts:  Thin Liquid: sip   Puree: 1 tsp  Solid: cookies            Positioning: upright in a chair  Oral Peripheral Exam: See physical exam section.  Anatomic Notes: See Videostroboscopy report for assessment of anatomy and laryngeal functioning  Pharyngeal secretions prior to administration of  liquid or food: Yes  Oral Phase Abnormal Findings: No abnormal behavior observed  Behavioral Adaptations: No abnormal behavior observed  Pharyngeal Phase Abnormal Findings: no penetration, no aspiration, no residue and felt residue     MR Enterography 11/14/17:  FINDINGS:   No bowel wall thickening or abnormal contrast enhancement is identified. No mesenteric edema is identified to suggest active bowel inflammation. There is a moderate amount of stool throughout the colon. No abscess, fistula or free intraperitoneal fluid is evident. No perianal disease is noted. No lymphadenopathy is demonstrated.     The visualized portions of the liver and spleen are within normal limits. The bile ducts are normal is caliber. The adrenal glands and pancreas are within normal limits. The kidneys are unremarkable. The bladder is grossly negative. The prostate is unremarkable.     IMPRESSION:   Negative MR enterography except for possible constipation.        No prior CT, PH/Impedance, Bravo, esophagram, colonoscopy available. [facility] Endoscopy report from 2019 not available.    Prior medical records were reviewed including, but not limited to, notes from referring providers, lab work, radiographic tests, and other diagnostic tests. Pertinent results were summarized above.     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  Attended the [employer] for a couple years    Family History   Problem Relation Age of Onset     Heart Disease Maternal Grandmother      Lipids Maternal Grandmother      Heart Disease Maternal Grandfather      Lipids Maternal Grandfather      Heart Disease Paternal Grandfather      Lipids Paternal Grandfather      Lipids Mother      Lipids Father      Lipids Paternal Grandmother      Thyroid Disease Maternal  Grandmother         Thyroid nodule     Family history reviewed and edited as appropriate    Medications and Allergies:     Medications  Concerta  Prozac  Albuterol Inhaler  Breo Ellipta Inhaler    Allergies:  Dust mites  Lactose intolerance     Review of systems:  A full 10 point review of systems was obtained and was negative except for the pertinent positives and negatives stated within the HPI.    Objective Findings:   Physical Exam:  Virtual Visit, formal exam not performed  General: Alert, cooperative, no distress, well-appearing, conversant  Head: Atraumatic, normocephalic, no obvious abnormalities   Respiratory: unlabored breathing  Skin: No jaundice  Neurologic: AAOx3, no obvious neurologic abnormality  Psychiatric: Normal Affect, appropriate mood  Extremities: No obvious edema, moving bilateral upper extremities equally     Labs, Radiology, Pathology     Lab Results   Component Value Date    WBC 5.1 11/28/2012    WBC 4.9 10/16/2009    WBC 6.7 12/30/2008    HGB 15.8 (A) 11/28/2012    HGB 14.6 10/16/2009    HGB 14.3 12/30/2008     11/28/2012     10/16/2009     12/30/2008    CHOL 248 (A) 11/28/2012    TRIG 325 11/28/2012    HDL 38 11/28/2012    ALT 28 12/30/2008    ALT 32 05/09/2008    AST 32 12/30/2008    AST 36 05/09/2008     12/30/2008     05/09/2008    BUN 14 12/30/2008    BUN 13 05/09/2008    CO2 26 12/30/2008    CO2 26 05/09/2008    TSH 2.32 11/28/2012    TSH 1.95 10/16/2009    TSH 1.71 01/12/2009    INR 1.02 05/09/2008        Liver Function Studies - No results for input(s): PROTTOTAL, ALBUMIN, BILITOTAL, ALKPHOS, AST, ALT, BILIDIRECT in the last 01082 hours.       Patient Active Problem List    Diagnosis Date Noted     Exertional dyspnea 04/14/2009     Priority: High     Esophageal dysphagia 07/19/2022     Priority: Medium     Gastroesophageal reflux disease, unspecified whether esophagitis present 07/19/2022     Priority: Medium     Major depression in complete  remission (H) 02/01/2011     Priority: Medium     On fluoxetine 10 mg daily. SARA NGUYEN MD taking over medication prescription        ADHD (attention deficit hyperactivity disorder) 10/16/2009     Priority: Medium     Based on neuropsychological testing 7/09  [facility] [place]   SARA NGUYEN MD taking over prescription for concerta       Generalized anxiety disorder 11/25/2008     Priority: Medium     Diagnosis updated by automated process. Provider to review and confirm.       Atypical chest pain 11/25/2008     Priority: Medium     4.29.09 Treadmill. Mild to moderate expiratory and inspiratory air-flow obstruction, consistent with a fixed central airway obstruction.        Constipation 09/16/2008     Priority: Medium     Problem list name updated by automated process. Provider to review       Dyspnea 11/25/2008     Priority: Low     4.29.09 exercise spirometry. Mild to moderate expiratory and inspiratory air-flow obstruction, consistent with a fixed central airway obstruction.          Assessment and Plan   Assessment:    Jorge Denton is 26 year old male with history of allergic rhinitis, eczema, oral thrush, MDD, LANNY, ADHD who is presenting as a new patient in consultation at the request of Dr. Flor with a chief complaint of dysphagia.    Patient with years of dysphagia now with subjective improvement following OTC digestive enzymes and low carb diet though with continuation, worsening of heartburn/reflux despite intermittent PPI therapy. He had a prior work up with [facility] regarding his dysphagia, which was unremarkable. He does have a history of asthma that has been worse lately necessitating increased use of his Breo inhaler, from which he has had intermittent oral thrush treated with nystatin rinses. Differential diagnosis at this time would include GERD, Tapia's esophagus, eosinophilic esophagitis, infectious esophagitis (candida) given recurrent  thrush, or less likely a motility issue such as distal esophageal spasm. Given longstanding symptoms of reflux and also history of allergic rhinitis, eczema, asthma, GERD vs EoE would be at the top of the differential, though lack of resolution of reflux with PPI therapy would alternatively suggest EoE. Tapia's esophagus seems less likely given younger age. We would recommend EGD with biopsies to assess for EoE and also r/o other diagnoses including Tapia's, erosive esophagitis. Depending on the EGD examination, we discussed the possibility of placing a Bravo monitor to evaluate for reflux to which the patient agrees to.      1. Gastroesophageal reflux disease, unspecified whether esophagitis present    2. Esophageal dysphagia    3. Generalized anxiety disorder       Orders Placed This Encounter   Procedures     Adult GI  Referral - Procedure Only      Plan:  - schedule EGD with biopsies and possible Bravo placement  - hold PPI therapy at least 2 weeks prior to EGD    Tunde Bonner MS    Attending Attestation:    I was present with the medical student who participated in the service and in the documentation of the note. I  have verified the history and personally performed the physical exam and medical decision making. I agree with the  assessment and plan of care as documented in the note and have edited as necessary. I saw and evaluated the patient and agree with the findings and plan of care as documented in the note.    In summary, the patient is an 26 year old male with a history of allergic rhinitis, eczema, oral thrush, MDD, LANNY, ADHD who is presenting as a new patient in consultation at the request of Dr. Flor with a chief complaint of dysphagia and reflux.     The patient was seen in video telemedicine consultation with the medical student regarding his dysphagia and reflux. In order to evaluate an endoscopy and Bravo OFF PPI was recommended to the patient. Given his age and symptoms it is  possible he has EoE vs reflux disease. Alternatively he does not have pathologic reflux and a Bravo will help us to determine how much reflux he has. Next steps will be determined based on the recommended studies.     Overall time spent discussing, thinking, reviewing the chart including available imaging and labs, and evaluating the patient was 46 minutes of which greater than 50% of the time was spent on counseling and coordination of care.    Patient was seen July 19, 2022    Joey Li DO   of Medicine  Director, Esophageal Disorders Program  Division of Gastroenterology, Hepatology, and Nutrition  [facility]      Follow up plan:   Return to clinic 3 months and as needed.    The risks and benefits of my recommendations, as well as other treatment options were discussed with the patient and any available family today. All questions were answered.     o Follow up: As planned above. Today, I personally spent 35 minutes in direct face to face time with the patient, of which greater than 50% of the time was spent in patient education and counseling as described above. Approximately 16 minutes were spent on indirect care associated with the patient's consultation including but not limited to review of: patient medical records to date, clinic visits, hospital records, lab results, imaging studies, procedural documentation, and coordinating care with other providers. The findings from this review are summarized in the above note. All of the above accounted for a cumulative time of 46 minutes and was performed on the date of service.     The patient verbalized understanding of the plan and was appreciative for the time spent and information provided during the office visit.     Author:   Joey Li DO   of Medicine  Director, Esophageal Disorders Program  Division of Gastroenterology, Hepatology, and Nutrition  [facility]     Documentation assisted by  voice recognition and documentation system.

## 2022-07-20 NOTE — PATIENT INSTRUCTIONS
It was a pleasure taking care of you today.  I've included a brief summary of our discussion and care plan from today's visit below.  Please review this information with your primary care provider.  _______________________________________________________________________    My recommendations are summarized as follows:    Please schedule an upper endoscopy with biopsy and placement of a Bravo OFF acid suppression therapy for at least two weeks prior to and for the duration of the study   Next steps will be guided based on endoscopic findings and the pH test    To schedule endoscopic procedures you may call: [phone]  To schedule radiology tests you may call: [phone]  To schedule an ENT appointment you may call: [phone]    Please call my nurse Luz ([phone]), Ar ([phone]) with any questions or concerns.  If you were seen through the [facility] please feel free to reach out to Sandie at [phone]   --    Return to GI Clinic in 3 months to review your progress.    _______________________________________________________________________    Who do I call with any questions after my visit?  Please be in touch if there are any further questions that arise following today's visit.  There are multiple ways to contact your gastroenterology care team.      During business hours, you may reach a Gastroenterology nurse at [phone] and choose option 3.       To schedule or reschedule an appointment, please call [phone].     You can always send a secure message through [employer].  [employer] messages are answered by your nurse or doctor typically within 24 hours.  Please allow extra time on weekends and holidays.      For urgent/emergent questions after business hours, you may reach the on-call GI Fellow by contacting the [facility] at [phone].     How will I get the results of any tests ordered?    You will receive all of your results.  If you have signed up for  MyChart, any tests ordered at your visit will be available to you after your physician reviews them.  Typically this takes 1-2 weeks.  If there are urgent results that require a change in your care plan, your physician or nurse will call you to discuss the next steps.      What is [employer]?  [employer] is a secure way for you to access all of your healthcare records from the [facility].  It is a web based computer program, so you can sign on to it from any location.  It also allows you to send secure messages to your care team.  I recommend signing up for [employer] access if you have not already done so and are comfortable with using a computer.      How to I schedule a follow-up visit?  If you did not schedule a follow-up visit today, please call [phone] to schedule a follow-up office visit.      If you feel you received exceptional care and are interested in supporting the clinical and research goals of Dr. Li or the Division of Gastroenterology, Hepatology, and Nutrition please contact [email or web address] from the [facility] to discuss opportunities to donate.    Sincerely,    Joey Li DO   of Medicine  Director, Esophageal Disorders Program  Division of Gastroenterology, Hepatology, and Nutrition  [facility]

## 2022-07-21 ENCOUNTER — TELEPHONE (OUTPATIENT)
Dept: GASTROENTEROLOGY | Facility: CLINIC | Age: 27
End: 2022-07-21

## 2022-07-26 ENCOUNTER — HOSPITAL ENCOUNTER (OUTPATIENT)
Facility: AMBULATORY SURGERY CENTER | Age: 27
End: 2022-07-26
Attending: INTERNAL MEDICINE
Payer: COMMERCIAL

## 2022-07-26 ENCOUNTER — TELEPHONE (OUTPATIENT)
Dept: GASTROENTEROLOGY | Facility: CLINIC | Age: 27
End: 2022-07-26

## 2022-07-26 NOTE — TELEPHONE ENCOUNTER
Screening Questions  BlueKIND OF PREP RedLOCATION [review exclusion criteria] GreenSEDATION TYPE      1.  YES Are you active on mychart?       2.  Joey Li, DO Ordering/Referring Provider:      3. BLUE PLUS  What insurance is in the chart?    4.  Y Do you have a legal guardian or medical Power of ?              Are you able to give consent for your medical care?                (Sedation review/consideration needed)      5.   23.5   BMI  [BMI OVER 40-EXTENDED PREP]  If greater than 40 review exclusion criteria [PAC APPT IF @ [facility]]       6.   N Have you had a positive covid test in the last 90 days?      7.   Respiratory Screening :  [If yes to any of the following HOSPITAL setting only]                     N Do you use daily home oxygen?   Y Do you have mod to severe Obstructive Sleep Apnea?  [OKAY @ [facility] SH PH RI]                  N Do you have Pulmonary Hypertension?                   N Do you have UNCONTROLLED asthma?         8.   N Have you had a heart or lung transplant?       9.   N Are you currently on dialysis?   [ If yes, G-PREP & HOSPITAL setting only]      10.   N  Do you have chronic kidney disease?  [ If yes, G-PREP ]     11.  N Have you had a stroke or Transient ischemic attack (TIA - aka  mini stroke ) within 6 months?   (If yes, please review exclusion criteria)     12.   N In the past 6 months, have you had any heart related issues including cardiomyopathy or heart attack?           N If yes, did it require cardiac stenting or other implantable device?       13.   N Do you have any implantable devices in your body (pacemaker, defib, LVAD)?  (If yes, please review exclusion criteria)     14.   N Do you take nitroglycerin?            N If yes, how often? n  (if yes, HOSPITAL setting ONLY)     15.   N Are you currently taking any blood thinners?           [IF YES, INFORM PATIENT TO FOLLOW UP W/ ORDERING PROVIDER FOR BRIDGING INSTRUCTIONS]      16.    N  Do you have a diagnosis  of diabetes?  [ If yes, G-PREP ]     17.   [FEMALES] N Are you currently pregnant?    N If yes, how many weeks?      18.    N  Are you taking any prescription pain medications on a routine schedule?    [ If yes, EXTENDED PREP.] [If yes, MAC]    22.  Do you take the medication Phentermine?     Yes-> Hold for 7 days before procedure.  Please consult your prescribing provider if you have questions about holding this medication.     No-> Continue to next question.     19.   N Do you have any chemical dependencies such as alcohol, street drugs, or methadone?   [If yes, MAC]     20.   N Do you have any history of post-traumatic stress syndrome, severe anxiety or history of psychosis?   [If yes, MAC]     21.   Y Do you transfer independently?       22.   N  On a regular basis do you go 3-5 days between bowel movements?  [ If yes, EXTENDED PREP.]     23.    Preferred LOCAL Pharmacy for Pre Prescription     [id number] [address]          Scheduling Details         Jorge : Caller   (Please ask for phone number if not scheduled by patient)    Upper Endoscopy [EGD] BRAVO: Type of Procedure Scheduled    Which Colonoscopy Prep was Sent?      JOSE A Surgeon    10/28 Date of Procedure   [facility] Location    MAC Sedation Type     Conscious Sedation- Needs  for 6 hours after the procedure  MAC/General-Needs  for 24 hours after procedure     N Pre-op Required at [facility], [place], [place] and OR for MAC sedation   (advise patient they will need a pre-op prior to procedure -)       Y Informed patient they will need an adult    Cannot take any type of public or medical transportation alone     HOME Pre-Procedure Covid test to be completed at [facility] or Externally     Y  Confirmed Nurse will call to complete assessment     Additional comments:

## 2022-08-08 ENCOUNTER — THERAPY VISIT (OUTPATIENT)
Dept: PHYSICAL THERAPY | Facility: CLINIC | Age: 27
End: 2022-08-08
Attending: OTOLARYNGOLOGY
Payer: COMMERCIAL

## 2022-08-08 DIAGNOSIS — R29.898 NECK TIGHTNESS: ICD-10-CM

## 2022-08-08 PROCEDURE — 97161 PT EVAL LOW COMPLEX 20 MIN: CPT | Mod: GP | Performed by: PHYSICAL THERAPIST

## 2022-08-08 PROCEDURE — 97112 NEUROMUSCULAR REEDUCATION: CPT | Mod: GP | Performed by: PHYSICAL THERAPIST

## 2022-08-08 NOTE — PROGRESS NOTES
[facility]    OUTPATIENT Physical Therapy ORTHOPEDIC EVALUATION  PLAN OF TREATMENT FOR OUTPATIENT REHABILITATION  (COMPLETE FOR INITIAL CLAIMS ONLY)  Patient's Last Name, First Name, M.I.  YOB: 1995  Jorge Denton    Provider s Name:  [facility]   Medical Record No.  [id number]   Start of Care Date:  08/08/22   Onset Date:   06/02/22   Type:     _X__PT   ___OT Medical Diagnosis:    Encounter Diagnosis   Name Primary?    Neck tightness         Treatment Diagnosis:  neck tightness        Goals:     08/08/22 0500   Body Part   Goals listed below are for neck tightness   Goal #1   Goal #1 sleeping   Previous Functional Level No restrictions   Current Functional Level 2-3 hours without sleep per night   STG Target Performance 1-2 hours without sleep per night   Rationale to establish restorative sleep pattern   Due Date 09/05/22   LTG Target Performance Less than 1 hour without sleep per night   Rationale to establish restorative sleep pattern   Due Date 10/03/22       Therapy Frequency:  1 x per week  Predicted Duration of Therapy Intervention:  for 4 weeks tapering to 2 x per month for 2 months    Yesi Chatterjee, ANA                 I CERTIFY THE NEED FOR THESE SERVICES FURNISHED UNDER        THIS PLAN OF TREATMENT AND WHILE UNDER MY CARE .             Physician Signature               Date    X_____________________________________________________                         Certification Date From:  08/08/22   Certification Date To:  11/05/22    Referring Provider:  Senait Flor    Initial Assessment        See Epic Evaluation SOC Date: 08/08/22

## 2022-08-08 NOTE — PROGRESS NOTES
Physical Therapy Initial Evaluation  August 8, 2022     Precautions/Restrictions/MD instructions: PT eval and treat.     Subjective:   Date of Onset: MD order on 6/2/22  C/C: anterior neck tightness, lateral neck pain  Quality of pain is dull, aching and tightness. Pains are described as intermittent in nature. Pain is worse: evening. Pain is rated 7/10.   History of symptoms: Pains began suddenly as the result of strangulation. Since onset, symptoms are improving.   Worsened by: speaking, sleeping  Alleviated by:    Acupuncture, dry needling, PT  General health as reported by patient: good  Pertinent medical/surgical history: asthma, fibromyalgia, numbness/tingling, pelvic floor dysfunction. Sleep disorder/apnea, chest pain, sleep apnea, numbness in perianal region.   Imaging: x-ray and EKG. Current occupational status: between jobs- typically works in  positions. Patient's goals are: decrease pain, improve tolerance to singing and speaking, difficulty sleeping. Return to MD:  PRN.          Objective:  CERVICAL:    Posture: forward head, scapular elevation    Neurological:    Motor Deficit:  Myotomes L R   C4 (shoulder elevation) 5/5 5/5   C5 (shoulder abduction) 5/5 5/5   C6 (elbow flexion) 5/5 5/5   C7 (elbow extension) 5/5 5/5   C8 (thumb extension) 5/5 5/5   T1 (finger add/abd) 5/5 5/5    Strength (lb) WNL WNL     Sensory Deficit, Reflexes, Dural Signs: Reduced light touch sensation in Right C6-7 dermatomes.  Intact to light touch sensation in all other UE dermatomes. Brachioradialis, triceps and biceps reflex 1+ B.    AROM: (Major, Moderate, Minimal or Nil loss)  Movement Loss Og Mod Min Nil Pain   Protrusion    x    Flexion    x Cramping at anterior neck   Retraction   x     Extension    x Cramping at anterior neck   Left Rotation    x    Right Rotation    x    Left Side Bending   42     Right Side bending   43              Assessment/Plan:    The patient is a 27 year old male with chief  complaint of anterior neck tightness, voice hoarseness.    The patient has the following significant findings with corresponding treatment plan.  Diagnosis 1:  Neck tightness    Pain -  hot/cold therapy, manual therapy, splint/taping/bracing/orthotics, self management, education, directional preference exercise and home program  Decreased ROM/flexibility - manual therapy and therapeutic exercise  Decreased strength - therapeutic exercise and therapeutic activities  Impaired balance - neuro re-education and therapeutic activities  Decreased proprioception - neuro re-education and therapeutic activities  Impaired muscle performance - neuro re-education  Decreased function - therapeutic activities  Impaired posture - neuro re-education         Therapy Evaluation Codes:    Cumulative Therapy Evaluation is: Low complexity.    Previous and current functional limitations:  (See Goal Flow Sheet for this information)    Short term and Long term goals: (See Goal Flow Sheet for this information)     Communication ability:  Patient appears to be able to clearly communicate and understand verbal and written communication and follow directions correctly.  Treatment Explanation - The following has been discussed with the patient: RX ordered/plan of care, anticipated outcomes, and possible risks and side effects.  This patient would benefit from PT intervention to resume normal activities.   Rehab potential is good.    Frequency:  1 X week, once daily  Duration:  for 4 weeks tapering to 2 X a month over 2 months  Discharge Plan: Achieve all LTGs, be independent in home treatment program, and reach maximal therapeutic benefit.    Please refer to the daily flowsheet for treatment today, total treatment time and time spent performing 1:1 timed codes.

## 2022-10-14 ENCOUNTER — DOCUMENTATION ONLY (OUTPATIENT)
Dept: OTOLARYNGOLOGY | Facility: CLINIC | Age: 27
End: 2022-10-14

## 2022-10-14 NOTE — PROGRESS NOTES
PT plan of care from 8/8/22 was faxed to [facility] in [place]. Fax number [phone]. 4 pgs faxed.

## 2022-10-19 ENCOUNTER — TELEPHONE (OUTPATIENT)
Dept: GASTROENTEROLOGY | Facility: CLINIC | Age: 27
End: 2022-10-19

## 2022-10-19 ENCOUNTER — PATIENT OUTREACH (OUTPATIENT)
Dept: GASTROENTEROLOGY | Facility: CLINIC | Age: 27
End: 2022-10-19

## 2022-10-19 NOTE — TELEPHONE ENCOUNTER
Attempted to contact patient regarding upcoming EGD/BRAVO procedure on 10.27.22 for pre assessment questions. No answer.     Left message to return call to [phone] #[phone]    Covid test scheduled ? Discuss at home rapid antigen COVID test 1-2 days prior to procedure.    Arrival time: 0905    Facility location: [facility]    Sedation type: MAC    Indication for procedure: GERD    Anticoagulants: No.     Diabetic? No    EGD/BRAVO instructions sent via [employer]    Uma Nagy RN

## 2022-10-25 NOTE — TELEPHONE ENCOUNTER
Second attempt for pre-assessment prior to upcoming EGD BRAVO.    No answer.  Left message to return call [phone] #4    [employer] message sent.    Rere Bermeo RN

## 2022-10-26 ENCOUNTER — PATIENT OUTREACH (OUTPATIENT)
Dept: GASTROENTEROLOGY | Facility: CLINIC | Age: 27
End: 2022-10-26

## 2022-10-26 NOTE — TELEPHONE ENCOUNTER
Third attempt for pre-assessment prior to upcoming EGD BRAVO.    No answer.  Left message to return call [phone] #4    Rere Bermeo RN

## 2022-10-26 NOTE — TELEPHONE ENCOUNTER
Reached out to pt again, left detailed vm for call back re: EGD with Bravo scheduled for tomorrow.

## 2022-10-27 ENCOUNTER — TELEPHONE (OUTPATIENT)
Dept: GASTROENTEROLOGY | Facility: CLINIC | Age: 27
End: 2022-10-27

## 2022-10-27 RX ORDER — LIDOCAINE 40 MG/G
CREAM TOPICAL
Status: CANCELLED | OUTPATIENT
Start: 2022-10-27

## 2022-10-27 RX ORDER — ONDANSETRON 2 MG/ML
4 INJECTION INTRAMUSCULAR; INTRAVENOUS
Status: CANCELLED | OUTPATIENT
Start: 2022-10-27

## 2022-10-27 NOTE — TELEPHONE ENCOUNTER
Caller: Domenico    Procedure: EGD with Bravo    Date, Location, and Surgeon of Procedure Cancelled: 10/27/22 [place] [facility]    Ordering Provider:Guillermo    Reason for cancel (please be detailed, any staff messages or encounters to note?): See message below    Trixie Luciano, RN  P Endoscopy Scheduling Pool  Hi     Please reschedule pt for EGD/Bravo. Pt No-showed for appt today.     Thanks!           Rescheduled: No-patient has been left message to reschedule     If rescheduled:    Date:    Location:    Prep Resent: (changes to prep?)   Covid Test Rescheduled:    Note any change or update to original order/sedation:

## 2022-11-05 ENCOUNTER — TRANSFERRED RECORDS (OUTPATIENT)
Dept: HEALTH INFORMATION MANAGEMENT | Facility: CLINIC | Age: 27
End: 2022-11-05

## 2022-11-23 ENCOUNTER — OFFICE VISIT (OUTPATIENT)
Dept: FAMILY MEDICINE | Facility: CLINIC | Age: 27
End: 2022-11-23
Payer: COMMERCIAL

## 2022-11-23 ENCOUNTER — TELEPHONE (OUTPATIENT)
Dept: UROLOGY | Facility: CLINIC | Age: 27
End: 2022-11-23

## 2022-11-23 VITALS
OXYGEN SATURATION: 97 % | HEART RATE: 71 BPM | HEIGHT: 74 IN | SYSTOLIC BLOOD PRESSURE: 124 MMHG | BODY MASS INDEX: 26.31 KG/M2 | TEMPERATURE: 98.3 F | WEIGHT: 205 LBS | DIASTOLIC BLOOD PRESSURE: 75 MMHG

## 2022-11-23 DIAGNOSIS — R31.0 GROSS HEMATURIA: ICD-10-CM

## 2022-11-23 DIAGNOSIS — R30.0 DYSURIA: Primary | ICD-10-CM

## 2022-11-23 DIAGNOSIS — N42.81 PROSTATE PAIN: ICD-10-CM

## 2022-11-23 RX ORDER — DOXYCYCLINE HYCLATE 100 MG
100 TABLET ORAL 2 TIMES DAILY
Qty: 60 TABLET | Refills: 1 | Status: SHIPPED | OUTPATIENT
Start: 2022-11-23 | End: 2023-12-15

## 2022-11-23 RX ORDER — TADALAFIL 5 MG/1
TABLET ORAL
COMMUNITY

## 2022-11-23 ASSESSMENT — PATIENT HEALTH QUESTIONNAIRE - PHQ9
SUM OF ALL RESPONSES TO PHQ QUESTIONS 1-9: 0
5. POOR APPETITE OR OVEREATING: NOT AT ALL

## 2022-11-23 ASSESSMENT — ANXIETY QUESTIONNAIRES
GAD7 TOTAL SCORE: 0
5. BEING SO RESTLESS THAT IT IS HARD TO SIT STILL: NOT AT ALL
7. FEELING AFRAID AS IF SOMETHING AWFUL MIGHT HAPPEN: NOT AT ALL
IF YOU CHECKED OFF ANY PROBLEMS ON THIS QUESTIONNAIRE, HOW DIFFICULT HAVE THESE PROBLEMS MADE IT FOR YOU TO DO YOUR WORK, TAKE CARE OF THINGS AT HOME, OR GET ALONG WITH OTHER PEOPLE: NOT DIFFICULT AT ALL
6. BECOMING EASILY ANNOYED OR IRRITABLE: NOT AT ALL
2. NOT BEING ABLE TO STOP OR CONTROL WORRYING: NOT AT ALL
1. FEELING NERVOUS, ANXIOUS, OR ON EDGE: NOT AT ALL
GAD7 TOTAL SCORE: 0
3. WORRYING TOO MUCH ABOUT DIFFERENT THINGS: NOT AT ALL

## 2022-11-23 NOTE — Clinical Note
I have made a referral for Domenico to our urology group, if you have thoughts or if you would have an opening to see him, or suggest someone.  Thank you.

## 2022-11-23 NOTE — PROGRESS NOTES
Jorge Denton is a 27 year old male who presents today with on going symptoms or a urological nature:    1.)  Painful urination, difficulty initiating the flow  2.)  Pain in what he describes as around or behind his testicles to the point of not being able to sit  3.)  Redness around the meatus  4.)  History of mycoplasma urinary infection  5.)  History of hypospadies repair  6.)  History of  Mondor's phlebitis of the penis  7.)  Stated gross hematuria    He is very uncomfortable and concerned as he feels his testicles are painful when they haven't been before.  He has recently been to a urologist (11/17/22) at [facility], he states they did not do a physical exam at all.  He would really like to have another opinion to see if he can get rid of these symptoms for good.    Review Of Systems   ROS: 10 point ROS neg other than the symptoms noted above in the HPI.      Past Medical History:   Diagnosis Date     Allergic rhinitis     allergy testing positive for dustmites     Eczema     in earlier childhood     Gastroesophageal reflux disease      Uncomplicated asthma      Past Surgical History:   Procedure Laterality Date     MASTECTOMY SUBCUTANEOUS MALE BILATERAL (GYNECOMASTIA)       Social History     Socioeconomic History     Marital status: Single     Spouse name: Not on file     Number of children: Not on file     Years of education: Not on file     Highest education level: Not on file   Occupational History     Not on file   Tobacco Use     Smoking status: Never     Smokeless tobacco: Never     Tobacco comments:     no passive exposure   Vaping Use     Vaping Use: Never used   Substance and Sexual Activity     Alcohol use: No     Drug use: No     Sexual activity: Yes     Partners: Female, Male   Other Topics Concern      Service Not Asked     Blood Transfusions No     Caffeine Concern No     Occupational Exposure Not Asked     Hobby Hazards No     Sleep Concern No     Stress Concern Yes     Comment:  "pretty high lately     Weight Concern No     Special Diet Yes     Comment: Lactose intolerant     Back Care Yes     Comment: Chiro     Exercise Yes     Bike Helmet Yes     Seat Belt Yes     Self-Exams Not Asked   Social History Narrative    Jorge participates in martial arts and enjoys skiing.     Social Determinants of Health     Financial Resource Strain: Not on file   Food Insecurity: Not on file   Transportation Needs: Not on file   Physical Activity: Not on file   Stress: Not on file   Social Connections: Not on file   Intimate Partner Violence: Not on file   Housing Stability: Not on file     Family History   Problem Relation Age of Onset     Heart Disease Maternal Grandmother      Lipids Maternal Grandmother      Heart Disease Maternal Grandfather      Lipids Maternal Grandfather      Heart Disease Paternal Grandfather      Lipids Paternal Grandfather      Lipids Mother      Lipids Father      Lipids Paternal Grandmother      Thyroid Disease Maternal Grandmother         Thyroid nodule       /75   Pulse 71   Temp 98.3  F (36.8  C) (Oral)   Ht 1.88 m (6' 2\")   Wt 93 kg (205 lb)   SpO2 97%   BMI 26.32 kg/m      Exam:  Constitutional: alert and moderate distress  Gastrointestinal: Abdomen soft, non-tender. BS normal. No masses, organomegaly  : male positive for pain with palpation posterior scrotal area, testicles normal, small firm area in left inguinal canal.  SILVIANO - prostate not enlarged but very painful to palpation.  Penile shaft, skin without obvious abnormalilty.  UA- negative  Psychiatric: mentation appears normal, affect normal/bright and anxious      Assessment/Plan:  1. Dysuria    - UA without Microscopic; Future    - Adult Urology  Referral; Future  - US Abdomen or Pelvis Doppler Limited; Future    2. Prostate pain    - Adult Urology  Referral; Future  - US Abdomen or Pelvis Doppler Limited; Future  - doxycycline hyclate (VIBRA-TABS) 100 MG tablet; Take 1 tablet (100 " mg) by mouth 2 times daily  Dispense: 60 tablet; Refill: 1    3. Gross hematuria (stated)    - Adult Urology  Referral; Future    I will follow up with Domenico after the ultrasound and urology consult      Options for treatment and follow-up care were reviewed with the patient. Patient engaged in the decision making process and verbalized understanding of the options discussed and agreed with the final plan.

## 2022-11-23 NOTE — TELEPHONE ENCOUNTER
[facility] Call Center    Phone Message    May a detailed message be left on voicemail: yes     Reason for Call: Appointment Intake    Referring Provider Name: Olivia Ferreira   Diagnosis and/or Symptoms: Gross Hematuria     Per Notes patient is to be seen as a Urgent appointment in 1-2 weeks,   but there currently  are no  appointments in  requested time frame. Please review for further follow up and call patient for scheduling. Thank you!     Action Taken: Message routed to:  [facility] Urology     Travel Screening: Not Applicable

## 2022-11-23 NOTE — NURSING NOTE
"ROOM:1  JEB MONSALVE    Preferred Name: Domenico MORALES AGREED:               DECLINED:          Flu    27 year old  Chief Complaint   Patient presents with     prostate pain      Testicular pain, painful a lot this week, difficult to sit   Painful urination   For two months now        Blood pressure 124/75, pulse 71, temperature 98.3  F (36.8  C), temperature source Oral, height 1.88 m (6' 2\"), weight 93 kg (205 lb), SpO2 97 %. Body mass index is 26.32 kg/m .  BP completed using cuff size:        Patient Active Problem List   Diagnosis     Constipation     Generalized anxiety disorder     Atypical chest pain     Dyspnea     Exertional dyspnea     ADHD (attention deficit hyperactivity disorder)     Major depression in complete remission (H)     Esophageal dysphagia     Gastroesophageal reflux disease, unspecified whether esophagitis present       Wt Readings from Last 2 Encounters:   11/23/22 93 kg (205 lb)   06/02/22 85.3 kg (188 lb)     BP Readings from Last 3 Encounters:   11/23/22 124/75   06/02/22 118/64   11/09/12 138/65 (96 %, Z = 1.75 /  38 %, Z = -0.31)*     *BP percentiles are based on the 2017 AAP Clinical Practice Guideline for boys       Allergies   Allergen Reactions     Dust Mites      Lactose Intolerance [Beta-Galactosidase]        Current Outpatient Medications   Medication     ALBUTEROL IN     BREO ELLIPTA 200-25 MCG/INH Inhaler     FLUoxetine (PROZAC) 10 MG tablet     tadalafil (CIALIS) 5 MG tablet     VITAMIN D OR     CALCIUM MAGNESIUM OR     methylphenidate (CONCERTA) 36 MG CR tablet     No current facility-administered medications for this visit.       Social History     Tobacco Use     Smoking status: Never     Smokeless tobacco: Never     Tobacco comments:     no passive exposure   Vaping Use     Vaping Use: Never used   Substance Use Topics     Alcohol use: No     Drug use: No       Honoring Choices - Health Care Directive Guide offered to patient at time of visit.    Health Maintenance Due "   Topic Date Due     ADVANCE CARE PLANNING  Never done     DEPRESSION ACTION PLAN  Never done     HEPATITIS B IMMUNIZATION (2 of 3 - 3-dose series) 08/27/2004     HPV IMMUNIZATION (1 - Male 2-dose series) Never done     YEARLY PREVENTIVE VISIT  10/16/2010     HIV SCREENING  Never done     HEPATITIS C SCREENING  Never done     COVID-19 Vaccine (3 - Booster for Pfizer series) 08/20/2021     INFLUENZA VACCINE (1) 09/01/2022       Immunization History   Administered Date(s) Administered     COVID-19 Vaccine 12+ (Pfizer) 06/03/2021, 06/25/2021       No results found for: PAP    No lab results found.    PHQ-2 ( 1999 Pfizer) 11/23/2022 7/19/2022   Q1: Little interest or pleasure in doing things 0 1   Q2: Feeling down, depressed or hopeless 0 1   PHQ-2 Score 0 2   PHQ-2 Total Score (12-17 Years)- Positive if 3 or more points; Administer PHQ-A if positive - -       PHQ-9 SCORE 11/23/2022   PHQ-9 Total Score 0       LANYN-7 SCORE 11/23/2022   Total Score 0       No flowsheet data found.    Juan Graham    November 23, 2022 10:00 AM

## 2022-11-28 ENCOUNTER — ANCILLARY PROCEDURE (OUTPATIENT)
Dept: ULTRASOUND IMAGING | Facility: CLINIC | Age: 27
End: 2022-11-28
Attending: NURSE PRACTITIONER
Payer: COMMERCIAL

## 2022-11-28 DIAGNOSIS — R30.0 DYSURIA: ICD-10-CM

## 2022-11-28 DIAGNOSIS — N42.81 PROSTATE PAIN: ICD-10-CM

## 2022-11-28 PROCEDURE — 93976 VASCULAR STUDY: CPT | Performed by: RADIOLOGY

## 2022-11-28 PROCEDURE — 76870 US EXAM SCROTUM: CPT | Performed by: RADIOLOGY

## 2023-03-01 ENCOUNTER — TRANSFERRED RECORDS (OUTPATIENT)
Dept: HEALTH INFORMATION MANAGEMENT | Facility: CLINIC | Age: 28
End: 2023-03-01

## 2023-03-22 LAB
ALBUMIN UR-MCNC: NEGATIVE MG/DL
APPEARANCE UR: CLEAR
BILIRUB UR QL STRIP: NEGATIVE
COLOR UR AUTO: YELLOW
GLUCOSE UR STRIP-MCNC: NEGATIVE MG/DL
HGB UR QL STRIP: NEGATIVE
KETONES UR STRIP-MCNC: NEGATIVE MG/DL
LEUKOCYTE ESTERASE UR QL STRIP: NEGATIVE
NITRATE UR QL: NEGATIVE
PH UR STRIP: 5.5 [PH] (ref 5–7)
SP GR UR STRIP: 1.02 (ref 1–1.03)
UROBILINOGEN UR STRIP-ACNC: 0.2 E.U./DL

## 2023-04-01 NOTE — TELEPHONE ENCOUNTER
Problem: Adult Inpatient Plan of Care  Goal: Plan of Care Review  Outcome: Ongoing, Progressing  Goal: Patient-Specific Goal (Individualized)  Outcome: Ongoing, Progressing  Goal: Absence of Hospital-Acquired Illness or Injury  Outcome: Ongoing, Progressing  Goal: Optimal Comfort and Wellbeing  Outcome: Ongoing, Progressing  Goal: Readiness for Transition of Care  Outcome: Ongoing, Progressing     Problem: Fall Injury Risk  Goal: Absence of Fall and Fall-Related Injury  Outcome: Ongoing, Progressing     Problem: Hypertension Comorbidity  Goal: Blood Pressure in Desired Range  Outcome: Ongoing, Progressing     Problem: Alcohol Withdrawal  Goal: Alcohol Withdrawal Symptom Control  Outcome: Ongoing, Progressing  Goal: Alcohol Withdrawal Symptom Control  Outcome: Ongoing, Progressing     Problem: Acute Neurologic Deterioration (Alcohol Withdrawal)  Goal: Optimal Neurologic Function  Outcome: Ongoing, Progressing     Problem: Substance Misuse (Alcohol Withdrawal)  Goal: Readiness for Change Identified  Outcome: Ongoing, Progressing     Problem: Restraint, Nonviolent  Goal: Absence of Harm or Injury  Outcome: Ongoing, Progressing     Problem: Skin Injury Risk Increased  Goal: Skin Health and Integrity  Outcome: Ongoing, Progressing  Intervention: Optimize Skin Protection  Recent Flowsheet Documentation  Taken 4/1/2023 0714 by Pilar Pearson, MARY KATE  Head of Bed (HOB) Positioning: HOB elevated     Problem: Communication Impairment (Mechanical Ventilation, Invasive)  Goal: Effective Communication  Outcome: Ongoing, Progressing  Goal: Effective Communication  Outcome: Ongoing, Progressing  Goal: Effective Communication  Outcome: Ongoing, Progressing     Problem: Device-Related Complication Risk (Mechanical Ventilation, Invasive)  Goal: Optimal Device Function  Outcome: Ongoing, Progressing  Intervention: Optimize Device Care and Function  Recent Flowsheet Documentation  Taken 4/1/2023 0714 by Pilar Pearson,  FUTURE VISIT INFORMATION      FUTURE VISIT INFORMATION:    Date: 6/1/21    Time: 1 PM    Location: [facility]  REFERRAL INFORMATION:    Referring provider:  ANA LUISA Javed    Referring providers clinic:  [facility] - [facility]    Reason for visit/diagnosis: VCD, non-optimal repiratory mechanics    RECORDS REQUESTED FROM:       Clinic name Comments Records Status Imaging Status   [facility] 2/5/21 to  (Critical access hospital) 5/25/21 - SPEECH OVs with ANA LUISA Javed [facility] 8/11/20 - ENT OV with Dr. Hicks Scanned In    [facility] 3/3/21 - GI TV with Sabrina Acharya NP Care Everywhere    [facility] 6/13/20 - UC OV with Dr. Chavez Care Everywhere    [facility] - Imaging 6/13/20 - XR Chest Care Everywhere 4/8 Req - In PACs   [facility] 4/4/21 - ED OV with KAREN Carey  8/10/20 - ED OV with Dr. Downey Care Everywhere    [facility] - Imaging 12/11/19 - XR Chest Care Everywhere 4/8 Req - In PACs   [facility] 12/6/19 - ED OV with Dr. Paul Care Everywhere    [facility] - Imaging 12/6/19 - XR Neck  12/5/19 - XR Chest  11/30/19 - XR Chest  6/4/19 - XR Chest Care Everywhere 4/8 Req - In PACs           * 4/8/21 11:26 AM Faxed req to [facility], [facility], and [facility] for images to be pushed to [place] [facility]s. - Suly   MARY KATE  Airway Safety Measures: manual resuscitator/mask at bedside  Goal: Optimal Device Function  Outcome: Ongoing, Progressing  Intervention: Optimize Device Care and Function  Recent Flowsheet Documentation  Taken 4/1/2023 0714 by Pilar Pearson RRT  Airway Safety Measures: manual resuscitator/mask at bedside  Goal: Optimal Device Function  Outcome: Ongoing, Progressing  Intervention: Optimize Device Care and Function  Recent Flowsheet Documentation  Taken 4/1/2023 0714 by Pilar Pearson RRT  Airway Safety Measures: manual resuscitator/mask at bedside     Problem: Inability to Wean (Mechanical Ventilation, Invasive)  Goal: Mechanical Ventilation Liberation  Outcome: Ongoing, Progressing  Goal: Mechanical Ventilation Liberation  Outcome: Ongoing, Progressing  Goal: Mechanical Ventilation Liberation  Outcome: Ongoing, Progressing     Problem: Nutrition Impairment (Mechanical Ventilation, Invasive)  Goal: Optimal Nutrition Delivery  Outcome: Ongoing, Progressing  Goal: Optimal Nutrition Delivery  Outcome: Ongoing, Progressing  Goal: Optimal Nutrition Delivery  Outcome: Ongoing, Progressing     Problem: Skin and Tissue Injury (Mechanical Ventilation, Invasive)  Goal: Absence of Device-Related Skin and Tissue Injury  Outcome: Ongoing, Progressing  Goal: Absence of Device-Related Skin and Tissue Injury  Outcome: Ongoing, Progressing  Goal: Absence of Device-Related Skin and Tissue Injury  Outcome: Ongoing, Progressing     Problem: Ventilator-Induced Lung Injury (Mechanical Ventilation, Invasive)  Goal: Absence of Ventilator-Induced Lung Injury  Outcome: Ongoing, Progressing  Intervention: Prevent Ventilator-Associated Pneumonia  Recent Flowsheet Documentation  Taken 4/1/2023 0714 by Pilar Pearson RRT  Head of Bed (HOB) Positioning: HOB elevated  Goal: Absence of Ventilator-Induced Lung Injury  Outcome: Ongoing, Progressing  Intervention: Prevent Ventilator-Associated Pneumonia  Recent Flowsheet  Documentation  Taken 4/1/2023 0714 by Pilar Pearson RRT  Head of Bed (HOB) Positioning: HOB elevated  Goal: Absence of Ventilator-Induced Lung Injury  Outcome: Ongoing, Progressing  Intervention: Prevent Ventilator-Associated Pneumonia  Recent Flowsheet Documentation  Taken 4/1/2023 0714 by Pilar Pearson RRT  Head of Bed (HOB) Positioning: HOB elevated     Problem: Activity and Energy Impairment (Excessive Substance Use)  Goal: Optimized Energy Level (Excessive Substance Use)  Outcome: Ongoing, Progressing     Problem: Behavior Regulation Impairment (Excessive Substance Use)  Goal: Improved Behavioral Control (Excessive Substance Use)  Outcome: Ongoing, Progressing     Problem: Decreased Participation and Engagement (Excessive Substance Use)  Goal: Increased Participation and Engagement (Excessive Substance Use)  Outcome: Ongoing, Progressing     Problem: Physiologic Impairment (Excessive Substance Use)  Goal: Improved Physiologic Symptoms (Excessive Substance Use)  Outcome: Ongoing, Progressing     Problem: Social, Occupational or Functional Impairment (Excessive Substance Use)  Goal: Enhanced Social, Occupational or Functional Skills (Excessive Substance Use)  Outcome: Ongoing, Progressing   Goal Outcome Evaluation:  Pt is stable on current vent settings, no changes made this shift. Pt is still on ativan drip no sbt today due to withdrawals. Will continue to monitor.

## 2023-04-22 ENCOUNTER — HEALTH MAINTENANCE LETTER (OUTPATIENT)
Age: 28
End: 2023-04-22

## 2023-08-02 NOTE — TELEPHONE ENCOUNTER
DIAGNOSIS: dx: chronic mircoplasma genital infection     DATE RECEIVED: 8.23.23   NOTES (Gather within 2 years) STATUS DETAILS   OFFICE NOTE from referring provider   CE 3.31.23  [facility]   OFFICE NOTE from other specialist Internal 11.23.22  Jhon LUONG    11.17.22  [facility]   DISCHARGE SUMMARY from hospital     DISCHARGE REPORT from the ER CE 10.19.22  [place] [facility]   LABS (any labs) Internal    MEDICATION LIST Internal    IMAGING  (NEED IMAGES AND REPORTS)     Osteomyelitis: Foot imaging      Liver Abscess: Abdominal imaging     Other (anything related to diagnoses

## 2023-08-15 ENCOUNTER — TRANSFERRED RECORDS (OUTPATIENT)
Dept: HEALTH INFORMATION MANAGEMENT | Facility: CLINIC | Age: 28
End: 2023-08-15

## 2023-08-17 ENCOUNTER — TRANSFERRED RECORDS (OUTPATIENT)
Dept: HEALTH INFORMATION MANAGEMENT | Facility: CLINIC | Age: 28
End: 2023-08-17
Payer: COMMERCIAL

## 2023-08-17 LAB
C TRACH DNA SPEC QL PROBE+SIG AMP: NOT DETECTED
N GONORRHOEA DNA SPEC QL PROBE+SIG AMP: NOT DETECTED
SPECIMEN DESCRIP: NORMAL
SPECIMEN DESCRIPTION: NORMAL

## 2023-08-22 ENCOUNTER — TRANSCRIBE ORDERS (OUTPATIENT)
Dept: OTHER | Age: 28
End: 2023-08-22

## 2023-08-22 ENCOUNTER — MEDICAL CORRESPONDENCE (OUTPATIENT)
Dept: HEALTH INFORMATION MANAGEMENT | Facility: CLINIC | Age: 28
End: 2023-08-22
Payer: COMMERCIAL

## 2023-08-22 ENCOUNTER — TRANSFERRED RECORDS (OUTPATIENT)
Dept: HEALTH INFORMATION MANAGEMENT | Facility: CLINIC | Age: 28
End: 2023-08-22

## 2023-08-22 DIAGNOSIS — R10.2 PELVIC AND PERINEAL PAIN: Primary | ICD-10-CM

## 2023-08-23 ENCOUNTER — PRE VISIT (OUTPATIENT)
Dept: INFECTIOUS DISEASES | Facility: CLINIC | Age: 28
End: 2023-08-23
Payer: COMMERCIAL

## 2023-09-19 NOTE — TELEPHONE ENCOUNTER
[facility] - NEW PROTOCOL PREVISIT    RECORDS RECEIVED FROM: Internal   REASON FOR VISIT: low back pain, numbness    Date of Appt: 10/17/2023   NOTES (FOR ALL VISITS) STATUS DETAILS   OFFICE NOTE from referring provider N/A    OFFICE NOTE from other specialist Care Everywhere 08/15/2023  [facility]    DISCHARGE SUMMARY from hospital N/A    DISCHARGE REPORT from ER N/A    OPERATIVE REPORT N/A    EMG REPORT N/A    MEDICATION LIST N/A    IMAGING  (FOR ALL VISITS)     MRI (HEAD, NECK, SPINE) Received 08/25/2023 thoracic lumbar spine   XRAY (SPINE) *NEUROSURGERY* N/A    CT (HEAD, NECK, SPINE) N/A       Images in PACS, records received

## 2023-09-20 ENCOUNTER — VIRTUAL VISIT (OUTPATIENT)
Dept: UROLOGY | Facility: CLINIC | Age: 28
End: 2023-09-20
Attending: UROLOGY
Payer: COMMERCIAL

## 2023-09-20 VITALS — WEIGHT: 195 LBS | BODY MASS INDEX: 25.04 KG/M2

## 2023-09-20 DIAGNOSIS — R10.2 PELVIC AND PERINEAL PAIN: ICD-10-CM

## 2023-09-20 PROCEDURE — 99215 OFFICE O/P EST HI 40 MIN: CPT | Mod: VID | Performed by: PHYSICIAN ASSISTANT

## 2023-09-20 NOTE — PROGRESS NOTES
Virtual Visit Details    Type of service:  Video Visit   Video Start Time: 1:00 PM  Video End Time:1:28 PM    Originating Location (pt. Location): Other car    Distant Location (provider location):  On-site  Platform used for Video Visit: Ari    CC: pelvic pain, several urologic opinions, ?ejaculatory duct obstruction treatment    HPI:  Jorge Denton is a 28 year old male who presents in consultation from Dr. Jara for evaluation of the above.     Three years of pelvic floor pain (went to PT at that time and has continued this with marginal improvement) in 2020. Vague details about a tick borne disease ~3 years ago and had chronic pain in multiple areas (Saw Dr. Ferreira of ID).     EMR review and discussion with pt:  -Meatotomy at age 6, urethroscopy in 2012 without stricture.   -Saw Dr. Brumfield in 2020. PFPT and behavioral health referrals as well as Valium recommended. Alos had a trial of Flomax and treatment with Doxy around that time.   -Saw Dr. 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 Given meloxicam and pentoxifylline. He had a penile MRI that did not show plaques or lesions. Had an episode of presumed prostatitis and treated with Bactrim x 6 wks. Followed up again later in 2021 and treated with doxy for urethritis and CT scan was performed and noted a moderate amount of stool. Seen again in Jan 2021. Started on Cialis daily and had some relief of his pain. Seen again in 2022 and noting paresthesia at base of penis. Pain referral placed.   -Significant right testicle pain and numbness as well as perineal pain worsened in Nov 2022. Normal scrotal US at that time. Especially worsening of his symptoms with sexual activity.   -Saw Dr. Jara 8/17/23. He performed a digital rectal exam (with prostate more firm on the right), prostate massage, and post prostate massage urination specimen was sent for Pathnostics and Guidance assays (complete genetic panel, bacterial, viral,  fungal, STDs--all neg).  Dr. Jara referred him to [facility] for evaluation of ejaculatory duct obstruction and treatment/procedure. Discussion about cysto at that time, although this was deferred.      Continues to have worsening pain past 5 months. Several courses of abx for mycoplasma/prostatitis in the past (See  Urology visits: Memo). Seen by Dr. Hampton in July 2023. Prostate MRI noting findings to suggest chronic prostatitis.      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 Blood in semen (last visible two months ago). Pain is localized now to right perineum (yael worse with intercourse) and right testicle. Has ED almost always (yael during pain), difficulty maintaining or obtaining erection.     Has not had cysto in the past.      No fevers or chills, no leakage or dribbling. Hx of depression, on SSRIs and seen by behavioral health over the years.     Quite a bit of burning with urination despite several courses of abx      Has follow-up at a pain speciality clinic planned (May be a spine clinic)  Hoping to avoid nerve bloack, which he has been offered.    Past Medical History:   Diagnosis Date    Allergic rhinitis     allergy testing positive for dustmites    Eczema     in earlier childhood    Gastroesophageal reflux disease     Uncomplicated asthma        Past Surgical History:   Procedure Laterality Date    MASTECTOMY SUBCUTANEOUS MALE BILATERAL (GYNECOMASTIA)         Social History     Socioeconomic History    Marital status: Single     Spouse name: Not on file    Number of children: Not on file    Years of education: Not on file    Highest education level: Not on file   Occupational History    Not on file   Tobacco Use    Smoking status: Never    Smokeless tobacco: Never    Tobacco comments:     no passive exposure   Vaping Use    Vaping Use: Never used   Substance and Sexual Activity    Alcohol use: No    Drug use: No    Sexual activity: Yes     Partners: Female, Male   Other Topics Concern      Service Not Asked    Blood Transfusions No    Caffeine Concern No    Occupational Exposure Not Asked    Hobby Hazards No    Sleep Concern No    Stress Concern Yes     Comment: pretty high lately    Weight Concern No    Special Diet Yes     Comment: Lactose intolerant    Back Care Yes     Comment: Chiro    Exercise Yes    Bike Helmet Yes    Seat Belt Yes    Self-Exams Not Asked   Social History Narrative    Jorge participates in [employer] arts and enjoys skiing.     Social Determinants of Health     Financial Resource Strain: Not on file   Food Insecurity: Not on file   Transportation Needs: Not on file   Physical Activity: Not on file   Stress: Not on file   Social Connections: Not on file   Interpersonal Safety: Not on file   Housing Stability: Not on file       Family History   Problem Relation Age of Onset    Heart Disease Maternal Grandmother     Lipids Maternal Grandmother     Heart Disease Maternal Grandfather     Lipids Maternal Grandfather     Heart Disease Paternal Grandfather     Lipids Paternal Grandfather     Lipids Mother     Lipids Father     Lipids Paternal Grandmother     Thyroid Disease Maternal Grandmother         Thyroid nodule       Allergies   Allergen Reactions    Dust Mites     Lactose Intolerance [Beta-Galactosidase]        Current Outpatient Medications   Medication    ALBUTEROL IN    BREO ELLIPTA 200-25 MCG/INH Inhaler    FLUoxetine (PROZAC) 10 MG tablet    tadalafil (CIALIS) 5 MG tablet    VITAMIN D OR    doxycycline hyclate (VIBRA-TABS) 100 MG tablet     No current facility-administered medications for this visit.         PEx:   Weight 88.5 kg (195 lb).    PSYCH: NAD  EYES: EOMI  MOUTH: MMM  NEURO: AAO x3    A/P: Domenico Denton is a 28 year old male with pelvic pain.  -Has had a multitude of imaging/diagnostics as well as infectious evals, treatments and referrals for pain management.    -Will review with Dr. Obando how to best diagnose an ejaculatory duct obstruction and wether cysto  would be indicated at this point (Two general urologists did not have insight into about how to best evaluate for this).     Nahomi Romeo PA-C  [facility]        70 minutes spent on the date of the encounter doing chart review, review of outside records, review of test results, interpretation of tests, discussion with several Urologists, patient visit and documentation

## 2023-09-20 NOTE — NURSING NOTE
Is the patient currently in the state of [place]? YES    Visit mode:VIDEO    If the visit is dropped, the patient can be reconnected by: VIDEO VISIT: Text to cell phone:   Telephone Information:   Mobile [phone]       Will anyone else be joining the visit? NO  (If patient encounters technical issues they should call [phone]    How would you like to obtain your AVS? MyChart    Are changes needed to the allergy or medication list? No    Reason for visit: Consult    Deirdre FOOTE

## 2023-09-20 NOTE — LETTER
9/20/2023       RE: Jorge Denton  [address]  [place] [address]     Dear Colleague,    Thank you for referring your patient, Jorge Denton, to the [facility] at [facility]. Please see a copy of my visit note below.    Virtual Visit Details    Type of service:  Video Visit   Video Start Time: 1:00 PM  Video End Time:1:28 PM    Originating Location (pt. Location): Other car    Distant Location (provider location):  On-site  Platform used for Video Visit: Ari    CC: pelvic pain, several urologic opinions, ?ejaculatory duct obstruction treatment    HPI:  Jorge Denton is a 28 year old male who presents in consultation from Dr. Jara for evaluation of the above.     Three years of pelvic floor pain (went to PT at that time and has continued this with marginal improvement) in 2020. Vague details about a tick borne disease ~3 years ago and had chronic pain in multiple areas (Saw Dr. Ferreira of ID).     EMR review and discussion with pt:  -Meatotomy at age 6, urethroscopy in 2012 without stricture.   -Saw Dr. Brumfield in 2020. PFPT and behavioral health referrals as well as Valium recommended. Alos had a trial of Flomax and treatment with Doxy around that time.   -Saw Dr. 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 Given meloxicam and pentoxifylline. He had a penile MRI that did not show plaques or lesions. Had an episode of presumed prostatitis and treated with Bactrim x 6 wks. Followed up again later in 2021 and treated with doxy for urethritis and CT scan was performed and noted a moderate amount of stool. Seen again in Jan 2021. Started on Cialis daily and had some relief of his pain. Seen again in 2022 and noting paresthesia at base of penis. Pain referral placed.   -Significant right testicle pain and numbness as well as perineal pain worsened in Nov 2022. Normal scrotal US at that time. Especially  worsening of his symptoms with sexual activity.   -Saw Dr. Jara 8/17/23. He performed a digital rectal exam (with prostate more firm on the right), prostate massage, and post prostate massage urination specimen was sent for Pathnostics and Guidance assays (complete genetic panel, bacterial, viral, fungal, STDs--all neg).  Dr. Jara referred him to [facility] for evaluation of ejaculatory duct obstruction and treatment/procedure. Discussion about cysto at that time, although this was deferred.      Continues to have worsening pain past 5 months. Several courses of abx for mycoplasma/prostatitis in the past (See [facility] visits: [facility]). Seen by Dr. Hampton in July 2023. Prostate MRI noting findings to suggest chronic prostatitis.      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 Blood in semen (last visible two months ago). Pain is localized now to right perineum (yael worse with intercourse) and right testicle. Has ED almost always (yael during pain), difficulty maintaining or obtaining erection.     Has not had cysto in the past.      No fevers or chills, no leakage or dribbling. Hx of depression, on SSRIs and seen by behavioral health over the years.     Quite a bit of burning with urination despite several courses of abx      Has follow-up at a pain speciality clinic planned (May be a spine clinic)  Hoping to avoid nerve bloack, which he has been offered.    Past Medical History:   Diagnosis Date    Allergic rhinitis     allergy testing positive for dustmites    Eczema     in earlier childhood    Gastroesophageal reflux disease     Uncomplicated asthma        Past Surgical History:   Procedure Laterality Date    MASTECTOMY SUBCUTANEOUS MALE BILATERAL (GYNECOMASTIA)         Social History     Socioeconomic History    Marital status: Single     Spouse name: Not on file    Number of children: Not on file    Years of education: Not on file    Highest education level: Not on file   Occupational History     Not on file   Tobacco Use    Smoking status: Never    Smokeless tobacco: Never    Tobacco comments:     no passive exposure   Vaping Use    Vaping Use: Never used   Substance and Sexual Activity    Alcohol use: No    Drug use: No    Sexual activity: Yes     Partners: Female, Male   Other Topics Concern     Service Not Asked    Blood Transfusions No    Caffeine Concern No    Occupational Exposure Not Asked    Hobby Hazards No    Sleep Concern No    Stress Concern Yes     Comment: pretty high lately    Weight Concern No    Special Diet Yes     Comment: Lactose intolerant    Back Care Yes     Comment: Chiro    Exercise Yes    Bike Helmet Yes    Seat Belt Yes    Self-Exams Not Asked   Social History Narrative    Jorge participates in [employer] and enjoys skiing.     Social Determinants of Health     Financial Resource Strain: Not on file   Food Insecurity: Not on file   Transportation Needs: Not on file   Physical Activity: Not on file   Stress: Not on file   Social Connections: Not on file   Interpersonal Safety: Not on file   Housing Stability: Not on file       Family History   Problem Relation Age of Onset    Heart Disease Maternal Grandmother     Lipids Maternal Grandmother     Heart Disease Maternal Grandfather     Lipids Maternal Grandfather     Heart Disease Paternal Grandfather     Lipids Paternal Grandfather     Lipids Mother     Lipids Father     Lipids Paternal Grandmother     Thyroid Disease Maternal Grandmother         Thyroid nodule       Allergies   Allergen Reactions    Dust Mites     Lactose Intolerance [Beta-Galactosidase]        Current Outpatient Medications   Medication    ALBUTEROL IN    BREO ELLIPTA 200-25 MCG/INH Inhaler    FLUoxetine (PROZAC) 10 MG tablet    tadalafil (CIALIS) 5 MG tablet    VITAMIN D OR    doxycycline hyclate (VIBRA-TABS) 100 MG tablet     No current facility-administered medications for this visit.         PEx:   Weight 88.5 kg (195 lb).    PSYCH: NAD  EYES:  EOMI  MOUTH: MMM  NEURO: AAO x3    A/P: Domenico Denton is a 28 year old male with pelvic pain.  -Has had a multitude of imaging/diagnostics as well as infectious evals, treatments and referrals for pain management.    -Will review with Dr. Obando how to best diagnose an ejaculatory duct obstruction and wether cysto would be indicated at this point (Two general urologists did not have insight into about how to best evaluate for this).     Nahomi Romeo PA-C  [facility]        70 minutes spent on the date of the encounter doing chart review, review of outside records, review of test results, interpretation of tests, discussion with several Urologists, patient visit and documentation

## 2023-09-25 ENCOUNTER — TELEPHONE (OUTPATIENT)
Dept: UROLOGY | Facility: CLINIC | Age: 28
End: 2023-09-25
Payer: COMMERCIAL

## 2023-09-25 DIAGNOSIS — R10.2 PELVIC AND PERINEAL PAIN: Primary | ICD-10-CM

## 2023-09-25 NOTE — TELEPHONE ENCOUNTER
"----- Message from Neisha Kelley sent at 9/25/2023  2:53 PM CDT -----  Regarding: FW: f/u testing  Let me know \"once nursing has been able to update him\" - Thanks :)    ----- Message -----  From: Nahomi Romeo PA-C  Sent: 9/25/2023  12:28 PM CDT  To: [facility] - Scheduling Pool; #  Subject: f/u testing                                      Nursing--Told pt I would review his testing, etc with Dr. Obando. He thought the pt needed two more steps to eval his pelvic complaints:    1. Trans-rectal ultrasound. - If no infection or ejaculatory duct obstruction, he has chronic pelvic pain syndrome.  I ordered, pls help him schedule. If neg for obstruction, will refer to [facility].   3. Semen analysis with retrograde semen analysis from [facility], to rule-out ejaculatory duct obstruction (aka are there sperm on sample).  Ordered, pls help him schedule.       Scheduling, pls call to schedule once nursing has been able to update him.     He will need a 1 hour virtual visit with me to review the testing.     MH      "

## 2023-09-27 NOTE — TELEPHONE ENCOUNTER
Patient returned my call and informed of PA's notes. He will get testing done. Virtual visit to discuss after.     Aruna Resendiz LPN

## 2023-09-27 NOTE — TELEPHONE ENCOUNTER
[facility] Call Center    Phone Message    May a detailed message be left on voicemail: yes     Reason for Call: Other: Pt would like a call back to discuss what he thinks is of this below message. He states that the provider left a VM, stating there is some important information to speak about and to call back. Writer could not get a hold of anyone on the back line. Please call asap to discuss      Action Taken: Other: uro    Travel Screening: Not Applicable

## 2023-10-02 ENCOUNTER — TELEPHONE (OUTPATIENT)
Dept: UROLOGY | Facility: CLINIC | Age: 28
End: 2023-10-02
Payer: COMMERCIAL

## 2023-10-02 NOTE — TELEPHONE ENCOUNTER
" [facility] Call Center    Phone Message    May a detailed message be left on voicemail: yes     Reason for Call: Other: Pt called and is needing to schedule the following:     [facility]  scheduled by urology [phone] per [facility]enote.     Write doesn't have capability to schedule imaging, also called number provided and it goes to \" is not available\" and hangs up, then called imaging at the  and they stated URO has to schedule this - called back line at [facility] and no answer - please call pt to further discuss, thanks!     Action Taken: Other: Uro    Travel Screening: Not Applicable                                                                  "

## 2023-10-03 DIAGNOSIS — R10.2 PELVIC AND PERINEAL PAIN: Primary | ICD-10-CM

## 2023-10-06 ENCOUNTER — LAB (OUTPATIENT)
Dept: LAB | Facility: CLINIC | Age: 28
End: 2023-10-06
Payer: COMMERCIAL

## 2023-10-06 DIAGNOSIS — R10.2 PELVIC AND PERINEAL PAIN: ICD-10-CM

## 2023-10-06 PROCEDURE — 89322 SEMEN ANAL STRICT CRITERIA: CPT

## 2023-10-06 PROCEDURE — 89331 RETROGRADE EJACULATION ANAL: CPT

## 2023-10-09 LAB
ABNORMAL SPERM MORPHOLOGY: 94
ABSTINENCE DAYS: 5 DAYS (ref 2–7)
ABSTINENCE DAYS: 5 DAYS (ref 2–7)
AGGLUTINATION: ABNORMAL
AGGLUTINATION: NO
ANALYSIS TEMP - CENTIGRADE: 23 CENTIGRADE
ANALYSIS TEMP - CENTIGRADE: 23 CENTIGRADE
COLLECTION METHOD: ABNORMAL
COLLECTION METHOD: NORMAL
COLLECTION SITE: ABNORMAL
COLLECTION SITE: NORMAL
CONSENT TO RELEASE TO PARTNER: NO
CONSENT TO RELEASE TO PARTNER: NO
DAL- RECEIVED TIME: ABNORMAL
DAL- RECEIVED TIME: NORMAL
HEAD DEFECT: 94 %
IMMOTILE: 0 %
IMMOTILE: 47 %
LIQUEFIED: YES
MIDPIECE DEFECT: 25 %
NON-PROGRESSIVE MOTILITY: 0 %
NON-PROGRESSIVE MOTILITY: 4 %
NORMAL SPERM MORPHOLOGY: 6 % NORMAL FORMS
PROGRESSIVE MOTILITY: 0 %
PROGRESSIVE MOTILITY: 49 %
ROUND CELLS: 0 MILLION/ML
ROUND CELLS: 0.3 MILLION/ML
SPECIMEN PH: 6 PH
SPECIMEN PH: 7.2 PH
SPECIMEN VOLUME: 0.3 ML
SPECIMEN VOLUME: 6.2 ML
SPERM CONCENTRATION: 0 MILLION/ML
SPERM CONCENTRATION: 37.6 MILLION/ML
TAIL DEFECT: 5 %
TIME OF ANALYSIS: ABNORMAL
TIME OF ANALYSIS: NORMAL
TOTAL PROGRESSIVE MOTILE NUMBER: 0 MILLION
TOTAL PROGRESSIVE MOTILE NUMBER: 114 MILLION
TOTAL SPERM NUMBER: 0 MILLION
TOTAL SPERM NUMBER: 233 MILLION
VISCOUS: NO
VITALITY: ABNORMAL
VITALITY: NORMAL

## 2023-10-10 NOTE — PROGRESS NOTES
SUBJECTIVE:  HPI:  Jorge Denton  Is a 28 year old male who presents today for new patient evaluation of low back pain he is self-referred.  He has been evaluated at [facility] by Dr. Bowman.  He has had pelvic floor PT and coccyx mobilizations in 2021.  A pelvic MRI was apparently suggestive of chronic prostatitis.  This is according to his [facility] urologist on 7/3/2023 telehealth visit.    [facility] visit with the PA 8/15/2023 for chronic low back pain, and right-sided anterior torso numbness from the nipple to the genital area.  The low back pain has been ongoing for 3 years.  He was told he had a lumbar disc herniation on an MRI of March 2022 there is numbness in the right side of the penis and scrotum.  Erectile dysfunction noted.  Urology has been consulted and pelvic floor therapy has been done.  Back pain worsened with sitting, riding in times, and bending.  Position change helped the pain.  The numbness symptoms began after his lumbar MRI was done but there was no lower extremity weakness, bowel or bladder dysfunction.  The 3/15/2022 lumbar MRI from [facility] showed mild DDD L4-5 and L5-S1, but no neurologic compression at any level.  There was no neurologic compression to explain his numb sensations.  A repeat MRI was ordered as well as a thoracic MRI.  No lesions were found to explain his symptoms, follow-up with neurologist was recommended.  PT and surgery were discussed.    Follow-up note 8/31/2023, lumbar and thoracic MRI 8/25/2023 showed no abnormal compression of the spinal cord or and only mild degenerative disc disease in the T-spine and at L4-5 and L5-S1.  Neurology consultation was placed.  Options discussed included PT, MedX, injections, or lumbar fusion.      History today:  Domenico reports 3 to 4 years of back pain and pelvic pain without an inciting event that has defied evaluation by multiple physicians.  Pelvic floor therapy did help a little bit.  The pain is more right than left-sided  "goes down through the buttocks and inguinal area into the groin and perineum and even half of the rectum on the right.  He has diminished sensation on the right side of his body from the lower rib cage to the thigh mostly on the medial side.  He has difficulty starting his urinary stream but once it starts he is able to empty and his urinary stream force is pretty close to normal after doing pelvic floor PT. does recall having had a Pelvic Joint Dysfunction diagnosis.  He is tried TENS unit, dry needling, but no injections or surgery.\"  I have low back pain\" similar symptoms\" and had \"miraculous\" therapeutic responses to injections.  He does not recall ever getting scheduled to see the neurologist that was advised by [facility].    He has done some research and he mentioned looking into the pudendal nerve in the piriformis.  He thinks this is coming from his spine.      SYMPTOMS WORSENED WITH sitting, lying down    SYMPTOMS IMPROVED WITH nothing    Pain score and diagram reviewed.  See questionnaire.      ROS: .  Otherwise negative for bowel/bladder dysfunction, balance changes, headache, leg pain/numbness/weakness, fevers, chills, night sweats, unexplained weight loss;  otherwise unremarkable.   See the patient's intake questionnaire from today for details.    Treatment to Date: As above    MEDICATIONS:  Reviewed.    ALLERGIES:  Reviewed.     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  Sports hobbies and activities: \"Not anymore\", HEP, some walking.  Used to do a job where he was sitting a lot on a rolling stool which provoked a lot of testicular pain.  Since he stopped doing that that has not improved.    OBJECTIVE:    IMAGING: Images and reports reviewed ([facility])    Lumbar MRI 8/25/2023    Comparison lumbar MRI 3/15/2022.    L5-S1 DDD without " impingement decreased compared to prior study.  L4-5 DDD and mild DJD without impingement.  Unchanged.  L3-4 mild DJD    Thoracic MRI 8/25/2023    Multilevel not impinging DDD: T4-T7, T8-9, T11-12.  Normal cord signal.  No neurologic impingement at any level.  (OM-some hypokyphosis of the thoracic spine with a few Schmorl's nodes but not hyperkyphosis.)    EXAMINATION:    --CONSTITUTIONAL:   No acute distress.  The patient is well nourished and well groomed.  He transitions without pushoff and moves about the room easily.  --SKIN:   He has a self-inflicted shaving rash over the presacral area with some minor pinpoint excoriations.  There are some guttate psoriatic lesions over the posterior torso.  --GAIT:  is non-antalgic. Flat foot, heel and toe walking:  normal   .  Squat and rise   normal    minor pain with full squat and rise.  --STANDING EXAMINATION:    Symmetry of spine/pelvis   unremarkable   .      Range of motion full and painless.   Standing flexion   negative   .    Trixie's sign   negative    .     Stork test   negative    .   --NEUROLOGICAL:     ROMBERG, TANDEM WALK, PRONATOR DRIFT:   Normal.   .  SENSATION to light touch is present but diminished in the anterior thigh roughly corresponding to the lateral femoral cutaneous nerve, over the right rectus abdominis area and globally over the right gluteal area.  I did not check but he indicated that the right scrotum and penis had diminished sensation.  Otherwise intact in bilateral thighs, lower legs and feet.   REFLEXES:  patellar 2+, and achilles 2+.  Babinski is negative. No clonus.  MANUAL MOTOR TESTING:  L3- S1 Myotomes, Femoral, Obturator, Peroneal and Tibial nerves 5/5   DURAL STRETCH TESTS:  SLR negative.  Femoral Stretch Test negative.   --PELVIC/HIP JOINTS:                Long Sitting   negative   .      PELVIC ALIGNMENT neutral alignment.   --LUMBAR/GLUTEAL MUSCLES: Tenderness in the right gluteal musculature and sciatic notch without radiating  symptoms.    --ABDOMINAL:  Non-distended.  Nontender  --VASCULAR: Femoral pulses 2. Lower extremity capillary refill, temperature and color normal.       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  Diffuse diminished sensation in a nondermatomal distribution across the anterior right abdomen, right buttock, and anterior right thigh without a clear imaging finding to explain this on the thoracic and lumbar spine MRI  Otherwise neurologically intact  Status post antibiotic treatment for presumed prostatitis-nontherapeutic      PLAN:  Cervical and brain MRI.  I will give him a MyChart message about the results but I am not expecting any surprises.  Right pudendal nerve injection.  Neurology consultation.  I am not sure I have any any other better diagnostic or therapeutic options.  No follow-up here is planned.    Advised patient to call or return early if symptoms worsen, or having problems controlling bladder and bowel function or worsening leg weakness.     Please note: Voice recognition software was used in this dictation.  It may therefore contain typographical errors.    Rito Joseph MD

## 2023-10-12 ENCOUNTER — OFFICE VISIT (OUTPATIENT)
Dept: OTOLARYNGOLOGY | Facility: CLINIC | Age: 28
End: 2023-10-12
Payer: COMMERCIAL

## 2023-10-12 VITALS — SYSTOLIC BLOOD PRESSURE: 113 MMHG | DIASTOLIC BLOOD PRESSURE: 74 MMHG | HEART RATE: 72 BPM

## 2023-10-12 DIAGNOSIS — J38.7 LARYNGEAL HYPERFUNCTION: ICD-10-CM

## 2023-10-12 DIAGNOSIS — R07.0 THROAT PAIN: ICD-10-CM

## 2023-10-12 DIAGNOSIS — R49.0 DYSPHONIA: ICD-10-CM

## 2023-10-12 DIAGNOSIS — R49.0 DYSPHONIA: Primary | ICD-10-CM

## 2023-10-12 DIAGNOSIS — R29.898 NECK TIGHTNESS: Primary | ICD-10-CM

## 2023-10-12 PROCEDURE — 31579 LARYNGOSCOPY TELESCOPIC: CPT | Performed by: OTOLARYNGOLOGY

## 2023-10-12 PROCEDURE — 92524 BEHAVRAL QUALIT ANALYS VOICE: CPT | Mod: GN | Performed by: SPEECH-LANGUAGE PATHOLOGIST

## 2023-10-12 PROCEDURE — 99214 OFFICE O/P EST MOD 30 MIN: CPT | Mod: 25 | Performed by: OTOLARYNGOLOGY

## 2023-10-12 ASSESSMENT — PAIN SCALES - GENERAL: PAINLEVEL: EXTREME PAIN (8)

## 2023-10-12 NOTE — PROGRESS NOTES
"[facility] VOICE CLINIC    Patient: Jorge Denton  Date of Visit: 10/12/2023    CHIEF COMPLAINT: Voice and pain    HISTORY  Jorge Denton is a 28 year old year old gentleman, who was seen for follow-up evaluation in conjunction with a visit to Dr. Senait Flor.      Summary impressions by author Seymour Barfield CCC-SLP from 10/12/23:   Patient has a long history with our clinic dating back to 2021.  He initially presented for voice changes and throat discomfort which began near the time of Lyme disease exposure as well as a strangulation injury in June 2020.  Over the course of his treatment with our clinic he worked with my colleague Dora Ley, SUSAN-SLP, and subsequently with me (Seymour Barfield, SUSAN-SLP) for several sessions.  We were able to make progress toward reduction of throat discomfort, and within the scope of sessions he was noted to improve vocally and report some increased ease, though that was consistently not reported between sessions.  Patient was working with Quique Li in gastroenterology for reflux and esophageal issues.  He was last seen in clinic on 6/2/2022, and at that time additional therapy was recommended but this was not pursued.    History of presenting concern by Seymour Barfield CCC-SLP from 10/12/2023 : he returns today stating that broader neck tension has been improved and exercises continue to be helpful on that front; however, last spring and summer in conjunction with worsened air quality he began to notice significant changes to his voice quality.  The pain feels distinct from historic pain and that it feels like it is \"on the folds like a blister\".  This discomfort is located more on the left than the right side.  This makes it difficult to do his work as a , though thankfully his vocal demands are more modest in this context.  Previous discomfort improved with voice rest, however this new variety did not seem to.  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  He was encouraged to return to speech therapy at that time but was unable to schedule it with any of the recommended providers.  He chose to rest his voice for approximately 2 weeks, though issues with quality did not improve.  He presents today to reevaluate and move forward with speech therapy as feasible.    OBJECTIVE  PATIENT REPORTED MEASURES      1/13/2022     2:00 PM 2/4/2022     1:00 PM 4/13/2022     3:00 PM   Dysponia SLP Goals   How would you rate your speaking voice quality, if 0 is worst voice quality, and 10 is best voice? 4 6 6   How much effort is it to speak, if 0 is no extra effort and 10 is maximum effort? 7 6 5   How how severe is your [Throat Discomfort - or use patient specific description], if 0 is no [discomfort] at all and 10 is the worst [discomfort]? 6 4 2   How much does your voice problem bother you? Quite a bit Somewhat Somewhat   How much does your throat discomfort bother you?     Somewhat Somewhat A little bit     Patient Supplied Answers To Last 2 VHI Questionnaires      12/3/2021     3:00 PM 1/13/2022     1:41 PM   Voice Handicap Index (VHI-10)   My voice makes it difficult for people to hear me 3 3   People have difficulty understanding me in a noisy room 3 3   My voice difficulties restrict my personal and social life.  3 3   I feel left out of conversations because of my voice 3 3   My voice problem causes me to lose income 3 2   I feel as though I have to strain to produce voice 4 3   The clarity of my voice is unpredictable 4 4   My voice problem upsets me 4 4   My voice makes me feel handicapped 2 3   People ask, \"What's wrong with your voice?\" 2 2   VHI-10 31 30        Patient Supplied Answers To Last 2 CSI Questionnaires      7/30/2021    10:58 AM   Cough Severity Index (CSI)   My cough is worse when I lie down 1   My coughing problem causes me to restrict my personal and social life 1   I tend to avoid places because of my " cough problem 1   I feel embarrassed because of my coughing problem 1   People ask, ''What's wrong?'' because I cough a lot 1   I run out of air when I cough 3   My coughing problem affects my voice 1   My coughing problem limits my physical activity 1   My coughing problem upsets me 1   People ask me if I am sick because I cough a lot 1   CSI Score 12        Patient Supplied Answers To Last 2 EAT Questionnaires      1/13/2022     1:41 PM 2/4/2022    12:38 PM   Eating Assessment Tool (EAT-10)   My swallowing problem has caused me to lose weight 0 0   My swallowing problem interferes with my ability to go out for meals 0 1   Swallowing liquids takes extra effort 2 1   Swallowing solids takes extra effort 2 2   Swallowing pills takes extra effort 2 2   Swallowing is painful 1 0   The pleasure of eating is affected by my swallowing 0 1   When I swallow food sticks in my throat 3 2   I cough when I eat 0 0   Swallowing is stressful 2 2   EAT-10 12 11        Patient Supplied Answers to Dyspnea Index Questionnaire:      12/3/2021     3:00 PM   Dyspnea Index   1. I have trouble getting air in. 4   2. I feel tightness in my throat when I am having jane breathing problem. 4   3. It takes more effort to breathe than it used to. 4   4. Change in weather affect my breathing problem. 1   5. My breathing gets worse with stress. 4   6. I make sound/noise breathing in 4   7. I have to strain to breathe. 4   8. My shortness of breath gets worse with exercise or physical activity 4   9. My breathing problem makes me feel stressed. 4   10. My breathing problem casuses me to restrict my personal and social life. 4   Dyspnea Index Total Score 37     PERCEPTUAL EVALUATION (CPT 11732)  POSTURE / TENSION:   No overt tension    BREATHING:   appears within normal limits and adequate     LARYNGEAL PALPATION:   tenderness of the thyrohyoid area    VOICE:  Roughness: Mild to moderate Consistent  Breathiness: Minimal  Strain: Mild to moderate  Intermittent  Loudness  Conversational speech:  Mild to moderately reduced  Pitch:  Conversational speech:  WNL  Pitch glide:   Increased strain with full voice approach to upper register  Loft / falsetto is accessible without clear limitation  Resonance:  Conversational speech: mild laryngeal pharyngeal resonance  Singing vs. Speech:  Consistent audioperceptually but with increased reported effort and pain with singing    COUGH/THROAT CLEARING:  Not observed    THERAPY PROBES: Improvement was elicited with coordination of respiration and phonation    Laryngeal function studies are warranted but were unable to be completed due to logistical challenges accessing equipment.    LARYNGEAL EXAMINATION  Procedure: Flexible endoscopy with chip-tip technology with stroboscopy, left nostril; topical anesthesia with 3% Lidocaine and 0.25% phenylephrine was applied.   Performed by: Dr. Senait Flor  The laryngeal and pharyngeal structures were evaluated for gross appearance, mobility, function, and focal lesions / abnormalities of the associated mucosa.  Stroboscopy was warranted to evaluate closure, symmetry, and vibratory characteristics of the vocal folds.  All findings were within normal limits with the exception of the following salient features:   Moderate diffusely thickened secretions  Essentially healthy laryngeal   Vocal folds appear smooth and straight without focal lesion or abnormality  With phonatory tasks there is moderate to severe medial lateral compression and mild to moderate anterior posterior supraglottic recruitment  Decreased hyperfunction visually is seen with singing tasks; however, patient reports increased effort and discomfort on the attempt  Stroboscopy demonstrates:  Right true vocal fold appears within normal limits both in amplitude and mucosal wave  Left true vocal fold demonstrates subtle reduction of mucosal wave near the medial edge in a linear fashion as pitch sense, but is near normal at  "low modal registration  Essentially symmetric and periodic    The laryngeal exam was reviewed with Mr. Denton, and I provided pertinent explanations, as well as written and oral information.    ASSESSMENT / PLAN  IMPRESSIONS: Domenico Denton presents today with an exacerbation in his throat discomfort with new localization of pain near the level of the vocal folds (left greater than right), and corresponding degradation of voice quality.  Today's evaluation demonstrates dysphonia (R49.0) and throat pain (R07.0) of a likely multifactorial nature but with components of nonoptimal patterns of activation in the intrinsic and extrinsic muscles of the larynx.  Laryngeal evaluation shows no sign of previously described vocal fold hemorrhage, and is overall healthy apart from patterns of supraglottic recruitment and moderately thickened secretions.  It is important to note that decreased visual hyperfunction seen in singing versus speech corresponded to increase of discomfort versus the release of this during today's evaluation.  Also of note patient reports that today is a \"very good voice today\" and so audio perceptual quality which is notable only for mild to moderate strain and roughness is likely not reflective of patient's reported challenges over the past 6 months.      RECOMMENDATIONS:   Medically necessary speech therapy is warranted to optimize vocal technique and promote reduced discomfort, effort and fatigue  He demonstrates a Good prognosis for improvement given adherence to therapeutic recommendations. Therapeutic   Positive indicators: previously positive response to behavioral therapy  Negative indicators: Concomitant health problems  DURATION / FREQUENCY: 4 biweekly and 2 monthly one-hour sessions  Research: Not a candidate.    GOALS:  Patient goal:   To understand the problem and fix it as much as possible    Short-term goal(s): Within the first 4 sessions, Mr. Denton:  will demonstrate assigned laryngeal massage " techniques with 80% accuracy or better with no clinician support  will accurately identify target vs. habitual voice quality during therapy tasks in 4 out of 5 trials with no clinician support  will demonstrate the ability to alternate between target and habitual voice quality given clinician cue 75% of the time during therapy tasks    Long-term goal(s): In 6 months, Mr. Denton will:  Report a week of typical vocal activities, in which dysphonia and discomfort do not exceed a level of 3 out of 10, 80% of the time   This treatment plan was developed with the patient who agreed with the recommendations.    TOTAL SERVICE TIME: 50 minutes  EVALUATION OF VOICE AND RESONANCE (39029)  NO CHARGE FACILITY FEE (99074)    Louis Barfield M.M., M.A., CCC-SLP  Speech-Language Pathologist  Certificate of Vocology  [phone]    *this report was created in part through the use of computerized dictation software, and though reviewed following completion, some typographic errors may persist.  If there is confusion regarding any of this notes contents, please contact me for clarification.*

## 2023-10-12 NOTE — PROGRESS NOTES
[facility]   at the [facility]   Otolaryngology Clinic     Patient: Jorge Denton    MRN: [id number]    : 1995    Age/Gender: 28 year old male  Date of Service: 10/12/2023  Rendering Provider:   Senait Flor MD     Chief Complaint   Dysphonia  Interval History   HISTORY OF PRESENT ILLNESS: Mr. Denton is a 26 year old male is being followed for dysphonia. he was initially seen on 21. Please refer to this note for full history.     Today, he presents for follow up. he reports:  - saw some yellow discoloration   - dysfunctional voice started in 2020  - today voice is better than normal  - talking is usually labored and often painful  - feels like knife in voice box  - voice therapy not significantly improving  - neck muscle tension has improved  - neck tenderness medially on the right  - feeling of neck inflamed internally  - has done some massages for neck  - no heartburn or reflux history, but has tried reflux medications  - Had endoscopy done a few years ago  - Dry throat in the mornings  - fair amount of coughing  - Does snore  - Does not wake up from sleeping due to coughing         PAST MEDICAL HISTORY:   Past Medical History:   Diagnosis Date    Allergic rhinitis     allergy testing positive for dustmites    Eczema     in earlier childhood    Gastroesophageal reflux disease     Uncomplicated asthma        PAST SURGICAL HISTORY:   Past Surgical History:   Procedure Laterality Date    MASTECTOMY SUBCUTANEOUS MALE BILATERAL (GYNECOMASTIA)         CURRENT MEDICATIONS:   Current Outpatient Medications:     ALBUTEROL IN, one puff every 2 hours as needed, Disp: , Rfl:     BREO ELLIPTA 200-25 MCG/INH Inhaler, INHALE ONE PUFF BY MOUTH EVERY DAY - RINSE MOUTH/GARGLE AFTER USE, Disp: , Rfl:     doxycycline hyclate (VIBRA-TABS) 100 MG tablet, Take 1 tablet (100 mg) by mouth 2 times daily (Patient not taking: Reported on 2023), Disp: 60 tablet, Rfl: 1    FLUoxetine (PROZAC)  10 MG tablet, Take 1 tablet by mouth daily. Patient needs to be seen prior to future refills of this medication.  Please call clinic to schedule.  , Disp: 30 tablet, Rfl: 0    tadalafil (CIALIS) 5 MG tablet, tadalafil 5 mg tablet  TAKE ONE TABLET BY MOUTH AT BEDTIME, Disp: , Rfl:     VITAMIN D OR, 1 Tablet daily, Disp: , Rfl:     ALLERGIES: Dust mites and Lactose intolerance [beta-galactosidase]    SOCIAL HISTORY:    Social History     Socioeconomic History    Marital status: Single     Spouse name: Not on file    Number of children: Not on file    Years of education: Not on file    Highest education level: Not on file   Occupational History    Not on file   Tobacco Use    Smoking status: Never    Smokeless tobacco: Never    Tobacco comments:     no passive exposure   Vaping Use    Vaping Use: Never used   Substance and Sexual Activity    Alcohol use: No    Drug use: No    Sexual activity: Yes     Partners: Female, Male   Other Topics Concern     Service Not Asked    Blood Transfusions No    Caffeine Concern No    Occupational Exposure Not Asked    Hobby Hazards No    Sleep Concern No    Stress Concern Yes     Comment: pretty high lately    Weight Concern No    Special Diet Yes     Comment: Lactose intolerant    Back Care Yes     Comment: Chiro    Exercise Yes    Bike Helmet Yes    Seat Belt Yes    Self-Exams Not Asked   Social History Narrative    Jorge participates in martial arts and enjoys skiing.     Social Determinants of Health     Financial Resource Strain: Not on file   Food Insecurity: Not on file   Transportation Needs: Not on file   Physical Activity: Not on file   Stress: Not on file   Social Connections: Not on file   Interpersonal Safety: Not on file   Housing Stability: Not on file         FAMILY HISTORY:   Family History   Problem Relation Age of Onset    Heart Disease Maternal Grandmother     Lipids Maternal Grandmother     Heart Disease Maternal Grandfather     Lipids Maternal Grandfather      Heart Disease Paternal Grandfather     Lipids Paternal Grandfather     Lipids Mother     Lipids Father     Lipids Paternal Grandmother     Thyroid Disease Maternal Grandmother         Thyroid nodule      Non-contributory for problems with anesthesia    REVIEW OF SYSTEMS:   The patient was asked a 14 point review of systems regarding constitutional symptoms, eye symptoms, ears, nose, mouth, throat symptoms, cardiovascular symptoms, respiratory symptoms, gastrointestinal symptoms, genitourinary symptoms, musculoskeletal symptoms, integumentary symptoms, neurological symptoms, psychiatric symptoms, endocrine symptoms, hematologic/lymphatic symptoms, and allergic/ immunologic symptoms.   The pertinent factors have been included in the HPI and below.  Patient Supplied Answers to Review of Systems      11/27/2021     8:58 PM   [facility] ROS   Ears, Nose, Throat Nasal congestion or drainage    Trouble swallowing   Hematologic Lymph node swelling       Physical Examination   The patient underwent a physical examination as described below. The pertinent positive and negative findings are summarized after the description of the examination.  Constitutional: The patient's developmental and nutritional status was assessed. The patient's voice quality was assessed.  Head and Face: The head and face were inspected for deformities. The sinuses were palpated. The salivary glands were palpated. Facial muscle strength was assessed bilaterally.  Eyes: Extraocular movements and primary gaze alignment were assessed.  Ears, Nose, Mouth and Throat: The ears and nose were examined for deformities. The nasal septum, mucosa, and turbinates were inspected by anterior rhinoscopy. The lips, teeth, and gums were examined for abnormalities. The oral mucosa, tongue, palate, tonsils, lateral and posterior pharynx were inspected for the presence of asymmetry or mucosal lesions.    Neck: The tracheal position was noted, and the neck mass palpated to  determine if there were any asymmetries, abnormal neck masses, thyromegally, or thyroid nodules.  Respiratory: The nature of the breathing and chest expansion/symmetry was observed.  Cardiovascular: The patient was examined to determine the presence of any edema or jugular venous distension.  Abdomen: The contour of the abdomen was noted.  Lymphatic: The patient was examined for infraclavicular lymphadenopathy.  Musculoskeletal: The patient was inspected for the presence of skeletal deformities.  Extremities: The extremities were examined for any clubbing or cyanosis.  Skin: The skin was examined for inflammatory or neoplastic conditions.  Neurologic: The patient's orientation, mood, and affect were noted. The cranial nerve  functions were examined.  Other pertinent positive and negative findings on physical examination:   OC/OP: no lesions, uvula midline, soft palate elevates symmetrically   Neck: no lesions, bilateral TH space tenderness   All other physical examination findings were within normal limits and noncontributory.    Procedures   Video Laryngoscopy with Stroboscopy (CPT 62201) and Behavioral & Qualitative Evaluation of Voice and Resonence      Preoperative Diagnosis:  Dysphonia and throat symptoms  Postoperative Diagnosis: Dysphonia and throat symptoms  Indication:  Patient has new or persistent dysphonia and throat symptoms.  This requires evaluation by stroboscopy to fully delineate the laryngeal functioning; especially mucosal wave assessment, ultrasharp visualization of lesions on the vocal folds, and overall functioning of the larynx.  Details of Procedure: A 70 degree rigid telescopic laryngoscope or a distal chip flexible scope was used. The lighting was obtained via a light cable connected to a stroboscopic unit. The telescope was inserted either transorally or transnasally until the vocal folds could be visualized. The patient was instructed to sustain the vowel  ee  at a comfortable pitch and  loudness as the voice was monitored by a microphone connected to a fundamental frequency tracker. This circuit tracked vocal periodicity, allowing the light to flash in synchrony with the glottal cycles. A setting on the stroboscope was set to change the phase of light strobing with relation to the vocal fundamental frequency, producing an image of 1 to 2 glottal cycles every second. The video images were recorded for analysis. Use of the variable speed, slow and stop scan allowed careful study of pertinent segments of laryngeal function to increase accuracy of clinical assessments of function and dysfunction.  In particular, features of glottal closure, mucosal wave on the vocal fold cover and laryngeal symmetry were analyzed. Lastly, the patient was asked to phonate speech samples and auditory/perceptual evaluation of voice and resonance were performed.  The vocal quality was carefully evaluated for hoarseness, breathiness, loudness, phrase length and intelligibility to determine the source of dysphonia.     Findings:   B. LARYNGOVIDEOSTROBOSCOPY   Anatomic/Physiological Deviations:  RNC, mild mucus, supraglottic hyperfunction, no evidence of vocal cord hemorrhage.  Mucosal wave: Right:     No restriction                             Left:     No restriction  Bilateral Vocal Fold Vibration: Symmetric  Vocal Process: Right:     No restriction    Left:    No restriction  Vocal Fold closure: Complete glottal closure     Complication(s): None  Disposition: Patient tolerated the procedure well          Review of Relevant Clinical Data     Labs:  Lab Results   Component Value Date    TSH 2.32 11/28/2012     Lab Results   Component Value Date     12/30/2008    CO2 26 12/30/2008    BUN 14 12/30/2008     Lab Results   Component Value Date    WBC 5.1 11/28/2012    HGB 15.8 (A) 11/28/2012    HCT 48 11/28/2012    MCV 89 11/28/2012     11/28/2012     Lab Results   Component Value Date    INR 1.02 05/09/2008     No  "results found for: \"WILMAN\"  No components found for: \"RHEUMATOIDFACTOR\", \"RF\"  Lab Results   Component Value Date    CRP 4.1 09/26/2008    CRP 7.1 05/09/2008     No components found for: \"CKTOT\", \"URICACID\"  No components found for: \"C3\", \"C4\", \"DSDNAAB\", \"NDNAABIFA\"  No results found for: \"MPOAB\"    Patient reported Quality of Life (QOL) Measures   Patient Supplied Answers To VHI Questionnaire      1/13/2022     1:41 PM   Voice Handicap Index (VHI-10)   My voice makes it difficult for people to hear me 3   People have difficulty understanding me in a noisy room 3   My voice difficulties restrict my personal and social life.  3   I feel left out of conversations because of my voice 3   My voice problem causes me to lose income 2   I feel as though I have to strain to produce voice 3   The clarity of my voice is unpredictable 4   My voice problem upsets me 4   My voice makes me feel handicapped 3   People ask, \"What's wrong with your voice?\" 2   VHI-10 30         Patient Supplied Answers To EAT Questionnaire      2/4/2022    12:38 PM   Eating Assessment Tool (EAT-10)   My swallowing problem has caused me to lose weight 0   My swallowing problem interferes with my ability to go out for meals 1   Swallowing liquids takes extra effort 1   Swallowing solids takes extra effort 2   Swallowing pills takes extra effort 2   Swallowing is painful 0   The pleasure of eating is affected by my swallowing 1   When I swallow food sticks in my throat 2   I cough when I eat 0   Swallowing is stressful 2   EAT-10 11         Patient Supplied Answers To CSI Questionnaire      7/30/2021    10:58 AM   Cough Severity Index (CSI)   My cough is worse when I lie down 1   My coughing problem causes me to restrict my personal and social life 1   I tend to avoid places because of my cough problem 1   I feel embarrassed because of my coughing problem 1   People ask, ''What's wrong?'' because I cough a lot 1   I run out of air when I cough 3   My " coughing problem affects my voice 1   My coughing problem limits my physical activity 1   My coughing problem upsets me 1   People ask me if I am sick because I cough a lot 1   CSI Score 12         Patient Supplied Answers to Dyspnea Index Questionnaire:      12/3/2021     3:00 PM   Dyspnea Index   1. I have trouble getting air in. 4   2. I feel tightness in my throat when I am having jane breathing problem. 4   3. It takes more effort to breathe than it used to. 4   4. Change in weather affect my breathing problem. 1   5. My breathing gets worse with stress. 4   6. I make sound/noise breathing in 4   7. I have to strain to breathe. 4   8. My shortness of breath gets worse with exercise or physical activity 4   9. My breathing problem makes me feel stressed. 4   10. My breathing problem casuses me to restrict my personal and social life. 4   Dyspnea Index Total Score 37       Impression & Plan     IMPRESSION: Mr. Denton is a 26 year old male who is being seen for the followin. 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  - symptoms consistent with severe muscle tension dysphonia  - speech therapy helped  - states that his voice still goes out 1-2 times per month but he has been able to control this more   - scope today, 2021, shows bilateral vocal process ulceration stable, supraglottic hyperfunction   - still has persistent muscle tension dysphonia  - has chronic pain that has been going on    - scope shows mild mucus and persistent supraglottic hyperfunction  - discussed doing myofacial release  - discussed botox injections or augmentation  - symptoms 22 are worsening after respiratory infection in April  - therapy helps with  some of the muscular pain, but none of the voice changes   - symptoms 10/12/2023 are mild today, but usually voice is fatigued and painful  - scope shows mild mucus, supraglottic hyperfunction, no evidence of vocal cord hemorrhage  - symptoms due to muscle tension dysphonia  - discussed adjuvant SLN block - will consider this and let me know if he wants to proceed  Plan  - Voice therapy     2. Dysphagia  - states that since January of last year, he has had intermittent issues with swallowing  - more recently the swallowing problems have intensified and become more frequent  - speech therapy did not help with the swallowing   - having difficulty with both solids and liquids  - also having difficulty with swallowing pills  - FEES today, 12/02/2021, shows no penetration or aspiration, feels residue though there is scant residue  - would benefit from behavioral FEES  - symptoms 10/12/2023 are stable   Plan:   - observation       RETURN VISIT:  after voice therapy as needed      I, Roro Longoria, am serving as a scribe; to document services personally performed by Dr. Senait Flor - based on data collection and the provider's statements to me.       Senait Flor MD    Laryngology    [facility]  Department of  Otolaryngology - Head and Neck Surgery  Clinics & Surgery Center  [address] [place] [address]  Appointment line: [phone]  Fax: [phone]  [email or web address]

## 2023-10-12 NOTE — LETTER
10/12/2023       RE: Jorge Denton  [address]  [facility] [address]     Dear Colleague,    Thank you for referring your patient, Jorge Denton, to the [facility] EAR NOSE AND THROAT CLINIC [place] at [facility]. Please see a copy of my visit note below.        [facility]   at the [facility]   Otolaryngology Clinic     Patient: Jorge Denton    MRN: [id number]    : 1995    Age/Gender: 28 year old male  Date of Service: 10/12/2023  Rendering Provider:   Senait Flor MD     Chief Complaint   Dysphonia  Interval History   HISTORY OF PRESENT ILLNESS: Mr. Denton is a 26 year old male is being followed for dysphonia. he was initially seen on 21. Please refer to this note for full history.     Today, he presents for follow up. he reports:  - saw some yellow discoloration   - dysfunctional voice started in 2020  - today voice is better than normal  - talking is usually labored and often painful  - feels like knife in voice box  - voice therapy not significantly improving  - neck muscle tension has improved  - neck tenderness medially on the right  - feeling of neck inflamed internally  - has done some massages for neck  - no heartburn or reflux history, but has tried reflux medications  - Had endoscopy done a few years ago  - Dry throat in the mornings  - fair amount of coughing  - Does snore  - Does not wake up from sleeping due to coughing         PAST MEDICAL HISTORY:   Past Medical History:   Diagnosis Date    Allergic rhinitis     allergy testing positive for dustmites    Eczema     in earlier childhood    Gastroesophageal reflux disease     Uncomplicated asthma        PAST SURGICAL HISTORY:   Past Surgical History:   Procedure Laterality Date    MASTECTOMY SUBCUTANEOUS MALE BILATERAL (GYNECOMASTIA)         CURRENT MEDICATIONS:   Current Outpatient Medications:     ALBUTEROL IN, one puff every 2 hours as needed,  Disp: , Rfl:     BREO ELLIPTA 200-25 MCG/INH Inhaler, INHALE ONE PUFF BY MOUTH EVERY DAY - RINSE MOUTH/GARGLE AFTER USE, Disp: , Rfl:     doxycycline hyclate (VIBRA-TABS) 100 MG tablet, Take 1 tablet (100 mg) by mouth 2 times daily (Patient not taking: Reported on 9/20/2023), Disp: 60 tablet, Rfl: 1    FLUoxetine (PROZAC) 10 MG tablet, Take 1 tablet by mouth daily. Patient needs to be seen prior to future refills of this medication.  Please call clinic to schedule.  , Disp: 30 tablet, Rfl: 0    tadalafil (CIALIS) 5 MG tablet, tadalafil 5 mg tablet  TAKE ONE TABLET BY MOUTH AT BEDTIME, Disp: , Rfl:     VITAMIN D OR, 1 Tablet daily, Disp: , Rfl:     ALLERGIES: Dust mites and Lactose intolerance [beta-galactosidase]    SOCIAL HISTORY:    Social History     Socioeconomic History    Marital status: Single     Spouse name: Not on file    Number of children: Not on file    Years of education: Not on file    Highest education level: Not on file   Occupational History    Not on file   Tobacco Use    Smoking status: Never    Smokeless tobacco: Never    Tobacco comments:     no passive exposure   Vaping Use    Vaping Use: Never used   Substance and Sexual Activity    Alcohol use: No    Drug use: No    Sexual activity: Yes     Partners: Female, Male   Other Topics Concern     Service Not Asked    Blood Transfusions No    Caffeine Concern No    Occupational Exposure Not Asked    Hobby Hazards No    Sleep Concern No    Stress Concern Yes     Comment: pretty high lately    Weight Concern No    Special Diet Yes     Comment: Lactose intolerant    Back Care Yes     Comment: Chiro    Exercise Yes    Bike Helmet Yes    Seat Belt Yes    Self-Exams Not Asked   Social History Narrative    Jorge participates in [employer] and enjoys skiing.     Social Determinants of Health     Financial Resource Strain: Not on file   Food Insecurity: Not on file   Transportation Needs: Not on file   Physical Activity: Not on file   Stress: Not  on file   Social Connections: Not on file   Interpersonal Safety: Not on file   Housing Stability: Not on file         FAMILY HISTORY:   Family History   Problem Relation Age of Onset    Heart Disease Maternal Grandmother     Lipids Maternal Grandmother     Heart Disease Maternal Grandfather     Lipids Maternal Grandfather     Heart Disease Paternal Grandfather     Lipids Paternal Grandfather     Lipids Mother     Lipids Father     Lipids Paternal Grandmother     Thyroid Disease Maternal Grandmother         Thyroid nodule      Non-contributory for problems with anesthesia    REVIEW OF SYSTEMS:   The patient was asked a 14 point review of systems regarding constitutional symptoms, eye symptoms, ears, nose, mouth, throat symptoms, cardiovascular symptoms, respiratory symptoms, gastrointestinal symptoms, genitourinary symptoms, musculoskeletal symptoms, integumentary symptoms, neurological symptoms, psychiatric symptoms, endocrine symptoms, hematologic/lymphatic symptoms, and allergic/ immunologic symptoms.   The pertinent factors have been included in the HPI and below.  Patient Supplied Answers to Review of Systems      11/27/2021     8:58 PM   [facility] E[facility] ROS   Ears, Nose, Throat Nasal congestion or drainage    Trouble swallowing   Hematologic Lymph node swelling       Physical Examination   The patient underwent a physical examination as described below. The pertinent positive and negative findings are summarized after the description of the examination.  Constitutional: The patient's developmental and nutritional status was assessed. The patient's voice quality was assessed.  Head and Face: The head and face were inspected for deformities. The sinuses were palpated. The salivary glands were palpated. Facial muscle strength was assessed bilaterally.  Eyes: Extraocular movements and primary gaze alignment were assessed.  Ears, Nose, Mouth and Throat: The ears and nose were examined for deformities. The nasal septum,  mucosa, and turbinates were inspected by anterior rhinoscopy. The lips, teeth, and gums were examined for abnormalities. The oral mucosa, tongue, palate, tonsils, lateral and posterior pharynx were inspected for the presence of asymmetry or mucosal lesions.    Neck: The tracheal position was noted, and the neck mass palpated to determine if there were any asymmetries, abnormal neck masses, thyromegally, or thyroid nodules.  Respiratory: The nature of the breathing and chest expansion/symmetry was observed.  Cardiovascular: The patient was examined to determine the presence of any edema or jugular venous distension.  Abdomen: The contour of the abdomen was noted.  Lymphatic: The patient was examined for infraclavicular lymphadenopathy.  Musculoskeletal: The patient was inspected for the presence of skeletal deformities.  Extremities: The extremities were examined for any clubbing or cyanosis.  Skin: The skin was examined for inflammatory or neoplastic conditions.  Neurologic: The patient's orientation, mood, and affect were noted. The cranial nerve  functions were examined.  Other pertinent positive and negative findings on physical examination:   OC/OP: no lesions, uvula midline, soft palate elevates symmetrically   Neck: no lesions, bilateral TH space tenderness   All other physical examination findings were within normal limits and noncontributory.    Procedures   Video Laryngoscopy with Stroboscopy (CPT 41163) and Behavioral & Qualitative Evaluation of Voice and Resonence      Preoperative Diagnosis:  Dysphonia and throat symptoms  Postoperative Diagnosis: Dysphonia and throat symptoms  Indication:  Patient has new or persistent dysphonia and throat symptoms.  This requires evaluation by stroboscopy to fully delineate the laryngeal functioning; especially mucosal wave assessment, ultrasharp visualization of lesions on the vocal folds, and overall functioning of the larynx.  Details of Procedure: A 70 degree rigid  telescopic laryngoscope or a distal chip flexible scope was used. The lighting was obtained via a light cable connected to a stroboscopic unit. The telescope was inserted either transorally or transnasally until the vocal folds could be visualized. The patient was instructed to sustain the vowel  ee  at a comfortable pitch and loudness as the voice was monitored by a microphone connected to a fundamental frequency tracker. This circuit tracked vocal periodicity, allowing the light to flash in synchrony with the glottal cycles. A setting on the stroboscope was set to change the phase of light strobing with relation to the vocal fundamental frequency, producing an image of 1 to 2 glottal cycles every second. The video images were recorded for analysis. Use of the variable speed, slow and stop scan allowed careful study of pertinent segments of laryngeal function to increase accuracy of clinical assessments of function and dysfunction.  In particular, features of glottal closure, mucosal wave on the vocal fold cover and laryngeal symmetry were analyzed. Lastly, the patient was asked to phonate speech samples and auditory/perceptual evaluation of voice and resonance were performed.  The vocal quality was carefully evaluated for hoarseness, breathiness, loudness, phrase length and intelligibility to determine the source of dysphonia.     Findings:   B. LARYNGOVIDEOSTROBOSCOPY   Anatomic/Physiological Deviations:  RNC, mild mucus, supraglottic hyperfunction, no evidence of vocal cord hemorrhage.  Mucosal wave: Right:     No restriction                             Left:     No restriction  Bilateral Vocal Fold Vibration: Symmetric  Vocal Process: Right:     No restriction    Left:    No restriction  Vocal Fold closure: Complete glottal closure     Complication(s): None  Disposition: Patient tolerated the procedure well          Review of Relevant Clinical Data     Labs:  Lab Results   Component Value Date    TSH 2.32  "11/28/2012     Lab Results   Component Value Date     12/30/2008    CO2 26 12/30/2008    BUN 14 12/30/2008     Lab Results   Component Value Date    WBC 5.1 11/28/2012    HGB 15.8 (A) 11/28/2012    HCT 48 11/28/2012    MCV 89 11/28/2012     11/28/2012     Lab Results   Component Value Date    INR 1.02 05/09/2008     No results found for: \"WILMAN\"  No components found for: \"RHEUMATOIDFACTOR\", \"RF\"  Lab Results   Component Value Date    CRP 4.1 09/26/2008    CRP 7.1 05/09/2008     No components found for: \"CKTOT\", \"URICACID\"  No components found for: \"C3\", \"C4\", \"DSDNAAB\", \"NDNAABIFA\"  No results found for: \"MPOAB\"    Patient reported Quality of Life (QOL) Measures   Patient Supplied Answers To VHI Questionnaire      1/13/2022     1:41 PM   Voice Handicap Index (VHI-10)   My voice makes it difficult for people to hear me 3   People have difficulty understanding me in a noisy room 3   My voice difficulties restrict my personal and social life.  3   I feel left out of conversations because of my voice 3   My voice problem causes me to lose income 2   I feel as though I have to strain to produce voice 3   The clarity of my voice is unpredictable 4   My voice problem upsets me 4   My voice makes me feel handicapped 3   People ask, \"What's wrong with your voice?\" 2   VHI-10 30         Patient Supplied Answers To EAT Questionnaire      2/4/2022    12:38 PM   Eating Assessment Tool (EAT-10)   My swallowing problem has caused me to lose weight 0   My swallowing problem interferes with my ability to go out for meals 1   Swallowing liquids takes extra effort 1   Swallowing solids takes extra effort 2   Swallowing pills takes extra effort 2   Swallowing is painful 0   The pleasure of eating is affected by my swallowing 1   When I swallow food sticks in my throat 2   I cough when I eat 0   Swallowing is stressful 2   EAT-10 11         Patient Supplied Answers To CSI Questionnaire      7/30/2021    10:58 AM   Cough " Severity Index (CSI)   My cough is worse when I lie down 1   My coughing problem causes me to restrict my personal and social life 1   I tend to avoid places because of my cough problem 1   I feel embarrassed because of my coughing problem 1   People ask, ''What's wrong?'' because I cough a lot 1   I run out of air when I cough 3   My coughing problem affects my voice 1   My coughing problem limits my physical activity 1   My coughing problem upsets me 1   People ask me if I am sick because I cough a lot 1   CSI Score 12         Patient Supplied Answers to Dyspnea Index Questionnaire:      12/3/2021     3:00 PM   Dyspnea Index   1. I have trouble getting air in. 4   2. I feel tightness in my throat when I am having jane breathing problem. 4   3. It takes more effort to breathe than it used to. 4   4. Change in weather affect my breathing problem. 1   5. My breathing gets worse with stress. 4   6. I make sound/noise breathing in 4   7. I have to strain to breathe. 4   8. My shortness of breath gets worse with exercise or physical activity 4   9. My breathing problem makes me feel stressed. 4   10. My breathing problem casuses me to restrict my personal and social life. 4   Dyspnea Index Total Score 37       Impression & Plan     IMPRESSION: Mr. Denton is a 26 year old male who is being seen for the followin. 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  - symptoms consistent with severe muscle tension dysphonia  - speech therapy helped  - states that his voice still goes out 1-2 times per month but he has been able to control this more   - scope today, 2021, shows bilateral vocal process ulceration stable, supraglottic hyperfunction    - still has persistent muscle tension dysphonia  - has chronic pain that has been going on    - scope shows mild mucus and persistent supraglottic hyperfunction  - discussed doing myofacial release  - discussed botox injections or augmentation  - symptoms 6/2/22 are worsening after respiratory infection in April  - therapy helps with some of the muscular pain, but none of the voice changes   - symptoms 10/12/2023 are mild today, but usually voice is fatigued and painful  - scope shows mild mucus, supraglottic hyperfunction, no evidence of vocal cord hemorrhage  - symptoms due to muscle tension dysphonia  - discussed adjuvant SLN block - will consider this and let me know if he wants to proceed  Plan  - Voice therapy     2. Dysphagia  - states that since January of last year, he has had intermittent issues with swallowing  - more recently the swallowing problems have intensified and become more frequent  - speech therapy did not help with the swallowing   - having difficulty with both solids and liquids  - also having difficulty with swallowing pills  - FEES today, 12/02/2021, shows no penetration or aspiration, feels residue though there is scant residue  - would benefit from behavioral FEES  - symptoms 10/12/2023 are stable   Plan:   - observation       RETURN VISIT:  after voice therapy as needed      I, Roro Longoria, am serving as a scribe; to document services personally performed by Dr. Senait Flor - based on data collection and the provider's statements to me.       Senait Flor MD    Laryngology    [facility]  Department of  Otolaryngology - Head and Neck Surgery  Clinics & Surgery Center  [address] [place] [id number]  Appointment line: [phone]  Fax: [phone]  [email or web address]         Again, thank you for allowing me to participate in the care of your patient.      Sincerely,    Senait Flor MD

## 2023-10-12 NOTE — PATIENT INSTRUCTIONS
1.  You were seen in the ENT Clinic today by . If you have any questions or concerns after your appointment, please call [phone]. Press option #1 for scheduling related needs. Press option #3 for Nurse advice.    2.   has recommended  the following:   - voice therapy    3.  Plan is to return to clinic as needed      Emma Mackey LPN  [phone]  [facility] Otolaryngology

## 2023-10-13 ENCOUNTER — TRANSCRIBE ORDERS (OUTPATIENT)
Dept: OTHER | Age: 28
End: 2023-10-13

## 2023-10-13 ENCOUNTER — TELEPHONE (OUTPATIENT)
Dept: UROLOGY | Facility: CLINIC | Age: 28
End: 2023-10-13
Payer: COMMERCIAL

## 2023-10-13 DIAGNOSIS — R49.0 DYSPHONIA: Primary | ICD-10-CM

## 2023-10-13 NOTE — TELEPHONE ENCOUNTER
----- Message from Denisse Oro sent at 10/12/2023  4:32 PM CDT -----  Per Tavia,  Please schedule patient for TRUS in office with Dr. Obando.      Thanks, Merry

## 2023-10-13 NOTE — TELEPHONE ENCOUNTER
Left message for pt to call back to schedule TRUS. Provided both direct and urology scheduling number.

## 2023-10-17 ENCOUNTER — PRE VISIT (OUTPATIENT)
Dept: NEUROSURGERY | Facility: CLINIC | Age: 28
End: 2023-10-17

## 2023-10-17 ENCOUNTER — TELEPHONE (OUTPATIENT)
Dept: NEUROSURGERY | Facility: CLINIC | Age: 28
End: 2023-10-17

## 2023-10-17 ENCOUNTER — OFFICE VISIT (OUTPATIENT)
Dept: NEUROSURGERY | Facility: CLINIC | Age: 28
End: 2023-10-17
Payer: COMMERCIAL

## 2023-10-17 VITALS
HEART RATE: 86 BPM | BODY MASS INDEX: 25.03 KG/M2 | WEIGHT: 195 LBS | SYSTOLIC BLOOD PRESSURE: 125 MMHG | HEIGHT: 74 IN | DIASTOLIC BLOOD PRESSURE: 75 MMHG

## 2023-10-17 DIAGNOSIS — R20.0 NUMBNESS: ICD-10-CM

## 2023-10-17 DIAGNOSIS — R10.2 PELVIC PAIN: Primary | ICD-10-CM

## 2023-10-17 PROCEDURE — 99204 OFFICE O/P NEW MOD 45 MIN: CPT | Performed by: PREVENTIVE MEDICINE

## 2023-10-17 ASSESSMENT — PAIN SCALES - GENERAL: PAINLEVEL: EXTREME PAIN (8)

## 2023-10-17 NOTE — PATIENT INSTRUCTIONS
Domenico is some of your symptoms sound like they could be a right pudendal neuralgia but most of your symptoms do not really fit.  As you know, I do not see clear pathology in your thoracic or lumbar spine that would explain this or that would probably recommend any injections.  I will make sure that the cervical spine and brain do not show any obvious answers but I will also schedule you to see a neurologist to follow-up.  Perhaps the pudendal nerve injection will give you some pain relief.  See the assessment and plan below for further details of our discussion today she will all the best.    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  Diffuse diminished sensation in a nondermatomal distribution across the anterior right abdomen, right buttock, and anterior right thigh without a clear imaging finding to explain this on the thoracic and lumbar spine MRI  Otherwise neurologically intact  Status post antibiotic treatment for presumed prostatitis-nontherapeutic      PLAN:  Cervical and brain MRI.  I will give him a MyChart message about the results but I am not expecting any surprises.  Right pudendal nerve injection.  Neurology consultation.  I am not sure I have any any other better diagnostic or therapeutic options.  No follow-up here is planned.

## 2023-10-17 NOTE — TELEPHONE ENCOUNTER
Writer faxed MRI orders to [facility] at patient request for imaging scheduling.  Faxed face sheet, OV note, orders to [phone].  Verified delivery by rightfax.  Requested results to be faxed to [facility] at [phone].  GREG Sarabia, LYNDSAY ([facility])

## 2023-10-17 NOTE — NURSING NOTE
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 Yes.  Work status?  Full time.    Sports: martial arts   Activities: no             /75   Pulse 86   Ht 1.88 m (6' 2\")   Wt 88.5 kg (195 lb)   BMI 25.04 kg/m        Allergies   Allergen Reactions    Dust Mites     Lactose Intolerance [Beta-Galactosidase]        Current Outpatient Medications   Medication    ALBUTEROL IN    BREO ELLIPTA 200-25 MCG/INH Inhaler    doxycycline hyclate (VIBRA-TABS) 100 MG tablet    FLUoxetine (PROZAC) 10 MG tablet    tadalafil (CIALIS) 5 MG tablet    VITAMIN D OR     No current facility-administered medications for this visit.         Cecilio Jama MA    "

## 2023-10-17 NOTE — LETTER
10/17/2023         RE: Jorge Denton  [address]  [facility] [address]        Dear Colleague,    Thank you for referring your patient, Jorge Denton, to the [facility] [place]. Please see a copy of my visit note below.        SUBJECTIVE:  HPI:  Jorge Denton  Is a 28 year old male who presents today for new patient evaluation of low back pain he is self-referred.  He has been evaluated at [facility] by Dr. Bowman.  He has had pelvic floor PT and coccyx mobilizations in 2021.  A pelvic MRI was apparently suggestive of chronic prostatitis.  This is according to his [facility] urologist on 7/3/2023 telehealth visit.    [facility] visit with the PA 8/15/2023 for chronic low back pain, and right-sided anterior torso numbness from the nipple to the genital area.  The low back pain has been ongoing for 3 years.  He was told he had a lumbar disc herniation on an MRI of March 2022 there is numbness in the right side of the penis and scrotum.  Erectile dysfunction noted.  Urology has been consulted and pelvic floor therapy has been done.  Back pain worsened with sitting, riding in times, and bending.  Position change helped the pain.  The numbness symptoms began after his lumbar MRI was done but there was no lower extremity weakness, bowel or bladder dysfunction.  The 3/15/2022 lumbar MRI from [facility] showed mild DDD L4-5 and L5-S1, but no neurologic compression at any level.  There was no neurologic compression to explain his numb sensations.  A repeat MRI was ordered as well as a thoracic MRI.  No lesions were found to explain his symptoms, follow-up with neurologist was recommended.  PT and surgery were discussed.    Follow-up note 8/31/2023, lumbar and thoracic MRI 8/25/2023 showed no abnormal compression of the spinal cord or and only mild degenerative disc disease in the T-spine and at L4-5 and L5-S1.  Neurology consultation was placed.  Options discussed included PT, MedX,  "injections, or lumbar fusion.      History today:  Domenico reports 3 to 4 years of back pain and pelvic pain without an inciting event that has defied evaluation by multiple physicians.  Pelvic floor therapy did help a little bit.  The pain is more right than left-sided goes down through the buttocks and inguinal area into the groin and perineum and even half of the rectum on the right.  He has diminished sensation on the right side of his body from the lower rib cage to the thigh mostly on the medial side.  He has difficulty starting his urinary stream but once it starts he is able to empty and his urinary stream force is pretty close to normal after doing pelvic floor PT. does recall having had a Pelvic Joint Dysfunction diagnosis.  He is tried TENS unit, dry needling, but no injections or surgery.\"  I have low back pain\" similar symptoms\" and had \"miraculous\" therapeutic responses to injections.  He does not recall ever getting scheduled to see the neurologist that was advised by [facility].    He has done some research and he mentioned looking into the pudendal nerve in the piriformis.  He thinks this is coming from his spine.      SYMPTOMS WORSENED WITH sitting, lying down    SYMPTOMS IMPROVED WITH nothing    Pain score and diagram reviewed.  See questionnaire.      ROS: .  Otherwise negative for bowel/bladder dysfunction, balance changes, headache, leg pain/numbness/weakness, fevers, chills, night sweats, unexplained weight loss;  otherwise unremarkable.   See the patient's intake questionnaire from today for details.    Treatment to Date: As above    MEDICATIONS:  Reviewed.    ALLERGIES:  Reviewed.     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  Sports hobbies and activities: \"Not anymore\", HEP, some walking.  Used to do a job where he was " sitting a lot on a rolling stool which provoked a lot of testicular pain.  Since he stopped doing that that has not improved.    OBJECTIVE:    IMAGING: Images and reports reviewed ([facility])    Lumbar MRI 8/25/2023    Comparison lumbar MRI 3/15/2022.    L5-S1 DDD without impingement decreased compared to prior study.  L4-5 DDD and mild DJD without impingement.  Unchanged.  L3-4 mild DJD    Thoracic MRI 8/25/2023    Multilevel not impinging DDD: T4-T7, T8-9, T11-12.  Normal cord signal.  No neurologic impingement at any level.  (OM-some hypokyphosis of the thoracic spine with a few Schmorl's nodes but not hyperkyphosis.)    EXAMINATION:    --CONSTITUTIONAL:   No acute distress.  The patient is well nourished and well groomed.  He transitions without pushoff and moves about the room easily.  --SKIN:   He has a self-inflicted shaving rash over the presacral area with some minor pinpoint excoriations.  There are some guttate psoriatic lesions over the posterior torso.  --GAIT:  is non-antalgic. Flat foot, heel and toe walking:  normal   .  Squat and rise   normal    minor pain with full squat and rise.  --STANDING EXAMINATION:    Symmetry of spine/pelvis   unremarkable   .      Range of motion full and painless.   Standing flexion   negative   .    Trixie's sign   negative    .     Stork test   negative    .   --NEUROLOGICAL:     ROMBERG, TANDEM WALK, PRONATOR DRIFT:   Normal.   .  SENSATION to light touch is present but diminished in the anterior thigh roughly corresponding to the lateral femoral cutaneous nerve, over the right rectus abdominis area and globally over the right gluteal area.  I did not check but he indicated that the right scrotum and penis had diminished sensation.  Otherwise intact in bilateral thighs, lower legs and feet.   REFLEXES:  patellar 2+, and achilles 2+.  Babinski is negative. No clonus.  MANUAL MOTOR TESTING:  L3- S1 Myotomes, Femoral, Obturator, Peroneal and Tibial nerves 5/5   DURAL STRETCH  TESTS:  SLR negative.  Femoral Stretch Test negative.   --PELVIC/HIP JOINTS:                Long Sitting   negative   .      PELVIC ALIGNMENT neutral alignment.   --LUMBAR/GLUTEAL MUSCLES: Tenderness in the right gluteal musculature and sciatic notch without radiating symptoms.    --ABDOMINAL:  Non-distended.  Nontender  --VASCULAR: Femoral pulses 2. Lower extremity capillary refill, temperature and color normal.       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  Diffuse diminished sensation in a nondermatomal distribution across the anterior right abdomen, right buttock, and anterior right thigh without a clear imaging finding to explain this on the thoracic and lumbar spine MRI  Otherwise neurologically intact  Status post antibiotic treatment for presumed prostatitis-nontherapeutic      PLAN:  Cervical and brain MRI.  I will give him a MyChart message about the results but I am not expecting any surprises.  Right pudendal nerve injection.  Neurology consultation.  I am not sure I have any any other better diagnostic or therapeutic options.  No follow-up here is planned.    Advised patient to call or return early if symptoms worsen, or having problems controlling bladder and bowel function or worsening leg weakness.     Please note: Voice recognition software was used in this dictation.  It may therefore contain typographical errors.    Rito Joseph MD             Again, thank you for allowing me to participate in the care of your patient.        Sincerely,        Rito Joseph MD

## 2023-10-19 ENCOUNTER — HOSPITAL ENCOUNTER (OUTPATIENT)
Facility: AMBULATORY SURGERY CENTER | Age: 28
Discharge: HOME | End: 2023-10-19
Attending: PHYSICAL MEDICINE & REHABILITATION
Payer: COMMERCIAL

## 2023-10-19 ENCOUNTER — TELEPHONE (OUTPATIENT)
Dept: PHYSICAL MEDICINE AND REHAB | Facility: CLINIC | Age: 28
End: 2023-10-19
Payer: COMMERCIAL

## 2023-10-19 DIAGNOSIS — R10.2 PELVIC PAIN: Primary | ICD-10-CM

## 2023-10-19 DIAGNOSIS — R20.0 NUMBNESS: ICD-10-CM

## 2023-10-19 DIAGNOSIS — G58.8 PUDENDAL NEURALGIA: ICD-10-CM

## 2023-10-19 NOTE — PROGRESS NOTES
Per [facility], Dr Joseph, case request placed for ultrasound-guided right pudendal nerve block

## 2023-10-19 NOTE — TELEPHONE ENCOUNTER
Patient is scheduled for surgery with Dr. Quiroz    Spoke with: patient    Date of Surgery: 11/29/23    Location: [facility]    Informed patient they will need an adult  yes    Additional comments: Patient is aware of date and time of the procedure.        Luciana Guerra MA on 10/19/2023 at 11:20 AM

## 2023-10-20 ENCOUNTER — VIRTUAL VISIT (OUTPATIENT)
Dept: UROLOGY | Facility: CLINIC | Age: 28
End: 2023-10-20
Payer: COMMERCIAL

## 2023-10-20 DIAGNOSIS — R10.2 PELVIC AND PERINEAL PAIN: Primary | ICD-10-CM

## 2023-10-20 PROCEDURE — 99214 OFFICE O/P EST MOD 30 MIN: CPT | Mod: VID | Performed by: PHYSICIAN ASSISTANT

## 2023-10-20 NOTE — NURSING NOTE
Is the patient currently in the state of [place]? YES    Visit mode:VIDEO    If the visit is dropped, the patient can be reconnected by: TELEPHONE VISIT: Phone number:   Telephone Information:   Mobile [phone]       Will anyone else be joining the visit? NO  (If patient encounters technical issues they should call [phone]    How would you like to obtain your AVS? MyChart    Are changes needed to the allergy or medication list? Pt stated no med changes    Reason for visit: Video Visit (Go over test results )    Antoinette FOOTE

## 2023-10-20 NOTE — PROGRESS NOTES
Virtual Visit Details    Type of service:  Video Visit   Video Start Time: 10:05 AM  Video End Time:10:13 PM    Originating Location (pt. Location): home    Distant Location (provider location):  On-site  Platform used for Video Visit: Ari    CC: pelvic pain, several urologic opinions, ?ejaculatory duct obstruction treatment    HPI:  Jorge Denton is a 28 year old male who presents in follow-up of the above.    Three years of pelvic floor pain (went to PT at that time and has continued this with marginal improvement) in 2020. Vague details about a tick borne disease ~3 years ago and had chronic pain in multiple areas (Saw Dr. Ferreira of ID).     EMR review and discussion with pt:  -Meatotomy at age 6, urethroscopy in 2012 without stricture.   -Saw Dr. Brumfield in 2020. PFPT and behavioral health referrals as well as Valium recommended. Alos had a trial of Flomax and treatment with Doxy around that time.   -Saw Dr. 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 Given meloxicam and pentoxifylline. He had a penile MRI that did not show plaques or lesions. Had an episode of presumed prostatitis and treated with Bactrim x 6 wks. Followed up again later in 2021 and treated with doxy for urethritis and CT scan was performed and noted a moderate amount of stool. Seen again in Jan 2021. Started on Cialis daily and had some relief of his pain. Seen again in 2022 and noting paresthesia at base of penis. Pain referral placed.   -Significant right testicle pain and numbness as well as perineal pain worsened in Nov 2022. Normal scrotal US at that time. Especially worsening of his symptoms with sexual activity.   -Saw Dr. Jara 8/17/23. He performed a digital rectal exam (with prostate more firm on the right), prostate massage, and post prostate massage urination specimen was sent for Pathnostics and Guidance assays (complete genetic panel, bacterial, viral, fungal, STDs--all neg).  Dr. Jara  referred him to [facility] for evaluation of ejaculatory duct obstruction and treatment/procedure. Discussion about cysto at that time, although this was deferred.      Continues to have worsening pain past 5 months. Several courses of abx for mycoplasma/prostatitis in the past (See  Urology visits: [facility]). Seen by Dr. Hampton in July 2023. Prostate MRI noting findings to suggest chronic prostatitis.      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 Blood in semen (last visible two months ago). Pain is localized now to right perineum (yael worse with intercourse) and right testicle. Has ED almost always (yael during pain), difficulty maintaining or obtaining erection.     Has not had cysto in the past.      No fevers or chills, no leakage or dribbling. Hx of depression, on SSRIs and seen by behavioral health over the years.     Quite a bit of burning with urination despite several courses of abx      Has follow-up at a pain speciality clinic planned (May be a spine clinic)  Hoping to avoid nerve block, which he has been offered.    TODAY:  Normal antegrade and retrograde semen analyses.   [facility] arranging TRUS. He will return their call/voicemail from 10/17/23.  Has scheduled right pudendal nerve block 11/29/23. Has MRI brain and cervical spine ordered via Neurosurg.     Past Medical History:   Diagnosis Date    Allergic rhinitis     allergy testing positive for dustmites    Eczema     in earlier childhood    Gastroesophageal reflux disease     Uncomplicated asthma        Past Surgical History:   Procedure Laterality Date    MASTECTOMY SUBCUTANEOUS MALE BILATERAL (GYNECOMASTIA)         Social History     Socioeconomic History    Marital status: Single     Spouse name: Not on file    Number of children: Not on file    Years of education: Not on file    Highest education level: Not on file   Occupational History    Not on file   Tobacco Use    Smoking status: Never    Smokeless tobacco: Never    Tobacco comments:      no passive exposure   Vaping Use    Vaping Use: Never used   Substance and Sexual Activity    Alcohol use: No    Drug use: No    Sexual activity: Yes     Partners: Female, Male   Other Topics Concern     Service Not Asked    Blood Transfusions No    Caffeine Concern No    Occupational Exposure Not Asked    Hobby Hazards No    Sleep Concern No    Stress Concern Yes     Comment: pretty high lately    Weight Concern No    Special Diet Yes     Comment: Lactose intolerant    Back Care Yes     Comment: Chiro    Exercise Yes    Bike Helmet Yes    Seat Belt Yes    Self-Exams Not Asked   Social History Narrative    Jorge participates in [employer] arts and enjoys skiing.     Social Determinants of Health     Financial Resource Strain: Not on file   Food Insecurity: Not on file   Transportation Needs: Not on file   Physical Activity: Not on file   Stress: Not on file   Social Connections: Not on file   Interpersonal Safety: Not on file   Housing Stability: Not on file       Family History   Problem Relation Age of Onset    Heart Disease Maternal Grandmother     Lipids Maternal Grandmother     Heart Disease Maternal Grandfather     Lipids Maternal Grandfather     Heart Disease Paternal Grandfather     Lipids Paternal Grandfather     Lipids Mother     Lipids Father     Lipids Paternal Grandmother     Thyroid Disease Maternal Grandmother         Thyroid nodule       Allergies   Allergen Reactions    Dust Mites     Lactose Intolerance [Beta-Galactosidase]        Current Outpatient Medications   Medication    ALBUTEROL IN    BREO ELLIPTA 200-25 MCG/INH Inhaler    doxycycline hyclate (VIBRA-TABS) 100 MG tablet    FLUoxetine (PROZAC) 10 MG tablet    tadalafil (CIALIS) 5 MG tablet    VITAMIN D OR     No current facility-administered medications for this visit.         PEx:   There were no vitals taken for this visit.    PSYCH: NAD  EYES: EOMI    A/P: Domenico Denton is a 28 year old male with pelvic pain.  -Has had a multitude of  imaging/diagnostics as well as infectious evals, treatments and referrals for pain management.    -Reviewed semen analyses with the patient.  -Follow-up as scheduled for nerve block.   -TRUS to be done at [facility] site.      Nahomi Romeo PA-C  [facility]        20 minutes spent on the date of the encounter doing chart review, review of outside records, review of test results, interpretation of tests, discussion with several Urologists, patient visit and documentation

## 2023-10-20 NOTE — LETTER
10/20/2023       RE: Jorge Denton  [address]  [facility] [address]     Dear Colleague,    Thank you for referring your patient, Jorge Denton, to the [facility] [place] at [facility]. Please see a copy of my visit note below.    Virtual Visit Details    Type of service:  Video Visit   Video Start Time: 10:05 AM  Video End Time:10:13 PM    Originating Location (pt. Location): home    Distant Location (provider location):  On-site  Platform used for Video Visit: Ari    CC: pelvic pain, several urologic opinions, ?ejaculatory duct obstruction treatment    HPI:  Jorge Denton is a 28 year old male who presents in follow-up of the above.    Three years of pelvic floor pain (went to PT at that time and has continued this with marginal improvement) in 2020. Vague details about a tick borne disease ~3 years ago and had chronic pain in multiple areas (Saw Dr. Ferreira of ID).     EMR review and discussion with pt:  -Meatotomy at age 6, urethroscopy in 2012 without stricture.   -Saw Dr. Brumfield in 2020. PFPT and behavioral health referrals as well as Valium recommended. Alos had a trial of Flomax and treatment with Doxy around that time.   -Saw Dr. 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 Given meloxicam and pentoxifylline. He had a penile MRI that did not show plaques or lesions. Had an episode of presumed prostatitis and treated with Bactrim x 6 wks. Followed up again later in 2021 and treated with doxy for urethritis and CT scan was performed and noted a moderate amount of stool. Seen again in Jan 2021. Started on Cialis daily and had some relief of his pain. Seen again in 2022 and noting paresthesia at base of penis. Pain referral placed.   -Significant right testicle pain and numbness as well as perineal pain worsened in Nov 2022. Normal scrotal US at that time. Especially worsening of his symptoms with sexual activity.    -Saw Dr. Jara 8/17/23. He performed a digital rectal exam (with prostate more firm on the right), prostate massage, and post prostate massage urination specimen was sent for Pathnostics and Guidance assays (complete genetic panel, bacterial, viral, fungal, STDs--all neg).  Dr. Jara referred him to [facility] for evaluation of ejaculatory duct obstruction and treatment/procedure. Discussion about cysto at that time, although this was deferred.      Continues to have worsening pain past 5 months. Several courses of abx for mycoplasma/prostatitis in the past (See  Urology visits: [facility]). Seen by Dr. Hampton in July 2023. Prostate MRI noting findings to suggest chronic prostatitis.      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 Blood in semen (last visible two months ago). Pain is localized now to right perineum (yael worse with intercourse) and right testicle. Has ED almost always (yael during pain), difficulty maintaining or obtaining erection.     Has not had cysto in the past.      No fevers or chills, no leakage or dribbling. Hx of depression, on SSRIs and seen by behavioral health over the years.     Quite a bit of burning with urination despite several courses of abx      Has follow-up at a pain speciality clinic planned (May be a spine clinic)  Hoping to avoid nerve block, which he has been offered.    TODAY:  Normal antegrade and retrograde semen analyses.   [facility] arranging TRUS. He will return their call/voicemail from 10/17/23.  Has scheduled right pudendal nerve block 11/29/23. Has MRI brain and cervical spine ordered via Neurosurg.     Past Medical History:   Diagnosis Date    Allergic rhinitis     allergy testing positive for dustmites    Eczema     in earlier childhood    Gastroesophageal reflux disease     Uncomplicated asthma        Past Surgical History:   Procedure Laterality Date    MASTECTOMY SUBCUTANEOUS MALE BILATERAL (GYNECOMASTIA)         Social History     Socioeconomic History     Marital status: Single     Spouse name: Not on file    Number of children: Not on file    Years of education: Not on file    Highest education level: Not on file   Occupational History    Not on file   Tobacco Use    Smoking status: Never    Smokeless tobacco: Never    Tobacco comments:     no passive exposure   Vaping Use    Vaping Use: Never used   Substance and Sexual Activity    Alcohol use: No    Drug use: No    Sexual activity: Yes     Partners: Female, Male   Other Topics Concern     Service Not Asked    Blood Transfusions No    Caffeine Concern No    Occupational Exposure Not Asked    Hobby Hazards No    Sleep Concern No    Stress Concern Yes     Comment: pretty high lately    Weight Concern No    Special Diet Yes     Comment: Lactose intolerant    Back Care Yes     Comment: Chiro    Exercise Yes    Bike Helmet Yes    Seat Belt Yes    Self-Exams Not Asked   Social History Narrative    Jorge participates in [employer] arts and enjoys skiing.     Social Determinants of Health     Financial Resource Strain: Not on file   Food Insecurity: Not on file   Transportation Needs: Not on file   Physical Activity: Not on file   Stress: Not on file   Social Connections: Not on file   Interpersonal Safety: Not on file   Housing Stability: Not on file       Family History   Problem Relation Age of Onset    Heart Disease Maternal Grandmother     Lipids Maternal Grandmother     Heart Disease Maternal Grandfather     Lipids Maternal Grandfather     Heart Disease Paternal Grandfather     Lipids Paternal Grandfather     Lipids Mother     Lipids Father     Lipids Paternal Grandmother     Thyroid Disease Maternal Grandmother         Thyroid nodule       Allergies   Allergen Reactions    Dust Mites     Lactose Intolerance [Beta-Galactosidase]        Current Outpatient Medications   Medication    ALBUTEROL IN    BREO ELLIPTA 200-25 MCG/INH Inhaler    doxycycline hyclate (VIBRA-TABS) 100 MG tablet    FLUoxetine (PROZAC) 10  MG tablet    tadalafil (CIALIS) 5 MG tablet    VITAMIN D OR     No current facility-administered medications for this visit.         PEx:   There were no vitals taken for this visit.    PSYCH: NAD  EYES: EOMI    A/P: Domenico Denton is a 28 year old male with pelvic pain.  -Has had a multitude of imaging/diagnostics as well as infectious evals, treatments and referrals for pain management.    -Reviewed semen analyses with the patient.  -Follow-up as scheduled for nerve block.   -TRUS to be done at [facility] site.      Nahomi Romeo PA-C  [facility]        20 minutes spent on the date of the encounter doing chart review, review of outside records, review of test results, interpretation of tests, discussion with several Urologists, patient visit and documentation

## 2023-10-24 NOTE — TELEPHONE ENCOUNTER
Left another message to schedule this patient for 11/24/23 for transrectal ultrasound at 940 am at the [facility] with Dr Obando  Got voicemail again  Sent [employer] weeks ago with no response    Desiree Babcock, RN, BSN  Care Coordinator Urology  [facility]  Urology Clinic  [phone]

## 2023-11-14 ENCOUNTER — VIRTUAL VISIT (OUTPATIENT)
Dept: FAMILY MEDICINE | Facility: CLINIC | Age: 28
End: 2023-11-14
Payer: COMMERCIAL

## 2023-11-14 DIAGNOSIS — N41.9 PROSTATITIS, UNSPECIFIED PROSTATITIS TYPE: Primary | ICD-10-CM

## 2023-11-14 DIAGNOSIS — E55.9 VITAMIN D DEFICIENCY: ICD-10-CM

## 2023-11-14 PROCEDURE — 99204 OFFICE O/P NEW MOD 45 MIN: CPT | Mod: VID | Performed by: FAMILY MEDICINE

## 2023-11-14 ASSESSMENT — PATIENT HEALTH QUESTIONNAIRE - PHQ9
SUM OF ALL RESPONSES TO PHQ QUESTIONS 1-9: 0
SUM OF ALL RESPONSES TO PHQ QUESTIONS 1-9: 0
10. IF YOU CHECKED OFF ANY PROBLEMS, HOW DIFFICULT HAVE THESE PROBLEMS MADE IT FOR YOU TO DO YOUR WORK, TAKE CARE OF THINGS AT HOME, OR GET ALONG WITH OTHER PEOPLE: NOT DIFFICULT AT ALL

## 2023-11-14 NOTE — PROGRESS NOTES
Domenico is a 28 year old who is being evaluated via a billable video visit.      How would you like to obtain your AVS? MyChart  If the video visit is dropped, the invitation should be resent by: Text to cell phone: [phone]  Will anyone else be joining your video visit? No          Assessment & Plan     Prostatitis, unspecified prostatitis type  I recommended he continue taking Bactrim.  He has not picked up the prescription yet from the ER physician who sent in an additional month.  I encouraged him to stay well-hydrated and I ordered a BMP and CBC that he can schedule time to come get done at his convenience.  If symptoms do not improve he will schedule follow-up appointment.  - Basic metabolic panel  (Ca, Cl, CO2, Creat, Gluc, K, Na, BUN); Future  - CBC with platelets; Future    Vitamin D deficiency  We are going to check a vitamin D level since he reports having low vitamin D in the past.  - Vitamin D deficiency screening; Future      I reviewed previous blood work lab results over the past 6 months as well as the MRI of the prostate on 6/28/2023, the urology consultation notes from 10/20/2023 and the emergency department notes and lab results from 11/13/2023.              Sachin Ramirez, [facility]    Subjective   Domenico is a 28 year old, presenting for the following health issues:  Hospital F/U      [facility] the [place]     ED/UC Followup:    Facility:  [facility]  Date of visit: 11/13/2023  Reason for visit: Back pain   Current Status: Pt continues having back pain and abdominal pain.  Pt would discuss medication and labs.        He has been struggling with pelvic pain and back pain symptoms over the past several days.  He was seen at an outside clinic on 11/12/2023 and they felt that his symptoms were probably due to a urinary tract infection.  He was given a prescription for Bactrim for 1 week.  He is still waiting on the urine culture results to come back from that visit.   "Yesterday he went to the emergency department at [facility] for ongoing symptoms.  His urinalysis yesterday looked good and he had a rectal exam which revealed prostate tenderness.  It was felt that his symptoms were most likely secondary to prostatitis and they recommended an additional month of Bactrim.  He reports having urinary issues off-and-on in the past.  On 6/28/2023 he had an MRI of the prostate which was suggestive of prostatitis.  He has seen urology for this issue as well.    He also reports wanting to have blood work checked to make sure that his kidney function and blood counts look okay.  He would also like to have his vitamin D level checked since this has been low in the past.    MRI prostate 6/28/2023  IMPRESSION:   1. Diffuse intermediate to low T2 signal throughout the prostate gland peripheral zone without significant restricted diffusion. Appearance suggestive of prostatitis (favored to be subacute given the absence of significant restricted diffusion).   2. No abscess.       Review of Systems   Constitutional, HEENT, cardiovascular, pulmonary, GI, , musculoskeletal, neuro, skin, endocrine and psych systems are negative, except as otherwise noted.      Objective    Vitals - Patient Reported  Weight (Patient Reported): 88.9 kg (196 lb)  Height (Patient Reported): 188 cm (6' 2\")  BMI (Based on Pt Reported Ht/Wt): 25.16  Pain Score: Moderate Pain (5)  Pain Loc: Lower Leg      Vitals:  No vitals were obtained today due to virtual visit.    Physical Exam   GENERAL: Healthy, alert and no distress  EYES: Eyes grossly normal to inspection.  No discharge or erythema, or obvious scleral/conjunctival abnormalities.  RESP: No audible wheeze, cough, or visible cyanosis.  No visible retractions or increased work of breathing.    SKIN: Visible skin clear. No significant rash, abnormal pigmentation or lesions.  NEURO: Cranial nerves grossly intact.  Mentation and speech appropriate for age.  PSYCH: " Mentation appears normal, affect normal/bright, judgement and insight intact, normal speech and appearance well-groomed.                Video-Visit Details    Type of service:  Video Visit   Video Start Time: 12:10 PM  Video End Time:12:25 PM    Originating Location (pt. Location): Home    Distant Location (provider location):  On-site  Platform used for Video Visit: [email or web address]      Answers submitted by the patient for this visit:  Patient Health Questionnaire (Submitted on 11/14/2023)  If you checked off any problems, how difficult have these problems made it for you to do your work, take care of things at home, or get along with other people?: Not difficult at all  PHQ9 TOTAL SCORE: 0

## 2023-11-14 NOTE — COMMUNITY RESOURCES LIST (ENGLISH)
11/14/2023   [facility]  N/A  For questions about this resource list or additional care needs, please contact your primary care clinic or care manager.  Phone: [phone]   Email: N/A   Address: [address] [place] [id number]   Hours: N/A        Food and Nutrition       Food pantry  1  [facility] Distance: 2.93 miles      [place] [id number] [facility] [address] [place] [address]  Language: English, Tuvaluan, Greek, Angolan  Hours: Mon 3:00 PM - 6:00 PM , Wed 9:00 AM - 12:00 PM , Fri 9:00 AM - 12:00 PM  Fees: Free   Phone: (359) 503-5034 Email: info@iocp.org Website: http://www.Bright.com.org     2  [facility] Food Shelf Distance: 6.37 miles      [facility]   [address] [place] [address]  Language: English  Hours: Thu 2:00 PM - 6:00 PM , Thu 3:00 PM - 6:00 PM  Fees: Free   Phone: (497) 733-7831 Email: foodshelf@[facility].org Website: https://TwentyFour6[facility] Hospital[employer] Intelligence.org/serve/food-shelf/     SNAP application assistance  3  [facility] Distance: 7.46 miles      Phone/Virtual   [address] [place] [address]  Language: English, Angolan  Hours: Mon 9:00 AM - 1:00 PM , Tue - Thu 9:00 AM - 4:00 PM , Fri 9:00 AM - 1:00 PM  Fees: Free   Phone: (743) 150-5378 Email: info@ActX.org Website: http://www.Octroarachvr.org/     4  [facility] Distance: 9.35 miles      In-Person, Phone/Virtual   [address] [place] [address]  Language: American Sign Language, English  Hours: Mon - Fri 8:00 AM - 4:00 PM  Fees: Free   Phone: (743) 476-7711 Email: noman@[place]. Website: https://www.[place]./residents#human-services     Soup kitchen or free meals  5  [facility] & Fishes Distance: 2.01 miles      [facility]   [address] [place] [address]  Language: English, Angolan  Hours: Sat 5:30 PM - 6:30 PM  Fees: Free   Phone:  (809) 813-2008 Email: info@[facility].org Website: https://www.[facility].org/     6  [facility] - Loaves and Fishes - Community Meal Distance: 4.24 miles      [place]   [address] [place] [address]  Language: English  Hours: Mon 5:30 PM - 6:30 PM  Fees: Free   Phone: (486) 531-1682 Email: contact@ReadOz Website: https://www.ReadOz/          Transportation       Free or low-cost transportation  7  [facility] - Bus Passes - Free or low-cost transportation Distance: 12.31 miles      In-Person   [address] [place] [address]  Language: English  Hours: Tue 9:30 AM - 11:30 AM , Thu 9:30 AM - 11:30 AM  Fees: Free   Phone: (458) 556-8424 Email: info@[employer] Website: http://Dejamor.Octopusapp/     8  [facility] Distance: 13.02 miles      In-Person   [address] [place] [address]  Language: English  Hours: Mon - Wed 9:30 AM - 12:00 PM , Mon - Wed 1:00 PM - 2:00 PM Appt. Only  Fees: Free   Phone: (130) 514-8464 Email: info@saintolaf.org Website: http://www.saintolaf.org/     Transportation to medical appointments  9  [employer] Distance: 6.24 miles      In-Person   [address] [place] [address]  Language: English  Hours: Mon - Sat 4:00 AM - 6:00 PM  Fees: Insurance, Self Pay   Phone: (468) 693-6072 Email: delighttransportation1@[facility].[employer] Website: https://helpmeconnect.web.[facility].mn./HelpMeConnect/Providers/Delight_Transportation/Transportation/2?returnUrl=%2FHelpMeConnect%2FSearch%2FBasicNeeds%2FTransportation%2FTransportationServices%3Fstart%3D40     10  [place] -  [facility] ([facility]IF[facility]) Distance: 10.94 miles      In-Person   [address] [place] [address]  Language: Upper sorbian, Kiswahili, English, Gujarati, Ana Maria, Hebrew, Indonesian, Pashto, Urdu, Portuguese  Hours: Mon - Wed 9:00 AM - 5:00 PM , Thu 12:00 PM - 6:00 PM , Fri 9:00 AM - 5:00 PM , Sun 10:30 AM - 2:00 PM Appt.  Only  Fees: Free   Phone: (524) 396-6273 Email: info@SpeakPhone.org Website: https://www.Fishbowlw.org/          Important Numbers & Websites       Emergency Services   911  [facility] Services   311  Poison Control   (861) 300-6321  Suicide Prevention Lifeline   (771) 103-4296 (TALK)  Child Abuse Hotline   (992) 657-2593 (4-A-Child)  Sexual Assault Hotline   (682) 473-8638 (HOPE)  National Runaway Safeline   (410) 366-5933 (RUNAWAY)  All-Options Talkline   (838) 933-1949  Substance Abuse Referral   (897) 761-8824 (HELP)

## 2023-11-21 ENCOUNTER — TELEPHONE (OUTPATIENT)
Dept: UROLOGY | Facility: CLINIC | Age: 28
End: 2023-11-21
Payer: COMMERCIAL

## 2023-11-21 NOTE — CONFIDENTIAL NOTE
Note below reviewed by writer. Patient's currently scheduled visit is being discussed with Yoanna ISSA as appointment is scheduled as cysto and appointment note states TRUS. Equipment may not be available for TRUS at currently scheduled visit.    Kailey Andrade RN, BSN

## 2023-11-21 NOTE — TELEPHONE ENCOUNTER
Please place UA/UC order for upcoming cystoscopy with Dr. Obando.    Thank you,    Radha mendoza Clinic Assistant- Surgical Specialties

## 2023-11-21 NOTE — TELEPHONE ENCOUNTER
TREY and direct callback to see if patient is available on Friday 11/24/23 at 940 am with Dr. Obando

## 2023-11-22 ENCOUNTER — TELEPHONE (OUTPATIENT)
Dept: UROLOGY | Facility: CLINIC | Age: 28
End: 2023-11-22

## 2023-11-22 NOTE — TELEPHONE ENCOUNTER
"11/22 Called patient and left voicemail. Provided patient with direct call back number to [facility] clinic at [phone].     Patient is currently scheduled for a \"cystoscopy\" on 11/29/2023 with Dr. Obando in [place]. Appointment notes state \"TRUS\", which [place] does not have equipment to complete at current scheduled visit.      attempted to contact and reschedule 11/29/2023 appointment to 11/24/2023 with Dr. Obando at the [facility] office. Patient did not answer call. Detailed voicemail was left with contact information.     MyChart will be sent to patient.     Mirta Bermeo   Dermatology, Surgery, Urology  [facility] [place] [place]     "

## 2023-11-24 ENCOUNTER — TELEPHONE (OUTPATIENT)
Dept: UROLOGY | Facility: CLINIC | Age: 28
End: 2023-11-24
Payer: COMMERCIAL

## 2023-11-24 ENCOUNTER — DOCUMENTATION ONLY (OUTPATIENT)
Dept: UROLOGY | Facility: CLINIC | Age: 28
End: 2023-11-24
Payer: COMMERCIAL

## 2023-11-24 DIAGNOSIS — R30.0 DYSURIA: Primary | ICD-10-CM

## 2023-11-24 NOTE — CONFIDENTIAL NOTE
Per discussion with Yoanna ISSA, patient to be rescheduled at the [facility] with Dr. Obando. Per 11/21/23 encounter, the [facility] team attempted to reach patient by phone for rescheduling. Per 11/22/23 encounters, [facility] scheduling team attempted to reach patient.     Called and spoke to patient who is aware that the necessary equipment will not be available for his 11/29/23 appointment with Dr. Obando in [place] and that the appointment will be cancelled. Informed patient that a message will be sent to the [facility] team with request to assist in rescheduling his visit at that location. Patient verbalized understanding and was comfortable with plan.    Kailey Andrade RN, BSN

## 2023-11-24 NOTE — PROGRESS NOTES
Jorge KAUR Denton has an upcoming lab appointment:    Future Appointments   Date Time Provider Department Center   11/29/2023  9:15 AM [facility]   11/29/2023  9:30 AM Joey Obando MD [facility] [place]   12/4/2023 10:30 AM Stephie Bedolla, SLP [facility]   12/18/2023  9:30 AM Stephie Bedolla SLP [facility]   1/5/2024  2:30 PM Stephie Bedolla SLP [facility]   2/5/2024  3:30 PM Stephie Bedolla, SLP [facility]   2/21/2024 11:00 AM [facility]     Patient is scheduled for a UA.There is no order available. Please review and place either future orders or HMPO (Review of Health Maintenance Protocol Orders), as appropriate.    Brianna FERRIS

## 2023-12-01 ENCOUNTER — TELEPHONE (OUTPATIENT)
Dept: UROLOGY | Facility: CLINIC | Age: 28
End: 2023-12-01

## 2023-12-01 NOTE — TELEPHONE ENCOUNTER
[facility] Call Center    Phone Message    May a detailed message be left on voicemail: yes     Reason for Call: Other: Patient would like to reschedule procedure today due to fighting an infection. Please call back asap to discuss       Action Taken: Message routed to:  Other: uro    Travel Screening: Not Applicable

## 2023-12-05 NOTE — TELEPHONE ENCOUNTER
RECORDS RECEIVED FROM: Internal   REASON FOR VISIT: Numbness    Date of Appt: 02/21/2024   NOTES (FOR ALL VISITS) STATUS DETAILS   OFFICE NOTE from referring provider Internal 10/17/2023 Dr Joseph [facility]    OFFICE NOTE from other specialist N/A    DISCHARGE SUMMARY from hospital N/A    DISCHARGE REPORT from the ER N/A    OPERATIVE REPORT N/A    JOSE CARLOS Virus Labs (MS ONLY) N/A    EMG N/A    EEG N/A    MEDICATION LIST N/A    IMAGING  (FOR ALL VISITS)     LUMBAR PUNCTURE N/A    MAVIS SCAN (MOVEMENT) N/A    ULTRASOUND (CAROTID BILAT) *VASCULAR* N/A    MRI (HEAD, NECK, SPINE) N/A    CT (HEAD, NECK, SPINE) N/A

## 2023-12-06 ENCOUNTER — LAB (OUTPATIENT)
Dept: LAB | Facility: CLINIC | Age: 28
End: 2023-12-06
Payer: COMMERCIAL

## 2023-12-06 DIAGNOSIS — R30.0 DYSURIA: ICD-10-CM

## 2023-12-06 DIAGNOSIS — N41.9 PROSTATITIS, UNSPECIFIED PROSTATITIS TYPE: ICD-10-CM

## 2023-12-06 DIAGNOSIS — E55.9 VITAMIN D DEFICIENCY: ICD-10-CM

## 2023-12-06 LAB
ALBUMIN UR-MCNC: NEGATIVE MG/DL
ANION GAP SERPL CALCULATED.3IONS-SCNC: 11 MMOL/L (ref 7–15)
APPEARANCE UR: CLEAR
BACTERIA #/AREA URNS HPF: ABNORMAL /HPF
BILIRUB UR QL STRIP: NEGATIVE
BUN SERPL-MCNC: 15.8 MG/DL (ref 6–20)
CALCIUM SERPL-MCNC: 9.8 MG/DL (ref 8.6–10)
CHLORIDE SERPL-SCNC: 105 MMOL/L (ref 98–107)
COLOR UR AUTO: YELLOW
CREAT SERPL-MCNC: 0.9 MG/DL (ref 0.67–1.17)
DEPRECATED HCO3 PLAS-SCNC: 27 MMOL/L (ref 22–29)
EGFRCR SERPLBLD CKD-EPI 2021: >90 ML/MIN/1.73M2
ERYTHROCYTE [DISTWIDTH] IN BLOOD BY AUTOMATED COUNT: 12.7 % (ref 10–15)
GLUCOSE SERPL-MCNC: 98 MG/DL (ref 70–99)
GLUCOSE UR STRIP-MCNC: NEGATIVE MG/DL
HCT VFR BLD AUTO: 48 % (ref 40–53)
HGB BLD-MCNC: 16.4 G/DL (ref 13.3–17.7)
HGB UR QL STRIP: NEGATIVE
KETONES UR STRIP-MCNC: NEGATIVE MG/DL
LEUKOCYTE ESTERASE UR QL STRIP: NEGATIVE
MCH RBC QN AUTO: 29.4 PG (ref 26.5–33)
MCHC RBC AUTO-ENTMCNC: 34.2 G/DL (ref 31.5–36.5)
MCV RBC AUTO: 86 FL (ref 78–100)
MUCOUS THREADS #/AREA URNS LPF: PRESENT /LPF
NITRATE UR QL: NEGATIVE
PH UR STRIP: 5.5 [PH] (ref 5–7)
PLATELET # BLD AUTO: 190 10E3/UL (ref 150–450)
POTASSIUM SERPL-SCNC: 4.1 MMOL/L (ref 3.4–5.3)
RBC # BLD AUTO: 5.57 10E6/UL (ref 4.4–5.9)
RBC #/AREA URNS AUTO: ABNORMAL /HPF
SODIUM SERPL-SCNC: 143 MMOL/L (ref 135–145)
SP GR UR STRIP: 1.02 (ref 1–1.03)
SQUAMOUS #/AREA URNS AUTO: ABNORMAL /LPF
UROBILINOGEN UR STRIP-ACNC: 0.2 E.U./DL
VIT D+METAB SERPL-MCNC: 65 NG/ML (ref 20–50)
WBC # BLD AUTO: 3.8 10E3/UL (ref 4–11)
WBC #/AREA URNS AUTO: ABNORMAL /HPF

## 2023-12-06 PROCEDURE — 80053 COMPREHEN METABOLIC PANEL: CPT

## 2023-12-06 PROCEDURE — 81001 URINALYSIS AUTO W/SCOPE: CPT

## 2023-12-06 PROCEDURE — 85027 COMPLETE CBC AUTOMATED: CPT

## 2023-12-06 PROCEDURE — 82306 VITAMIN D 25 HYDROXY: CPT

## 2023-12-06 PROCEDURE — 36415 COLL VENOUS BLD VENIPUNCTURE: CPT

## 2023-12-06 PROCEDURE — 82248 BILIRUBIN DIRECT: CPT | Performed by: INTERNAL MEDICINE

## 2023-12-07 ENCOUNTER — VIRTUAL VISIT (OUTPATIENT)
Dept: FAMILY MEDICINE | Facility: CLINIC | Age: 28
End: 2023-12-07
Payer: COMMERCIAL

## 2023-12-07 DIAGNOSIS — N41.9 PROSTATITIS, UNSPECIFIED PROSTATITIS TYPE: Primary | ICD-10-CM

## 2023-12-07 DIAGNOSIS — R19.5 STOOL CLAY COLOR: ICD-10-CM

## 2023-12-07 DIAGNOSIS — K59.00 CONSTIPATION, UNSPECIFIED CONSTIPATION TYPE: ICD-10-CM

## 2023-12-07 LAB
ALBUMIN SERPL BCG-MCNC: 4.8 G/DL (ref 3.5–5.2)
ALP SERPL-CCNC: 55 U/L (ref 40–150)
ALT SERPL W P-5'-P-CCNC: 46 U/L (ref 0–70)
AST SERPL W P-5'-P-CCNC: 29 U/L (ref 0–45)
BILIRUB DIRECT SERPL-MCNC: <0.2 MG/DL (ref 0–0.3)
BILIRUB SERPL-MCNC: 0.3 MG/DL
PROT SERPL-MCNC: 7.4 G/DL (ref 6.4–8.3)

## 2023-12-07 PROCEDURE — 99214 OFFICE O/P EST MOD 30 MIN: CPT | Mod: 95 | Performed by: INTERNAL MEDICINE

## 2023-12-07 NOTE — PROGRESS NOTES
"Domenico is a 28 year old who is being evaluated via a billable video visit.        Instructions Relayed to Patient by Virtual Roomer:     Patient is active on [employer]:   Relayed following to patient: \"It looks like you are active on [employer], are you able to join the visit this way? If not, do you need us to send you a link now or would you like your provider to send a link via text or email when they are ready to initiate the visit?\"    Reminded patient to ensure they were logged on to virtual visit by arrival time listed. Documented in appointment notes if patient had flexibility to initiate visit sooner than arrival time. If pediatric virtual visit, ensured pediatric patient along with parent/guardian will be present for video visit.     Patient offered the website [email or web address] [email or web address]/video-visits and/or phone number to [employer] Help line: [phone]   How would you like to obtain your AVS? [facility]  If the video visit is dropped, the invitation should be resent by: Text to cell phone: [phone]  Will anyone else be joining your video visit? No          Assessment & Plan     Prostatitis, unspecified prostatitis type  Follows up with urologist  On antibiotics    Constipation, unspecified constipation type  Complaining of difficulty moving bowels for the past couple of weeks  Having pellet-like stools  Having a sensation of incomplete evacuation  Also was having issues with some rectal pain  No blood in the stool  In the past he normally used to open bowels once or twice a day  All the problem started after he was placed on antibiotics for proctitis  He was on Bactrim before but that did not agree with him so he is currently on doxycycline  CT scan done on 15 November did show that he has got significant amount of stool in the sigmoid colon and rectum  I discussed with him about taking clear liquid diet for a couple of days and taking some mag citrate to see if that will improve his constipation  If not then we " "might have to try some GoLytely  I do not think  we need to do any more imaging since he just had that done    Stool shon color  He is complaining of some white stools that are floating and I will have to make sure that he does not have any hepatic issues going on due to antibiotic use recently  Check liver panel  - Hepatic panel (Albumin, ALT, AST, Bili, Alk Phos, TP)      30 minutes spent by me on the date of the encounter doing chart review, history and exam, documentation and further activities per the note       BMI:   Estimated body mass index is 25.04 kg/m  as calculated from the following:    Height as of 10/17/23: 1.88 m (6' 2\").    Weight as of 10/17/23: 88.5 kg (195 lb).           Han Grimes MD  [facility] MICHELLE Acuña is a 28 year old, presenting for the following health issues:  Constipation        12/7/2023     2:39 PM   Additional Questions   Roomed by Luis Manuel RICO       History of Present Illness       Reason for visit:  I have GI issues that are new, bad cosntipation, white floating stools  Symptom onset:  3-7 days ago  Symptoms include:  See above  Symptom intensity:  Moderate  Symptom progression:  Worsening  Had these symptoms before:  No  What makes it worse:  Fat consumption  What makes it better:  NA    He eats 2-3 servings of fruits and vegetables daily.He consumes 0 sweetened beverage(s) daily.He exercises with enough effort to increase his heart rate 30 to 60 minutes per day.  He exercises with enough effort to increase his heart rate 5 days per week.   He is taking medications regularly.       Constipation  Onset/Duration: ~1 week  Description:  Frequency of bowel movements: ~1 per day   Consistency of stool: \"Hard, sharp, compacted, white/gray in color, and floating\"  Progression of Symptoms: worsening  Accompanying signs and symptoms:    Abdominal pain: No   Rectal pain: YES   Blood in stool: No   Nausea/Vomiting: YES   Weight loss or gain: " No  History:   Similar problems in past: No  History of abdominal surgery: No  Chronic laxative use: No  New medications: YES- Doxycycline for 2 weeks   Precipitating or alleviating factors: n/a  Therapies tried and outcome: larry seeds         Review of Systems         Objective           Vitals:  No vitals were obtained today due to virtual visit.    Physical Exam   GENERAL: Healthy, alert and no distress  EYES: Eyes grossly normal to inspection.  No discharge or erythema, or obvious scleral/conjunctival abnormalities.  RESP: No audible wheeze, cough, or visible cyanosis.  No visible retractions or increased work of breathing.    SKIN: Visible skin clear. No significant rash, abnormal pigmentation or lesions.  NEURO: Cranial nerves grossly intact.  Mentation and speech appropriate for age.  PSYCH: Mentation appears normal, affect normal/bright, judgement and insight intact, normal speech and appearance well-groomed.                Video-Visit Details    Type of service:  Video Visit   Video Start Time: 230 PM  Video End Time:245 PM    Originating Location (pt. Location): Home    Distant Location (provider location):  Off-site  Platform used for Video Visit: Ari

## 2023-12-09 NOTE — RESULT ENCOUNTER NOTE
Dear Domenico,     Here are your recent results.     Your urine sample showed a tiny amount of microscopic blood.  There is no evidence of bladder infection.  No further workup is needed, given the normal CT scan about a month ago.  Repeat urine sample in 6 to 12 months is recommended, you are fine to do this with your primary doctor.    Please let us know if you have any questions or concerns.     Kasia HARMAN

## 2023-12-11 ENCOUNTER — VIRTUAL VISIT (OUTPATIENT)
Dept: FAMILY MEDICINE | Facility: CLINIC | Age: 28
End: 2023-12-11
Payer: COMMERCIAL

## 2023-12-11 DIAGNOSIS — R10.9 BILATERAL FLANK PAIN: ICD-10-CM

## 2023-12-11 DIAGNOSIS — R10.84 ABDOMINAL PAIN, GENERALIZED: Primary | ICD-10-CM

## 2023-12-11 PROCEDURE — 99214 OFFICE O/P EST MOD 30 MIN: CPT | Mod: VID | Performed by: STUDENT IN AN ORGANIZED HEALTH CARE EDUCATION/TRAINING PROGRAM

## 2023-12-11 RX ORDER — LEVALBUTEROL TARTRATE 45 UG/1
2 AEROSOL, METERED ORAL EVERY 4 HOURS PRN
COMMUNITY
End: 2024-08-01

## 2023-12-11 NOTE — PROGRESS NOTES
"Domenico is a 28 year old who is being evaluated via a billable video visit.      How would you like to obtain your AVS? MyChart  If the video visit is dropped, the invitation should be resent by: Text to cell phone: [phone]  Will anyone else be joining your video visit? No          Assessment & Plan     (R10.84) Abdominal pain, generalized  (primary encounter diagnosis)  Comment: Chronic, uncontrolled. Pt with worsening symptoms overall. CT scan reviewed and informed to pt. Pt requesting additional imaging- will order Abd U/S. Did emphasize to pt that if ABD US is negative to continue to monitor symptoms with journaling, elimination diet, stress control. If GI referral is pushed distantly to future- pt encouraged to call insurance to see what other GI's are in network to be seen sooner   Plan: REVIEW OF HEALTH MAINTENANCE PROTOCOL ORDERS,         US Abdomen Complete, Adult GI          Referral - Consult Only            (R10.9) Bilateral flank pain  Comment: Chronic, uncontrolled. See above  Plan: US Abdomen Complete            Dictation Disclaimer: Some of this Note has been completed with voice-recognition dictation software. Although errors are generally corrected real-time, there is the potential for a rare error to be present in the completed chart.                BMI:   Estimated body mass index is 25.04 kg/m  as calculated from the following:    Height as of 10/17/23: 1.88 m (6' 2\").    Weight as of 10/17/23: 88.5 kg (195 lb).   Weight management plan: Patient was referred to their PCP to discuss a diet and exercise plan.        LETICIA OVALLE MD  [facility]    Subjective   Domenico is a 28 year old, presenting for the following health issues:  No chief complaint on file.        12/11/2023    11:13 AM   Additional Questions   Roomed by Yue CISSE     Follow up on GI issues-Discuss Gallbladder    The patient presents via virtual visit for evaluation of multiple medical " concerns.    This is the 4th or 5th visit on this issue in the past month. He is pretty concerned at this point. He has been having some really bad GI issues, burning suspicions and concerns about different things. He has never had many GI problems in his life. He gets constipated in odd ways and his stools are turning white and shon like floating. He would go a day plus without anything, which is not common for him. He has seen a couple of doctors and had a kidney and liver function test, which came back normal. His symptoms have been getting worse. He has been taking over-the-counter medications, which somewhat helps, but if he does not take it, he has horrible problems. He has low back, flank, and abdomen pain and tenderness that has developed some referring up to his shoulder. He has occasional nausea. He has a splitting headache all the time now. He has concerns about his gallbladder and pancreas, especially when he is not producing bile. He takes psyllium husk and magnesium citrate, which helps, but if he stops taking it, his symptoms return. He would like further evaluation because this is not getting better. His imaging was done before he started having these problems. He had some mild symptoms back then, but it was for a different thing. Within the last 2 weeks, his symptoms started getting worse every day. His symptoms started off with really bad low back and low flank symptoms, but now it has started to go everywhere and now his entire abdomen is very sore. He does not have the sharp pain under the rib cage on the right side. His flank, low back, and abdomen are sore and tender. It is not muscular.    Review of Systems   See HPI      Objective           Vitals:  No vitals were obtained today due to virtual visit.    Physical Exam   GENERAL: Healthy, alert and no distress  EYES: Eyes grossly normal to inspection.  No discharge or erythema, or obvious scleral/conjunctival abnormalities.  RESP: No audible  wheeze, cough, or visible cyanosis.  No visible retractions or increased work of breathing.    SKIN: Visible skin clear. No significant rash, abnormal pigmentation or lesions.  NEURO: Cranial nerves grossly intact.  Mentation and speech appropriate for age.  PSYCH: Mentation appears normal, affect normal/bright, judgement and insight intact, normal speech and appearance well-groomed.                Video-Visit Details    Type of service:  Video Visit   Video Start Time:  11:20AM  Video End Time:11:38 AM    Originating Location (pt. Location): Home    Distant Location (provider location):  Off-site  Platform used for Video Visit: Syncplicity

## 2023-12-11 NOTE — TELEPHONE ENCOUNTER
REFERRAL INFORMATION:  Referring Provider:  Ritu Rodríguez MD   Referring Clinic:  [facility]   Reason for Visit/Diagnosis: Abdominal pain, generalized      FUTURE VISIT INFORMATION:  Appointment Date: 12/28/2023  Appointment Time:      NOTES STATUS DETAILS   OFFICE NOTE from Referring Provider Internal 12/11/2023 OV with IGGY Rodríguez   OFFICE NOTE from Other Specialist Internal/CE 12/7/2023 VV with MARIA DEL ROSARIO Grimes  7/19/2022 OV with SALLY Yeh    [facility]:  4/21/2021 OV with IGGY Martin  10/8/2019 OV with IGGY Julian      HOSPITAL DISCHARGE SUMMARY/  ED VISITS Care Everywhere 11/15/2023 [facility] with VINCE Freedman   OPERATIVE REPORT N/A    MEDICATION LIST Internal         ENDOSCOPY  Received  11/19/2019 [facility]   COLONOSCOPY N/A    ERCP N/A    EUS N/A    STOOL TESTING N/A    PERTINENT LABS N/A    PATHOLOGY REPORTS (RELATED) N/A    IMAGING (CT, MRI, EGD, MRCP, Small Bowel Follow Through/SBT, MR/CT Enterography) [facility] :   11/15/2023 CT ABD PEL        Request sent to [facility] for records fax [phone]  12/13 Records have been received, Sent to [facility] to be scanned into chart.

## 2023-12-12 ENCOUNTER — ANCILLARY PROCEDURE (OUTPATIENT)
Dept: ULTRASOUND IMAGING | Facility: CLINIC | Age: 28
End: 2023-12-12
Attending: STUDENT IN AN ORGANIZED HEALTH CARE EDUCATION/TRAINING PROGRAM
Payer: COMMERCIAL

## 2023-12-12 DIAGNOSIS — R10.9 BILATERAL FLANK PAIN: ICD-10-CM

## 2023-12-12 DIAGNOSIS — R10.84 ABDOMINAL PAIN, GENERALIZED: ICD-10-CM

## 2023-12-12 PROCEDURE — 76700 US EXAM ABDOM COMPLETE: CPT | Mod: TC | Performed by: RADIOLOGY

## 2023-12-15 ENCOUNTER — VIRTUAL VISIT (OUTPATIENT)
Dept: FAMILY MEDICINE | Facility: CLINIC | Age: 28
End: 2023-12-15
Payer: COMMERCIAL

## 2023-12-15 DIAGNOSIS — J35.1 SWELLING OF TONSIL: Primary | ICD-10-CM

## 2023-12-15 DIAGNOSIS — R53.83 FATIGUE, UNSPECIFIED TYPE: ICD-10-CM

## 2023-12-15 PROCEDURE — 99215 OFFICE O/P EST HI 40 MIN: CPT | Mod: 95

## 2023-12-15 RX ORDER — MOMETASONE FUROATE 1 MG/G
CREAM TOPICAL DAILY
COMMUNITY

## 2023-12-15 RX ORDER — BUDESONIDE AND FORMOTEROL FUMARATE DIHYDRATE 80; 4.5 UG/1; UG/1
2 AEROSOL RESPIRATORY (INHALATION)
COMMUNITY
Start: 2023-12-05 | End: 2024-01-16

## 2023-12-15 RX ORDER — CALCIPOTRIENE 0.05 MG/ML
SOLUTION TOPICAL
COMMUNITY

## 2023-12-15 RX ORDER — DOXYCYCLINE 100 MG/1
100 CAPSULE ORAL 2 TIMES DAILY
COMMUNITY
Start: 2023-11-26 | End: 2024-01-16

## 2023-12-15 RX ORDER — CALCIPOTRIENE 50 UG/G
OINTMENT TOPICAL
COMMUNITY

## 2023-12-15 ASSESSMENT — ENCOUNTER SYMPTOMS
CHILLS: 1
TROUBLE SWALLOWING: 1
FLANK PAIN: 1
SLEEP DISTURBANCE: 1
FATIGUE: 1
BACK PAIN: 1

## 2023-12-15 NOTE — PROGRESS NOTES
Domenico is a 28 year old who is being evaluated via a billable video visit.      How would you like to obtain your AVS? MyChart  If the video visit is dropped, the invitation should be resent by: Text to cell phone: [phone]  Will anyone else be joining your video visit? No          Assessment & Plan     Pleasant 28-year-old male, stable at time of exam but with complaint of tonsillar swelling.    Swelling of tonsil  -Discussed starting steroids to help alleviate some of the swelling, does not tolerate that well and would prefer to avoid if possible  -Recommend a strep test to rule out strep  -Recommend seeking urgent care for a physical exam of symptoms    Fatigue, unspecified type  -Onset of about 2 weeks with reported symptoms of splenomegaly and GI changes  -Would consider infectious and/or inflammatory workup      After discussion with the patient, we determined that it would be in his best interest to be seen in person at a health care facility such as urgent care due to concern for airway compromise with tonsillar swelling.    40 minutes spent by me on the date of the encounter doing chart review, history and exam, documentation and further activities per the note    CONSULTATION/REFERRAL to urgent care to have a physical exam and workup    Nidhi Camejo, DNP, APRN, CNP  [facility]   Domenico is a 28 year old, presenting for the following health issues:  Throat Problem (History of Mey Barr. Potential reactivation. Now struggling with swollen tonsils that are making it hard to swallow and making sleep difficult due to breathing obstruction. Swollen lymphnodes)      28-year-old male who was diagnosed with EBV last year and has since had multiple other health issues developed over the past 6 months. The initial fatigue finally resolved this summer but still is dealing with some reported splenomegaly.    About 2 weeks ago he started noticing some new symptoms. Started with GI  symptoms (tan/white BMs, stool that will float, flank and back pain), brain fog, fatigue, swollen lymph nodes and most recently, swollen tonsils, so much that is impacting his swallowing and sleeping due to the breathing difficulty. This started about 5 days ago and seems to be getting worse. At one point he did consider going to the ED for evaluation. Other symptoms include body aches, chills, and subjective fevers.     Has done honey lozenges, salt gargles, and Ibuprofen which all helps to help temporarily but not significantly.    Has not been checked for strep. Currently has a prostate infection which he has been on doxycycline for the past 6 weeks and has about 10 days left.    We discussed the potential for steroids to help with the tonsil swelling but he mentioned that he has not tolerated that well given some of his other health problems.    Recently had an abdominal CT scan and ultrasound.                 Review of Systems   Constitutional:  Positive for chills and fatigue.   HENT:  Positive for trouble swallowing.    Gastrointestinal:         Tan/white BMs   Genitourinary:  Positive for flank pain.   Musculoskeletal:  Positive for back pain.   Psychiatric/Behavioral:  Positive for sleep disturbance.             Objective           Vitals:  No vitals were obtained today due to virtual visit.    Physical Exam   GENERAL: Healthy, alert and no distress  EYES: Eyes grossly normal to inspection.  No discharge or erythema, or obvious scleral/conjunctival abnormalities.  RESP: No audible wheeze, cough, or visible cyanosis.  No visible retractions or increased work of breathing.    SKIN: Visible skin clear. No significant rash, abnormal pigmentation or lesions.  NEURO: Cranial nerves grossly intact.  Mentation and speech appropriate for age.  PSYCH: Mentation appears normal, affect normal/bright, judgement and insight intact, normal speech and appearance well-groomed.                Video-Visit Details    Type of  service:  Video Visit   Video Start Time:  3:05 pm  Video End Time: 3:21    Originating Location (pt. Location): Home    Distant Location (provider location):  On-site  Platform used for Video Visit: Ari

## 2023-12-21 ENCOUNTER — PRE VISIT (OUTPATIENT)
Dept: UROLOGY | Facility: CLINIC | Age: 28
End: 2023-12-21
Payer: COMMERCIAL

## 2023-12-21 NOTE — CONFIDENTIAL NOTE
Reason for visit: TRUS     Relevant information: pelvic and perineal pain    Records/imaging/labs/orders: in epic    At Rooming: standard TRUS set-up    Chanel Diaz  12/21/2023  1:44 PM

## 2023-12-28 ENCOUNTER — PRE VISIT (OUTPATIENT)
Dept: GASTROENTEROLOGY | Facility: CLINIC | Age: 28
End: 2023-12-28

## 2023-12-28 ENCOUNTER — OFFICE VISIT (OUTPATIENT)
Dept: GASTROENTEROLOGY | Facility: CLINIC | Age: 28
End: 2023-12-28
Attending: STUDENT IN AN ORGANIZED HEALTH CARE EDUCATION/TRAINING PROGRAM
Payer: COMMERCIAL

## 2023-12-28 VITALS
HEIGHT: 74 IN | SYSTOLIC BLOOD PRESSURE: 114 MMHG | HEART RATE: 78 BPM | BODY MASS INDEX: 25.86 KG/M2 | OXYGEN SATURATION: 97 % | WEIGHT: 201.5 LBS | DIASTOLIC BLOOD PRESSURE: 77 MMHG

## 2023-12-28 DIAGNOSIS — R10.84 ABDOMINAL PAIN, GENERALIZED: ICD-10-CM

## 2023-12-28 DIAGNOSIS — Z11.4 SCREENING FOR HIV (HUMAN IMMUNODEFICIENCY VIRUS): ICD-10-CM

## 2023-12-28 DIAGNOSIS — Z11.59 NEED FOR HEPATITIS C SCREENING TEST: ICD-10-CM

## 2023-12-28 DIAGNOSIS — R19.4 ALTERED BOWEL HABITS: Primary | ICD-10-CM

## 2023-12-28 LAB
CRP SERPL-MCNC: <3 MG/L
HCV AB SERPL QL IA: NONREACTIVE
HIV 1+2 AB+HIV1 P24 AG SERPL QL IA: NONREACTIVE

## 2023-12-28 PROCEDURE — 99214 OFFICE O/P EST MOD 30 MIN: CPT | Performed by: PHYSICIAN ASSISTANT

## 2023-12-28 PROCEDURE — 36415 COLL VENOUS BLD VENIPUNCTURE: CPT | Performed by: PHYSICIAN ASSISTANT

## 2023-12-28 PROCEDURE — 86364 TISS TRNSGLTMNASE EA IG CLAS: CPT | Performed by: PHYSICIAN ASSISTANT

## 2023-12-28 PROCEDURE — 83993 ASSAY FOR CALPROTECTIN FECAL: CPT | Performed by: PHYSICIAN ASSISTANT

## 2023-12-28 PROCEDURE — 82705 FATS/LIPIDS FECES QUAL: CPT | Mod: 90 | Performed by: PHYSICIAN ASSISTANT

## 2023-12-28 PROCEDURE — 86140 C-REACTIVE PROTEIN: CPT | Performed by: PHYSICIAN ASSISTANT

## 2023-12-28 PROCEDURE — 86803 HEPATITIS C AB TEST: CPT | Performed by: PHYSICIAN ASSISTANT

## 2023-12-28 PROCEDURE — 82784 ASSAY IGA/IGD/IGG/IGM EACH: CPT | Performed by: PHYSICIAN ASSISTANT

## 2023-12-28 PROCEDURE — 82653 EL-1 FECAL QUANTITATIVE: CPT | Performed by: PHYSICIAN ASSISTANT

## 2023-12-28 PROCEDURE — 87389 HIV-1 AG W/HIV-1&-2 AB AG IA: CPT | Performed by: PHYSICIAN ASSISTANT

## 2023-12-28 PROCEDURE — 99000 SPECIMEN HANDLING OFFICE-LAB: CPT | Performed by: PHYSICIAN ASSISTANT

## 2023-12-28 RX ORDER — FLUCONAZOLE 100 MG/1
100 TABLET ORAL DAILY
COMMUNITY
Start: 2023-12-20 | End: 2024-02-07

## 2023-12-28 ASSESSMENT — PAIN SCALES - GENERAL: PAINLEVEL: MILD PAIN (3)

## 2023-12-28 NOTE — NURSING NOTE
"Chief Complaint   Patient presents with    New Patient     New consult for generalized abdominal pain, abdominal bloating, occasional nausea, occasional vomiting, abnormal bowel movements.     He requests these members of his care team be copied on today's visit information:  PCP: Chelsea Mireles    Referring Provider:  Ritu Rodríguez MD  [address] [place] [address]    Vitals:    12/28/23 1029   BP: 114/77   BP Location: Left arm   Patient Position: Sitting   Cuff Size: Adult Regular   Pulse: 78   SpO2: 97%   Weight: 91.4 kg (201 lb 8 oz)   Height: 1.88 m (6' 2\")     Body mass index is 25.87 kg/m .    Medications were reconciled.        Sandee Green CMA    "

## 2023-12-28 NOTE — PROGRESS NOTES
NEW PATIENT GI CLINIC VISIT    CC/REFERRING MD:  Ritu Rodríguez  REASON FOR CONSULTATION:   Ritu Rodríguez for   Chief Complaint   Patient presents with    New Patient     New consult for generalized abdominal pain, abdominal bloating, occasional nausea, occasional vomiting, abnormal bowel movements.       ASSESSMENT/PLAN: Patient here for evaluation of relatively sudden change to bowel movement pattern and stool caliber/color as well as some generalized abdominal and flank discomfort.  We spent some time reviewing his recent workup, including laboratory testing, CT and ultrasound of the abdomen.  He has a little tenderness in the abdomen today.  I think it would be reasonable to assess for signs of inflammation, malabsorption within the stool, will also check inflammatory markers and celiac serologies.  Reviewed some various strategies to improve stool output, including use of MiraLAX, Senokot, Dulcolax, while also continuing his current fiber regimen.  If this is ineffective, we will need to consider secretagogue therapy.  I will follow-up with patient after laboratory testing is completed.  Additional evaluation could include colonoscopy as well as referral to the pelvic floor center for anorectal manometry and defecography.  Patient stated understanding of plan and I will reach out to him after labs are in.    1. Abdominal pain, generalized  - Adult GI  Referral - Consult Only  - CRP, inflammation; Future  - Calprotectin Feces; Future  - Tissue transglutaminase dunia IgA and IgG; Future  - IgA; Future  - Fat Stool Qual Random Collection; Future  - Elastase Fecal; Future  - [facility] [employer]; BA48F; Bile Acids, Feces, 48 hours (Laboratory Miscellaneous Order); Future  - CRP, inflammation  - Calprotectin Feces  - Tissue transglutaminase dunia IgA and IgG  - IgA  - Fat Stool Qual Random Collection  - Elastase Fecal  - [facility]; BA48F; Bile Acids, Feces, 48 hours (Laboratory  Miscellaneous Order)    2. Altered bowel habits  - CRP, inflammation; Future  - Calprotectin Feces; Future  - Tissue transglutaminase dunia IgA and IgG; Future  - IgA; Future  - Fat Stool Qual Random Collection; Future  - Elastase Fecal; Future  - [facility]; BA48F; Bile Acids, Feces, 48 hours (Laboratory Miscellaneous Order); Future  - CRP, inflammation  - Calprotectin Feces  - Tissue transglutaminase dunia IgA and IgG  - IgA  - Fat Stool Qual Random Collection  - Elastase Fecal  - [facility]; BA48F; Bile Acids, Feces, 48 hours (Laboratory Miscellaneous Order)      Thank you for this consultation.  It was a pleasure to participate in the care of this patient; please contact us with any further questions.      30 minutes spent on the date of the encounter doing chart review, patient visit, and documentation    This note was created with voice recognition software, and while reviewed for accuracy, typos may remain.     Yonas Casey PA-C  Division of Gastroenterology, Hepatology and Nutrition  [facility]  Jorge Denton is a 28 year old male with a past medical history significant for ADHD, anxiety, recurrent prostatitis, asthma that is seen as a new patient in the GI clinic today for abdominal and flank pain as well as altered bowel habits.  To review, patient developed some sudden change to his stool pattern a few months ago.  He was previously going daily with formed stool and then developed some problems with constipation.  He describes having hard, small pieces of stool that he has to strain quite a bit to get out.  It was more of a tan color as well.  He had noticed a little bit of hematochezia as well initially.  He was seen in the ER in mid November, had CT scan with some stool burden in the left side of the colon, otherwise no abnormalities.  He subsequently had abdominal ultrasound earlier this month with very mild splenomegaly,  otherwise no gallstones.  He has laboratory testing notable for normal CBC, CMP, lipase.  He has not had any stool testing.  He has had some pain in the bilateral abdomen into the flanks and back.  He has been using bile salts to help improve his stool output.  He was recommended to use mag citrate, this was either not working or then causing watery stool.  Has not had any other recent medication changes.  Appetite has been fine but he has had a small amount of weight loss, maybe 6 pounds in the last few weeks.  He has had some other ongoing medical issues.  Diagnosed with prostatitis by urology, is coming up in 6 weeks and doxycycline.  Was diagnosed with thrush as well, taking oral topical antifungal.  Has described having some bloating.  Has had concerns about pelvic pain and pudendal neuralgia.  Was actually previously seen by our group, saw Dr. Li last year for some dysphagia and EGD with [facility] was planned but ultimately not pursued.  Has seen ENT for dysphonia and laryngeal problems.    No pertinent surgical history or family medical history of digestive diseases.  No tobacco, alcohol, or drug use.  Underwent EGD in 2019 without any abnormalities, no history of colonoscopy.    ROS:    10 point ROS neg other than the symptoms noted above in the HPI.    PREVIOUS ENDOSCOPY:  Reviewed, see HPI    PERTINENT RELEVANT IMAGING OR LABS:  Reviewed    ALLERGIES:     Allergies   Allergen Reactions    Dust Mites     Lactose Intolerance [Beta-Galactosidase]        PERTINENT MEDICATIONS:    Current Outpatient Medications:     budesonide-formoterol (SYMBICORT) 80-4.5 MCG/ACT Inhaler, Inhale 2 puffs into the lungs Inhale 2 Puffs two times a day. Inhale 2 Puffs two times a day., Disp: , Rfl:     calcipotriene (DOVONOX) 0.005 % external ointment, , Disp: , Rfl:     calcipotriene (DOVONOX) 0.005 % external solution, , Disp: , Rfl:     doxycycline hyclate (VIBRAMYCIN) 100 MG capsule, Take 100 mg by mouth 2 times daily, Disp: ,  Rfl:     fluconazole (DIFLUCAN) 100 MG tablet, Take 100 mg by mouth daily, Disp: , Rfl:     levalbuterol (XOPENEX HFA) 45 MCG/ACT inhaler, Inhale 2 puffs into the lungs every 4 hours as needed for shortness of breath or wheezing, Disp: , Rfl:     mometasone (ELOCON) 0.1 % external cream, Apply topically daily, Disp: , Rfl:     tadalafil (CIALIS) 5 MG tablet, tadalafil 5 mg tablet  TAKE ONE TABLET BY MOUTH AT BEDTIME, Disp: , Rfl:     PROBLEM LIST  Patient Active Problem List    Diagnosis Date Noted    Exertional dyspnea 04/14/2009     Priority: High    Pelvic pain 10/19/2023     Priority: Medium    Numbness 10/19/2023     Priority: Medium    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 SARA NGUYEN MD taking over medication prescription       ADHD (attention deficit hyperactivity disorder) 10/16/2009     Priority: Medium     Based on neuropsychological testing 7/09  Seeing therapist every two weeks-[facility]   SARA NGUYEN MD taking over prescription for concerta      Generalized anxiety disorder 11/25/2008     Priority: Medium     Diagnosis updated by automated process. Provider to review and confirm.      Atypical chest pain 11/25/2008     Priority: Medium     4.29.09 Treadmill. Mild to moderate expiratory and inspiratory air-flow obstruction, consistent with a fixed central airway obstruction.       Constipation 09/16/2008     Priority: Medium     Problem list name updated by automated process. Provider to review      Dyspnea 11/25/2008     Priority: Low     4.29.09 exercise spirometry. Mild to moderate expiratory and inspiratory air-flow obstruction, consistent with a fixed central airway obstruction.            PERTINENT PAST MEDICAL HISTORY:  Past Medical History:   Diagnosis Date    Allergic rhinitis     allergy testing positive for dustmites    Eczema     in earlier childhood    Gastroesophageal reflux disease     Uncomplicated asthma        PREVIOUS SURGERIES:  Past Surgical History:   Procedure Laterality Date    MASTECTOMY SUBCUTANEOUS MALE BILATERAL (GYNECOMASTIA)         SOCIAL HISTORY:  Social History     Socioeconomic History    Marital status: Single     Spouse name: Not on file    Number of children: Not on file    Years of education: Not on file    Highest education level: Not on file   Occupational History    Not on file   Tobacco Use    Smoking status: Never    Smokeless tobacco: Never    Tobacco comments:     no passive exposure   Vaping Use    Vaping Use: Never used   Substance and Sexual Activity    Alcohol use: No    Drug use: No    Sexual activity: Yes     Partners: Female, Male   Other Topics Concern     Service Not Asked    Blood Transfusions No    Caffeine Concern No    Occupational Exposure Not Asked    Hobby Hazards No    Sleep Concern No    Stress Concern Yes     Comment: pretty high lately    Weight Concern No    Special Diet Yes     Comment: Lactose intolerant    Back Care Yes     Comment: Chiro    Exercise Yes    Bike Helmet Yes    Seat Belt Yes    Self-Exams Not Asked   Social History Narrative    Jorge participates in martial arts and enjoys skiing.     Social Determinants of Health     Financial Resource Strain: Unknown (12/7/2023)    Financial Resource Strain     Within the past 12 months, have you or your family members you live with been unable to get utilities (heat, electricity) when it was really needed?: Patient refused   Food Insecurity: Unknown (12/7/2023)    Food Insecurity     Within the past 12 months, did you worry that your food would run out before you got money to buy more?: Patient refused     Within the past 12 months, did the food you bought just not last  and you didn t have money to get more?: Patient refused   Recent Concern: Food Insecurity - High Risk (11/14/2023)    Food Insecurity     Within the past 12 months, did you worry that your food would run out before you got money to buy more?: Yes     Within the past 12 months, did the food you bought just not last and you didn t have money to get more?: Yes   Transportation Needs: Unknown (12/7/2023)    Transportation Needs     Within the past 12 months, has lack of transportation kept you from medical appointments, getting your medicines, non-medical meetings or appointments, work, or from getting things that you need?: Patient refused   Recent Concern: Transportation Needs - High Risk (11/14/2023)    Transportation Needs     Within the past 12 months, has lack of transportation kept you from medical appointments, getting your medicines, non-medical meetings or appointments, work, or from getting things that you need?: Yes   Physical Activity: Not on file   Stress: Not on file   Social Connections: Not on file   Interpersonal Safety: Not on file   Housing Stability: Unknown (12/7/2023)    Housing Stability     Do you have housing? : Patient refused     Are you worried about losing your housing?: Patient refused       FAMILY HISTORY:  Family History   Problem Relation Age of Onset    Heart Disease Maternal Grandmother     Lipids Maternal Grandmother     Heart Disease Maternal Grandfather     Lipids Maternal Grandfather     Heart Disease Paternal Grandfather     Lipids Paternal Grandfather     Lipids Mother     Lipids Father     Lipids Paternal Grandmother     Thyroid Disease Maternal Grandmother         Thyroid nodule       Past/family/social history reviewed and no changes    PHYSICAL EXAMINATION:  Constitutional: aaox3, cooperative, pleasant, not dyspneic/diaphoretic, no acute distress  Vitals reviewed: /77 (BP Location: Left arm, Patient Position: Sitting, Cuff Size: Adult Regular)   Pulse 78   Ht 1.88 m  "(6' 2\")   Wt 91.4 kg (201 lb 8 oz)   SpO2 97%   BMI 25.87 kg/m    Wt:   Wt Readings from Last 2 Encounters:   12/28/23 91.4 kg (201 lb 8 oz)   10/17/23 88.5 kg (195 lb)      Eyes: Sclera anicteric/injected  CV: No edema  Respiratory: Unlabored breathing  Abd: Nondistended, +bs, no hepatosplenomegaly, nontender, no peritoneal signs  Skin: warm, perfused, no jaundice  Psych: Normal affect  MSK: Normal gait                    "

## 2023-12-28 NOTE — LETTER
12/28/2023         RE: Jorge Denton  [address]  [facility] [address]        Dear Colleague,    Thank you for referring your patient, Jorge Denton, to the [facility]. Please see a copy of my visit note below.    NEW PATIENT GI CLINIC VISIT    CC/REFERRING MD:  Ritu Rodríguez  REASON FOR CONSULTATION:   Ritu Rodríguez for   Chief Complaint   Patient presents with     New Patient     New consult for generalized abdominal pain, abdominal bloating, occasional nausea, occasional vomiting, abnormal bowel movements.       ASSESSMENT/PLAN: Patient here for evaluation of relatively sudden change to bowel movement pattern and stool caliber/color as well as some generalized abdominal and flank discomfort.  We spent some time reviewing his recent workup, including laboratory testing, CT and ultrasound of the abdomen.  He has a little tenderness in the abdomen today.  I think it would be reasonable to assess for signs of inflammation, malabsorption within the stool, will also check inflammatory markers and celiac serologies.  Reviewed some various strategies to improve stool output, including use of MiraLAX, Senokot, Dulcolax, while also continuing his current fiber regimen.  If this is ineffective, we will need to consider secretagogue therapy.  I will follow-up with patient after laboratory testing is completed.  Additional evaluation could include colonoscopy as well as referral to the pelvic floor center for anorectal manometry and defecography.  Patient stated understanding of plan and I will reach out to him after labs are in.    1. Abdominal pain, generalized  - Adult GI  Referral - Consult Only  - CRP, inflammation; Future  - Calprotectin Feces; Future  - Tissue transglutaminase dunia IgA and IgG; Future  - IgA; Future  - Fat Stool Qual Random Collection; Future  - Elastase Fecal; Future  - [place] [employer]; BA48F; Bile Acids, Feces, 48 hours (Laboratory  Miscellaneous Order); Future  - CRP, inflammation  - Calprotectin Feces  - Tissue transglutaminase dunia IgA and IgG  - IgA  - Fat Stool Qual Random Collection  - Elastase Fecal  - [place] [employer]; BA48F; Bile Acids, Feces, 48 hours (Laboratory Miscellaneous Order)    2. Altered bowel habits  - CRP, inflammation; Future  - Calprotectin Feces; Future  - Tissue transglutaminase dunia IgA and IgG; Future  - IgA; Future  - Fat Stool Qual Random Collection; Future  - Elastase Fecal; Future  - [employer]; BA48F; Bile Acids, Feces, 48 hours (Laboratory Miscellaneous Order); Future  - CRP, inflammation  - Calprotectin Feces  - Tissue transglutaminase dunia IgA and IgG  - IgA  - Fat Stool Qual Random Collection  - Elastase Fecal  - [place] [employer]; BA48F; Bile Acids, Feces, 48 hours (Laboratory Miscellaneous Order)      Thank you for this consultation.  It was a pleasure to participate in the care of this patient; please contact us with any further questions.      30 minutes spent on the date of the encounter doing chart review, patient visit, and documentation    This note was created with voice recognition software, and while reviewed for accuracy, typos may remain.     Yonas Casey PA-C  Division of Gastroenterology, Hepatology and Nutrition  [facility]  Jorge Denton is a 28 year old male with a past medical history significant for ADHD, anxiety, recurrent prostatitis, asthma that is seen as a new patient in the GI clinic today for abdominal and flank pain as well as altered bowel habits.  To review, patient developed some sudden change to his stool pattern a few months ago.  He was previously going daily with formed stool and then developed some problems with constipation.  He describes having hard, small pieces of stool that he has to strain quite a bit to get out.  It was more of a tan color as well.  He had noticed a little bit of  hematochezia as well initially.  He was seen in the ER in mid November, had CT scan with some stool burden in the left side of the colon, otherwise no abnormalities.  He subsequently had abdominal ultrasound earlier this month with very mild splenomegaly, otherwise no gallstones.  He has laboratory testing notable for normal CBC, CMP, lipase.  He has not had any stool testing.  He has had some pain in the bilateral abdomen into the flanks and back.  He has been using bile salts to help improve his stool output.  He was recommended to use mag citrate, this was either not working or then causing watery stool.  Has not had any other recent medication changes.  Appetite has been fine but he has had a small amount of weight loss, maybe 6 pounds in the last few weeks.  He has had some other ongoing medical issues.  Diagnosed with prostatitis by urology, is coming up in 6 weeks and doxycycline.  Was diagnosed with thrush as well, taking oral topical antifungal.  Has described having some bloating.  Has had concerns about pelvic pain and pudendal neuralgia.  Was actually previously seen by our group, saw Dr. Li last year for some dysphagia and EGD with [facility] was planned but ultimately not pursued.  Has seen ENT for dysphonia and laryngeal problems.    No pertinent surgical history or family medical history of digestive diseases.  No tobacco, alcohol, or drug use.  Underwent EGD in 2019 without any abnormalities, no history of colonoscopy.    ROS:    10 point ROS neg other than the symptoms noted above in the HPI.    PREVIOUS ENDOSCOPY:  Reviewed, see HPI    PERTINENT RELEVANT IMAGING OR LABS:  Reviewed    ALLERGIES:     Allergies   Allergen Reactions     Dust Mites      Lactose Intolerance [Beta-Galactosidase]        PERTINENT MEDICATIONS:    Current Outpatient Medications:      budesonide-formoterol (SYMBICORT) 80-4.5 MCG/ACT Inhaler, Inhale 2 puffs into the lungs Inhale 2 Puffs two times a day. Inhale 2 Puffs two times  a day., Disp: , Rfl:      calcipotriene (DOVONOX) 0.005 % external ointment, , Disp: , Rfl:      calcipotriene (DOVONOX) 0.005 % external solution, , Disp: , Rfl:      doxycycline hyclate (VIBRAMYCIN) 100 MG capsule, Take 100 mg by mouth 2 times daily, Disp: , Rfl:      fluconazole (DIFLUCAN) 100 MG tablet, Take 100 mg by mouth daily, Disp: , Rfl:      levalbuterol (XOPENEX HFA) 45 MCG/ACT inhaler, Inhale 2 puffs into the lungs every 4 hours as needed for shortness of breath or wheezing, Disp: , Rfl:      mometasone (ELOCON) 0.1 % external cream, Apply topically daily, Disp: , Rfl:      tadalafil (CIALIS) 5 MG tablet, tadalafil 5 mg tablet  TAKE ONE TABLET BY MOUTH AT BEDTIME, Disp: , Rfl:     PROBLEM LIST  Patient Active Problem List    Diagnosis Date Noted     Exertional dyspnea 04/14/2009     Priority: High     Pelvic pain 10/19/2023     Priority: Medium     Numbness 10/19/2023     Priority: Medium     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 SARA NGUYEN MD taking over medication prescription        ADHD (attention deficit hyperactivity disorder) 10/16/2009     Priority: Medium     Based on neuropsychological testing 7/09  Seeing therapist every two weeks-[facility]   SARA NGUYEN MD taking over prescription for concerta       Generalized anxiety disorder 11/25/2008     Priority: Medium     Diagnosis updated by automated process. Provider to review and confirm.       Atypical chest pain 11/25/2008     Priority: Medium     4.29.09 Treadmill. Mild to moderate expiratory and inspiratory air-flow obstruction, consistent with a fixed central airway obstruction.        Constipation 09/16/2008      Priority: Medium     Problem list name updated by automated process. Provider to review       Dyspnea 11/25/2008     Priority: Low     4.29.09 exercise spirometry. Mild to moderate expiratory and inspiratory air-flow obstruction, consistent with a fixed central airway obstruction.           PERTINENT PAST MEDICAL HISTORY:  Past Medical History:   Diagnosis Date     Allergic rhinitis     allergy testing positive for dustmites     Eczema     in earlier childhood     Gastroesophageal reflux disease      Uncomplicated asthma        PREVIOUS SURGERIES:  Past Surgical History:   Procedure Laterality Date     MASTECTOMY SUBCUTANEOUS MALE BILATERAL (GYNECOMASTIA)         SOCIAL HISTORY:  Social History     Socioeconomic History     Marital status: Single     Spouse name: Not on file     Number of children: Not on file     Years of education: Not on file     Highest education level: Not on file   Occupational History     Not on file   Tobacco Use     Smoking status: Never     Smokeless tobacco: Never     Tobacco comments:     no passive exposure   Vaping Use     Vaping Use: Never used   Substance and Sexual Activity     Alcohol use: No     Drug use: No     Sexual activity: Yes     Partners: Female, Male   Other Topics Concern      Service Not Asked     Blood Transfusions No     Caffeine Concern No     Occupational Exposure Not Asked     Hobby Hazards No     Sleep Concern No     Stress Concern Yes     Comment: pretty high lately     Weight Concern No     Special Diet Yes     Comment: Lactose intolerant     Back Care Yes     Comment: Chiro     Exercise Yes     Bike Helmet Yes     Seat Belt Yes     Self-Exams Not Asked   Social History Narrative    Jorge participates in martial arts and enjoys skiing.     Social Determinants of Health     Financial Resource Strain: Unknown (12/7/2023)    Financial Resource Strain      Within the past 12 months, have you or your family members you live with been unable to get utilities  (heat, electricity) when it was really needed?: Patient refused   Food Insecurity: Unknown (12/7/2023)    Food Insecurity      Within the past 12 months, did you worry that your food would run out before you got money to buy more?: Patient refused      Within the past 12 months, did the food you bought just not last and you didn t have money to get more?: Patient refused   Recent Concern: Food Insecurity - High Risk (11/14/2023)    Food Insecurity      Within the past 12 months, did you worry that your food would run out before you got money to buy more?: Yes      Within the past 12 months, did the food you bought just not last and you didn t have money to get more?: Yes   Transportation Needs: Unknown (12/7/2023)    Transportation Needs      Within the past 12 months, has lack of transportation kept you from medical appointments, getting your medicines, non-medical meetings or appointments, work, or from getting things that you need?: Patient refused   Recent Concern: Transportation Needs - High Risk (11/14/2023)    Transportation Needs      Within the past 12 months, has lack of transportation kept you from medical appointments, getting your medicines, non-medical meetings or appointments, work, or from getting things that you need?: Yes   Physical Activity: Not on file   Stress: Not on file   Social Connections: Not on file   Interpersonal Safety: Not on file   Housing Stability: Unknown (12/7/2023)    Housing Stability      Do you have housing? : Patient refused      Are you worried about losing your housing?: Patient refused       FAMILY HISTORY:  Family History   Problem Relation Age of Onset     Heart Disease Maternal Grandmother      Lipids Maternal Grandmother      Heart Disease Maternal Grandfather      Lipids Maternal Grandfather      Heart Disease Paternal Grandfather      Lipids Paternal Grandfather      Lipids Mother      Lipids Father      Lipids Paternal Grandmother      Thyroid Disease Maternal  "Grandmother         Thyroid nodule       Past/family/social history reviewed and no changes    PHYSICAL EXAMINATION:  Constitutional: aaox3, cooperative, pleasant, not dyspneic/diaphoretic, no acute distress  Vitals reviewed: /77 (BP Location: Left arm, Patient Position: Sitting, Cuff Size: Adult Regular)   Pulse 78   Ht 1.88 m (6' 2\")   Wt 91.4 kg (201 lb 8 oz)   SpO2 97%   BMI 25.87 kg/m    Wt:   Wt Readings from Last 2 Encounters:   12/28/23 91.4 kg (201 lb 8 oz)   10/17/23 88.5 kg (195 lb)      Eyes: Sclera anicteric/injected  CV: No edema  Respiratory: Unlabored breathing  Abd: Nondistended, +bs, no hepatosplenomegaly, nontender, no peritoneal signs  Skin: warm, perfused, no jaundice  Psych: Normal affect  MSK: Normal gait                      Again, thank you for allowing me to participate in the care of your patient.        Sincerely,        Yonas Casey PA-C  "

## 2023-12-29 LAB
CALPROTECTIN STL-MCNT: <5 MG/KG (ref 0–49.9)
ELASTASE PANC STL-MCNT: >800 UG/G
FAT STL QL: NORMAL
IGA SERPL-MCNC: 124 MG/DL (ref 84–499)
NEUTRAL FAT STL QL: NORMAL
TTG IGA SER-ACNC: 0.6 U/ML
TTG IGG SER-ACNC: 0.9 U/ML

## 2024-01-02 ENCOUNTER — TELEPHONE (OUTPATIENT)
Dept: UROLOGY | Facility: CLINIC | Age: 29
End: 2024-01-02
Payer: COMMERCIAL

## 2024-01-02 NOTE — RESULT ENCOUNTER NOTE
Donny Acuña,    Most of your recent lab tests are now available for you to review.  The tests screening for celiac disease are normal.  The inflammatory markers, both blood in stool, are normal as well.  The stool tests looking for fat malabsorption and exocrine pancreatic insufficiency are normal as well.  I am still waiting on 1 more stool test result, the bile acid level, which should be back soon.  I will be in touch when I see that result.    Please let me know if you have any questions.    Sincerely,  Yonas PARKINSON [facility], Hepatology, [facility] Nutrition

## 2024-01-02 NOTE — TELEPHONE ENCOUNTER
[facility] Call Center    Phone Message    May a detailed message be left on voicemail: yes     Reason for Call: Other: . Pt has a TRUS procedure on 1/4 and would like to know what that entails, and if any prep is needed, please call edward, thank you    Action Taken: Message routed to:  [facility] uro    Travel Screening: Not Applicable

## 2024-01-03 LAB
Lab: NORMAL
PERFORMING LABORATORY: NORMAL
SPECIMEN STATUS: NORMAL
TEST NAME: NORMAL

## 2024-01-03 PROCEDURE — 83789 MASS SPECTROMETRY QUAL/QUAN: CPT | Performed by: PHYSICIAN ASSISTANT

## 2024-01-04 ENCOUNTER — OFFICE VISIT (OUTPATIENT)
Dept: UROLOGY | Facility: CLINIC | Age: 29
End: 2024-01-04
Payer: COMMERCIAL

## 2024-01-04 VITALS — SYSTOLIC BLOOD PRESSURE: 111 MMHG | DIASTOLIC BLOOD PRESSURE: 71 MMHG | HEART RATE: 72 BPM

## 2024-01-04 DIAGNOSIS — G58.8 PUDENDAL NEURALGIA: Primary | ICD-10-CM

## 2024-01-04 PROCEDURE — 99214 OFFICE O/P EST MOD 30 MIN: CPT | Performed by: UROLOGY

## 2024-01-04 ASSESSMENT — PAIN SCALES - GENERAL: PAINLEVEL: MODERATE PAIN (5)

## 2024-01-04 NOTE — LETTER
1/4/2024       RE: Jorge Denton  [address]  [facility] [facility] [address]     Dear Colleague,    Thank you for referring your patient, Jorge Denton, to the [facility]OLOG[facility] [place] [facility]. Please see a copy of my visit note below.    We are  seeing Jorge Denton in consultation for evaluation of testis pain.    HPI:  Jorge Denton is a 28 year old male with history of pelvic pain.  This pain has been going on for three years and he reports it as groin, penile and testicular pain along with numbness of his thigh. Started after he got a job where he was sitting down for 10 hours a day, wearing tight jeans.. He has tried PFPT (with several different providers) with some benefit but still states the pain is horrible and is even worse during ejaculation. During the last year, he has had intermittent pain in his deep pelvis and was treated with two separate courses of abx for presumed prostatitis which he states did not help much with the pain. He underwent a urine analysis with post prostatic massage analysis (genetic assay of the fluid) which revealed no bacteria or organisms.. Underwent a prostate MRI which showed findings consistent with chronic prostatitis. He underwent retrograde and antegrade semen analysis which shows good volume and overall normal findings.  There is no evidence of ejaculatory duct obstruction on prostate imaging or on the semen analysis results.  No increased round cells (WBCs) seen on the semen analysis which would indicate active inflammatory/bacterial prostatitis.  Pain is localized to the right perineum, with some radiation towards the penis and testicle on that side, also perhaps ipsilateral upper inner thigh.  Pain is worse with sitting, that tends to exacerbate the pain the most.  Although sitting on the toilet seat does not exacerbate this discomfort.  This is fairly pathognomonic for pudendal neuralgia.  He  reports numbness in the area of the right testicle and genital region.  He has seen a pain clinic.  He has not seen a pudendal neuralgia specialist.  He has not attempted any local blocks or tried gabapentin.    PAST MEDICAL HX:  Past Medical History:   Diagnosis Date    Allergic rhinitis     allergy testing positive for dustmites    Eczema     in earlier childhood    Gastroesophageal reflux disease     Uncomplicated asthma        PAST SURG HX:  Past Surgical History:   Procedure Laterality Date    MASTECTOMY SUBCUTANEOUS MALE BILATERAL (GYNECOMASTIA)          FAMILY HX:  Family History   Problem Relation Age of Onset    Heart Disease Maternal Grandmother     Lipids Maternal Grandmother     Heart Disease Maternal Grandfather     Lipids Maternal Grandfather     Heart Disease Paternal Grandfather     Lipids Paternal Grandfather     Lipids Mother     Lipids Father     Lipids Paternal Grandmother     Thyroid Disease Maternal Grandmother         Thyroid nodule       SOCIAL HX:  Social History     Tobacco Use    Smoking status: Never    Smokeless tobacco: Never    Tobacco comments:     no passive exposure   Vaping Use    Vaping Use: Never used   Substance Use Topics    Alcohol use: No    Drug use: No       MEDICATIONS:  Current Outpatient Medications   Medication Sig    calcipotriene (DOVONOX) 0.005 % external ointment     calcipotriene (DOVONOX) 0.005 % external solution     fluconazole (DIFLUCAN) 100 MG tablet Take 100 mg by mouth daily    levalbuterol (XOPENEX HFA) 45 MCG/ACT inhaler Inhale 2 puffs into the lungs every 4 hours as needed for shortness of breath or wheezing    mometasone (ELOCON) 0.1 % external cream Apply topically daily    tadalafil (CIALIS) 5 MG tablet tadalafil 5 mg tablet   TAKE ONE TABLET BY MOUTH AT BEDTIME    budesonide-formoterol (SYMBICORT) 80-4.5 MCG/ACT Inhaler Inhale 2 puffs into the lungs Inhale 2 Puffs two times a day. Inhale 2 Puffs two times a day. (Patient not taking: Reported on  1/4/2024)    doxycycline hyclate (VIBRAMYCIN) 100 MG capsule Take 100 mg by mouth 2 times daily (Patient not taking: Reported on 1/4/2024)     No current facility-administered medications for this visit.       ALLERGIES:  Dust mites and Lactose intolerance [beta-galactosidase]      GENERAL PHYSICAL EXAM:   /71   Pulse 72     General: Alert, oriented, nad  Eyes: anicteric, EOMI.  Pulse: regular  Resps: normal, non-labored.  Abdomen:  nondistended.   exam deferred.     Imaging/labs:            Component      Latest Ref Rng 12/28/2023  11:16 AM   HIV Antigen Antibody Combo      Nonreactive  Nonreactive    Hepatitis C Antibody      Nonreactive  Nonreactive                        Imaging reviewed today:  Reviewed results of abdominal ultrasound 12/12/2023-no hydronephrosis bilateral, normal study.  CT abdomen pelvis 10/24/2023-normal  MR prostate 6/28/2023-26.2 cc, possible prostatitis, no abscess.  Testicle ultrasound 11/28/2022 -normal    ASSESSMENT:   R testis pain along with R inner thigh/groin numbness, R penile numbness, very consistent with pudendal neuralgia  Prior prostatic massage testing consistent with nonbacterial inflammatory prostatitis (chronic pelvic pain syndrome versus pudendal neuralgia ).  Complex regional pain syndrome is in the differential as well.  Previous anatomic workup has been negative.    Prior retrograde and antegrade semen analysis showing no retrograde semen and normal antegrade semen analysis, very low suspicion for ejaculatory duct obstruction so TRUS was not indicated to be performed today  Resolved hematospermia.  Complains also of green tinge to the semen persisting.  Does not take high-dose vitamins.      PLAN:  Neurology referral for further pudendal neuralgia workup  I gave him contact information for Dr. Nimesh Jean Jr also.  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  Symptomatic treatment recommended in the short-term, NSAIDs, PFPT.  Consider gabapentin prescription and/or  diagnostic/therapeutic nerve blocks with pain service or neurology.      Seen and Discussed with Dr Obando.  Magan Caceres MD  PGY2 Urology Resident     I saw and examined the patient with the resident today.  I agree with the note and plan of care as above.   No evidence of anything anatomical (such as tumor or kidney stone) or active infection.  Discrete unilateral perineal/genital symptoms are most consistent with pudendal neuralgia.  The mechanism of injury supports this as well.  I discussed with him that surgery for pudendal nerve release may be an option, and is generally the best chance for therapeutic cure, especially if there is nerve compression and when conservative measures have failed.  I do not see an indication for any continuing antibiotics at all.  There is no evidence of infection to treat.  Recommended observation of self-limited hematospermia, this is generally due to a fragile vein (akin to a nosebleed) and is not generally indicative of any deeper/concerning pathology.  Discussed there is not any indication for TRUS today, given normal prostate anatomy on prostate-specific MRI 6 months ago.      Neurology referral placed.  Recommended he consider seeing an interventional pain clinic such as [facility].  I gave him contact information and recommended he touch base with Dr. Ted Viera, who should know some national specialists in pudendal neuralgia.  Patient states he only has [place] insurance, but perhaps he would be a candidate for self-pay or open enrollment in the future.    I'm happy to see him back in the future as needed.    Joey Obando MD  Urology Staff           Additional Coding Information:    Problems:  4 -- one or more chronic illnesses with exacerbation or side effects    Data Reviewed  3 or more studies reviewed, as listed above     Tests ordered/pending: Neurology referral    Notes from other providers reviewed: Reviewed past urology notes from Dr. King 3/31/2023, Dr. Hampton  6/12/2023, 7/3/2023.    Level of risk:  3 -- low risk (e.g., OTC medication or observation, minor surgery without risks)    Time spent:  27 minutes spent on the date of the encounter doing chart review, history and exam, documentation and further activities per the note

## 2024-01-04 NOTE — NURSING NOTE
Chief Complaint   Patient presents with    Procedure     TRUS    Consult       Blood pressure 111/71, pulse 72. There is no height or weight on file to calculate BMI.    Patient Active Problem List   Diagnosis    Constipation    Generalized anxiety disorder    Atypical chest pain    Dyspnea    Exertional dyspnea    ADHD (attention deficit hyperactivity disorder)    Major depression in complete remission (H)    Esophageal dysphagia    Gastroesophageal reflux disease, unspecified whether esophagitis present    Pelvic pain    Numbness    Pudendal neuralgia       Allergies   Allergen Reactions    Dust Mites     Lactose Intolerance [Beta-Galactosidase]        Current Outpatient Medications   Medication Sig Dispense Refill    calcipotriene (DOVONOX) 0.005 % external ointment       calcipotriene (DOVONOX) 0.005 % external solution       fluconazole (DIFLUCAN) 100 MG tablet Take 100 mg by mouth daily      levalbuterol (XOPENEX HFA) 45 MCG/ACT inhaler Inhale 2 puffs into the lungs every 4 hours as needed for shortness of breath or wheezing      mometasone (ELOCON) 0.1 % external cream Apply topically daily      tadalafil (CIALIS) 5 MG tablet tadalafil 5 mg tablet   TAKE ONE TABLET BY MOUTH AT BEDTIME      budesonide-formoterol (SYMBICORT) 80-4.5 MCG/ACT Inhaler Inhale 2 puffs into the lungs Inhale 2 Puffs two times a day. Inhale 2 Puffs two times a day. (Patient not taking: Reported on 1/4/2024)      doxycycline hyclate (VIBRAMYCIN) 100 MG capsule Take 100 mg by mouth 2 times daily (Patient not taking: Reported on 1/4/2024)         Social History     Tobacco Use    Smoking status: Never    Smokeless tobacco: Never    Tobacco comments:     no passive exposure   Vaping Use    Vaping Use: Never used   Substance Use Topics    Alcohol use: No    Drug use: No       Kvng Garsia  1/4/2024  12:05 PM

## 2024-01-04 NOTE — TELEPHONE ENCOUNTER
Tried calling but got voicemail    Desiree Babcock, RN, BSN  Care Coordinator Urology  [facility] [place]  Urology Clinic  [phone]

## 2024-01-04 NOTE — PROGRESS NOTES
We are  seeing Jorge Denton in consultation for evaluation of testis pain.    HPI:  Jorge Denton is a 28 year old male with history of pelvic pain.  This pain has been going on for three years and he reports it as groin, penile and testicular pain along with numbness of his thigh. Started after he got a job where he was sitting down for 10 hours a day, wearing tight jeans.. He has tried PFPT (with several different providers) with some benefit but still states the pain is horrible and is even worse during ejaculation. During the last year, he has had intermittent pain in his deep pelvis and was treated with two separate courses of abx for presumed prostatitis which he states did not help much with the pain. He underwent a urine analysis with post prostatic massage analysis (genetic assay of the fluid) which revealed no bacteria or organisms.. Underwent a prostate MRI which showed findings consistent with chronic prostatitis. He underwent retrograde and antegrade semen analysis which shows good volume and overall normal findings.  There is no evidence of ejaculatory duct obstruction on prostate imaging or on the semen analysis results.  No increased round cells (WBCs) seen on the semen analysis which would indicate active inflammatory/bacterial prostatitis.  Pain is localized to the right perineum, with some radiation towards the penis and testicle on that side, also perhaps ipsilateral upper inner thigh.  Pain is worse with sitting, that tends to exacerbate the pain the most.  Although sitting on the toilet seat does not exacerbate this discomfort.  This is fairly pathognomonic for pudendal neuralgia.  He reports numbness in the area of the right testicle and genital region.  He has seen a pain clinic.  He has not seen a pudendal neuralgia specialist.  He has not attempted any local blocks or tried gabapentin.    PAST MEDICAL HX:  Past Medical History:   Diagnosis Date     Allergic rhinitis     allergy testing  positive for dustmites     Eczema     in earlier childhood     Gastroesophageal reflux disease      Uncomplicated asthma        PAST SURG HX:  Past Surgical History:   Procedure Laterality Date     MASTECTOMY SUBCUTANEOUS MALE BILATERAL (GYNECOMASTIA)          FAMILY HX:  Family History   Problem Relation Age of Onset     Heart Disease Maternal Grandmother      Lipids Maternal Grandmother      Heart Disease Maternal Grandfather      Lipids Maternal Grandfather      Heart Disease Paternal Grandfather      Lipids Paternal Grandfather      Lipids Mother      Lipids Father      Lipids Paternal Grandmother      Thyroid Disease Maternal Grandmother         Thyroid nodule       SOCIAL HX:  Social History     Tobacco Use     Smoking status: Never     Smokeless tobacco: Never     Tobacco comments:     no passive exposure   Vaping Use     Vaping Use: Never used   Substance Use Topics     Alcohol use: No     Drug use: No       MEDICATIONS:  Current Outpatient Medications   Medication Sig     calcipotriene (DOVONOX) 0.005 % external ointment      calcipotriene (DOVONOX) 0.005 % external solution      fluconazole (DIFLUCAN) 100 MG tablet Take 100 mg by mouth daily     levalbuterol (XOPENEX HFA) 45 MCG/ACT inhaler Inhale 2 puffs into the lungs every 4 hours as needed for shortness of breath or wheezing     mometasone (ELOCON) 0.1 % external cream Apply topically daily     tadalafil (CIALIS) 5 MG tablet tadalafil 5 mg tablet   TAKE ONE TABLET BY MOUTH AT BEDTIME     budesonide-formoterol (SYMBICORT) 80-4.5 MCG/ACT Inhaler Inhale 2 puffs into the lungs Inhale 2 Puffs two times a day. Inhale 2 Puffs two times a day. (Patient not taking: Reported on 1/4/2024)     doxycycline hyclate (VIBRAMYCIN) 100 MG capsule Take 100 mg by mouth 2 times daily (Patient not taking: Reported on 1/4/2024)     No current facility-administered medications for this visit.       ALLERGIES:  Dust mites and Lactose intolerance  [beta-galactosidase]      GENERAL PHYSICAL EXAM:   /71   Pulse 72     General: Alert, oriented, nad  Eyes: anicteric, EOMI.  Pulse: regular  Resps: normal, non-labored.  Abdomen:  nondistended.   exam deferred.     Imaging/labs:            Component      Latest Ref Rng 12/28/2023  11:16 AM   HIV Antigen Antibody Combo      Nonreactive  Nonreactive    Hepatitis C Antibody      Nonreactive  Nonreactive                        Imaging reviewed today:  Reviewed results of abdominal ultrasound 12/12/2023-no hydronephrosis bilateral, normal study.  CT abdomen pelvis 10/24/2023-normal  MR prostate 6/28/2023-26.2 cc, possible prostatitis, no abscess.  Testicle ultrasound 11/28/2022 -normal    ASSESSMENT:     R testis pain along with R inner thigh/groin numbness, R penile numbness, very consistent with pudendal neuralgia    Prior prostatic massage testing consistent with nonbacterial inflammatory prostatitis (chronic pelvic pain syndrome versus pudendal neuralgia ).  Complex regional pain syndrome is in the differential as well.  Previous anatomic workup has been negative.      Prior retrograde and antegrade semen analysis showing no retrograde semen and normal antegrade semen analysis, very low suspicion for ejaculatory duct obstruction so TRUS was not indicated to be performed today    Resolved hematospermia.  Complains also of green tinge to the semen persisting.  Does not take high-dose vitamins.      PLAN:    Neurology referral for further pudendal neuralgia workup    I gave him contact information for Dr. Nimesh Jean Jr also.  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    Symptomatic treatment recommended in the short-term, NSAIDs, PFPT.  Consider gabapentin prescription and/or diagnostic/therapeutic nerve blocks with pain service or neurology.      Seen and Discussed with Dr Obando.  Magan Caceres MD  PGY2 Urology Resident     I saw and examined the patient with the resident today.  I agree with the note and plan of  care as above.   No evidence of anything anatomical (such as tumor or kidney stone) or active infection.  Discrete unilateral perineal/genital symptoms are most consistent with pudendal neuralgia.  The mechanism of injury supports this as well.  I discussed with him that surgery for pudendal nerve release may be an option, and is generally the best chance for therapeutic cure, especially if there is nerve compression and when conservative measures have failed.  I do not see an indication for any continuing antibiotics at all.  There is no evidence of infection to treat.  Recommended observation of self-limited hematospermia, this is generally due to a fragile vein (akin to a nosebleed) and is not generally indicative of any deeper/concerning pathology.  Discussed there is not any indication for TRUS today, given normal prostate anatomy on prostate-specific MRI 6 months ago.      Neurology referral placed.  Recommended he consider seeing an interventional pain clinic such as [facility].  I gave him contact information and recommended he touch base with Dr. Ted Viera, who should know some national specialists in pudendal neuralgia.  Patient states he only has [place] insurance, but perhaps he would be a candidate for self-pay or open enrollment in the future.    I'm happy to see him back in the future as needed.    Joey Obando MD  Urology Staff           Additional Coding Information:    Problems:  4 -- one or more chronic illnesses with exacerbation or side effects    Data Reviewed  3 or more studies reviewed, as listed above     Tests ordered/pending: Neurology referral    Notes from other providers reviewed: Reviewed past urology notes from Dr. King 3/31/2023, Dr. Hampton 6/12/2023, 7/3/2023.    Level of risk:  3 -- low risk (e.g., OTC medication or observation, minor surgery without risks)    Time spent:  27 minutes spent on the date of the encounter doing chart review, history and exam, documentation and  further activities per the note

## 2024-01-04 NOTE — NURSING NOTE
Chief Complaint   Patient presents with    Consult       Blood pressure 111/71, pulse 72. There is no height or weight on file to calculate BMI.    Patient Active Problem List   Diagnosis    Constipation    Generalized anxiety disorder    Atypical chest pain    Dyspnea    Exertional dyspnea    ADHD (attention deficit hyperactivity disorder)    Major depression in complete remission (H)    Esophageal dysphagia    Gastroesophageal reflux disease, unspecified whether esophagitis present    Pelvic pain    Numbness    Pudendal neuralgia       Allergies   Allergen Reactions    Dust Mites     Lactose Intolerance [Beta-Galactosidase]        Current Outpatient Medications   Medication Sig Dispense Refill    calcipotriene (DOVONOX) 0.005 % external ointment       calcipotriene (DOVONOX) 0.005 % external solution       fluconazole (DIFLUCAN) 100 MG tablet Take 100 mg by mouth daily      levalbuterol (XOPENEX HFA) 45 MCG/ACT inhaler Inhale 2 puffs into the lungs every 4 hours as needed for shortness of breath or wheezing      mometasone (ELOCON) 0.1 % external cream Apply topically daily      tadalafil (CIALIS) 5 MG tablet tadalafil 5 mg tablet   TAKE ONE TABLET BY MOUTH AT BEDTIME      budesonide-formoterol (SYMBICORT) 80-4.5 MCG/ACT Inhaler Inhale 2 puffs into the lungs Inhale 2 Puffs two times a day. Inhale 2 Puffs two times a day. (Patient not taking: Reported on 1/4/2024)      doxycycline hyclate (VIBRAMYCIN) 100 MG capsule Take 100 mg by mouth 2 times daily (Patient not taking: Reported on 1/4/2024)         Social History     Tobacco Use    Smoking status: Never    Smokeless tobacco: Never    Tobacco comments:     no passive exposure   Vaping Use    Vaping Use: Never used   Substance Use Topics    Alcohol use: No    Drug use: No       Kvng Garsia  1/4/2024  12:09 PM

## 2024-01-05 ENCOUNTER — VIRTUAL VISIT (OUTPATIENT)
Dept: OTOLARYNGOLOGY | Facility: CLINIC | Age: 29
End: 2024-01-05
Payer: COMMERCIAL

## 2024-01-05 DIAGNOSIS — R49.0 DYSPHONIA: Primary | ICD-10-CM

## 2024-01-05 DIAGNOSIS — J38.7 LARYNGEAL HYPERFUNCTION: ICD-10-CM

## 2024-01-05 DIAGNOSIS — R07.0 THROAT PAIN: ICD-10-CM

## 2024-01-05 PROCEDURE — 92507 TX SP LANG VOICE COMM INDIV: CPT | Mod: GN | Performed by: SPEECH-LANGUAGE PATHOLOGIST

## 2024-01-05 NOTE — PROGRESS NOTES
Domenico Denton is a 28 year old male who is being evaluated via a billable video visit.      Domenico has been notified and verbally consented to the following:     This video visit will be conducted between you and your provider.    Patient has opted to conduct today's video visit vs an in-person appointment.     Video visits are billed at different rates depending on your insurance coverage. Please reach out to your insurance provider with any questions.     If during the course of the call the provider feels the appointment is not appropriate, you will not be charged for this service.  Provider has received verbal consent for billable virtual visit from the patient? Yes  Will anyone else be joining your video visit? No    Call initiated at: 2:30 PM   Type of Visit Platform Used: [employer] Video  Location of provider: Home  Location of patient: [facility]  Lencho Loomis Jr., M.D., F.A.C.S.  Mariposa Monahan M.D., M.P.H.  Senait Flor M.D.  Kristen Pardo M.M. (voice), M.A., CCC-SLP  Rama Warner M.S., CCC-SLP  Stephie Bedolla, Ph.D., CCC-SLP  Cecilio Tee, Ph.D., CCC-SLP  Berta Jain, M.S., CCC-SLP  Seymour Barfield M.M., M.A., CCC-SLP  NEELA Javed. (voice), M.S., CCC-SLP    [facility]  VOICE/SPEECH/BREATHING THERAPY PROGRESS REPORT    Patient: Jorge Denton  Date of Service: 1/5/2024    Date of Last Service: 10/12/23  Referring physician: Dr. Flor  Initial evaluation: 10/12/23  Therapy Session #1     I had the pleasure of seeing Mr. Denton today, for speech therapy to address a diagnosis of:  Dysphonia (R49.0)   Throat Pain (R07.0)  Laryngeal Hyperfunction (J38.7).    PROGRESS SINCE LAST SESSION  Mr. Denton was seen for evaluation on 10/12/23.  At that time, it was determined that he would benefit from a course of speech therapy to address the above diagnosis.  No therapeutic suggestions were made at the time.    Regarding practice, Mr. Denton reports the following:     No practice;  "no recommendations made for practice    Mr. Denton also states that:    His muscle tension and pain issues did resolve for several years (2021 to May 2023)  o The dysphonia did remain, as it had for 10 years    His baseline vulnerability became much worse this past May with greatly increased pain and dysphonia, especially complete loss of upper register; also felt dry, as well as raspy voice  o Dx'd with hemorrhage by Chon Hemme  o No therapy    His voice is still a mess;\" there is constant pain, he loses his voice very easily, and can't use his upper register  o This feels very different from the previous pain  o His voice cuts in and out during any given sentence; this worsens as he uses his voice, but is \"ever present\"  - This worsened in May, but has been present for many years  - Today is his best voice for weeks    For the last two months he has been dealing with a esophagela candida infection, for which he is trying to get adequate medical treatment  o He has done several courses of nystatin, which resolves the infection but then it comes back  o The symptoms for this seemed different than the voice, and came on probably with a course of steroids, along with antibiotics, in November    He is otherwise very healthy     Mr. Denton presents today with the following:  Voice quality:    Very frequent glottal xie    Inconsistent airflow    Otherwise WNL    Pitch is WNL and appropriate, except when he falls into glottal xie and pitch cannot be discerned    No apparent attempt to use any technique for airflow, pitch, or resonance  Cough/ Throat clear:    none    THERAPEUTIC ACTIVITIES  Today Mr. Denton participated in the following therapeutic activities:    In response to his questions, I provided explanations the nature of his mucosal and muscular pain  o We did some troubleshooting to determine the factors that may be affecting him, and also how to identify the source of his discomfort and his dysphonia  - It is " likely that they are separate factors  - It is difficult to parse out all symptoms, as some are very long standing, some started last May, and some started in November, but all are interrelated  o I provided more information to help him understand the disorder better    Developed a plan to move ahead with therapy  o Speech therapy should concentrate on exercises in singing mode, as he has not tried this previously, and it may make the issues more apparent to him  o He should undergo a course of psychotherapy that includes hypnotherapy (he likes this idea; I provided contact information for a therapist)  o I will facilitate appointments with Dr. Li in GI, and Dr. Ron in ENT, to help him manage the apparent esophageal candida    IMPRESSIONS/GOALS/PLAN  Mr. Denton had a productive session of speech therapy today, to address the following:  Dysphonia (R49.0)   Throat Pain (R07.0)   Laryngeal Hyperfunction (J38.7)  Speech therapy for him is medically necessary to allow  him to meet personal and professional demands and fully engage in activities of daily living.    Goals for this practice period:     Reduce vocal hyperfunction using previous techniques    Pursue other therapies    Plan: I will see Mr. Denton in a month to work on education, modification, and carryover of therapeutic activities to more complex activities.    TOTAL SERVICE TIME: 59 minutes  TREATMENT (30423)      Stephie Bedolla, Ph.D., [facility]-SLP  Speech-Language Pathologist  Director, [facility]  She/her/hers  [phone]

## 2024-01-05 NOTE — LETTER
1/5/2024       RE: Jorge Denton  [address]  [facility] [address]     Dear Colleague,    Thank you for referring your patient, Jorge Denton, to the [facility] [place] [facility]. Please see a copy of my visit note below.    Domenico Denton is a 28 year old male who is being evaluated via a billable video visit.      Domenico has been notified and verbally consented to the following:   This video visit will be conducted between you and your provider.  Patient has opted to conduct today's video visit vs an in-person appointment.   Video visits are billed at different rates depending on your insurance coverage. Please reach out to your insurance provider with any questions.   If during the course of the call the provider feels the appointment is not appropriate, you will not be charged for this service.  Provider has received verbal consent for billable virtual visit from the patient? Yes  Will anyone else be joining your video visit? No    Call initiated at: 2:30 PM   Type of Visit Platform Used: [employer]ement[employer] Video  Location of provider: Home  Location of patient: [facility] VOICE [facility]  Lencho Loomis Jr., M.D., F.A.C.S.  Mariposa Monahan M.D., M.P.H.  Senait Flor M.D.  Kristen Pardo M.M. (voice), M.A., CCC-SLP  Rama Warner M.S., CCC-SLP  Stephie Bedolla, Ph.D., CCC-SLP  Cecilio Tee, Ph.D., CCC-SLP  Berta Jain M.S., CCC-SLP  Seymour Barfield M.M., M.A., CCC-SLP  ELAINE Javed (voice), M.S., CCC-SLP    [facility]  VOICE/SPEECH/BREATHING THERAPY PROGRESS REPORT    Patient: Jorge Denton  Date of Service: 1/5/2024    Date of Last Service: 10/12/23  Referring physician: Dr. Flor  Initial evaluation: 10/12/23  Therapy Session #1     I had the pleasure of seeing Mr. Denton today, for speech therapy to address a diagnosis of:  Dysphonia (R49.0)   Throat Pain (R07.0)  Laryngeal Hyperfunction (J38.7).    PROGRESS SINCE LAST  "SESSION  Mr. Denton was seen for evaluation on 10/12/23.  At that time, it was determined that he would benefit from a course of speech therapy to address the above diagnosis.  No therapeutic suggestions were made at the time.    Regarding practice, Mr. Denton reports the following:   No practice; no recommendations made for practice    Mr. Denton also states that:  His muscle tension and pain issues did resolve for several years (2021 to May 2023)  The dysphonia did remain, as it had for 10 years  His baseline vulnerability became much worse this past May with greatly increased pain and dysphonia, especially complete loss of upper register; also felt dry, as well as raspy voice  Dx'd with hemorrhage by Chon Hicks  No therapy  His voice is still a mess;\" there is constant pain, he loses his voice very easily, and can't use his upper register  This feels very different from the previous pain  His voice cuts in and out during any given sentence; this worsens as he uses his voice, but is \"ever present\"  This worsened in May, but has been present for many years  Today is his best voice for weeks  For the last two months he has been dealing with a esophagela candida infection, for which he is trying to get adequate medical treatment  He has done several courses of nystatin, which resolves the infection but then it comes back  The symptoms for this seemed different than the voice, and came on probably with a course of steroids, along with antibiotics, in November  He is otherwise very healthy     Mr. Denton presents today with the following:  Voice quality:  Very frequent glottal xie  Inconsistent airflow  Otherwise WNL  Pitch is WNL and appropriate, except when he falls into glottal xie and pitch cannot be discerned  No apparent attempt to use any technique for airflow, pitch, or resonance  Cough/ Throat clear:  none    THERAPEUTIC ACTIVITIES  Today Mr. Denton participated in the following therapeutic activities:  In response " to his questions, I provided explanations the nature of his mucosal and muscular pain  We did some troubleshooting to determine the factors that may be affecting him, and also how to identify the source of his discomfort and his dysphonia  It is likely that they are separate factors  It is difficult to parse out all symptoms, as some are very long standing, some started last May, and some started in November, but all are interrelated  I provided more information to help him understand the disorder better  Developed a plan to move ahead with therapy  Speech therapy should concentrate on exercises in singing mode, as he has not tried this previously, and it may make the issues more apparent to him  He should undergo a course of psychotherapy that includes hypnotherapy (he likes this idea; I provided contact information for a therapist)  I will facilitate appointments with Dr. Li in GI, and Dr. Ron in ENT, to help him manage the apparent esophageal candida    IMPRESSIONS/GOALS/PLAN  Mr. Denton had a productive session of speech therapy today, to address the following:  Dysphonia (R49.0)   Throat Pain (R07.0)   Laryngeal Hyperfunction (J38.7)  Speech therapy for him is medically necessary to allow  him to meet personal and professional demands and fully engage in activities of daily living.    Goals for this practice period:   Reduce vocal hyperfunction using previous techniques  Pursue other therapies    Plan: I will see Mr. Denton in a month to work on education, modification, and carryover of therapeutic activities to more complex activities.    TOTAL SERVICE TIME: 59 minutes  TREATMENT (40146)      Stephie Bedolla, Ph.D., [facility]-SLP  Speech-Language Pathologist  Director, [facility]  She/her/hers  [phone]

## 2024-01-11 LAB — MAYO MISC RESULT: NORMAL

## 2024-01-11 NOTE — RESULT ENCOUNTER NOTE
Gwendolyn Acuña,    The last stool test is in.  There does not appear to be any significant amount of bile acids in your stool sample, which is normal.    So, altogether, I do not see any specific abnormalities within your lab tests.  How have you been feeling?  If you are continuing to have recurrently bothersome symptoms, I think proceeding with colonoscopy would be reasonable next step.  If you are okay with pursuing this, I can order it.    Please let me know if you have any questions.    Sincerely,  Yonas PARKINSON [facility] GI, Hepatology, and Nutrition

## 2024-01-12 NOTE — PATIENT INSTRUCTIONS
Donny Acuña,  At long last, here is the contact information for the psychologist who was so helpful to at least one other patient (maybe more, can't remember).    Alejandro Wyatt, Ph.D.   Clinical Health Psychologist   Diplomate, American Board of Professional Psychology   [address]   [place] [address]   Phone: [phone]   Website: [email or web address]     I have also asked Mary (Dr. Flor's nurse) to put in orders for GI, and ENT Dr. Ron.  Someone will be calling you to schedule more speech therapy sessions.  Sorry for the long delay.  SAMANTHA Bedolla

## 2024-01-16 ENCOUNTER — OFFICE VISIT (OUTPATIENT)
Dept: FAMILY MEDICINE | Facility: CLINIC | Age: 29
End: 2024-01-16
Payer: COMMERCIAL

## 2024-01-16 VITALS
WEIGHT: 199.6 LBS | RESPIRATION RATE: 18 BRPM | BODY MASS INDEX: 25.62 KG/M2 | SYSTOLIC BLOOD PRESSURE: 122 MMHG | OXYGEN SATURATION: 97 % | TEMPERATURE: 97.3 F | DIASTOLIC BLOOD PRESSURE: 73 MMHG | HEIGHT: 74 IN | HEART RATE: 85 BPM

## 2024-01-16 DIAGNOSIS — B37.0 THRUSH: Primary | ICD-10-CM

## 2024-01-16 DIAGNOSIS — R07.0 THROAT PAIN: ICD-10-CM

## 2024-01-16 DIAGNOSIS — H10.33 ACUTE CONJUNCTIVITIS OF BOTH EYES, UNSPECIFIED ACUTE CONJUNCTIVITIS TYPE: ICD-10-CM

## 2024-01-16 PROCEDURE — 87102 FUNGUS ISOLATION CULTURE: CPT | Performed by: PHYSICIAN ASSISTANT

## 2024-01-16 PROCEDURE — 99214 OFFICE O/P EST MOD 30 MIN: CPT | Performed by: PHYSICIAN ASSISTANT

## 2024-01-16 RX ORDER — NYSTATIN 100000/ML
500000 SUSPENSION, ORAL (FINAL DOSE FORM) ORAL 4 TIMES DAILY
Qty: 280 ML | Refills: 0 | Status: SHIPPED | OUTPATIENT
Start: 2024-01-16 | End: 2024-01-30

## 2024-01-16 RX ORDER — FLUTICASONE PROPIONATE 110 UG/1
AEROSOL, METERED RESPIRATORY (INHALATION)
COMMUNITY

## 2024-01-16 ASSESSMENT — PAIN SCALES - GENERAL: PAINLEVEL: NO PAIN (0)

## 2024-01-16 NOTE — PATIENT INSTRUCTIONS
Use Nystatin 4 times a day to try and help with symptoms while we await results.     You will receive a Datoramat message when results are back and treatment if indicated.    Try Pataday eye drops.

## 2024-01-16 NOTE — COMMUNITY RESOURCES LIST (ENGLISH)
01/16/2024   [facility]  N/A  For questions about this resource list or additional care needs, please contact your primary care clinic or care manager.  Phone: [phone]   Email: N/A   Address: [address] [place] [id number]   Hours: N/A        Food and Nutrition       Food pantry  1  [facility] Distance: 2.93 miles      [place] [id number] [facility] [address] [place] [address]  Language: English, Lithuanian, Latvian, Algerian  Hours: Mon 3:00 PM - 6:00 PM , Wed 9:00 AM - 12:00 PM , Fri 9:00 AM - 12:00 PM  Fees: Free   Phone: (855) 199-9527 Email: info@iocp.org Website: http://www.Mouth Party.org     2  [facility] Food Shelf Distance: 6.37 miles      [facility]   [address] [place] [address]  Language: English  Hours: Thu 2:00 PM - 6:00 PM , Thu 3:00 PM - 6:00 PM  Fees: Free   Phone: (770) 138-3882 Email: foodshelf@[facility].org Website: https://Alexis BittarMiriam HospitalNetWitness.org/serve/food-shelf/     SNAP application assistance  3  [facility] Distance: 7.46 miles      Phone/Virtual   [address] [place] [address]  Language: English, Algerian  Hours: Mon 9:00 AM - 1:00 PM , Tue - Thu 9:00 AM - 4:00 PM , Fri 9:00 AM - 1:00 PM  Fees: Free   Phone: (512) 151-7923 Email: info@[employer].org Website: http://www.TV4 EntertainmentarProspectWise.org/     4  [facility] Distance: 9.35 miles      In-Person, Phone/Virtual   [address] [place] [address]  Language: American Sign Language, English  Hours: Mon - Fri 8:00 AM - 4:00 PM  Fees: Free   Phone: (130) 322-8104 Email: noman@[employer]. Website: https://www.[employer]./residents#human-services     Soup kitchen or free meals  5  [facility] Distance: 2.01 miles      [facility]   [address] [place] [address]  Language: English, Algerian  Hours: Sat 5:30 PM - 6:30 PM  Fees: Free   Phone:  (397) 556-5797 Email: info@[facility].org Website: https://www.[facility].org/     6  [facility] Community Meal Distance: 4.24 miles      [facility]   [address] [place] [address]  Language: English  Hours: Mon 5:30 PM - 6:30 PM  Fees: Free   Phone: (175) 720-4531 Email: contact@[employer] Website: https://www.80[employer]/          Hotlines and Helplines       Hotline - Housing crisis  7  [facility] Distance: 12.35 miles      Phone/Virtual   [address] [place] [address]  Language: English  Hours: Mon - Sun Open 24 Hours   Phone: (166) 365-8713 Email: info@[employer]Share.[employer] Website: http://www.[employer] Cavitation     8  [facility] Distance: 13.02 miles      Phone/Virtual   [address] [place] [address]  Language: English  Hours: Mon - Sun Open 24 Hours  Fees: Free   Phone: (581) 173-8140 Email: info@saintolaf.org Website: http://www.saintolaf.org/          Housing       Coordinated Entry access point  9  [facility] ([facility]) Distance: 12.21 miles      In-Person, Phone/Virtual   [address] [place] [address]  Language: English, English  Hours: Mon - Fri 10:00 AM - 5:30 PM , Mon - Sun 7:30 PM - 10:20 PM , Sat - Sun 1:00 PM - 5:30 PM  Fees: Free   Phone: (323) 638-8922 Email: info@[employer] Website: https://www.Parkmobile.org/our-programs/adult-shelter-connect-[facility]/     10  [facility] - Adult alf Connect [facility] Distance: 13.02 miles      In-Person   [address] [place] [address]  Language: English  Hours: Mon - Sat 10:00 PM - 5:00 PM  Fees: Free   Phone: (706) 751-9343 Email: info@saintolaf.[employer] Website: http://www.saintolaf.org/     Drop-in center or day shelter  11  [facility] Distance: 12.25 miles      In-Person   [address] [place] [address]  Language: English, Hmong, Kuwaiti, English  Hours:  Mon - Thu 8:30 AM - 4:30 PM , Sat - Sun 9:00 AM - 12:00 PM  Fees: Free   Phone: (803) 862-9115 Email: info@[employer] Website: https://Redgage.org/     12  [facility] Distance: 13.36 miles      In-Person   [address] [place] [address]  Language: English  Hours: Mon - Fri 12:00 PM - 3:00 PM  Fees: Free   Phone: (877) 695-1163 Email: Hotelzilla@[employer] Website: http://Hotelzilla.[employer]/     Housing search assistance  13  [facility] Distance: 7.14 miles      In-Person   [address] [place] [address]  Language: English  Hours: Mon - Fri 8:00 AM - 4:00 PM  Fees: Free   Phone: (307) 550-1449 Email: info@Sovi Website: https://Sovi/     14  [facility] Distance: 9.74 miles      In-Person   [address] [place] [address]  Language: English  Hours: Mon - Fri 8:00 AM - 4:30 PM  Fees: Insurance, Self Pay   Phone: (117) 758-5524 Email: jimmy@[employer] Website: https://account.makexyz.[employer]/locations/1892     Shelter for individuals  15  [facility] Distance: 12.22 miles      In-Person   [address] [place] [address]  Language: English  Hours: Mon - Sun 5:00 PM - 10:00 AM  Fees: Free, Self Pay   Phone: (365) 555-3461 Email: info@Oodrive.[employer] Website: https://www.Oodrive.org/locations/higher-ground-shelter/     16  [facility] Distance: 12.49 miles      In-Person   [address] [place] [address]  Language: English  Hours: Mon - Fri 4:00 PM - 9:00 AM  Fees: Free   Phone: (769) 697-9914 Email: remigio@[facility].salvationarmy.org Website: https://[facility].salvationarmy.org/northern/[facility]/          Important Numbers & Websites       Emergency  Services   911  [facility]   311  Poison Control   (198) 965-1175  Suicide Prevention Lifeline   (154) 329-6383 (TALK)  Child Abuse Hotline   (949) 367-9375 (4-A-Child)  Sexual Assault Hotline   (910) 809-5965 (HOPE)  National Runaway Safeline   (915) 198-3778 (RUNAWAY)  All-Options Talkline   (601) 979-4280  Substance Abuse Referral   (341) 788-7142 (HELP)

## 2024-01-16 NOTE — PROGRESS NOTES
Assessment & Plan     Thrush  Question if this is truly thrush or he has had  recurrent infection with another baseline issue.  Symptoms being sore throat orientated and swallowing question eosinophilic esophagitis.  Also having eye symptoms and all related to new living environment potentially.  Will send for yeast culture and update patient on MyChart based on results and if additional treatment is needed.  Will cover with nystatin in the meantime as he has had some relief from this in the past.  - Yeast culture  - nystatin (MYCOSTATIN) [id number] UNIT/ML suspension; Take 5 mLs (500,000 Units) by mouth 4 times daily for 14 days    Throat pain  Encourage patient to keep his ENT follow-up, would be interested to see if abnormalities noted on direct visualization as exam today not overly remarkable and his history has been complex recently.  - Yeast culture    Acute conjunctivitis of both eyes, unspecified acute conjunctivitis type  Recommended trying Pat[place]win, suspect allergic component.    Review of prior external note(s) from - CareEverywhere information from [facility]  and [place] reviewed  Review of the result(s) of each unique test - HIV screen, stool tests, mono screen, CBC, strep  Ordering of each unique test  Prescription drug management             Carline Senior PA-C  [facility]    Riya Acuña is a 28 year old, presenting for the following health issues:  Mouth/Lip Problem      1/16/2024    12:43 PM   Additional Questions   Roomed by Elli   Accompanied by self       Mouth/Lip Problem    History of Present Illness       Reason for visit:  Severe candida in mouth,throat,nails thatvhas persisted despite treatment.want testing    He eats 2-3 servings of fruits and vegetables daily.He consumes 0 sweetened beverage(s) daily.He exercises with enough effort to increase his heart rate 30 to 60 minutes per day.  He exercises with enough effort to increase his heart rate 4 days  per week.   He is taking medications regularly.       Patient presents today with concerns of recurrent thrush.  States that he has had thrush in the past intermittently as he has used steroid inhalers for some time.  He always rinses his mouth out.  First felt thrush coming on the end of November.  Treated with nystatin and felt like it was helpful however was out of town and did not complete the course.  He had several visits related to throat.  Having more sore throat, feeling of swelling, difficulty swallowing which she does have some at baseline.  He was given dexamethasone for sore throat but did not take it.  He also had a visit and was given prednisone but states he did not take this either as he did not feel like it would be helpful.  He was on extended antibiotics during this time for prostatitis.  He then was seen again for concern for thrush and given a 2-week course of fluconazole.  Initially felt like it was helpful but was also out of town during that time and by the end of the course symptoms have returned.  He was having some nail changes that did improve with fluconazole.    He is frustrated by the recurrent symptoms and would like to be formally tested for Candida.  He does have an appointment with ENT next week related to this.    He wonders if his living environment is contributing.  Looked into a new place similar timeframe to when symptoms started.  Has some mold exposure.  Has also had intermittent eye redness, irritation, crusting during this time.  Uses over-the-counter lubricating drops, unsure if they have been overly helpful.  Also had some GI changes and has seen specialty for this, workup unremarkable thus far.  No endoscopy or direct visualization of his throat yet.    He has been using various over-the-counter mouthwashes, holistic treatments and tongue scraping.  States he will get a thick film on his tongue regularly          Review of Systems         Objective    /73 (BP  "Location: Left arm, Patient Position: Sitting, Cuff Size: Adult Regular)   Pulse 85   Temp 97.3  F (36.3  C) (Tympanic)   Resp 18   Ht 1.88 m (6' 2.02\")   Wt 90.5 kg (199 lb 9.6 oz)   SpO2 97%   BMI 25.62 kg/m    Body mass index is 25.62 kg/m .  Physical Exam   GENERAL: healthy, alert and no distress  EYES: PERRL.  EOMI.  Mild conjunctival injection bilaterally.  HENT: oropharynx clear and oral mucous membranes moist  NECK: no adenopathy, no asymmetry, masses, or scars and thyroid normal to palpation  RESP: lungs clear to auscultation - no rales, rhonchi or wheezes  CV: regular rate and rhythm, normal S1 S2, no S3 or S4, no murmur  SKIN: Scattered areas of plaque-like rash, patient notes psoriasis history.  Nails unremarkable.                      "

## 2024-01-17 ENCOUNTER — TELEPHONE (OUTPATIENT)
Dept: OTOLARYNGOLOGY | Facility: CLINIC | Age: 29
End: 2024-01-17
Payer: COMMERCIAL

## 2024-01-17 NOTE — TELEPHONE ENCOUNTER
OBEDM to schedule this patient on 1/23 at 2:00 PM with Dr. Flor - referred by DD      Gave direct number

## 2024-01-20 LAB — BACTERIA SPEC CULT: NO GROWTH

## 2024-01-23 ENCOUNTER — MYC MEDICAL ADVICE (OUTPATIENT)
Dept: GASTROENTEROLOGY | Facility: CLINIC | Age: 29
End: 2024-01-23
Payer: COMMERCIAL

## 2024-01-23 DIAGNOSIS — R19.7 DIARRHEA, UNSPECIFIED TYPE: Primary | ICD-10-CM

## 2024-01-23 DIAGNOSIS — R10.84 ABDOMINAL PAIN, GENERALIZED: ICD-10-CM

## 2024-01-29 ENCOUNTER — OFFICE VISIT (OUTPATIENT)
Dept: FAMILY MEDICINE | Facility: CLINIC | Age: 29
End: 2024-01-29
Payer: COMMERCIAL

## 2024-01-29 VITALS
HEART RATE: 68 BPM | SYSTOLIC BLOOD PRESSURE: 116 MMHG | WEIGHT: 200.06 LBS | DIASTOLIC BLOOD PRESSURE: 67 MMHG | TEMPERATURE: 97.9 F | OXYGEN SATURATION: 96 % | RESPIRATION RATE: 16 BRPM | BODY MASS INDEX: 25.68 KG/M2 | HEIGHT: 74 IN

## 2024-01-29 DIAGNOSIS — R20.2 PARESTHESIA AND PAIN OF BOTH UPPER EXTREMITIES: Primary | ICD-10-CM

## 2024-01-29 DIAGNOSIS — M79.602 PARESTHESIA AND PAIN OF BOTH UPPER EXTREMITIES: Primary | ICD-10-CM

## 2024-01-29 DIAGNOSIS — M79.601 PARESTHESIA AND PAIN OF BOTH UPPER EXTREMITIES: Primary | ICD-10-CM

## 2024-01-29 DIAGNOSIS — U09.9 POST-COVID CHRONIC NEUROLOGIC SYMPTOMS: ICD-10-CM

## 2024-01-29 DIAGNOSIS — L40.9 PSORIASIS: ICD-10-CM

## 2024-01-29 DIAGNOSIS — K59.00 CONSTIPATION, UNSPECIFIED CONSTIPATION TYPE: ICD-10-CM

## 2024-01-29 DIAGNOSIS — H16.229 KERATOCONJUNCT SICCA, NOT SPECIFIED AS SJOGREN'S, UNSP EYE: ICD-10-CM

## 2024-01-29 DIAGNOSIS — R29.90 POST-COVID CHRONIC NEUROLOGIC SYMPTOMS: ICD-10-CM

## 2024-01-29 DIAGNOSIS — R20.2 PARESTHESIA OF BOTH FEET: ICD-10-CM

## 2024-01-29 LAB
BASOPHILS # BLD AUTO: 0 10E3/UL (ref 0–0.2)
BASOPHILS NFR BLD AUTO: 1 %
EOSINOPHIL # BLD AUTO: 0.2 10E3/UL (ref 0–0.7)
EOSINOPHIL NFR BLD AUTO: 4 %
ERYTHROCYTE [DISTWIDTH] IN BLOOD BY AUTOMATED COUNT: 12.9 % (ref 10–15)
ERYTHROCYTE [SEDIMENTATION RATE] IN BLOOD BY WESTERGREN METHOD: 3 MM/HR (ref 0–15)
HCT VFR BLD AUTO: 48.3 % (ref 40–53)
HGB BLD-MCNC: 16.6 G/DL (ref 13.3–17.7)
IMM GRANULOCYTES # BLD: 0 10E3/UL
IMM GRANULOCYTES NFR BLD: 0 %
LYMPHOCYTES # BLD AUTO: 1.8 10E3/UL (ref 0.8–5.3)
LYMPHOCYTES NFR BLD AUTO: 49 %
MCH RBC QN AUTO: 29.6 PG (ref 26.5–33)
MCHC RBC AUTO-ENTMCNC: 34.4 G/DL (ref 31.5–36.5)
MCV RBC AUTO: 86 FL (ref 78–100)
MONOCYTES # BLD AUTO: 0.3 10E3/UL (ref 0–1.3)
MONOCYTES NFR BLD AUTO: 9 %
NEUTROPHILS # BLD AUTO: 1.4 10E3/UL (ref 1.6–8.3)
NEUTROPHILS NFR BLD AUTO: 38 %
PLATELET # BLD AUTO: 178 10E3/UL (ref 150–450)
RBC # BLD AUTO: 5.6 10E6/UL (ref 4.4–5.9)
WBC # BLD AUTO: 3.7 10E3/UL (ref 4–11)

## 2024-01-29 PROCEDURE — 85025 COMPLETE CBC W/AUTO DIFF WBC: CPT | Performed by: NURSE PRACTITIONER

## 2024-01-29 PROCEDURE — 36415 COLL VENOUS BLD VENIPUNCTURE: CPT | Performed by: NURSE PRACTITIONER

## 2024-01-29 PROCEDURE — 84443 ASSAY THYROID STIM HORMONE: CPT | Performed by: NURSE PRACTITIONER

## 2024-01-29 PROCEDURE — 85652 RBC SED RATE AUTOMATED: CPT | Performed by: NURSE PRACTITIONER

## 2024-01-29 PROCEDURE — 86038 ANTINUCLEAR ANTIBODIES: CPT | Performed by: NURSE PRACTITIONER

## 2024-01-29 PROCEDURE — 82607 VITAMIN B-12: CPT | Performed by: NURSE PRACTITIONER

## 2024-01-29 PROCEDURE — 86618 LYME DISEASE ANTIBODY: CPT | Performed by: NURSE PRACTITIONER

## 2024-01-29 PROCEDURE — 99214 OFFICE O/P EST MOD 30 MIN: CPT | Performed by: NURSE PRACTITIONER

## 2024-01-29 PROCEDURE — 86140 C-REACTIVE PROTEIN: CPT | Performed by: NURSE PRACTITIONER

## 2024-01-29 PROCEDURE — 86431 RHEUMATOID FACTOR QUANT: CPT | Performed by: NURSE PRACTITIONER

## 2024-01-29 PROCEDURE — 80053 COMPREHEN METABOLIC PANEL: CPT | Performed by: NURSE PRACTITIONER

## 2024-01-29 PROCEDURE — 82550 ASSAY OF CK (CPK): CPT | Performed by: NURSE PRACTITIONER

## 2024-01-29 PROCEDURE — 83921 ORGANIC ACID SINGLE QUANT: CPT | Performed by: NURSE PRACTITIONER

## 2024-01-29 ASSESSMENT — ENCOUNTER SYMPTOMS: NEUROLOGIC COMPLAINT: 1

## 2024-01-29 ASSESSMENT — PAIN SCALES - GENERAL: PAINLEVEL: SEVERE PAIN (7)

## 2024-01-29 NOTE — PROGRESS NOTES
Subjective   Domenico is a 28 year old, presenting for the following health issues:  Neurologic Problem (Experiencing numerous immune type symptoms for a month/Neuropathy spreading through arms and hands/Need evaluation and referral to rheumatology)      1/29/2024    11:28 AM   Additional Questions   Accompanied by girlfriend (Mariposa Hdz)   Patient is concerned that may need to see a rheumatologist   Patient says in the last 4 months have had a multitude of symptoms   Had Covid in November and thinks after that multiple   Recently was seen by ophthalmologist was inflamed and concerned about some type of immune response   And was given eye drops for this and recommended rheumatology testin g    Had stomach issues in Novembers and had constipation   Saw GI for this and stool tests came back normal   They are contemplating colonoscopy     Also having episodes of hands intermittently would get red and burning and then itching   Symptoms will wax and wane   Now has burning stabbing itching pain in arm and bottoms and tops of feet     Does not take any medication OTC except Vitamin D and C   Is on inhalers and psoriasis   Does not use any recreational drugs or marijuana    Mother, maternal grandmother and paternal grandmother have a hx of RA he states     During GI workup had esophageal candida in November and was treating   Had been on antibiotics and steroids after November episode       How many servings of fruits and vegetables do you eat daily?  4 or more  On average, how many sweetened beverages do you drink each day (Examples: soda, juice, sweet tea, etc.  Do NOT count diet or artificially sweetened beverages)?   0  How many days per week do you exercise enough to make your heart beat faster? 4  How many minutes a day do you exercise enough to make your heart beat faster? 30 - 60  How many days per week do you miss taking your medication? 0    Lab work is in process  Labs reviewed in EPIC  BP Readings from  Last 3 Encounters:   01/29/24 116/67   01/16/24 122/73   01/04/24 111/71    Wt Readings from Last 3 Encounters:   01/29/24 90.7 kg (200 lb 1 oz)   01/16/24 90.5 kg (199 lb 9.6 oz)   12/28/23 91.4 kg (201 lb 8 oz)                  Patient Active Problem List   Diagnosis    Constipation    Generalized anxiety disorder    Atypical chest pain    Dyspnea    Exertional dyspnea    ADHD (attention deficit hyperactivity disorder)    Major depression in complete remission (H)    Esophageal dysphagia    Gastroesophageal reflux disease, unspecified whether esophagitis present    Pelvic pain    Numbness    Pudendal neuralgia     Past Surgical History:   Procedure Laterality Date    MASTECTOMY SUBCUTANEOUS MALE BILATERAL (GYNECOMASTIA)         Social History     Tobacco Use    Smoking status: Never    Smokeless tobacco: Never    Tobacco comments:     no passive exposure   Substance Use Topics    Alcohol use: No     Family History   Problem Relation Age of Onset    Heart Disease Maternal Grandmother     Lipids Maternal Grandmother     Heart Disease Maternal Grandfather     Lipids Maternal Grandfather     Heart Disease Paternal Grandfather     Lipids Paternal Grandfather     Lipids Mother     Lipids Father     Lipids Paternal Grandmother     Thyroid Disease Maternal Grandmother         Thyroid nodule         Current Outpatient Medications   Medication Sig Dispense Refill    calcipotriene (DOVONOX) 0.005 % external ointment       calcipotriene (DOVONOX) 0.005 % external solution       fluticasone (FLOVENT HFA) 110 MCG/ACT inhaler Flovent  mcg/actuation aerosol inhaler   INHALE TWO PUFFS BY MOUTH TWICE A DAY      levalbuterol (XOPENEX HFA) 45 MCG/ACT inhaler Inhale 2 puffs into the lungs every 4 hours as needed for shortness of breath or wheezing      mometasone (ELOCON) 0.1 % external cream Apply topically daily      tadalafil (CIALIS) 5 MG tablet tadalafil 5 mg tablet   TAKE ONE TABLET BY MOUTH AT BEDTIME      fluconazole  "(DIFLUCAN) 100 MG tablet Take 100 mg by mouth daily (Patient not taking: Reported on 1/29/2024)      nystatin (MYCOSTATIN) 325511 UNIT/ML suspension Take 5 mLs (500,000 Units) by mouth 4 times daily for 14 days (Patient not taking: Reported on 1/29/2024) 280 mL 0     Allergies   Allergen Reactions    Dust Mites     Lactose Intolerance [Beta-Galactosidase]              Review of Systems  Constitutional, neuro, ENT, endocrine, pulmonary, cardiac, gastrointestinal, genitourinary, musculoskeletal, integument and psychiatric systems are negative, except as otherwise noted.      Objective    /67 (BP Location: Right arm, Patient Position: Sitting, Cuff Size: Adult Large)   Pulse 68   Temp 97.9  F (36.6  C) (Oral)   Resp 16   Ht 1.88 m (6' 2\")   Wt 90.7 kg (200 lb 1 oz)   SpO2 96%   BMI 25.69 kg/m    Body mass index is 25.69 kg/m .  Physical Exam   GENERAL: Patient is well nourished, well developed, well groomed and in no acute distress,in no apparent distress, non-toxic, in no respiratory distress and acyanotic, alert, cooperative, and well hydrated  EYES: Eyes grossly normal to inspection and conjunctivae and sclerae normal  HENT:ear canals and TM's normal and nose and mouth without ulcers or lesions   NECK:normal, supple, and no adenopathy  CARDIAC:regular rates and rhythm, no murmur, click or rub, no irregular beats, and peripheral pulses strong  without LE edema bilaterally  RESP: normal respiratory rate and rhythm, lungs clear to auscultation  unlabored respirations, no intercostal retractions or accessory muscle use  ABD:soft, nontender  SKIN: Skin color, texture, turgor normal. No rashes or lesions.  MS: extremities normal- no gross deformities noted, gait normal, and normal muscle tone  NEURO: Normal strength and tone, sensory exam grossly normal, mentation intact, speech normal, and normal strength throughout  PSYCH: Alert, oriented, thought content appropriate, affect: increased in intensity, thought " content exhibits logical connections,mentation appears normal., patient appears normal, anxious, oriented X 3       Results for orders placed or performed in visit on 01/29/24   Lyme Disease Total Abs Bld with Reflex to Confirm CLIA     Status: Normal   Result Value Ref Range    Lyme Disease Antibodies Total 0.08 <0.90   CRP inflammation     Status: Normal   Result Value Ref Range    CRP Inflammation <3.00 <5.00 mg/L   Erythrocyte sedimentation rate auto     Status: Normal   Result Value Ref Range    Erythrocyte Sedimentation Rate 3 0 - 15 mm/hr   Rheumatoid factor     Status: Normal   Result Value Ref Range    Rheumatoid Factor <10 <14 IU/mL   Anti Nuclear Ines IgG by IFA with Reflex     Status: Normal   Result Value Ref Range    WILMAN interpretation Negative Negative   Comprehensive metabolic panel     Status: Abnormal   Result Value Ref Range    Sodium 139 135 - 145 mmol/L    Potassium 3.9 3.4 - 5.3 mmol/L    Carbon Dioxide (CO2) 26 22 - 29 mmol/L    Anion Gap 11 7 - 15 mmol/L    Urea Nitrogen 15.9 6.0 - 20.0 mg/dL    Creatinine 0.85 0.67 - 1.17 mg/dL    GFR Estimate >90 >60 mL/min/1.73m2    Calcium 10.6 (H) 8.6 - 10.0 mg/dL    Chloride 102 98 - 107 mmol/L    Glucose 92 70 - 99 mg/dL    Alkaline Phosphatase 55 40 - 150 U/L    AST 24 0 - 45 U/L    ALT 38 0 - 70 U/L    Protein Total 7.2 6.4 - 8.3 g/dL    Albumin 4.9 3.5 - 5.2 g/dL    Bilirubin Total 0.4 <=1.2 mg/dL   Vitamin B12     Status: Normal   Result Value Ref Range    Vitamin B12 817 232 - 1,245 pg/mL   TSH with free T4 reflex     Status: Normal   Result Value Ref Range    TSH 1.88 0.30 - 4.20 uIU/mL   Methylmalonic Acid     Status: Normal   Result Value Ref Range    Methylmalonic Acid 0.11 0.00 - 0.40 umol/L    Narrative    This test was developed and its performance characteristics determined by the [facility],  Special Chemistry Laboratory. It has not been cleared or approved by the FDA. The laboratory is regulated under CLIA as  qualified to perform high-complexity testing. This test is used for clinical purposes. It should not be regarded as investigational or for research.   CK total     Status: Normal   Result Value Ref Range     39 - 308 U/L   CBC with platelets and differential     Status: Abnormal   Result Value Ref Range    WBC Count 3.7 (L) 4.0 - 11.0 10e3/uL    RBC Count 5.60 4.40 - 5.90 10e6/uL    Hemoglobin 16.6 13.3 - 17.7 g/dL    Hematocrit 48.3 40.0 - 53.0 %    MCV 86 78 - 100 fL    MCH 29.6 26.5 - 33.0 pg    MCHC 34.4 31.5 - 36.5 g/dL    RDW 12.9 10.0 - 15.0 %    Platelet Count 178 150 - 450 10e3/uL    % Neutrophils 38 %    % Lymphocytes 49 %    % Monocytes 9 %    % Eosinophils 4 %    % Basophils 1 %    % Immature Granulocytes 0 %    Absolute Neutrophils 1.4 (L) 1.6 - 8.3 10e3/uL    Absolute Lymphocytes 1.8 0.8 - 5.3 10e3/uL    Absolute Monocytes 0.3 0.0 - 1.3 10e3/uL    Absolute Eosinophils 0.2 0.0 - 0.7 10e3/uL    Absolute Basophils 0.0 0.0 - 0.2 10e3/uL    Absolute Immature Granulocytes 0.0 <=0.4 10e3/uL   CBC with Platelets & Differential     Status: Abnormal    Narrative    The following orders were created for panel order CBC with Platelets & Differential.  Procedure                               Abnormality         Status                     ---------                               -----------         ------                     CBC with platelets and d...[[id number]]  Abnormal            Final result                 Please view results for these tests on the individual orders.     Assessment & Plan     Paresthesia and pain of both upper extremities  Will follow up and/or notify patient of  results via My Chart to determine further need for followup   - Lyme Disease Total Abs Bld with Reflex to Confirm CLIA  - CBC with Platelets & Differential  - CRP inflammation  - Erythrocyte sedimentation rate auto  - Rheumatoid factor  - Anti Nuclear Ines IgG by IFA with Reflex  - Comprehensive metabolic panel  - Vitamin B12  -  TSH with free T4 reflex  - Methylmalonic Acid  - CK total  - Adult Post Covid Clinic  Referral  - Adult Rheumatology  Referral  - Lyme Disease Total Abs Bld with Reflex to Confirm CLIA  - CBC with Platelets & Differential  - CRP inflammation  - Erythrocyte sedimentation rate auto  - Rheumatoid factor  - Anti Nuclear Ines IgG by IFA with Reflex  - Comprehensive metabolic panel  - Vitamin B12  - TSH with free T4 reflex  - Methylmalonic Acid  - CK total    Paresthesia of both feet  Will follow up and/or notify patient of  results via My Chart to determine further need for followup   - Lyme Disease Total Abs Bld with Reflex to Confirm CLIA  - CBC with Platelets & Differential  - CRP inflammation  - Erythrocyte sedimentation rate auto  - Rheumatoid factor  - Anti Nuclear Ines IgG by IFA with Reflex  - Comprehensive metabolic panel  - Vitamin B12  - TSH with free T4 reflex  - Methylmalonic Acid  - CK total  - Adult Post Covid Clinic  Referral  - Adult Rheumatology  Referral  - Lyme Disease Total Abs Bld with Reflex to Confirm CLIA  - CBC with Platelets & Differential  - CRP inflammation  - Erythrocyte sedimentation rate auto  - Rheumatoid factor  - Anti Nuclear Ines IgG by IFA with Reflex  - Comprehensive metabolic panel  - Vitamin B12  - TSH with free T4 reflex  - Methylmalonic Acid  - CK total    Psoriasis  .concern could be autoimmune possible psoriatic arthritis   - Adult Rheumatology  Referral    [facility]  Will follow up and/or notify patient of  results via My Chart to determine further need for followup  - Adult Post Covid Clinic  Referral    Constipation, unspecified constipation type  A high fiber diet with plenty of fluids (up to 8 glasses of water daily) is suggested to relieve these symptoms.  Metamucil, 1 tablespoon once or twice daily can be used to keep bowels regular if needed.      Keratoconjunct sicca, not specified as  Sjogren's, unsp eye  FOLLOW UP WITH SPECIALIST :Ophthalmology  - Adult Rheumatology  Referral      Ordering of each unique test  Prescription drug management   Time spent by me doing chart review, history and exam, documentation and further activities per the note      See Patient Instructions  Patient Instructions   PLAN:   1.   Symptomatic therapy suggested: Continue current medications as prescribed.   2.  Orders Placed This Encounter   Procedures    Lyme Disease Total Abs Bld with Reflex to Confirm CLIA    CRP inflammation    Erythrocyte sedimentation rate auto    Rheumatoid factor    Anti Nuclear Ines IgG by IFA with Reflex    Comprehensive metabolic panel    Vitamin B12    TSH with free T4 reflex    Methylmalonic Acid    CK total    Adult Post Covid Clinic  Referral    Adult Rheumatology  Referral    CBC with Platelets & Differential       3.  CONSULTATION/REFERRAL to rheumatology and post covid clinic   Will follow up and/or notify patient of  results via My Chart to determine further need for followup   Patient needs to follow up in if no improvement,or sooner if worsening of symptoms or other symptoms develop.               Signed Electronically by: LAWANDA Caceres CNP    Answers submitted by the patient for this visit:  General Questionnaire (Submitted on 1/16/2024)  Chief Complaint: Chronic problems general questions HPI Form  What is the reason for your visit today? : severe candida in mouth,throat,nails thatvhas persisted despite treatment.want testing  How many servings of fruits and vegetables do you eat daily?: 2-3  On average, how many sweetened beverages do you drink each day (Examples: soda, juice, sweet tea, etc.  Do NOT count diet or artificially sweetened beverages)?: 0  How many minutes a day do you exercise enough to make your heart beat faster?: 30 to 60  How many days a week do you exercise enough to make your heart beat faster?: 4  How many days per  week do you miss taking your medication?: 0

## 2024-01-29 NOTE — PATIENT INSTRUCTIONS
PLAN:   1.   Symptomatic therapy suggested: Continue current medications as prescribed.   2.  Orders Placed This Encounter   Procedures    Lyme Disease Total Abs Bld with Reflex to Confirm CLIA    CRP inflammation    Erythrocyte sedimentation rate auto    Rheumatoid factor    Anti Nuclear Ines IgG by IFA with Reflex    Comprehensive metabolic panel    Vitamin B12    TSH with free T4 reflex    Methylmalonic Acid    CK total    Adult Post Covid Clinic  Referral    Adult Rheumatology  Referral    CBC with Platelets & Differential       3.  CONSULTATION/REFERRAL to rheumatology and post covid clinic   Will follow up and/or notify patient of  results via My Chart to determine further need for followup   Patient needs to follow up in if no improvement,or sooner if worsening of symptoms or other symptoms develop.

## 2024-01-29 NOTE — PROGRESS NOTES
{PROVIDER CHARTING PREFERENCE:347468}    Subjective   Domenico is a 28 year old, presenting for the following health issues:  Neurologic Problem (Experiencing numerous immune type symptoms for a month/Neuropathy spreading through arms and hands/Need evaluation and referral to rheumatology)      1/29/2024    11:22 AM   Additional Questions   Roomed by Diane Lynne Schoenherr RN     Neurologic Problem    History of Present Illness       Reason for visit:  Severe candida in mouth,throat,nails thatvhas persisted despite treatment.want testing    He eats 2-3 servings of fruits and vegetables daily.He consumes 0 sweetened beverage(s) daily.He exercises with enough effort to increase his heart rate 30 to 60 minutes per day.  He exercises with enough effort to increase his heart rate 4 days per week.   He is taking medications regularly.       {MA/LPN/RN Pre-Provider Visit Orders- hCG/UA/Strep (Optional):066111}  {SUPERLIST (Optional):502567}  {additonal problems for provider to add (Optional):625048}    {ROS Picklists (Optional):828399}      Objective    There were no vitals taken for this visit.  There is no height or weight on file to calculate BMI.  Physical Exam   {Exam List (Optional):601907}    {Diagnostic Test Results (Optional):228362}        Signed Electronically by: LAWANDA Caceres CNP  {Email feedback regarding this note to [email or web address]   :[id number]}

## 2024-01-30 ENCOUNTER — TELEPHONE (OUTPATIENT)
Dept: PHYSICAL MEDICINE AND REHAB | Facility: CLINIC | Age: 29
End: 2024-01-30
Payer: COMMERCIAL

## 2024-01-30 LAB
ALBUMIN SERPL BCG-MCNC: 4.9 G/DL (ref 3.5–5.2)
ALP SERPL-CCNC: 55 U/L (ref 40–150)
ALT SERPL W P-5'-P-CCNC: 38 U/L (ref 0–70)
ANA SER QL IF: NEGATIVE
ANION GAP SERPL CALCULATED.3IONS-SCNC: 11 MMOL/L (ref 7–15)
AST SERPL W P-5'-P-CCNC: 24 U/L (ref 0–45)
B BURGDOR IGG+IGM SER QL: 0.08
BILIRUB SERPL-MCNC: 0.4 MG/DL
BUN SERPL-MCNC: 15.9 MG/DL (ref 6–20)
CALCIUM SERPL-MCNC: 10.6 MG/DL (ref 8.6–10)
CHLORIDE SERPL-SCNC: 102 MMOL/L (ref 98–107)
CK SERPL-CCNC: 120 U/L (ref 39–308)
CREAT SERPL-MCNC: 0.85 MG/DL (ref 0.67–1.17)
CRP SERPL-MCNC: <3 MG/L
DEPRECATED HCO3 PLAS-SCNC: 26 MMOL/L (ref 22–29)
EGFRCR SERPLBLD CKD-EPI 2021: >90 ML/MIN/1.73M2
GLUCOSE SERPL-MCNC: 92 MG/DL (ref 70–99)
POTASSIUM SERPL-SCNC: 3.9 MMOL/L (ref 3.4–5.3)
PROT SERPL-MCNC: 7.2 G/DL (ref 6.4–8.3)
RHEUMATOID FACT SERPL-ACNC: <10 IU/ML
SODIUM SERPL-SCNC: 139 MMOL/L (ref 135–145)
TSH SERPL DL<=0.005 MIU/L-ACNC: 1.88 UIU/ML (ref 0.3–4.2)
VIT B12 SERPL-MCNC: 817 PG/ML (ref 232–1245)

## 2024-01-30 NOTE — TELEPHONE ENCOUNTER
Patient call:     Appointment type: post covid video visit   Provider:  nelda   Return date:r/s 1/31 appt   Speciality phone number: [phone]  Additional appointment(s) needed:   Additional notes: LVM and sent isaura Olivo on 1/30/2024 at 5:12 PM

## 2024-01-31 ENCOUNTER — TELEPHONE (OUTPATIENT)
Dept: PHYSICAL MEDICINE AND REHAB | Facility: CLINIC | Age: 29
End: 2024-01-31

## 2024-02-01 ENCOUNTER — TELEPHONE (OUTPATIENT)
Dept: FAMILY MEDICINE | Facility: CLINIC | Age: 29
End: 2024-02-01
Payer: COMMERCIAL

## 2024-02-01 LAB — METHYLMALONATE SERPL-SCNC: 0.11 UMOL/L (ref 0–0.4)

## 2024-02-05 ENCOUNTER — VIRTUAL VISIT (OUTPATIENT)
Dept: PHYSICAL MEDICINE AND REHAB | Facility: CLINIC | Age: 29
End: 2024-02-05
Payer: COMMERCIAL

## 2024-02-05 VITALS — WEIGHT: 198 LBS | BODY MASS INDEX: 25.41 KG/M2 | HEIGHT: 74 IN

## 2024-02-05 DIAGNOSIS — M79.602 PARESTHESIA AND PAIN OF BOTH UPPER EXTREMITIES: ICD-10-CM

## 2024-02-05 DIAGNOSIS — R20.2 PARESTHESIA OF BOTH FEET: ICD-10-CM

## 2024-02-05 DIAGNOSIS — Z86.19 FREQUENT INFECTIONS: ICD-10-CM

## 2024-02-05 DIAGNOSIS — R41.841 COGNITIVE COMMUNICATION DEFICIT: ICD-10-CM

## 2024-02-05 DIAGNOSIS — R10.84 ABDOMINAL PAIN, GENERALIZED: ICD-10-CM

## 2024-02-05 DIAGNOSIS — E83.52 SERUM CALCIUM ELEVATED: ICD-10-CM

## 2024-02-05 DIAGNOSIS — G93.32 POST-COVID CHRONIC FATIGUE: ICD-10-CM

## 2024-02-05 DIAGNOSIS — R20.2 PARESTHESIA AND PAIN OF BOTH UPPER EXTREMITIES: ICD-10-CM

## 2024-02-05 DIAGNOSIS — R41.840 POST-COVID CHRONIC CONCENTRATION DEFICIT: ICD-10-CM

## 2024-02-05 DIAGNOSIS — M79.601 PARESTHESIA AND PAIN OF BOTH UPPER EXTREMITIES: ICD-10-CM

## 2024-02-05 DIAGNOSIS — R29.90 POST-COVID CHRONIC NEUROLOGIC SYMPTOMS: Primary | ICD-10-CM

## 2024-02-05 DIAGNOSIS — U09.9 POST-COVID CHRONIC FATIGUE: ICD-10-CM

## 2024-02-05 DIAGNOSIS — U09.9 POST-COVID CHRONIC NEUROLOGIC SYMPTOMS: Primary | ICD-10-CM

## 2024-02-05 DIAGNOSIS — U09.9 POST-COVID CHRONIC CONCENTRATION DEFICIT: ICD-10-CM

## 2024-02-05 PROCEDURE — 99205 OFFICE O/P NEW HI 60 MIN: CPT | Mod: 95 | Performed by: PHYSICIAN ASSISTANT

## 2024-02-05 SDOH — SOCIAL STABILITY: SOCIAL NETWORK

## 2024-02-05 SDOH — SOCIAL STABILITY: SOCIAL NETWORK: I HAVE TROUBLE DOING ALL OF THE FAMILY ACTIVITIES THAT I WANT TO DO: ALWAYS

## 2024-02-05 SDOH — SOCIAL STABILITY: SOCIAL NETWORK: PROMIS ABILITY TO PARTICIPATE IN SOCIAL ROLES & ACTIVITIES T-SCORE: 29

## 2024-02-05 SDOH — SOCIAL STABILITY: SOCIAL NETWORK: I HAVE TROUBLE DOING ALL OF THE ACTIVITIES WITH FRIENDS THAT I WANT TO DO: ALWAYS

## 2024-02-05 SDOH — SOCIAL STABILITY: SOCIAL NETWORK: I HAVE TROUBLE DOING ALL OF MY REGULAR LEISURE ACTIVITIES WITH OTHERS: ALWAYS

## 2024-02-05 SDOH — SOCIAL STABILITY: SOCIAL NETWORK: I HAVE TROUBLE DOING ALL OF MY USUAL WORK (INCLUDE WORK AT HOME): ALWAYS

## 2024-02-05 ASSESSMENT — ANXIETY QUESTIONNAIRES
5. BEING SO RESTLESS THAT IT IS HARD TO SIT STILL: NOT AT ALL
GAD7 TOTAL SCORE: 0
4. TROUBLE RELAXING: NOT AT ALL
3. WORRYING TOO MUCH ABOUT DIFFERENT THINGS: NOT AT ALL
GAD7 TOTAL SCORE: 0
8. IF YOU CHECKED OFF ANY PROBLEMS, HOW DIFFICULT HAVE THESE MADE IT FOR YOU TO DO YOUR WORK, TAKE CARE OF THINGS AT HOME, OR GET ALONG WITH OTHER PEOPLE?: NOT DIFFICULT AT ALL
GAD7 TOTAL SCORE: 0
2. NOT BEING ABLE TO STOP OR CONTROL WORRYING: NOT AT ALL
IF YOU CHECKED OFF ANY PROBLEMS ON THIS QUESTIONNAIRE, HOW DIFFICULT HAVE THESE PROBLEMS MADE IT FOR YOU TO DO YOUR WORK, TAKE CARE OF THINGS AT HOME, OR GET ALONG WITH OTHER PEOPLE: NOT DIFFICULT AT ALL
6. BECOMING EASILY ANNOYED OR IRRITABLE: NOT AT ALL
7. FEELING AFRAID AS IF SOMETHING AWFUL MIGHT HAPPEN: NOT AT ALL
7. FEELING AFRAID AS IF SOMETHING AWFUL MIGHT HAPPEN: NOT AT ALL
1. FEELING NERVOUS, ANXIOUS, OR ON EDGE: NOT AT ALL

## 2024-02-05 ASSESSMENT — PATIENT HEALTH QUESTIONNAIRE - PHQ9
SUM OF ALL RESPONSES TO PHQ QUESTIONS 1-9: 3
SUM OF ALL RESPONSES TO PHQ QUESTIONS 1-9: 3
10. IF YOU CHECKED OFF ANY PROBLEMS, HOW DIFFICULT HAVE THESE PROBLEMS MADE IT FOR YOU TO DO YOUR WORK, TAKE CARE OF THINGS AT HOME, OR GET ALONG WITH OTHER PEOPLE: NOT DIFFICULT AT ALL

## 2024-02-05 ASSESSMENT — PAIN SCALES - GENERAL: PAINLEVEL: SEVERE PAIN (7)

## 2024-02-05 NOTE — PROGRESS NOTES
Date: 2024    To:Chelsea Mireles   [address] [place] [address]     Pt: Jorge Denton  MR: [id number]  : 1995  YI: 2024    Dear Chelsea Mireles MD    I had the pleasure of seeing Jorge at the Infectious Diseases Clinic at the [facility] as a referral from Norma Hurtado PA-C consultation due to frequent infections and positive EBV IGG. Jorge was accompanied by his partner Mariposa. History was obtained from our discussion during the visit and review of Jorge's pertinent, available health records.       Jorge is a 28 yr old M here today to discuss systemic symptoms and frequent infections. Infection history per discussion and chart review outline below.     Domenico notes that since the last four months he has had a multiple of symptoms concerning that include onset of abdominal pain and constipation, oral thrush and nail infection, ongoing pelvic pain and dysuria, severe fatigue, and intermittent parastesas numbness and tingling in arms and feet and then focal onset of facial drop and slur. Using advil which seems to help symptoms. No fevers, lymph nodes swelling, wt loss, night sweats. Losing sleep and unable to work due to pain a/w neurologic symptoms. He is being followed in long covid clinic, urology, gastroenterology, rheumatology and neurology. He has an upcoming brain MRI. Labs recently demonstrated a negative lyme screen, positive EBV IgG and mild elevation of calcium to 11. US of abdomen noted mild splemgly in last year otherwise no LAD. Gi work up for pain at this time with no specific etiology. Though that these symptoms could be long covid related.     Prior to tick bite many years ago and now last fall, never admitted for any invasive bacterial or challenging to treat infections. No childhood illness. Family hx of lupus otherwise no childhood illness, CF or immune deficiencies.     Pertinent Infectiosus Diseases History:   - history of acute EBV  "syndrome about 1.5 years prior with sore throat and swollen occipital and cervical lymph nodes   - concern for possible fungal infections of tongue, posterior oropharynge and nails on fingers and toes. Culture from tongue scrapping negative for fungal elements. Treated with nystatin without response and then fluconazole for 14 days with some improvement however remaining intermittent globus sensation. Uses inhaled steroid and mouth device for apnea.    - Cough following viral illness, treated with azithromycin and improved   - Chronic prostatitis and follows with urology. Treated with multiple rounds of abx without much help. Notable cultures demonstrating no dominant bacteria. Tested positive for ureaplasma and mycoplasma genitalium via DNA and treated without improvement. Then did a pre and post prostrate massage UA and then moleculare testing with was reassuring. MRI prostate demonstrated \"Diffuse intermediate to low T2 signal throughout the prostate gland peripheral zone without significant restricted diffusion. Appearance suggestive of prostatitis\". Referred to a pain doctor.   - Covid in Nov 2023, loss of smell   - Tick bite, unknown which tick or bacterial infection, although referral lab testing noted some protein positive for Borrelia miyamoto. Notes to have chronic pain and fatigue since then.   -tested negative for HIV in Dec 2023      Past Medical History:   Past Medical History:   Diagnosis Date     Allergic rhinitis     allergy testing positive for dustmites     Eczema     in earlier childhood     Gastroesophageal reflux disease      Uncomplicated asthma        Past Surgical History:  Past Surgical History:   Procedure Laterality Date     MASTECTOMY SUBCUTANEOUS MALE BILATERAL (GYNECOMASTIA)         Family History:   Family History   Problem Relation Age of Onset     Heart Disease Maternal Grandmother      Lipids Maternal Grandmother      Heart Disease Maternal Grandfather      Lipids Maternal " Grandfather      Heart Disease Paternal Grandfather      Lipids Paternal Grandfather      Lipids Mother      Lipids Father      Lipids Paternal Grandmother      Thyroid Disease Maternal Grandmother         Thyroid nodule     Immunizations:  Immunization History   Administered Date(s) Administered     COVID-19 MONOVALENT 12+ (Pfizer) 06/03/2021, 06/25/2021     Hepatitis B, Peds 07/30/2004     Influenza (IIV3) PF 12/04/2003     Meningococcal ACWY (Menactra ) 07/28/2015     TDAP (Adacel,Boostrix) 03/29/2017       Allergies:      Allergies   Allergen Reactions     Dust Mites      Lactose Intolerance [Beta-Galactosidase]        Review of Systems:   Comprehensive ROS is negative except as per HPI.    Physical Exam   /70 (BP Location: Right arm, Patient Position: Sitting, Cuff Size: Adult Regular)   Pulse 78   Wt 88.5 kg (195 lb)   SpO2 99%   BMI 25.04 kg/m    General: Well appearing, no distress  HEENT: Normocephalic, no scleral injections, EOMI, moist mucosa, no oral lesions, oropharynx without erythema or exudate  Neck: supple, no adenopathy  CV: regular rate and rhythm, no murmur, normal S1/S2  Lungs: clear bilaterally with good aeration  Abdomen: soft, non-tender, non-distended, active bowel sounds, no mass  Extremities: warm and well perfused, no edema  Neuro: CN 2-12 grossly intact, normal muscle bulk and tone, 5/5 strength in bilateral upper and lower extremities, normal gait  Skin: no rash  Lymph: no cervical/supraclavicular/axillary adenopathy    Lab:    Microbiology:   Tongue Fungal culture - negative - Jan 2024     Latest Reference Range & Units Most Recent   Hepatitis C Antibody Nonreactive  Nonreactive  12/28/23 11:16        Latest Reference Range & Units 12/28/23 11:16   HIV Antigen Antibody Combo Nonreactive  Nonreactive       Recent Labs   Lab Test 01/29/24  1221 12/06/23  1342   WBC 3.7* 3.8*   HGB 16.6 16.4   MCV 86 86    190       Inflammatory Markers:  Recent Labs   Lab Test  01/29/24  1221   SED 3       Electrolytes:  Recent Labs   Lab Test 01/29/24  1221      POTASSIUM 3.9   CHLORIDE 102   CO2 26   GLC 92   HERNANDO 10.6*     Renal studies:  Recent Labs   Lab Test 01/29/24  1221 12/06/23  1342   CR 0.85 0.90   GFRESTIMATED >90 >90       Liver studies:  Recent Labs   Lab Test 01/29/24  1221 12/06/23  1342   AST 24 29   ALT 38 46   BILITOTAL 0.4 0.3   ALKPHOS 55 55   ALBUMIN 4.9 4.8       Imaging:   US Dec 2023   Mild splenomegaly with the spleen measuring 15.9 cm in length.  Otherwise normal abdominal ultrasound.    CT Abdomen and Pelvis Dec 2023   Enhancement of the liver, spleen, splenic vein, pancreas and portal veins is normal. Gallbladder contracted without radiopaque stones. No adrenal lesion. Renal parenchymal enhancement is normal and symmetric. No bowel distention. No ureteral dilation. Retrocecal appendix is normal diameter and partially gas-filled. Moderate stool in the sigmoid colon and rectum. No bladder stones. No pelvic free fluid.    CXR Dec 2023   Lungs are clear. Heart and pulmonary vasculature are normal. No pleural effusion or pneumothorax.    Assessment/Plan:   Domenico is a 28 yr old M with systemic symptoms consisting of intermittent paraesthesia and focal neurologic deficits, fatigue, abdominal pain, constipation, chronic nonbacterial prostatitis here to discuss both role of EBV in symptoms and assess for recurrent infections.  In regards to the EBV IgG positive, this indicates previous infection and adequate response by immune system. Domenico has no evidence of chronic active EBV typically presenting with fevers, lymphadenopathy and cell line dyscrasia. We explained that some people do have a post-EBV syndrome with fatigue, similar to long covid. In regards to the recurrent infections, we did a detailed history of Domenico's infection history and at this time no concern for primary immune deficiency. Domenico's clinical history does not demonstrate any invasive bacterial or  atypical infection infections in childhood or young adulthood. Review of the microbiological data not concerning for any atypical infection or prolonged indolent infection. At this time we do not believe any infection can fully explain this patient's presentations. We support the working evaluation of long covid and encouraged continue support of whole body health including sleep hygiene,  balanced nutrition, exercise, and mental health support.We do not anticipate any follow up with ID needed.        If new concerns arise I would be happy to see Jorge again at clinic sooner.      Thank you for allowing me to assist in Jorge's care.       Sincerely,    Sia Vera MD  Adult and Pediatric Infectious Diseases Fellow PGY7  [facility]   Clinic Phone Number: [phone]  Clinic Fax:  [phone]

## 2024-02-05 NOTE — PROGRESS NOTES
Jorge Denton is a 28 year old male who presents to be evaluated for a billable video visit.    Video-Visit Details    Video visit Start time:9:28 AM    Type of service:  Video Visit    Video End Time:10:07 AM    Originating Location (pt. Location): Home    Distant Location (provider location):  Off- Site    Platform used for Video Visit: NumberPicture    Assessment/ Impression:   1. Paresthesia and pain of both upper extremities/Post-COVID chronic neurologic symptoms/ Paresthesia of both feet  Patient with new paresthesias/pain in upper extremities and on feet as well as intermittent numbness on side of face.  Patient also endorses recent visual changes including vision pain and has a history of pudendal neuralgia.  Discussed that with his post-COVID neurological symptoms we will check MRI brain with and without before patient's upcoming neurological visit.  - Adult Post Covid Clinic  Referral  - MRI Brain w & w/o contrast; Future    2. Cognitive communication deficit/ Post-COVID chronic concentration deficit/Post-COVID chronic fatigue  Patient has ongoing concentration and fatigue difficulties.Discussed energy conservation and provided information on fatigue management.  Also discussed referral to Physical and occupational therapy for the COVID-19 program.  Discussed also checking Mey-Barr antibody IgG due to patient with mild splenic enlargement on ultrasound which may be contributing to his abdominal pain  - Occupational Therapy Referral; Future    3. Abdominal pain, generalized  Patient with ongoing abdominal difficulties that sound very similar to possible gallbladder disease.  Patient is working with GI and appreciate recommendations.  Due to splenic enlargement on ultrasound we will check Mey-Barr antibody as above.  - EBV Capsid Antibody IgG; Future    4. Serum calcium elevated  Patient did have mildly elevated  serum calcium.  Discussed due to patient's ongoing issues will check parathyroid  ionized calcium and vitamin D to ensure no parathyroid disease.  Discussed that if patient does have appointment parathyroidism this could be contributing to a lot of his symptoms.  - Parathormone Intact; Future  - Ionized Calcium; Future  - Vitamin D Deficiency; Future    5. Frequent infections  Patient has had several infections recently including recurrent prostatitis yearly, thrush as well as several respiratory infections in December.  Discussed working with infectious disease and referral placed.  - Adult Infectious Disease  Referral; Future      Plan:  I reviewed present knowledge on long-Covid.  Education was provided and question were answered.  Orders/Referrals as above  Will advised patient on test results  I will follow up with Jorge Denton in 6 weeks. I will review progress and consider need for any other therapeutic interventions. If there are any questions and/or concerns he will call the clinic.      On day of encounter time spent in chart review and with patient in consultation, exam, education, coordination of care, review of outside charts/data and documentation:  60 minutes     I have attempted to proof read for major spelling errors and apologize for any minor errors I may have missed.      This note was dictated using voice recognition software. Any grammatical or context distortions are unintentional and inherent to the software.    _____________  Norma Hurtado PA-C  [facility]   This 28 year old male presents to the [facility] Post-COVID clinic as a new consult to evaluate continuing symptoms after COVID infection initially diagnosed 11/2023.  Jorge Denton initially developed  shortness of breath, headache, generalized aches and pains, brain fog, fatigue, weakness, decreased taste, and decreased smell. Treatment was symptomatic.  Jorge Denton experienced  "complications of chronic neurological symptoms. Patient a month ago woke up with blood shot eyes and was seen by ophthalmologist and was found to have Keratoconjunctivitis and was given prednsione drops and another eye drops.  Patient did not do the drops and red eyes come and goes. Patient did not have dry eyes on exam.  Patient states they feel painful and are light sensitive and will get crust.  Patient does state he has a dry mouth.  Patient reports headache as a pressure behind eyes mostly when looking at screen.  Patient is fatigue.  Patient wakes up tired but this has been worsened. He takes longer to get up an function.  Yesterday patient was having trouble speaking. He noticed he had numbness on his left side of his face and neck. Also reports dropping eye and side of mouth on left.  He took advil and his symptoms improved.    Patient started having abdominal symptoms and started having decreased appetite. In the past few months develop sudden change in bowel movements.  He would have painful bowel movents and the stool would be \" tan/whiee and float\".  Pain in upper right quadrant.  Patient having pain to back shoulder blade and pain after eating.  He states it doesn't matter if its luzma or low fat. He also reports nausea.  Patient takes bile salts which helps his gut motility. He is working with GI and will be having a HIDA scan.  Having recent infections such as thrush, respiratory infections and prostatitis. Patient had sever burning pain in his hands and arms and bottom of feet.  Then it spread to his body with pins and needles. He also states it was itching and pain.   Past medical history is significant for  ADHD, Pudendal Neuralgia, GERD, depression, and anxiety. The patient was vaccinated against COVID x3, Last vaccine 7/22/22 .  Previous activity was full time work, taken a leave of abcense.    History of COVID-19 infection: 11/2023  Date of first symptoms: 11/2023  Diagnosis: " PCR  Hospitalization: No  Treatment: symptomatic  Current Symptoms: See subjective  Goals of Care:  Decrease abdominal and neurological symptoms, increase energy, improve thinking, improve quality of life, and return to work          2/5/2024     9:11 AM   PHQ Assesment Total Score(s)   PHQ-9 Score 3           2/5/2024     9:11 AM   LANNY-7 Results   LANNY 7 TOTAL SCORE 0 (minimal anxiety)   LANNY-7 Total Score 0         2/5/2024     9:12 AM   PTSD Screen Score   Have you ever experienced this kind of event? Yes   PTSD Screen (Score of 3 or more suggests positive screen) 5         2/5/2024     9:14 AM   PROMIS-29   PROMIS Physical Function T-Score 32 (moderate dysfunction)   PROMIS Anxiety T-Score 58 (mild)   PROMIS Depression T-Score 41 (within normal limits)   PROMIS Fatigue T-Score 76 (severe)   PROMIS Sleep Disturbance T-Score 62 (moderate)   PROMIS Ability to Participate in Social Roles & Activities T-Score 29 (severe dysfunction)   PROMIS Pain Interference T-Score 67 (moderate)   PROMIS Pain Intensity 8       Past Medical History:   Diagnosis Date    Allergic rhinitis     allergy testing positive for dustmites    Eczema     in earlier childhood    Gastroesophageal reflux disease     Uncomplicated asthma        Past Surgical History:   Procedure Laterality Date    MASTECTOMY SUBCUTANEOUS MALE BILATERAL (GYNECOMASTIA)         Family History   Problem Relation Age of Onset    Heart Disease Maternal Grandmother     Lipids Maternal Grandmother     Heart Disease Maternal Grandfather     Lipids Maternal Grandfather     Heart Disease Paternal Grandfather     Lipids Paternal Grandfather     Lipids Mother     Lipids Father     Lipids Paternal Grandmother     Thyroid Disease Maternal Grandmother         Thyroid nodule       Social History     Tobacco Use    Smoking status: Never    Smokeless tobacco: Never    Tobacco comments:     no passive exposure   Vaping Use    Vaping Use: Never used   Substance Use Topics    Alcohol use:  No    Drug use: No         Current Outpatient Medications:     calcipotriene (DOVONOX) 0.005 % external ointment, , Disp: , Rfl:     calcipotriene (DOVONOX) 0.005 % external solution, , Disp: , Rfl:     fluconazole (DIFLUCAN) 100 MG tablet, Take 100 mg by mouth daily (Patient not taking: Reported on 1/29/2024), Disp: , Rfl:     fluticasone (FLOVENT HFA) 110 MCG/ACT inhaler, Flovent  mcg/actuation aerosol inhaler  INHALE TWO PUFFS BY MOUTH TWICE A DAY, Disp: , Rfl:     levalbuterol (XOPENEX HFA) 45 MCG/ACT inhaler, Inhale 2 puffs into the lungs every 4 hours as needed for shortness of breath or wheezing, Disp: , Rfl:     mometasone (ELOCON) 0.1 % external cream, Apply topically daily, Disp: , Rfl:     tadalafil (CIALIS) 5 MG tablet, tadalafil 5 mg tablet  TAKE ONE TABLET BY MOUTH AT BEDTIME, Disp: , Rfl:     Review of Systems   Constitutional: denies any fevers, chills, any recent weight loss , + fatigue  Eyes: denies changes in visual acuity , + eye pain  Ears, Nose, Throat: denies any difficulty swallowing   Cardiovascular: denies any exertional chest pain or palpitation   Respiratory: denies dyspnea   Gastrointestinal: diarrhea, vomiting, abdominal pain,constipation, nausea   Genitourinary: denies any dysuria, hematuria, frequency or urgency   Musculoskeletal: denies any muscle pain, joint pain, neck pain or back pain   Neurologic: denies any headache, changes in motor or sensory function, no loss of balance or vertigo   Psychiatric: denies mood changes; sleeps OK , + concentration difficulties        Objective    Vitals:  No vitals were obtained today due to virtual visit.    Physical Exam   EYES: Eyes grossly normal to inspection.  No discharge or erythema, or obvious scleral/conjunctival abnormalities.  SKIN: Visible skin clear. No significant rash, abnormal pigmentation or lesions.  NEURO: Cranial nerves grossly intact.  Mentation and speech appropriate for age.  GENERAL: Healthy, alert and no  distress  RESP: No audible wheeze, cough, or visible cyanosis.  No visible retractions or increased work of breathing.    PSYCH: Mentation appears normal, affect normal/bright, judgement and insight intact, normal speech and appearance well-groomed.            CRP Inflammation   Date Value Ref Range Status   09/26/2008 4.1 0.0 - 8.0 mg/L Final      Sed Rate   Date Value Ref Range Status   09/26/2008 8 0 - 15 mm/h Final     Erythrocyte Sedimentation Rate   Date Value Ref Range Status   01/29/2024 3 0 - 15 mm/hr Final      Last Renal Panel:  Sodium   Date Value Ref Range Status   01/29/2024 139 135 - 145 mmol/L Final     Comment:     Reference intervals for this test were updated on 09/26/2023 to more accurately reflect our healthy population. There may be differences in the flagging of prior results with similar values performed with this method. Interpretation of those prior results can be made in the context of the updated reference intervals.    12/30/2008 137 133 - 143 mmol/L Final     Potassium   Date Value Ref Range Status   01/29/2024 3.9 3.4 - 5.3 mmol/L Final   12/30/2008 4.0 3.4 - 5.3 mmol/L Final     Chloride   Date Value Ref Range Status   01/29/2024 102 98 - 107 mmol/L Final   12/30/2008 100 98 - 110 mmol/L Final     Carbon Dioxide   Date Value Ref Range Status   12/30/2008 26 20 - 32 mmol/L Final     Carbon Dioxide (CO2)   Date Value Ref Range Status   01/29/2024 26 22 - 29 mmol/L Final     Anion Gap   Date Value Ref Range Status   01/29/2024 11 7 - 15 mmol/L Final   12/30/2008 11 6 - 17 mmol/L Final     Glucose   Date Value Ref Range Status   01/29/2024 92 70 - 99 mg/dL Final   12/30/2008 89 60 - 99 mg/dL Final     Urea Nitrogen   Date Value Ref Range Status   01/29/2024 15.9 6.0 - 20.0 mg/dL Final   12/30/2008 14 5 - 24 mg/dL Final     Creatinine   Date Value Ref Range Status   01/29/2024 0.85 0.67 - 1.17 mg/dL Final   12/30/2008 0.60 0.39 - 0.73 mg/dL Final     Comment:     New IDMS-traceable  "calibration  beginning 5/1/08     GFR Estimate   Date Value Ref Range Status   01/29/2024 >90 >60 mL/min/1.73m2 Final   12/30/2008 GFR not calculated, patient <16 years old. mL/min/1.7m2 Final     Calcium   Date Value Ref Range Status   01/29/2024 10.6 (H) 8.6 - 10.0 mg/dL Final   12/30/2008 9.6 8.7 - 10.8 mg/dL Final     Albumin   Date Value Ref Range Status   01/29/2024 4.9 3.5 - 5.2 g/dL Final   12/30/2008 4.6 3.9 - 5.1 g/dL Final      Lab Results   Component Value Date    WBC 3.7 01/29/2024    WBC 5.1 11/28/2012     Lab Results   Component Value Date    RBC 5.60 01/29/2024    RBC 5.4 11/28/2012     Lab Results   Component Value Date    HGB 16.6 01/29/2024    HGB 15.8 11/28/2012     Lab Results   Component Value Date    HCT 48.3 01/29/2024    HCT 48 11/28/2012     No components found for: \"MCT\"  Lab Results   Component Value Date    MCV 86 01/29/2024    MCV 89 11/28/2012     Lab Results   Component Value Date    MCH 29.6 01/29/2024    MCH 29.1 11/28/2012     Lab Results   Component Value Date    MCHC 34.4 01/29/2024    MCHC 32.8 11/28/2012     Lab Results   Component Value Date    RDW 12.9 01/29/2024    RDW 13.8 11/28/2012     Lab Results   Component Value Date     01/29/2024     11/28/2012     10/16/2009      No results found for: \"A1C\"   TSH   Date Value Ref Range Status   01/29/2024 1.88 0.30 - 4.20 uIU/mL Final   11/28/2012 2.32 mcU/mL Final      Lab Results   Component Value Date    VITDT 65 (H) 12/06/2023      No results for input(s): \"MAG\" in the last 97605 hours.  Last Comprehensive Metabolic Panel:  Sodium   Date Value Ref Range Status   01/29/2024 139 135 - 145 mmol/L Final     Comment:     Reference intervals for this test were updated on 09/26/2023 to more accurately reflect our healthy population. There may be differences in the flagging of prior results with similar values performed with this method. Interpretation of those prior results can be made in the context of the updated " reference intervals.    12/30/2008 137 133 - 143 mmol/L Final     Potassium   Date Value Ref Range Status   01/29/2024 3.9 3.4 - 5.3 mmol/L Final   12/30/2008 4.0 3.4 - 5.3 mmol/L Final     Chloride   Date Value Ref Range Status   01/29/2024 102 98 - 107 mmol/L Final   12/30/2008 100 98 - 110 mmol/L Final     Carbon Dioxide   Date Value Ref Range Status   12/30/2008 26 20 - 32 mmol/L Final     Carbon Dioxide (CO2)   Date Value Ref Range Status   01/29/2024 26 22 - 29 mmol/L Final     Anion Gap   Date Value Ref Range Status   01/29/2024 11 7 - 15 mmol/L Final   12/30/2008 11 6 - 17 mmol/L Final     Glucose   Date Value Ref Range Status   01/29/2024 92 70 - 99 mg/dL Final   12/30/2008 89 60 - 99 mg/dL Final     Urea Nitrogen   Date Value Ref Range Status   01/29/2024 15.9 6.0 - 20.0 mg/dL Final   12/30/2008 14 5 - 24 mg/dL Final     Creatinine   Date Value Ref Range Status   01/29/2024 0.85 0.67 - 1.17 mg/dL Final   12/30/2008 0.60 0.39 - 0.73 mg/dL Final     Comment:     New IDMS-traceable calibration  beginning 5/1/08     GFR Estimate   Date Value Ref Range Status   01/29/2024 >90 >60 mL/min/1.73m2 Final   12/30/2008 GFR not calculated, patient <16 years old. mL/min/1.7m2 Final     Calcium   Date Value Ref Range Status   01/29/2024 10.6 (H) 8.6 - 10.0 mg/dL Final   12/30/2008 9.6 8.7 - 10.8 mg/dL Final     Bilirubin Total   Date Value Ref Range Status   01/29/2024 0.4 <=1.2 mg/dL Final   12/30/2008 <0.1 (L) 0.2 - 1.3 mg/dL Final     Alkaline Phosphatase   Date Value Ref Range Status   01/29/2024 55 40 - 150 U/L Final     Comment:     Reference intervals for this test were updated on 11/14/2023 to more accurately reflect our healthy population. There may be differences in the flagging of prior results with similar values performed with this method. Interpretation of those prior results can be made in the context of the updated reference intervals.   12/30/2008 149 130 - 530 U/L Final     ALT   Date Value Ref Range  Status   01/29/2024 38 0 - 70 U/L Final     Comment:     Reference intervals for this test were updated on 6/12/2023 to more accurately reflect our healthy population. There may be differences in the flagging of prior results with similar values performed with this method. Interpretation of those prior results can be made in the context of the updated reference intervals.     12/30/2008 28 0 - 50 U/L Final     AST   Date Value Ref Range Status   01/29/2024 24 0 - 45 U/L Final     Comment:     Reference intervals for this test were updated on 6/12/2023 to more accurately reflect our healthy population. There may be differences in the flagging of prior results with similar values performed with this method. Interpretation of those prior results can be made in the context of the updated reference intervals.   12/30/2008 32 0 - 45 U/L Final     Most Recent D-dimer:No lab results found.      Imaging:    I personally reviewed the following imaging results today and those on care everywhere, if indicated     US ABDOMEN COMPLETE 12/12/2023 1:30 PM     CLINICAL HISTORY: Abdominal pain, generalized; Bilateral flank pain     TECHNIQUE: Complete abdominal ultrasound.     COMPARISON: None.     FINDINGS:     GALLBLADDER: Normal. No gallstones, wall thickening, or  pericholecystic fluid. Negative sonographic Jones's sign.     BILE DUCTS: No biliary dilatation. The common duct measures 4 mm.     LIVER: Normal parenchyma with smooth contour. No focal mass.     RIGHT KIDNEY: Normal size. Normal echogenicity with no hydronephrosis  or mass.      LEFT KIDNEY: Normal size. Normal echogenicity with no hydronephrosis  or mass.      SPLEEN: Mildly enlarged spleen measuring 15.9 cm in length.     PANCREAS: The visualized portions are normal.     AORTA: Normal in caliber.      IVC: Normal where visualized.     No ascites.                                                                      IMPRESSION:  1.  Mild splenomegaly with the spleen  measuring 15.9 cm in length.  Otherwise normal abdominal ultrasound.     LAZARA MONTOYA MD     XR Chest 2 Views    COMPARISON:  5/22/2022.    FINDINGS: Chest 2 views. Lungs are clear. Heart and pulmonary vasculature are normal. No pleural effusion or pneumothorax.  Exam End: 12/05/23  3:23 PM     CT Abdomen Pelvis w/o & w Contrast     IMPRESSION:    1.  Normal examination.      REPORT SIGNED BY DR. Son Upton  Reviewed imaging from  [facility] sites, and [facility]     Medical Records Reviewed:    Reviewed consults/documents from  [facility], [facility], and [facility] including  Urology, Opthalmology, Neurology, ENT, and GI

## 2024-02-05 NOTE — PATIENT INSTRUCTIONS
Post COVID Self Care Suggestions:     Fatigue Management:       https://www.archives-pmr.org/action/showPdf?hsr=-[email or web address]%[email or web address]       Self Care:      [email or web address]/self-care/  Recovery World Health Organization:    https://apps.who.int/iris/bitstream/handle/01040/240961/FLH-FYGB-5411-564-77669-01186-eng.pdf  Breathing exercises:    https://www.[employer] [facility].org/health/conditions-and-diseases/coronavirus/coronavirus-recovery-breathing-exercises      Zurich Paxlovid [place]  https://medicine.[facility]/cii/research/paxlc-study/?mibextid=[email or web address]    Cognitive Post-COVID study  z.[facility]/covid-ema    .[email or web address]://[email or web address].[facility]/studies?[email or web address]=[email or web address]&commit=Search      To do:   MRI brain prior to neurology appointment  Labs  OT for fatigue/concentration difficulties  ID for frequent infections  Return to clinic in 6 weeks

## 2024-02-05 NOTE — CONFIDENTIAL NOTE
DIAGNOSIS  Frequent infections    DATE RECEIVED:  02.07.2024   NOTES (Gather within 2 years) STATUS DETAILS   OFFICE NOTE from referring provider   Internal 02.05.2024 Norma Hurtado PA-C    OFFICE NOTE from other specialist Internal 01.29.2024 Nicki Gonzalez APRN CNP    DISCHARGE SUMMARY from hospital     DISCHARGE REPORT from the ER     LABS (any labs) Internal    MEDICATION LIST Internal    IMAGING  (NEED IMAGES AND REPORTS)     Osteomyelitis: Foot imaging      Liver Abscess: Abdominal imaging     Other (anything related to diagnoses

## 2024-02-05 NOTE — LETTER
2/5/2024       RE: Jorge Denton  [address]  [facility] [address]       Dear Colleague,    Thank you for referring your patient, Jorge Denton, to the [facility] PHYSICAL MEDICINE AND REHABILITATION CLINIC [place] [facility]. Please see a copy of my visit note below.    Jorge Denton is a 28 year old male who presents to be evaluated for a billable video visit.    Assessment/ Impression:   1. Paresthesia and pain of both upper extremities/Post-COVID chronic neurologic symptoms/ Paresthesia of both feet  Patient with new paresthesias/pain in upper extremities and on feet as well as intermittent numbness on side of face.  Patient also endorses recent visual changes including vision pain and has a history of pudendal neuralgia.  Discussed that with his post-COVID neurological symptoms we will check MRI brain with and without before patient's upcoming neurological visit.  - Adult Post Covid Clinic  Referral  - MRI Brain w & w/o contrast; Future    2. Cognitive communication deficit/ Post-COVID chronic concentration deficit/Post-COVID chronic fatigue  Patient has ongoing concentration and fatigue difficulties.Discussed energy conservation and provided information on fatigue management.  Also discussed referral to Physical and occupational therapy for the COVID-19 program.  Discussed also checking Mey-Barr antibody IgG due to patient with mild splenic enlargement on ultrasound which may be contributing to his abdominal pain  - Occupational Therapy Referral; Future    3. Abdominal pain, generalized  Patient with ongoing abdominal difficulties that sound very similar to possible gallbladder disease.  Patient is working with GI and appreciate recommendations.  Due to splenic enlargement on ultrasound we will check Mey-Barr antibody as above.  - EBV Capsid Antibody IgG; Future    4. Serum calcium elevated  Patient did have mildly  elevated  serum calcium.  Discussed due to patient's ongoing issues will check parathyroid ionized calcium and vitamin D to ensure no parathyroid disease.  Discussed that if patient does have appointment parathyroidism this could be contributing to a lot of his symptoms.  - Parathormone Intact; Future  - Ionized Calcium; Future  - Vitamin D Deficiency; Future    5. Frequent infections  Patient has had several infections recently including recurrent prostatitis yearly, thrush as well as several respiratory infections in December.  Discussed working with infectious disease and referral placed.  - Adult Infectious Disease  Referral; Future      Plan:  I reviewed present knowledge on long-Covid.  Education was provided and question were answered.  Orders/Referrals as above  Will advised patient on test results  I will follow up with Jorge Denton in 6 weeks. I will review progress and consider need for any other therapeutic interventions. If there are any questions and/or concerns he will call the clinic.      On day of encounter time spent in chart review and with patient in consultation, exam, education, coordination of care, review of outside charts/data and documentation:  60 minutes     I have attempted to proof read for major spelling errors and apologize for any minor errors I may have missed.      This note was dictated using voice recognition software. Any grammatical or context distortions are unintentional and inherent to the software.    _____________  CONCEPCIÓN Coreas [facility] PHYSICAL MEDICINE AND REHABILITATION CLINIC [place]    Subjective   This 28 year old male presents to the [facility] Post-COVID clinic as a new consult to evaluate continuing symptoms after COVID infection initially diagnosed 11/2023.  Jorge Denton initially developed  shortness of breath, headache, generalized aches and pains, brain fog, fatigue, weakness,  "decreased taste, and decreased smell. Treatment was symptomatic.  Jorge Denton experienced complications of chronic neurological symptoms. Patient a month ago woke up with blood shot eyes and was seen by ophthalmologist and was found to have Keratoconjunctivitis and was given prednsione drops and another eye drops.  Patient did not do the drops and red eyes come and goes. Patient did not have dry eyes on exam.  Patient states they feel painful and are light sensitive and will get crust.  Patient does state he has a dry mouth.  Patient reports headache as a pressure behind eyes mostly when looking at screen.  Patient is fatigue.  Patient wakes up tired but this has been worsened. He takes longer to get up an function.  Yesterday patient was having trouble speaking. He noticed he had numbness on his left side of his face and neck. Also reports dropping eye and side of mouth on left.  He took advil and his symptoms improved.    Patient started having abdominal symptoms and started having decreased appetite. In the past few months develop sudden change in bowel movements.  He would have painful bowel movents and the stool would be \" tan/whiee and float\".  Pain in upper right quadrant.  Patient having pain to back shoulder blade and pain after eating.  He states it doesn't matter if its luzma or low fat. He also reports nausea.  Patient takes bile salts which helps his gut motility. He is working with GI and will be having a HIDA scan.  Having recent infections such as thrush, respiratory infections and prostatitis. Patient had sever burning pain in his hands and arms and bottom of feet.  Then it spread to his body with pins and needles. He also states it was itching and pain.   Past medical history is significant for  ADHD, Pudendal Neuralgia, GERD, depression, and anxiety. The patient was vaccinated against COVID x3, Last vaccine 7/22/22 .  Previous activity was full time work, taken a leave of abcense.    History of " COVID-19 infection: 11/2023  Date of first symptoms: 11/2023  Diagnosis: PCR  Hospitalization: No  Treatment: symptomatic  Current Symptoms: See subjective  Goals of Care:  Decrease abdominal and neurological symptoms, increase energy, improve thinking, improve quality of life, and return to work          2/5/2024     9:11 AM   PHQ Assesment Total Score(s)   PHQ-9 Score 3           2/5/2024     9:11 AM   LANNY-7 Results   LANNY 7 TOTAL SCORE 0 (minimal anxiety)   LANNY-7 Total Score 0         2/5/2024     9:12 AM   PTSD Screen Score   Have you ever experienced this kind of event? Yes   PTSD Screen (Score of 3 or more suggests positive screen) 5         2/5/2024     9:14 AM   PROMIS-29   PROMIS Physical Function T-Score 32 (moderate dysfunction)   PROMIS Anxiety T-Score 58 (mild)   PROMIS Depression T-Score 41 (within normal limits)   PROMIS Fatigue T-Score 76 (severe)   PROMIS Sleep Disturbance T-Score 62 (moderate)   PROMIS Ability to Participate in Social Roles & Activities T-Score 29 (severe dysfunction)   PROMIS Pain Interference T-Score 67 (moderate)   PROMIS Pain Intensity 8       Past Medical History:   Diagnosis Date    Allergic rhinitis     allergy testing positive for dustmites    Eczema     in earlier childhood    Gastroesophageal reflux disease     Uncomplicated asthma        Past Surgical History:   Procedure Laterality Date    MASTECTOMY SUBCUTANEOUS MALE BILATERAL (GYNECOMASTIA)         Family History   Problem Relation Age of Onset    Heart Disease Maternal Grandmother     Lipids Maternal Grandmother     Heart Disease Maternal Grandfather     Lipids Maternal Grandfather     Heart Disease Paternal Grandfather     Lipids Paternal Grandfather     Lipids Mother     Lipids Father     Lipids Paternal Grandmother     Thyroid Disease Maternal Grandmother         Thyroid nodule       Social History     Tobacco Use    Smoking status: Never    Smokeless tobacco: Never    Tobacco comments:     no passive exposure    Vaping Use    Vaping Use: Never used   Substance Use Topics    Alcohol use: No    Drug use: No         Current Outpatient Medications:     calcipotriene (DOVONOX) 0.005 % external ointment, , Disp: , Rfl:     calcipotriene (DOVONOX) 0.005 % external solution, , Disp: , Rfl:     fluconazole (DIFLUCAN) 100 MG tablet, Take 100 mg by mouth daily (Patient not taking: Reported on 1/29/2024), Disp: , Rfl:     fluticasone (FLOVENT HFA) 110 MCG/ACT inhaler, Flovent  mcg/actuation aerosol inhaler  INHALE TWO PUFFS BY MOUTH TWICE A DAY, Disp: , Rfl:     levalbuterol (XOPENEX HFA) 45 MCG/ACT inhaler, Inhale 2 puffs into the lungs every 4 hours as needed for shortness of breath or wheezing, Disp: , Rfl:     mometasone (ELOCON) 0.1 % external cream, Apply topically daily, Disp: , Rfl:     tadalafil (CIALIS) 5 MG tablet, tadalafil 5 mg tablet  TAKE ONE TABLET BY MOUTH AT BEDTIME, Disp: , Rfl:     Review of Systems   Constitutional: denies any fevers, chills, any recent weight loss , + fatigue  Eyes: denies changes in visual acuity , + eye pain  Ears, Nose, Throat: denies any difficulty swallowing   Cardiovascular: denies any exertional chest pain or palpitation   Respiratory: denies dyspnea   Gastrointestinal: diarrhea, vomiting, abdominal pain,constipation, nausea   Genitourinary: denies any dysuria, hematuria, frequency or urgency   Musculoskeletal: denies any muscle pain, joint pain, neck pain or back pain   Neurologic: denies any headache, changes in motor or sensory function, no loss of balance or vertigo   Psychiatric: denies mood changes; sleeps OK , + concentration difficulties        Objective    Vitals:  No vitals were obtained today due to virtual visit.    Physical Exam   EYES: Eyes grossly normal to inspection.  No discharge or erythema, or obvious scleral/conjunctival abnormalities.  SKIN: Visible skin clear. No significant rash, abnormal pigmentation or lesions.  NEURO: Cranial nerves grossly intact.   Mentation and speech appropriate for age.  GENERAL: Healthy, alert and no distress  RESP: No audible wheeze, cough, or visible cyanosis.  No visible retractions or increased work of breathing.    PSYCH: Mentation appears normal, affect normal/bright, judgement and insight intact, normal speech and appearance well-groomed.            CRP Inflammation   Date Value Ref Range Status   09/26/2008 4.1 0.0 - 8.0 mg/L Final      Sed Rate   Date Value Ref Range Status   09/26/2008 8 0 - 15 mm/h Final     Erythrocyte Sedimentation Rate   Date Value Ref Range Status   01/29/2024 3 0 - 15 mm/hr Final      Last Renal Panel:  Sodium   Date Value Ref Range Status   01/29/2024 139 135 - 145 mmol/L Final     Comment:     Reference intervals for this test were updated on 09/26/2023 to more accurately reflect our healthy population. There may be differences in the flagging of prior results with similar values performed with this method. Interpretation of those prior results can be made in the context of the updated reference intervals.    12/30/2008 137 133 - 143 mmol/L Final     Potassium   Date Value Ref Range Status   01/29/2024 3.9 3.4 - 5.3 mmol/L Final   12/30/2008 4.0 3.4 - 5.3 mmol/L Final     Chloride   Date Value Ref Range Status   01/29/2024 102 98 - 107 mmol/L Final   12/30/2008 100 98 - 110 mmol/L Final     Carbon Dioxide   Date Value Ref Range Status   12/30/2008 26 20 - 32 mmol/L Final     Carbon Dioxide (CO2)   Date Value Ref Range Status   01/29/2024 26 22 - 29 mmol/L Final     Anion Gap   Date Value Ref Range Status   01/29/2024 11 7 - 15 mmol/L Final   12/30/2008 11 6 - 17 mmol/L Final     Glucose   Date Value Ref Range Status   01/29/2024 92 70 - 99 mg/dL Final   12/30/2008 89 60 - 99 mg/dL Final     Urea Nitrogen   Date Value Ref Range Status   01/29/2024 15.9 6.0 - 20.0 mg/dL Final   12/30/2008 14 5 - 24 mg/dL Final     Creatinine   Date Value Ref Range Status   01/29/2024 0.85 0.67 - 1.17 mg/dL Final  "  12/30/2008 0.60 0.39 - 0.73 mg/dL Final     Comment:     New IDMS-traceable calibration  beginning 5/1/08     GFR Estimate   Date Value Ref Range Status   01/29/2024 >90 >60 mL/min/1.73m2 Final   12/30/2008 GFR not calculated, patient <16 years old. mL/min/1.7m2 Final     Calcium   Date Value Ref Range Status   01/29/2024 10.6 (H) 8.6 - 10.0 mg/dL Final   12/30/2008 9.6 8.7 - 10.8 mg/dL Final     Albumin   Date Value Ref Range Status   01/29/2024 4.9 3.5 - 5.2 g/dL Final   12/30/2008 4.6 3.9 - 5.1 g/dL Final      Lab Results   Component Value Date    WBC 3.7 01/29/2024    WBC 5.1 11/28/2012     Lab Results   Component Value Date    RBC 5.60 01/29/2024    RBC 5.4 11/28/2012     Lab Results   Component Value Date    HGB 16.6 01/29/2024    HGB 15.8 11/28/2012     Lab Results   Component Value Date    HCT 48.3 01/29/2024    HCT 48 11/28/2012     No components found for: \"MCT\"  Lab Results   Component Value Date    MCV 86 01/29/2024    MCV 89 11/28/2012     Lab Results   Component Value Date    MCH 29.6 01/29/2024    MCH 29.1 11/28/2012     Lab Results   Component Value Date    MCHC 34.4 01/29/2024    MCHC 32.8 11/28/2012     Lab Results   Component Value Date    RDW 12.9 01/29/2024    RDW 13.8 11/28/2012     Lab Results   Component Value Date     01/29/2024     11/28/2012     10/16/2009      No results found for: \"A1C\"   TSH   Date Value Ref Range Status   01/29/2024 1.88 0.30 - 4.20 uIU/mL Final   11/28/2012 2.32 mcU/mL Final      Lab Results   Component Value Date    VITDT 65 (H) 12/06/2023      No results for input(s): \"MAG\" in the last 11870 hours.  Last Comprehensive Metabolic Panel:  Sodium   Date Value Ref Range Status   01/29/2024 139 135 - 145 mmol/L Final     Comment:     Reference intervals for this test were updated on 09/26/2023 to more accurately reflect our healthy population. There may be differences in the flagging of prior results with similar values performed with this method. " Interpretation of those prior results can be made in the context of the updated reference intervals.    12/30/2008 137 133 - 143 mmol/L Final     Potassium   Date Value Ref Range Status   01/29/2024 3.9 3.4 - 5.3 mmol/L Final   12/30/2008 4.0 3.4 - 5.3 mmol/L Final     Chloride   Date Value Ref Range Status   01/29/2024 102 98 - 107 mmol/L Final   12/30/2008 100 98 - 110 mmol/L Final     Carbon Dioxide   Date Value Ref Range Status   12/30/2008 26 20 - 32 mmol/L Final     Carbon Dioxide (CO2)   Date Value Ref Range Status   01/29/2024 26 22 - 29 mmol/L Final     Anion Gap   Date Value Ref Range Status   01/29/2024 11 7 - 15 mmol/L Final   12/30/2008 11 6 - 17 mmol/L Final     Glucose   Date Value Ref Range Status   01/29/2024 92 70 - 99 mg/dL Final   12/30/2008 89 60 - 99 mg/dL Final     Urea Nitrogen   Date Value Ref Range Status   01/29/2024 15.9 6.0 - 20.0 mg/dL Final   12/30/2008 14 5 - 24 mg/dL Final     Creatinine   Date Value Ref Range Status   01/29/2024 0.85 0.67 - 1.17 mg/dL Final   12/30/2008 0.60 0.39 - 0.73 mg/dL Final     Comment:     New IDMS-traceable calibration  beginning 5/1/08     GFR Estimate   Date Value Ref Range Status   01/29/2024 >90 >60 mL/min/1.73m2 Final   12/30/2008 GFR not calculated, patient <16 years old. mL/min/1.7m2 Final     Calcium   Date Value Ref Range Status   01/29/2024 10.6 (H) 8.6 - 10.0 mg/dL Final   12/30/2008 9.6 8.7 - 10.8 mg/dL Final     Bilirubin Total   Date Value Ref Range Status   01/29/2024 0.4 <=1.2 mg/dL Final   12/30/2008 <0.1 (L) 0.2 - 1.3 mg/dL Final     Alkaline Phosphatase   Date Value Ref Range Status   01/29/2024 55 40 - 150 U/L Final     Comment:     Reference intervals for this test were updated on 11/14/2023 to more accurately reflect our healthy population. There may be differences in the flagging of prior results with similar values performed with this method. Interpretation of those prior results can be made in the context of the updated reference  intervals.   12/30/2008 149 130 - 530 U/L Final     ALT   Date Value Ref Range Status   01/29/2024 38 0 - 70 U/L Final     Comment:     Reference intervals for this test were updated on 6/12/2023 to more accurately reflect our healthy population. There may be differences in the flagging of prior results with similar values performed with this method. Interpretation of those prior results can be made in the context of the updated reference intervals.     12/30/2008 28 0 - 50 U/L Final     AST   Date Value Ref Range Status   01/29/2024 24 0 - 45 U/L Final     Comment:     Reference intervals for this test were updated on 6/12/2023 to more accurately reflect our healthy population. There may be differences in the flagging of prior results with similar values performed with this method. Interpretation of those prior results can be made in the context of the updated reference intervals.   12/30/2008 32 0 - 45 U/L Final     Most Recent D-dimer:No lab results found.      Imaging:    I personally reviewed the following imaging results today and those on care everywhere, if indicated     US ABDOMEN COMPLETE 12/12/2023 1:30 PM     CLINICAL HISTORY: Abdominal pain, generalized; Bilateral flank pain     TECHNIQUE: Complete abdominal ultrasound.     COMPARISON: None.     FINDINGS:     GALLBLADDER: Normal. No gallstones, wall thickening, or  pericholecystic fluid. Negative sonographic Jones's sign.     BILE DUCTS: No biliary dilatation. The common duct measures 4 mm.     LIVER: Normal parenchyma with smooth contour. No focal mass.     RIGHT KIDNEY: Normal size. Normal echogenicity with no hydronephrosis  or mass.      LEFT KIDNEY: Normal size. Normal echogenicity with no hydronephrosis  or mass.      SPLEEN: Mildly enlarged spleen measuring 15.9 cm in length.     PANCREAS: The visualized portions are normal.     AORTA: Normal in caliber.      IVC: Normal where visualized.     No ascites.                                                                       IMPRESSION:  1.  Mild splenomegaly with the spleen measuring 15.9 cm in length.  Otherwise normal abdominal ultrasound.     LAZARA MONTOYA MD     XR Chest 2 Views    COMPARISON:  5/22/2022.    FINDINGS: Chest 2 views. Lungs are clear. Heart and pulmonary vasculature are normal. No pleural effusion or pneumothorax.  Exam End: 12/05/23  3:23 PM     CT Abdomen Pelvis w/o & w Contrast     IMPRESSION:    1.  Normal examination.      REPORT SIGNED BY DR. Son Upton  Reviewed imaging from  [facility], [facility] sites, and [facility]     Medical Records Reviewed:    Reviewed consults/documents from  [facility], [facility], and [facility] including  Urology, Opthalmology, Neurology, ENT, and GI         Again, thank you for allowing me to participate in the care of your patient.      Sincerely,    Norma Hurtado PA-C

## 2024-02-06 ENCOUNTER — LAB (OUTPATIENT)
Dept: LAB | Facility: CLINIC | Age: 29
End: 2024-02-06
Payer: COMMERCIAL

## 2024-02-06 DIAGNOSIS — G93.32 POST-COVID CHRONIC FATIGUE: ICD-10-CM

## 2024-02-06 DIAGNOSIS — R10.84 ABDOMINAL PAIN, GENERALIZED: ICD-10-CM

## 2024-02-06 DIAGNOSIS — U09.9 POST-COVID CHRONIC FATIGUE: ICD-10-CM

## 2024-02-06 DIAGNOSIS — R19.7 DIARRHEA, UNSPECIFIED TYPE: ICD-10-CM

## 2024-02-06 DIAGNOSIS — E83.52 SERUM CALCIUM ELEVATED: ICD-10-CM

## 2024-02-06 LAB — CA-I BLD-MCNC: 4.7 MG/DL (ref 4.4–5.2)

## 2024-02-06 PROCEDURE — 82330 ASSAY OF CALCIUM: CPT

## 2024-02-06 PROCEDURE — 86665 EPSTEIN-BARR CAPSID VCA: CPT

## 2024-02-06 PROCEDURE — 82306 VITAMIN D 25 HYDROXY: CPT

## 2024-02-06 PROCEDURE — 83970 ASSAY OF PARATHORMONE: CPT

## 2024-02-06 PROCEDURE — 36415 COLL VENOUS BLD VENIPUNCTURE: CPT

## 2024-02-07 ENCOUNTER — PRE VISIT (OUTPATIENT)
Dept: INFECTIOUS DISEASES | Facility: CLINIC | Age: 29
End: 2024-02-07
Payer: COMMERCIAL

## 2024-02-07 ENCOUNTER — OFFICE VISIT (OUTPATIENT)
Dept: INFECTIOUS DISEASES | Facility: CLINIC | Age: 29
End: 2024-02-07
Attending: PHYSICIAN ASSISTANT
Payer: COMMERCIAL

## 2024-02-07 VITALS
HEART RATE: 78 BPM | WEIGHT: 195 LBS | BODY MASS INDEX: 25.04 KG/M2 | OXYGEN SATURATION: 99 % | SYSTOLIC BLOOD PRESSURE: 103 MMHG | DIASTOLIC BLOOD PRESSURE: 70 MMHG

## 2024-02-07 DIAGNOSIS — R76.8 EPSTEIN-BARR VIRUS SEROPOSITIVITY: ICD-10-CM

## 2024-02-07 DIAGNOSIS — R20.0 NUMBNESS: ICD-10-CM

## 2024-02-07 DIAGNOSIS — R53.82 CHRONIC FATIGUE: ICD-10-CM

## 2024-02-07 DIAGNOSIS — Z86.19 FREQUENT INFECTIONS: Primary | ICD-10-CM

## 2024-02-07 LAB
EBV VCA IGG SER IA-ACNC: 52.3 U/ML
EBV VCA IGG SER IA-ACNC: POSITIVE
PTH-INTACT SERPL-MCNC: 32 PG/ML (ref 15–65)
VIT D+METAB SERPL-MCNC: 43 NG/ML (ref 20–50)

## 2024-02-07 PROCEDURE — 99244 OFF/OP CNSLTJ NEW/EST MOD 40: CPT | Mod: GC | Performed by: STUDENT IN AN ORGANIZED HEALTH CARE EDUCATION/TRAINING PROGRAM

## 2024-02-07 PROCEDURE — G0463 HOSPITAL OUTPT CLINIC VISIT: HCPCS | Performed by: STUDENT IN AN ORGANIZED HEALTH CARE EDUCATION/TRAINING PROGRAM

## 2024-02-07 ASSESSMENT — PAIN SCALES - GENERAL: PAINLEVEL: MILD PAIN (2)

## 2024-02-07 NOTE — NURSING NOTE
Chief Complaint   Patient presents with    Consult     Complete RM, referred by Norma Hurtado, frequent infections, scheduled per pt     /70 (BP Location: Right arm, Patient Position: Sitting, Cuff Size: Adult Regular)   Pulse 78   Wt 88.5 kg (195 lb)   SpO2 99%   BMI 25.04 kg/m    Janna Young [facility]

## 2024-02-07 NOTE — PATIENT INSTRUCTIONS
Domenico was seen today at the  Infectious Diseases clinic [facility] for positive EBV serology and recurrent infections.    The following is a brief outline of the plan as we discussed during the visit:    - no specific concern for infection or immune deficiency at this time         We will send a letter to you and your primary care provider summarizing our recommendations and the results of any testing performed today. Meanwhile feel free to contact our clinic at any time with questions and clarifications.    Thank you,    Sia Vera MD  Infectious Diseases Fellow     Infectious Diseases Clinic   [facility]     Contact info:  Clinic Coordinator: [phone]  Clinic Fax: [phone]    -------------------------------------------------------------------------------------------------------  Medical Records: [phone]  Financial Counselor (Billing and Insurance Questions): [phone]  Prior Authorizations: [phone]

## 2024-02-07 NOTE — LETTER
2024       RE: Jorge Denton  [address]  [place] [address]     Dear Colleague,    Thank you for referring your patient, Jorge Denton, to the [facility] INFECTIOUS DISEASE CLINIC [place] at [facility]. Please see a copy of my visit note below.      Date: 2024    To:Chelsea Mireles   [facility] [place] [address]     Pt: Jorge Dentno  MR: [id number]  : 1995  YI: 2024    Dear Chelsea Mireles MD    I had the pleasure of seeing Jorge at the Infectious Diseases Clinic at the [facility] as a referral from Norma Hurtado PA-C consultation due to frequent infections and positive EBV IGG. Jorge was accompanied by his partner Mariposa. History was obtained from our discussion during the visit and review of Jorge's pertinent, available health records.       Jorge is a 28 yr old M here today to discuss systemic symptoms and frequent infections. Infection history per discussion and chart review outline below.     Domenico notes that since the last four months he has had a multiple of symptoms concerning that include onset of abdominal pain and constipation, oral thrush and nail infection, ongoing pelvic pain and dysuria, severe fatigue, and intermittent parastesas numbness and tingling in arms and feet and then focal onset of facial drop and slur. Using advil which seems to help symptoms. No fevers, lymph nodes swelling, wt loss, night sweats. Losing sleep and unable to work due to pain a/w neurologic symptoms. He is being followed in [facility] clinic, urology, gastroenterology, rheumatology and neurology. He has an upcoming brain MRI. Labs recently demonstrated a negative lyme screen, positive EBV IgG and mild elevation of calcium to 11. US of abdomen noted mild splemgly in last year otherwise no LAD. Gi work up for pain at this time with no specific etiology. Though that these symptoms could be  "long covid related.     Prior to tick bite many years ago and now last fall, never admitted for any invasive bacterial or challenging to treat infections. No childhood illness. Family hx of lupus otherwise no childhood illness, CF or immune deficiencies.     Pertinent Infectiosus Diseases History:   - history of acute EBV syndrome about 1.5 years prior with sore throat and swollen occipital and cervical lymph nodes   - concern for possible fungal infections of tongue, posterior oropharynge and nails on fingers and toes. Culture from tongue scrapping negative for fungal elements. Treated with nystatin without response and then fluconazole for 14 days with some improvement however remaining intermittent globus sensation. Uses inhaled steroid and mouth device for apnea.    - Cough following viral illness, treated with azithromycin and improved   - Chronic prostatitis and follows with urology. Treated with multiple rounds of abx without much help. Notable cultures demonstrating no dominant bacteria. Tested positive for ureaplasma and mycoplasma genitalium via DNA and treated without improvement. Then did a pre and post prostrate massage UA and then moleculare testing with was reassuring. MRI prostate demonstrated \"Diffuse intermediate to low T2 signal throughout the prostate gland peripheral zone without significant restricted diffusion. Appearance suggestive of prostatitis\". Referred to a pain doctor.   - Covid in Nov 2023, loss of smell   - Tick bite, unknown which tick or bacterial infection, although referral lab testing noted some protein positive for Borrelia miyamoto. Notes to have chronic pain and fatigue since then.   -tested negative for HIV in Dec 2023      Past Medical History:   Past Medical History:   Diagnosis Date    Allergic rhinitis     allergy testing positive for dustmites    Eczema     in earlier childhood    Gastroesophageal reflux disease     Uncomplicated asthma        Past Surgical " History:  Past Surgical History:   Procedure Laterality Date    MASTECTOMY SUBCUTANEOUS MALE BILATERAL (GYNECOMASTIA)         Family History:   Family History   Problem Relation Age of Onset    Heart Disease Maternal Grandmother     Lipids Maternal Grandmother     Heart Disease Maternal Grandfather     Lipids Maternal Grandfather     Heart Disease Paternal Grandfather     Lipids Paternal Grandfather     Lipids Mother     Lipids Father     Lipids Paternal Grandmother     Thyroid Disease Maternal Grandmother         Thyroid nodule     Immunizations:  Immunization History   Administered Date(s) Administered    COVID-19 MONOVALENT 12+ (Pfizer) 06/03/2021, 06/25/2021    Hepatitis B, Peds 07/30/2004    Influenza (IIV3) PF 12/04/2003    Meningococcal ACWY (Menactra ) 07/28/2015    TDAP (Adacel,Boostrix) 03/29/2017       Allergies:      Allergies   Allergen Reactions    Dust Mites     Lactose Intolerance [Beta-Galactosidase]        Review of Systems:   Comprehensive ROS is negative except as per HPI.    Physical Exam   /70 (BP Location: Right arm, Patient Position: Sitting, Cuff Size: Adult Regular)   Pulse 78   Wt 88.5 kg (195 lb)   SpO2 99%   BMI 25.04 kg/m    General: Well appearing, no distress  HEENT: Normocephalic, no scleral injections, EOMI, moist mucosa, no oral lesions, oropharynx without erythema or exudate  Neck: supple, no adenopathy  CV: regular rate and rhythm, no murmur, normal S1/S2  Lungs: clear bilaterally with good aeration  Abdomen: soft, non-tender, non-distended, active bowel sounds, no mass  Extremities: warm and well perfused, no edema  Neuro: CN 2-12 grossly intact, normal muscle bulk and tone, 5/5 strength in bilateral upper and lower extremities, normal gait  Skin: no rash  Lymph: no cervical/supraclavicular/axillary adenopathy    Lab:    Microbiology:   Tongue Fungal culture - negative - Jan 2024     Latest Reference Range & Units Most Recent   Hepatitis C Antibody Nonreactive   Nonreactive  12/28/23 11:16        Latest Reference Range & Units 12/28/23 11:16   HIV Antigen Antibody Combo Nonreactive  Nonreactive       Recent Labs   Lab Test 01/29/24  1221 12/06/23  1342   WBC 3.7* 3.8*   HGB 16.6 16.4   MCV 86 86    190       Inflammatory Markers:  Recent Labs   Lab Test 01/29/24  1221   SED 3       Electrolytes:  Recent Labs   Lab Test 01/29/24  1221      POTASSIUM 3.9   CHLORIDE 102   CO2 26   GLC 92   HERNANDO 10.6*     Renal studies:  Recent Labs   Lab Test 01/29/24  1221 12/06/23  1342   CR 0.85 0.90   GFRESTIMATED >90 >90       Liver studies:  Recent Labs   Lab Test 01/29/24  1221 12/06/23  1342   AST 24 29   ALT 38 46   BILITOTAL 0.4 0.3   ALKPHOS 55 55   ALBUMIN 4.9 4.8       Imaging:   US Dec 2023   Mild splenomegaly with the spleen measuring 15.9 cm in length.  Otherwise normal abdominal ultrasound.    CT Abdomen and Pelvis Dec 2023   Enhancement of the liver, spleen, splenic vein, pancreas and portal veins is normal. Gallbladder contracted without radiopaque stones. No adrenal lesion. Renal parenchymal enhancement is normal and symmetric. No bowel distention. No ureteral dilation. Retrocecal appendix is normal diameter and partially gas-filled. Moderate stool in the sigmoid colon and rectum. No bladder stones. No pelvic free fluid.    CXR Dec 2023   Lungs are clear. Heart and pulmonary vasculature are normal. No pleural effusion or pneumothorax.    Assessment/Plan:   Domenico is a 28 yr old M with systemic symptoms consisting of intermittent paraesthesia and focal neurologic deficits, fatigue, abdominal pain, constipation, chronic nonbacterial prostatitis here to discuss both role of EBV in symptoms and assess for recurrent infections.  In regards to the EBV IgG positive, this indicates previous infection and adequate response by immune system. Domenico has no evidence of chronic active EBV typically presenting with fevers, lymphadenopathy and cell line dyscrasia. We explained that  some people do have a post-EBV syndrome with fatigue, similar to long covid. In regards to the recurrent infections, we did a detailed history of Domenico's infection history and at this time no concern for primary immune deficiency. Domenico's clinical history does not demonstrate any invasive bacterial or atypical infection infections in childhood or young adulthood. Review of the microbiological data not concerning for any atypical infection or prolonged indolent infection. At this time we do not believe any infection can fully explain this patient's presentations. We support the working evaluation of long covid and encouraged continue support of whole body health including sleep hygiene,  balanced nutrition, exercise, and mental health support.We do not anticipate any follow up with ID needed.        If new concerns arise I would be happy to see Jorge again at clinic sooner.      Thank you for allowing me to assist in Jorge's care.       Sincerely,    Sia Vera MD  Adult and Pediatric Infectious Diseases Fellow PGY7  [facility] Infectious Diseases Clinic   Clinic Phone Number: [phone]  Clinic Fax:  [phone]          Infectious Disease Clinic Staff Note: Mr. Freyer was seen, examined, and the case was discussed with Dr. Vera, ID Fellow -- I agree with her consultative history and examination, assessment and plan in this outpatient ID Consult note. This note reflects my observations and opinions and the plan outlined fully reflects my approach. I have reviewed the available history, radiology, laboratory results, and reports with the Fellow.

## 2024-02-08 ENCOUNTER — ANCILLARY PROCEDURE (OUTPATIENT)
Dept: NUCLEAR MEDICINE | Facility: CLINIC | Age: 29
End: 2024-02-08
Attending: PHYSICIAN ASSISTANT
Payer: COMMERCIAL

## 2024-02-08 DIAGNOSIS — R10.84 ABDOMINAL PAIN, GENERALIZED: ICD-10-CM

## 2024-02-08 PROCEDURE — A9537 TC99M MEBROFENIN: HCPCS | Performed by: RADIOLOGY

## 2024-02-08 PROCEDURE — 78227 HEPATOBIL SYST IMAGE W/DRUG: CPT | Mod: GC | Performed by: RADIOLOGY

## 2024-02-08 RX ORDER — KIT FOR THE PREPARATION OF TECHNETIUM TC 99M MEBROFENIN 45 MG/10ML
4.8-7.2 INJECTION, POWDER, LYOPHILIZED, FOR SOLUTION INTRAVENOUS ONCE
Status: COMPLETED | OUTPATIENT
Start: 2024-02-08 | End: 2024-02-08

## 2024-02-08 RX ADMIN — KIT FOR THE PREPARATION OF TECHNETIUM TC 99M MEBROFENIN 6 MILLICURIE: 45 INJECTION, POWDER, LYOPHILIZED, FOR SOLUTION INTRAVENOUS at 09:16

## 2024-02-08 NOTE — RESULT ENCOUNTER NOTE
Gwendolyn Denton,    Attached are your test results.  -Normal red blood cell (hgb) levels, decreased white blood cell count and normal platelet levels. ADVISE: recheck in the next 4 weeks   -Liver and gallbladder tests (ALT,AST, Alk phos,bilirubin) are normal.  -Kidney function (GFR) is normal.  -Sodium is normal.  -Potassium is normal.  -Calcium is elevated.  ADVISE: rechecking this in 1 month.  -Glucose is normal.  -TSH (thyroid stimulating hormone) level is normal which indicates normal thyroid function.  -CK (muscle enzyme test) is normal.  -Hepatitis C antibody screen test shows no signs of a previous hepatitis C infection.  Inflammatory markers are normal as are lyme's and rheumatoid arthritis   Vitamin B12 is normal    Please contact us if you have any questions.    Nicki Gonzalez, CNP

## 2024-02-12 NOTE — PROGRESS NOTES
Infectious Disease Clinic Staff Note: Mr. Freyer was seen, examined, and the case was discussed with Dr. Vera, ID Fellow -- I agree with her consultative history and examination, assessment and plan in this outpatient ID Consult note. This note reflects my observations and opinions and the plan outlined fully reflects my approach. I have reviewed the available history, radiology, laboratory results, and reports with the Fellow.   Toileting/Walking/Standing

## 2024-02-13 ENCOUNTER — HOSPITAL ENCOUNTER (OUTPATIENT)
Dept: MRI IMAGING | Facility: CLINIC | Age: 29
Discharge: HOME | End: 2024-02-13
Attending: PHYSICIAN ASSISTANT | Admitting: PHYSICIAN ASSISTANT
Payer: COMMERCIAL

## 2024-02-13 DIAGNOSIS — M79.601 PARESTHESIA AND PAIN OF BOTH UPPER EXTREMITIES: ICD-10-CM

## 2024-02-13 DIAGNOSIS — R20.2 PARESTHESIA AND PAIN OF BOTH UPPER EXTREMITIES: ICD-10-CM

## 2024-02-13 DIAGNOSIS — R29.90 POST-COVID CHRONIC NEUROLOGIC SYMPTOMS: ICD-10-CM

## 2024-02-13 DIAGNOSIS — U09.9 POST-COVID CHRONIC NEUROLOGIC SYMPTOMS: ICD-10-CM

## 2024-02-13 DIAGNOSIS — M79.602 PARESTHESIA AND PAIN OF BOTH UPPER EXTREMITIES: ICD-10-CM

## 2024-02-13 PROCEDURE — 255N000002 HC RX 255 OP 636: Performed by: PHYSICIAN ASSISTANT

## 2024-02-13 PROCEDURE — 70553 MRI BRAIN STEM W/O & W/DYE: CPT

## 2024-02-13 PROCEDURE — A9585 GADOBUTROL INJECTION: HCPCS | Performed by: PHYSICIAN ASSISTANT

## 2024-02-13 RX ORDER — GADOBUTROL 604.72 MG/ML
9 INJECTION INTRAVENOUS ONCE
Status: COMPLETED | OUTPATIENT
Start: 2024-02-13 | End: 2024-02-13

## 2024-02-13 RX ADMIN — GADOBUTROL 9 ML: 604.72 INJECTION INTRAVENOUS at 21:04

## 2024-02-14 DIAGNOSIS — K82.8 BILIARY DYSKINESIA: Primary | ICD-10-CM

## 2024-02-14 NOTE — PROGRESS NOTES
[facility]  Section of General Neurology  New Patient Visit      Jorge Denton MRN# [id number]   Age: 28 year old YOB: 1995              Assessment and Plan:   Assessment:  Domenico Denton is a pleasant 28 year old man who presents in consultation for diffuse symptoms that began after several respiratory infections including COVID.  He has seen neurology, rheumatology, ophthalmology, ID, urology among other services without a clear explanation.  We discussed that it is possible in totality we do not have a good cohesive explanation for his symptoms in totality but that we should look further.  If we do not, often times there can be post infectious phenomena that improves with time.    MRI brain reviewed as below, unrevealing.    His symptoms include nerve pain, diffuse paresthesias, intermittent R hemibody weakness, feelings of Ptosis on the right, R facial numbness,  diffuse fatigue among other symptoms that are quite debilitating.   He notes he has largely been unable to function for ~a month and can't work e.g. due to the fatigue.    We discussed his explicit reason for the referral in pudendal neuralgia (he has a long urological PMH additionally, has done pelvic floor therapy etc) and I would treat this symptomatically as we are his other symptoms.       Plan:  --MRI C spine, EMG RUE/RLE to further work up R hemibody weakness  --AchR, MUSK ab given symptom of worsening ptosis (has some at baseline)  --SSA, B1 given small fiber type symptoms (diffuse pains) as well as small fiber skin biopsy to be coordinated with EMG  --ESR/CRP  --Magnesium oxide 400 mg Riboflavin (vitamin b2) 400 mg daily, Imitrex PRN to further exclude migraine as a variable for head pains/sensations/photophobia  --PT for R sided weakness  --Follow up in 2-3 months to see how he is doing I will reach out with data as it returns, he can reach out with new questions        Ab Griffith MD   of  Neurology   [facility]      History of Presenting Symptoms:   Jorge Denton is a 28 year old male who presents today in consultation  Discussed/reviewed previous notes  He is unsure about the post COVID angle (did not infections prior to symptoms)   Hands burning, neurological pain.  Spreads to whole body   Sensation in feet like icycles  Face drooping, R eye/facial numbness, pressure behind ear  L eye ptosis at baseline  Ptosis in R eye worsening    3 respiratory illnesses back to back to back in November  He wonders about mold exposure.   R side is weaker.    Couldn't get out of bed x1 month given fatigue and weakness.    R side of body especially  PT?-- Hasn't tried  No migraine history  Eyes blood shot and red.   Has seen ophthalmology, rheumatology among other specialties as below     Does feel photophobic.   Low back pain hx/pelvic floor therapy helped a bit.   Previous incontinence      He lives in [place]  Unable to work    [facility] neurology note reviewed (Coco Wilkinson)    Assessment and Plan:  #. Post-Covid symptoms  -- Covid in November 2023  -- started having tingling in spots throughout the body  ---- feels like needle pricks and itching  -- difficulty taking a shower as his hands feel like sunburned  ---- will get random red lines on his hands  -- Feels clumsy with hands but has not dropped anything  -- feels things are getting progressively worse faster in the last couple weeks  -- Exam today was normal except for slight change in right thigh to touch and on right side of face however he was wearing a mask  -- Several labs done by PCP that were in range  -- Get remaining neuropathy labs  -- Schedule with rheumatology  -- Follow up with Dr Cerrato to discuss further management  ---- offered gabapentin but declined    #. Nutritional deficiencies.  -- as above    The differential diagnosis, prognosis, pathophysiology, etiology, and treatment options, side effects and  complications and the relative benefits of treatment options were discussed in detail with the patient and family. I will see the patient back in follow up with Dr Cerrato .       History of Present Illness:   HPI: Jorge Denton is a 28 y.o. male who presents for a neurological consultation for evaluation of tingling throughout body at the request of Vimal Gresham MD  Had Covid November 2023 and since then has been accumulating weird things. Started with blood shot eyes and saw eye doctors who felt this was his immune system. This has improved but then started with tingling throughout body. Last week started to have pain in hands like a sunburn that spread up to his arms and then started in his feet after being in the shower. Will get red lines over hands. Will randomly get needle pricks throughout body at random times and get itching sensations. Having difficulty sleeping. Sitting makes symtpoms worse but laying and standing make the sensation better.   Feels clumsy with hands but has not dropped anything and doesn't endorse weakness.   Was referred to rheumatology but has not been able to schedule yet  Family history of lupus (paternal grandmother) and MS mother and maternal grandmother    Past Medical History:     Past Medical History:   Diagnosis Date   Asthma 1/1/2019   Lyme disease   CLIFTON (obstructive sleep apnea)   doesn't tolerate CPAP   Psoriasis       Dr. Vera ID note reviewed  Assessment/Plan:   Domenico is a 28 yr old M with systemic symptoms consisting of intermittent paraesthesia and focal neurologic deficits, fatigue, abdominal pain, constipation, chronic nonbacterial prostatitis here to discuss both role of EBV in symptoms and assess for recurrent infections.  In regards to the EBV IgG positive, this indicates previous infection and adequate response by immune system. Domenico has no evidence of chronic active EBV typically presenting with fevers, lymphadenopathy and cell line dyscrasia. We explained  that some people do have a post-EBV syndrome with fatigue, similar to long covid. In regards to the recurrent infections, we did a detailed history of Domenico's infection history and at this time no concern for primary immune deficiency. Domenico's clinical history does not demonstrate any invasive bacterial or atypical infection infections in childhood or young adulthood. Review of the microbiological data not concerning for any atypical infection or prolonged indolent infection. At this time we do not believe any infection can fully explain this patient's presentations. We support the working evaluation of long covid and encouraged continue support of whole body health including sleep hygiene,  balanced nutrition, exercise, and mental health support.We do not anticipate any follow up with ID needed.           If new concerns arise I would be happy to see Jorge again at clinic sooner.        Referring Urology note reviewed:  ASSESSMENT:   R testis pain along with R inner thigh/groin numbness, R penile numbness, very consistent with pudendal neuralgia  Prior prostatic massage testing consistent with nonbacterial inflammatory prostatitis (chronic pelvic pain syndrome versus pudendal neuralgia ).  Complex regional pain syndrome is in the differential as well.  Previous anatomic workup has been negative.    Prior retrograde and antegrade semen analysis showing no retrograde semen and normal antegrade semen analysis, very low suspicion for ejaculatory duct obstruction so TRUS was not indicated to be performed today  Resolved hematospermia.  Complains also of green tinge to the semen persisting.  Does not take high-dose vitamins.        PLAN:  Neurology referral for further pudendal neuralgia workup  I gave him contact information for Dr. Nimesh Jean Jr also.  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  Symptomatic treatment recommended in the short-term, NSAIDs, PFPT.  Consider gabapentin prescription and/or diagnostic/therapeutic  nerve blocks with pain service or neurology.        Seen and Discussed with Dr Obando.  Magan Caceres MD  PGY2 Urology Resident     I saw and examined the patient with the resident today.  I agree with the note and plan of care as above.   No evidence of anything anatomical (such as tumor or kidney stone) or active infection.  Discrete unilateral perineal/genital symptoms are most consistent with pudendal neuralgia.  The mechanism of injury supports this as well.  I discussed with him that surgery for pudendal nerve release may be an option, and is generally the best chance for therapeutic cure, especially if there is nerve compression and when conservative measures have failed.  I do not see an indication for any continuing antibiotics at all.  There is no evidence of infection to treat.  Recommended observation of self-limited hematospermia, this is generally due to a fragile vein (akin to a nosebleed) and is not generally indicative of any deeper/concerning pathology.  Discussed there is not any indication for TRUS today, given normal prostate anatomy on prostate-specific MRI 6 months ago.       Neurology referral placed.  Recommended he consider seeing an interventional pain clinic such as [facility].  I gave him contact information and recommended he touch base with Dr. Ted Viera, who should know some national specialists in pudendal neuralgia.  Patient states he only has [place] insurance, but perhaps he would be a candidate for self-pay or open enrollment in the future.     I'm happy to see him back in the future as needed.     Joey Obando MD  Urology Staff        Past Medical History:     Patient Active Problem List   Diagnosis    Constipation    Generalized anxiety disorder    Atypical chest pain    Dyspnea    Exertional dyspnea    ADHD (attention deficit hyperactivity disorder)    Major depression in complete remission (H)    Esophageal dysphagia    Gastroesophageal reflux disease, unspecified whether  esophagitis present    Pelvic pain    Numbness    Pudendal neuralgia     Past Medical History:   Diagnosis Date    Allergic rhinitis     allergy testing positive for dustmites    Eczema     in earlier childhood    Gastroesophageal reflux disease     Uncomplicated asthma         Past Surgical History:     Past Surgical History:   Procedure Laterality Date    MASTECTOMY SUBCUTANEOUS MALE BILATERAL (GYNECOMASTIA)          Social History:     Social History     Tobacco Use    Smoking status: Never    Smokeless tobacco: Never    Tobacco comments:     no passive exposure   Vaping Use    Vaping Use: Never used   Substance Use Topics    Alcohol use: No    Drug use: No        Family History:     Family History   Problem Relation Age of Onset    Heart Disease Maternal Grandmother     Lipids Maternal Grandmother     Heart Disease Maternal Grandfather     Lipids Maternal Grandfather     Heart Disease Paternal Grandfather     Lipids Paternal Grandfather     Lipids Mother     Lipids Father     Lipids Paternal Grandmother     Thyroid Disease Maternal Grandmother         Thyroid nodule        Medications:     Current Outpatient Medications   Medication Sig    calcipotriene (DOVONOX) 0.005 % external ointment     calcipotriene (DOVONOX) 0.005 % external solution     fluticasone (FLOVENT HFA) 110 MCG/ACT inhaler Flovent  mcg/actuation aerosol inhaler   INHALE TWO PUFFS BY MOUTH TWICE A DAY    levalbuterol (XOPENEX HFA) 45 MCG/ACT inhaler Inhale 2 puffs into the lungs every 4 hours as needed for shortness of breath or wheezing    mometasone (ELOCON) 0.1 % external cream Apply topically daily    tadalafil (CIALIS) 5 MG tablet tadalafil 5 mg tablet   TAKE ONE TABLET BY MOUTH AT BEDTIME     No current facility-administered medications for this visit.        Allergies:     Allergies   Allergen Reactions    Dust Mites     Lactose Intolerance [Beta-Galactosidase]         Review of Systems:   As noted above     Physical Exam:  "  Vitals: /68   Pulse 68   Ht 1.88 m (6' 2\")   Wt 88.5 kg (195 lb)   BMI 25.04 kg/m       Neuro:   General Appearance: No apparent distress, well-nourished, well-groomed, pleasant     Mental Status: Alert and oriented to person, place, and time. Speech fluent and comprehension intact. No dysarthria.     Cranial Nerves:   II: Visual fields: normal  III: Pupils: 3 mm, equal, round, reactive to light   III,IV,VI: Extraocular Movements: intact   V: Facial sensation: felt to be less on R   VII: Facial strength: ptosis on L at baseline        Motor Exam:   Possible subtle weakness in R hemibody, variable, but good effort would estimate at 5/5 diffusely       Sensory: Light touch, vibration, and pinprick throughout felt to be at 75-80% on R compared to left    Coordination: no dysmetria with finger-to-nose bilaterally    Reflexes: biceps, triceps, brachioradialis, patellar, and ankle jerks 2+ and symmetric.        Data: Pertinent prior to visit   Imaging:  Narrative & Impression   EXAM: MR BRAIN W/O and W CONTRAST  LOCATION: [facility]  DATE: 2/13/2024     INDICATION: New neurological symtpoms  Parasthesias hands and feet, vision disturbance, eye pain  COMPARISON: None.  CONTRAST: 9mL Gadavist  TECHNIQUE: Routine multiplanar multisequence head MRI without and with intravenous contrast.     FINDINGS:  INTRACRANIAL CONTENTS: No acute or subacute infarct. No mass, acute hemorrhage, or extra-axial fluid collections. Normal brain parenchymal signal. Normal ventricles and sulci. Normal position of the cerebellar tonsils. 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     SELLA: No abnormality accounting for technique.     OSSEOUS STRUCTURES/SOFT TISSUES: Normal marrow signal. The major intracranial vascular flow voids are maintained.      ORBITS: No abnormality accounting for technique.      SINUSES/MASTOIDS: No mucosal thickening scattered about the ethmoid sinuses. " No middle ear or mastoid effusion.                                                                       IMPRESSION:  1.  No acute infarct, acute intracranial hemorrhage, or intracranial mass                  The total time of this encounter today amounted to 62 minutes. This time included time spent with the patient, prep work, ordering tests, and performing post visit documentation.

## 2024-02-14 NOTE — RESULT ENCOUNTER NOTE
Gwendolyn Acuña,    The report from your recent gallbladder scan is available for you to review.  This test did identify a reduced ejection fraction of the gallbladder at 18%, which is consistent with biliary dyskinesia/functional gallbladder disorder.  I recommend consultation with a general surgeon to determine if cholecystectomy is indicated.  I will place a referral for this and they will call you to schedule.    Sincerely,  Yonas PARKINSON [facility] GI, Hepatology, and Nutrition

## 2024-02-20 ENCOUNTER — THERAPY VISIT (OUTPATIENT)
Dept: OCCUPATIONAL THERAPY | Facility: CLINIC | Age: 29
End: 2024-02-20
Attending: PHYSICIAN ASSISTANT
Payer: COMMERCIAL

## 2024-02-20 DIAGNOSIS — R41.840 POST-COVID CHRONIC CONCENTRATION DEFICIT: ICD-10-CM

## 2024-02-20 DIAGNOSIS — U09.9 POST-COVID CHRONIC FATIGUE: ICD-10-CM

## 2024-02-20 DIAGNOSIS — G93.32 POST-COVID CHRONIC FATIGUE: ICD-10-CM

## 2024-02-20 DIAGNOSIS — U09.9 POST-COVID CHRONIC CONCENTRATION DEFICIT: ICD-10-CM

## 2024-02-20 PROCEDURE — 97535 SELF CARE MNGMENT TRAINING: CPT | Mod: GO

## 2024-02-20 PROCEDURE — 97166 OT EVAL MOD COMPLEX 45 MIN: CPT | Mod: GO

## 2024-02-20 NOTE — PROGRESS NOTES
OCCUPATIONAL THERAPY EVALUATION  Type of Visit: Evaluation    See electronic medical record for Abuse and Falls Screening details.    Subjective      Presenting condition or subjective complaint:    Date of onset: 02/05/24 (order date)    Relevant medical history:     Dates & types of surgery:      Prior diagnostic imaging/testing results:       Prior therapy history for the same diagnosis, illness or injury:        Occupational Profile: Patient is a 28 year old male who was referred to outpatient occupational therapy at request of Norma Hurtado PA-C to address post-covid related symptoms and impact on function. Past medical history is significant for  ADHD, Pudendal Neuralgia, GERD, depression, and anxiety. Patient was initially diagnosed 11/2023. He has been experiencing complications of diffuse symptoms. He has seen neurology, rheumatology, ophthalmology, infectious disease, PM&R, urology among other services without a clear explanation.  Labs have been relatively WNL and MRI 2/13/24 was unrevealing. He reports symptoms began 4 years ago when he had lymes disease resulting in increased fatigue.. Continuing symptoms include nerve pain, parathesias in hands and feet, facial numbness/dropping,  dry eyes, generalized weakness (R>L), fatigue, and challenges with memory/concentration. Symptoms have been quite debilitating and states he has a hard time getting out of bed. He questions if symptoms are covid related. Previous activity was full time work, taken a leave of [employer]. He will be seen by neurology 2/21/24.       Prior Level of Function  Transfers: Independent  Ambulation: Independent  ADL: Independent  IADL: Driving, Finances, Housekeeping, Laundry, Meal preparation, Work    Living Environment  Social support:   lives with girlfriend   Type of home:     Stairs to enter the home:         Ramp:     Stairs inside the home:         Help at home:    Equipment owned:       Employment:    currently on leave of  absense  Hobbies/Interests:      Patient goals for therapy:      Pain assessment:  stabbing nerve pain, headache 7/10     Objective     Fatigue     General Fatigue ADL, Work   Patient reports physical and cognitive fatigue impacting participation in self-care routine and household tasks. He states he has learned to plan and modify daily tasks in relation to symptom over the past 4 years. He endorses symptoms have become more impactful on daily functioning over the past few months and seeks answers from specialists     Cognitive Status Examination  Orientation: Oriented to person, place and time   Level of Consciousness: Alert  Follows Commands and Answers Questions: 100% of the time  Personal Safety and Judgement: Intact  Memory:  reports memory challenges over the past 4 years but has learned compensatory strategies  Attention:  reports challenges with concentration/focus but has learned to compensate  Patient states he feels confident implementing memory strategies including using calendar, writing things down, etc as well as attention strategies including breaking tasks up into smaller more achievable parts, taking breaks, decreasing distractions.    VISUAL SKILLS  History of dry eyes and right eye ptosis and following opthomology     SENSATION:  report nerve pain and parathesias in hands and feet      FUNCTIONAL MOBILITY  Assistive Device(s): None  Ambulation: IND    BED MOBILITY: Independent     TRANSFERS: Independent     BATHING: Independent     UPPER BODY DRESSING: Independent    LOWER BODY DRESSING: Independent     TOILETING: Independent     GROOMING: Independent    EATING/SELF FEEDING: Independent     Self-care routine is fatiguing and plans routine based on symptom severity       ACTIVITY TOLERANCE: Limited activity tolerance due to multiple symptoms including diffuse pain, parathesias, generalized weakness, and fatigue. Patient has gained insight how to perform activities based on symptom severity and how  to plan, pace, and prioritize tasks as well as take breaks. Encouraged balancing rest and activity to minimize fatigue cycle. He continues to seek answers from specialists as he questions symptoms are covid related    INSTRUMENTAL ACTIVITIES OF DAILY LIVING (IADL): limited participation in IADL's due to diffuse symptoms and states he has a difficult time time getting out of bed. He is receiving assistance from his girlfriend with daily activities as needed.  Meal Planning/Prep: Patient receives assistance for meals preparation and shopping. He states he plans meals and meal preps based on symptoms. He implements energy saving strategies including meal prepping for the week  Home/Financial Management: Receives assistance for household tasks.  Managing shedule: Pt tries to stay with a routine and verbalizes understanding of importance of following a schedule/routine and balancing rest with activity.  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  The following significant findings have been identified:  Diffuse symptoms including nerve pain, parathesias in hands and feet, facial numbness/dropping,  dry eyes, generalized weakness (R>L), fatigue, and challenges with memory/concentration .  These identified deficits interfere with their ability to perform self care tasks, work tasks, household chores, driving , household mobility, and meal planning and preparation as compared to previous level of function.    Clinical Decision Making  (Complexity):  Assessment of Occupational Performance: 3-5 Performance Deficits  Occupational Performance Limitations: bathing/showering, functional mobility, driving and community mobility, home establishment and management, meal preparation and cleanup, shopping, work, and leisure activities  Clinical Decision Making (Complexity): Moderate complexity    PLAN OF CARE  Treatment Interventions:  Interventions: Self-Care/Home Management    Long Term Goals   OT Goal 1  Goal Identifier: Fatigue Management  Goal Description: Patient  will verbalize 2-3 energy conservation strategies (e.g. pacing, planning, prioritizing, etc.) to facilitate physical and cognitive fatigue management with I/ADL  Target Date: 02/20/24  Date Met: 02/20/24      Frequency of Treatment: 1 visit  Duration of Treatment: 1 visit     Recommended Referrals to Other Professionals:  none  Education Assessment: Learner/Method: Patient;Listening     Risks and benefits of evaluation/treatment have been explained.   Patient/Family/caregiver agrees with Plan of Care.     Evaluation Time:    OT Koffi, Moderate Complexity Minutes (44907): 25    Signing Clinician: Clara Quiñonez, OTR/L, CNS, CSRS      [facility]                                                                                   OUTPATIENT OCCUPATIONAL THERAPY      PLAN OF TREATMENT FOR OUTPATIENT REHABILITATION   Patient's Last Name, First Name, Jorge Lorenzo YOB: 1995   Provider's Name   [facility]   Medical Record No.  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              Referring Provider:  Norma Hurtado    Initial Assessment  See Epic Evaluation- 02/20/24

## 2024-02-21 ENCOUNTER — OFFICE VISIT (OUTPATIENT)
Dept: NEUROLOGY | Facility: CLINIC | Age: 29
End: 2024-02-21
Payer: COMMERCIAL

## 2024-02-21 ENCOUNTER — PRE VISIT (OUTPATIENT)
Dept: NEUROLOGY | Facility: CLINIC | Age: 29
End: 2024-02-21

## 2024-02-21 VITALS
HEIGHT: 74 IN | WEIGHT: 195 LBS | BODY MASS INDEX: 25.03 KG/M2 | HEART RATE: 68 BPM | SYSTOLIC BLOOD PRESSURE: 135 MMHG | DIASTOLIC BLOOD PRESSURE: 68 MMHG

## 2024-02-21 DIAGNOSIS — M79.601 PARESTHESIA AND PAIN OF BOTH UPPER EXTREMITIES: ICD-10-CM

## 2024-02-21 DIAGNOSIS — R53.1 WEAKNESS OF RIGHT SIDE OF BODY: ICD-10-CM

## 2024-02-21 DIAGNOSIS — M79.2 NERVE PAIN: ICD-10-CM

## 2024-02-21 DIAGNOSIS — H02.403 PTOSIS OF BOTH EYELIDS: Primary | ICD-10-CM

## 2024-02-21 DIAGNOSIS — G58.8 PUDENDAL NEURALGIA: ICD-10-CM

## 2024-02-21 DIAGNOSIS — M79.602 PARESTHESIA AND PAIN OF BOTH UPPER EXTREMITIES: ICD-10-CM

## 2024-02-21 DIAGNOSIS — G43.009 MIGRAINE WITHOUT AURA AND WITHOUT STATUS MIGRAINOSUS, NOT INTRACTABLE: ICD-10-CM

## 2024-02-21 DIAGNOSIS — R20.2 PARESTHESIA AND PAIN OF BOTH UPPER EXTREMITIES: ICD-10-CM

## 2024-02-21 DIAGNOSIS — R20.0 NUMBNESS: ICD-10-CM

## 2024-02-21 DIAGNOSIS — R20.2 PARESTHESIA OF BOTH FEET: ICD-10-CM

## 2024-02-21 PROCEDURE — 86235 NUCLEAR ANTIGEN ANTIBODY: CPT | Performed by: STUDENT IN AN ORGANIZED HEALTH CARE EDUCATION/TRAINING PROGRAM

## 2024-02-21 PROCEDURE — 86140 C-REACTIVE PROTEIN: CPT | Performed by: STUDENT IN AN ORGANIZED HEALTH CARE EDUCATION/TRAINING PROGRAM

## 2024-02-21 PROCEDURE — 99000 SPECIMEN HANDLING OFFICE-LAB: CPT | Performed by: STUDENT IN AN ORGANIZED HEALTH CARE EDUCATION/TRAINING PROGRAM

## 2024-02-21 PROCEDURE — 83516 IMMUNOASSAY NONANTIBODY: CPT | Mod: 90 | Performed by: STUDENT IN AN ORGANIZED HEALTH CARE EDUCATION/TRAINING PROGRAM

## 2024-02-21 PROCEDURE — 86255 FLUORESCENT ANTIBODY SCREEN: CPT | Mod: 90 | Performed by: STUDENT IN AN ORGANIZED HEALTH CARE EDUCATION/TRAINING PROGRAM

## 2024-02-21 PROCEDURE — 83519 RIA NONANTIBODY: CPT | Mod: 90 | Performed by: STUDENT IN AN ORGANIZED HEALTH CARE EDUCATION/TRAINING PROGRAM

## 2024-02-21 PROCEDURE — 84425 ASSAY OF VITAMIN B-1: CPT | Mod: 90 | Performed by: STUDENT IN AN ORGANIZED HEALTH CARE EDUCATION/TRAINING PROGRAM

## 2024-02-21 PROCEDURE — 36415 COLL VENOUS BLD VENIPUNCTURE: CPT | Performed by: STUDENT IN AN ORGANIZED HEALTH CARE EDUCATION/TRAINING PROGRAM

## 2024-02-21 PROCEDURE — 99205 OFFICE O/P NEW HI 60 MIN: CPT | Performed by: STUDENT IN AN ORGANIZED HEALTH CARE EDUCATION/TRAINING PROGRAM

## 2024-02-21 RX ORDER — SUMATRIPTAN 50 MG/1
50 TABLET, FILM COATED ORAL
Qty: 9 TABLET | Refills: 5 | Status: SHIPPED | OUTPATIENT
Start: 2024-02-21 | End: 2024-04-09

## 2024-02-21 RX ORDER — GABAPENTIN 300 MG/1
CAPSULE ORAL
Qty: 90 CAPSULE | Refills: 4 | Status: SHIPPED | OUTPATIENT
Start: 2024-02-21 | End: 2024-04-09

## 2024-02-21 NOTE — LETTER
2/21/2024         RE: Jorge Denton  [address]  [place] [address]        Dear Colleague,    Thank you for referring your patient, Jorge Denton, to the [facility] [place]. Please see a copy of my visit note below.    [facility]  Section of General Neurology  New Patient Visit      Jorge Denton MRN# [id number]   Age: 28 year old YOB: 1995              Assessment and Plan:   Assessment:  Domenico Denton is a pleasant 28 year old man who presents in consultation for diffuse symptoms that began after several respiratory infections including COVID.  He has seen neurology, rheumatology, ophthalmology, ID, urology among other services without a clear explanation.  We discussed that it is possible in totality we do not have a good cohesive explanation for his symptoms in totality but that we should look further.  If we do not, often times there can be post infectious phenomena that improves with time.    MRI brain reviewed as below, unrevealing.    His symptoms include nerve pain, diffuse paresthesias, intermittent R hemibody weakness, feelings of Ptosis on the right, R facial numbness,  diffuse fatigue among other symptoms that are quite debilitating.   He notes he has largely been unable to function for ~a month and can't work e.g. due to the fatigue.    We discussed his explicit reason for the referral in pudendal neuralgia (he has a long urological PMH additionally, has done pelvic floor therapy etc) and I would treat this symptomatically as we are his other symptoms.       Plan:  --MRI C spine, EMG RUE/RLE to further work up R hemibody weakness  --AchR, MUSK ab given symptom of worsening ptosis (has some at baseline)  --SSA, B1 given small fiber type symptoms (diffuse pains) as well as small fiber skin biopsy to be coordinated with EMG  --ESR/CRP  --Magnesium oxide 400 mg Riboflavin (vitamin b2) 400 mg daily, Imitrex PRN to further  exclude migraine as a variable for head pains/sensations/photophobia  --PT for R sided weakness  --Follow up in 2-3 months to see how he is doing I will reach out with data as it returns, he can reach out with new questions        Ab Griffith MD   of Neurology   [facility]      History of Presenting Symptoms:   Jorge Denton is a 28 year old male who presents today in consultation  Discussed/reviewed previous notes  He is unsure about the post COVID angle (did not infections prior to symptoms)   Hands burning, neurological pain.  Spreads to whole body   Sensation in feet like icycles  Face drooping, R eye/facial numbness, pressure behind ear  L eye ptosis at baseline  Ptosis in R eye worsening    3 respiratory illnesses back to back to back in November  He wonders about mold exposure.   R side is weaker.    Couldn't get out of bed x1 month given fatigue and weakness.    R side of body especially  PT?-- Hasn't tried  No migraine history  Eyes blood shot and red.   Has seen ophthalmology, rheumatology among other specialties as below     Does feel photophobic.   Low back pain hx/pelvic floor therapy helped a bit.   Previous incontinence      He lives in [place]  Unable to work    [facility] neurology note reviewed (Coco Wilkinson)    Assessment and Plan:  #. Post-Covid symptoms  -- Covid in November 2023  -- started having tingling in spots throughout the body  ---- feels like needle pricks and itching  -- difficulty taking a shower as his hands feel like sunburned  ---- will get random red lines on his hands  -- Feels clumsy with hands but has not dropped anything  -- feels things are getting progressively worse faster in the last couple weeks  -- Exam today was normal except for slight change in right thigh to touch and on right side of face however he was wearing a mask  -- Several labs done by PCP that were in range  -- Get remaining neuropathy labs  -- Schedule with  rheumatology  -- Follow up with Dr Cerrato to discuss further management  ---- offered gabapentin but declined    #. Nutritional deficiencies.  -- as above    The differential diagnosis, prognosis, pathophysiology, etiology, and treatment options, side effects and complications and the relative benefits of treatment options were discussed in detail with the patient and family. I will see the patient back in follow up with Dr Cerrato .       History of Present Illness:   HPI: Jorge Denton is a 28 y.o. male who presents for a neurological consultation for evaluation of tingling throughout body at the request of Vimal Gresham MD  Had Covid November 2023 and since then has been accumulating weird things. Started with blood shot eyes and saw eye doctors who felt this was his immune system. This has improved but then started with tingling throughout body. Last week started to have pain in hands like a sunburn that spread up to his arms and then started in his feet after being in the shower. Will get red lines over hands. Will randomly get needle pricks throughout body at random times and get itching sensations. Having difficulty sleeping. Sitting makes symtpoms worse but laying and standing make the sensation better.   Feels clumsy with hands but has not dropped anything and doesn't endorse weakness.   Was referred to rheumatology but has not been able to schedule yet  Family history of lupus (paternal grandmother) and MS mother and maternal grandmother    Past Medical History:     Past Medical History:   Diagnosis Date   Asthma 1/1/2019   Lyme disease   CLIFTON (obstructive sleep apnea)   doesn't tolerate CPAP   Psoriasis       Dr. Vera ID note reviewed  Assessment/Plan:   Domenico is a 28 yr old M with systemic symptoms consisting of intermittent paraesthesia and focal neurologic deficits, fatigue, abdominal pain, constipation, chronic nonbacterial prostatitis here to discuss both role of EBV in symptoms and assess for  recurrent infections.  In regards to the EBV IgG positive, this indicates previous infection and adequate response by immune system. Domenico has no evidence of chronic active EBV typically presenting with fevers, lymphadenopathy and cell line dyscrasia. We explained that some people do have a post-EBV syndrome with fatigue, similar to long covid. In regards to the recurrent infections, we did a detailed history of Domenico's infection history and at this time no concern for primary immune deficiency. Domenico's clinical history does not demonstrate any invasive bacterial or atypical infection infections in childhood or young adulthood. Review of the microbiological data not concerning for any atypical infection or prolonged indolent infection. At this time we do not believe any infection can fully explain this patient's presentations. We support the working evaluation of long covid and encouraged continue support of whole body health including sleep hygiene,  balanced nutrition, exercise, and mental health support.We do not anticipate any follow up with ID needed.           If new concerns arise I would be happy to see Jorge again at clinic sooner.        Referring Urology note reviewed:  ASSESSMENT:   R testis pain along with R inner thigh/groin numbness, R penile numbness, very consistent with pudendal neuralgia  Prior prostatic massage testing consistent with nonbacterial inflammatory prostatitis (chronic pelvic pain syndrome versus pudendal neuralgia ).  Complex regional pain syndrome is in the differential as well.  Previous anatomic workup has been negative.    Prior retrograde and antegrade semen analysis showing no retrograde semen and normal antegrade semen analysis, very low suspicion for ejaculatory duct obstruction so TRUS was not indicated to be performed today  Resolved hematospermia.  Complains also of green tinge to the semen persisting.  Does not take high-dose vitamins.        PLAN:  Neurology referral for  further pudendal neuralgia workup  I gave him contact information for Dr. Nimesh Jean Jr also.  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  Symptomatic treatment recommended in the short-term, NSAIDs, PFPT.  Consider gabapentin prescription and/or diagnostic/therapeutic nerve blocks with pain service or neurology.        Seen and Discussed with Dr Obando.  Magan Caceres MD  PGY2 Urology Resident     I saw and examined the patient with the resident today.  I agree with the note and plan of care as above.   No evidence of anything anatomical (such as tumor or kidney stone) or active infection.  Discrete unilateral perineal/genital symptoms are most consistent with pudendal neuralgia.  The mechanism of injury supports this as well.  I discussed with him that surgery for pudendal nerve release may be an option, and is generally the best chance for therapeutic cure, especially if there is nerve compression and when conservative measures have failed.  I do not see an indication for any continuing antibiotics at all.  There is no evidence of infection to treat.  Recommended observation of self-limited hematospermia, this is generally due to a fragile vein (akin to a nosebleed) and is not generally indicative of any deeper/concerning pathology.  Discussed there is not any indication for TRUS today, given normal prostate anatomy on prostate-specific MRI 6 months ago.       Neurology referral placed.  Recommended he consider seeing an interventional pain clinic such as [facility].  I gave him contact information and recommended he touch base with Dr. Ted Viera, who should know some national specialists in pudendal neuralgia.  Patient states he only has [place] insurance, but perhaps he would be a candidate for self-pay or open enrollment in the future.     I'm happy to see him back in the future as needed.     Joey Obando MD  Urology Staff        Past Medical History:     Patient Active Problem List   Diagnosis     Constipation      Generalized anxiety disorder     Atypical chest pain     Dyspnea     Exertional dyspnea     ADHD (attention deficit hyperactivity disorder)     Major depression in complete remission (H)     Esophageal dysphagia     Gastroesophageal reflux disease, unspecified whether esophagitis present     Pelvic pain     Numbness     Pudendal neuralgia     Past Medical History:   Diagnosis Date     Allergic rhinitis     allergy testing positive for dustmites     Eczema     in earlier childhood     Gastroesophageal reflux disease      Uncomplicated asthma         Past Surgical History:     Past Surgical History:   Procedure Laterality Date     MASTECTOMY SUBCUTANEOUS MALE BILATERAL (GYNECOMASTIA)          Social History:     Social History     Tobacco Use     Smoking status: Never     Smokeless tobacco: Never     Tobacco comments:     no passive exposure   Vaping Use     Vaping Use: Never used   Substance Use Topics     Alcohol use: No     Drug use: No        Family History:     Family History   Problem Relation Age of Onset     Heart Disease Maternal Grandmother      Lipids Maternal Grandmother      Heart Disease Maternal Grandfather      Lipids Maternal Grandfather      Heart Disease Paternal Grandfather      Lipids Paternal Grandfather      Lipids Mother      Lipids Father      Lipids Paternal Grandmother      Thyroid Disease Maternal Grandmother         Thyroid nodule        Medications:     Current Outpatient Medications   Medication Sig     calcipotriene (DOVONOX) 0.005 % external ointment      calcipotriene (DOVONOX) 0.005 % external solution      fluticasone (FLOVENT HFA) 110 MCG/ACT inhaler Flovent  mcg/actuation aerosol inhaler   INHALE TWO PUFFS BY MOUTH TWICE A DAY     levalbuterol (XOPENEX HFA) 45 MCG/ACT inhaler Inhale 2 puffs into the lungs every 4 hours as needed for shortness of breath or wheezing     mometasone (ELOCON) 0.1 % external cream Apply topically daily     tadalafil (CIALIS) 5 MG tablet tadalafil  "5 mg tablet   TAKE ONE TABLET BY MOUTH AT BEDTIME     No current facility-administered medications for this visit.        Allergies:     Allergies   Allergen Reactions     Dust Mites      Lactose Intolerance [Beta-Galactosidase]         Review of Systems:   As noted above     Physical Exam:   Vitals: /68   Pulse 68   Ht 1.88 m (6' 2\")   Wt 88.5 kg (195 lb)   BMI 25.04 kg/m       Neuro:   General Appearance: No apparent distress, well-nourished, well-groomed, pleasant     Mental Status: Alert and oriented to person, place, and time. Speech fluent and comprehension intact. No dysarthria.     Cranial Nerves:   II: Visual fields: normal  III: Pupils: 3 mm, equal, round, reactive to light   III,IV,VI: Extraocular Movements: intact   V: Facial sensation: felt to be less on R   VII: Facial strength: ptosis on L at baseline        Motor Exam:   Possible subtle weakness in R hemibody, variable, but good effort would estimate at 5/5 diffusely       Sensory: Light touch, vibration, and pinprick throughout felt to be at 75-80% on R compared to left    Coordination: no dysmetria with finger-to-nose bilaterally    Reflexes: biceps, triceps, brachioradialis, patellar, and ankle jerks 2+ and symmetric.        Data: Pertinent prior to visit   Imaging:  Narrative & Impression   EXAM: MR BRAIN W/O and W CONTRAST  LOCATION: [facility]  DATE: 2/13/2024     INDICATION: New neurological symtpoms  Parasthesias hands and feet, vision disturbance, eye pain  COMPARISON: None.  CONTRAST: 9mL Gadavist  TECHNIQUE: Routine multiplanar multisequence head MRI without and with intravenous contrast.     FINDINGS:  INTRACRANIAL CONTENTS: No acute or subacute infarct. No mass, acute hemorrhage, or extra-axial fluid collections. Normal brain parenchymal signal. Normal ventricles and sulci. Normal position of the cerebellar tonsils. 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     SELLA: No " abnormality accounting for technique.     OSSEOUS STRUCTURES/SOFT TISSUES: Normal marrow signal. The major intracranial vascular flow voids are maintained.      ORBITS: No abnormality accounting for technique.      SINUSES/MASTOIDS: No mucosal thickening scattered about the ethmoid sinuses. No middle ear or mastoid effusion.                                                                       IMPRESSION:  1.  No acute infarct, acute intracranial hemorrhage, or intracranial mass                  The total time of this encounter today amounted to 62 minutes. This time included time spent with the patient, prep work, ordering tests, and performing post visit documentation.      Again, thank you for allowing me to participate in the care of your patient.        Sincerely,        Ephraim Griffith MD

## 2024-02-21 NOTE — NURSING NOTE
"Jorge Denton's goals for this visit include:   Chief Complaint   Patient presents with    New Patient     right side motor skill issues, right side of face drooping/numbness, per p           He requests these members of his care team be copied on today's visit information: yes    PCP: Chelsea Mireles    Referring Provider:  Joey Obando MD  [address] [facility] [address]  [place] [address]    /68   Pulse 68   Ht 1.88 m (6' 2\")   Wt 88.5 kg (195 lb)   BMI 25.04 kg/m      Do you need any medication refills at today's visit? No  GREG Sarabia, LYNDSAY ([facility])      "

## 2024-02-21 NOTE — PATIENT INSTRUCTIONS
Magnesium oxide 400 mg Riboflavin (vitamin b2) 400 mg daily  Imitrex 50 mg as needed     Future option: brief steroids, other nerve pain options    Follow up in 2-3 months

## 2024-02-22 ENCOUNTER — TELEPHONE (OUTPATIENT)
Dept: PHYSICAL MEDICINE AND REHAB | Facility: CLINIC | Age: 29
End: 2024-02-22

## 2024-02-22 LAB
CRP SERPL-MCNC: <3 MG/L
ENA SS-A AB SER IA-ACNC: <0.5 U/ML
ENA SS-A AB SER IA-ACNC: NEGATIVE
ENA SS-B IGG SER IA-ACNC: <0.6 U/ML
ENA SS-B IGG SER IA-ACNC: NEGATIVE

## 2024-02-22 NOTE — TELEPHONE ENCOUNTER
LVM x 2, sent MyC for pt to resched todays canceled virtual appt with Dr. Hurtado on 2/22/24.    2/22/24 BD

## 2024-02-24 LAB
ACHR MOD AB/ACHR TOTAL SFR SER: 0 %
VIT B1 PYROPHOSHATE BLD-SCNC: 380 NMOL/L

## 2024-02-25 LAB
ACHR BIND AB SER-SCNC: 0 NMOL/L
ACHR BLOCK AB/ACHR TOTAL SFR SER: 9 %
STRIA MUS IGG SER QL IF: NORMAL

## 2024-02-26 LAB — MUSK AB SER QL: NORMAL

## 2024-02-27 ENCOUNTER — VIRTUAL VISIT (OUTPATIENT)
Dept: PHYSICAL MEDICINE AND REHAB | Facility: CLINIC | Age: 29
End: 2024-02-27
Payer: COMMERCIAL

## 2024-02-27 VITALS — BODY MASS INDEX: 25.03 KG/M2 | HEIGHT: 74 IN | WEIGHT: 195 LBS

## 2024-02-27 DIAGNOSIS — K82.8 BILIARY DYSKINESIA: Primary | ICD-10-CM

## 2024-02-27 DIAGNOSIS — E83.52 SERUM CALCIUM ELEVATED: ICD-10-CM

## 2024-02-27 PROCEDURE — 99215 OFFICE O/P EST HI 40 MIN: CPT | Mod: 95 | Performed by: PHYSICIAN ASSISTANT

## 2024-02-27 SDOH — SOCIAL STABILITY: SOCIAL NETWORK: I HAVE TROUBLE DOING ALL OF MY REGULAR LEISURE ACTIVITIES WITH OTHERS: ALWAYS

## 2024-02-27 SDOH — SOCIAL STABILITY: SOCIAL NETWORK: PROMIS ABILITY TO PARTICIPATE IN SOCIAL ROLES & ACTIVITIES T-SCORE: 29

## 2024-02-27 SDOH — SOCIAL STABILITY: SOCIAL NETWORK: I HAVE TROUBLE DOING ALL OF THE FAMILY ACTIVITIES THAT I WANT TO DO: ALWAYS

## 2024-02-27 SDOH — SOCIAL STABILITY: SOCIAL NETWORK: I HAVE TROUBLE DOING ALL OF THE ACTIVITIES WITH FRIENDS THAT I WANT TO DO: ALWAYS

## 2024-02-27 SDOH — SOCIAL STABILITY: SOCIAL NETWORK: I HAVE TROUBLE DOING ALL OF MY USUAL WORK (INCLUDE WORK AT HOME): ALWAYS

## 2024-02-27 SDOH — SOCIAL STABILITY: SOCIAL NETWORK

## 2024-02-27 ASSESSMENT — PATIENT HEALTH QUESTIONNAIRE - PHQ9
10. IF YOU CHECKED OFF ANY PROBLEMS, HOW DIFFICULT HAVE THESE PROBLEMS MADE IT FOR YOU TO DO YOUR WORK, TAKE CARE OF THINGS AT HOME, OR GET ALONG WITH OTHER PEOPLE: NOT DIFFICULT AT ALL
SUM OF ALL RESPONSES TO PHQ QUESTIONS 1-9: 3
SUM OF ALL RESPONSES TO PHQ QUESTIONS 1-9: 3

## 2024-02-27 ASSESSMENT — PAIN SCALES - GENERAL: PAINLEVEL: SEVERE PAIN (7)

## 2024-02-27 NOTE — PROGRESS NOTES
Jorge Denton is a 28 year old male who presents to be evaluated for a billable video visit.    Video-Visit Details    Video visit Start time: 8:39 am    Type of service:  Video Visit    Video End Time: 9:07 A<    Originating Location (pt. Location): Home    Distant Location (provider location):  Off- Site    Platform used for Video Visit: [facility]    Assessment/Impression   1. Paresthesia and pain of both upper extremities/Post-COVID chronic neurologic symptoms/ Paresthesia of both feet  Patient with new paresthesias/pain in upper extremities and on feet as well as intermittent numbness on side of face.  Patient also endorses recent visual changes including vision pain and has a history of pudendal neuralgia.  He is working with neurology and appreciate recommendations.      2. 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 Encouraged to continue energy conservation and practicing techniques given by occupational therapy.      3. Biliary dyskinesia  Patient with ongoing abdominal difficulties that sound very similar to possible gallbladder disease.  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  He has been referred for a surgical consult and encouraged to schedule. He wishes to also see another GI specialist and will placed referral to [facility].  He would also like to see Integrative medicine for options that are non surgical will refer to [facility] for healing  - Adult GI  referral; Future  - Other speciality referral; future     4. Serum calcium elevated  Patient did have mildly elevated  serum calcium.  PTH was normal will recheck CMP as it has been one month and also recheck check ionized calcium. Discussed limiting dietary calcium if still elevated.   - Complete Metabolic Panel; Future  - Ionized Calcium; Future       Plan:  I reviewed present knowledge on long-Covid.   Education was provided and question were answered.  Orders/Referrals as above  I will advised patient on test results  I will follow up with Jorge Denton in 3 months. I will review progress and consider need for any other therapeutic interventions. If there are any questions and/or concerns he will call the clinic.      On day of encounter time spent in chart review and with patient in consultation, exam, education,coordination of care,  review of outside charts/documentation and documentation:  40 minutes     I have attempted to proof read for major spelling errors and apologize for any minor errors I may have missed.     This note was dictated using voice recognition software. Any grammatical or context distortions are unintentional and inherent to the software.  _________________________________  CONCEPCIÓN Coreas [facility] PHYSICAL MEDICINE AND REHABILITATION CLINIC [facility]   This 28 year old male presents to the [facility] Rehabilitation Medicine Post-COVID clinic as a new consult on 2/5/24 to evaluate continuing symptoms after COVID infection initially diagnosed 11/2023.  Jorge Denton initially developed  shortness of breath, headache, generalized aches and pains, brain fog, fatigue, weakness, decreased taste, and decreased smell. Treatment was symptomatic.  Jorge Denton experienced complications of chronic neurological symptoms. For full details from the initial consult please see note from 2/5/24.  Today patient returns for a follow up.  He has [facility] Rheumat[facility] and is undergoing work up.  He was also seen by ID for his Elevated IgG for EBV.  He was seen by Neurology for his on going neurological symptoms and will be having a MRI-Cpsine, EMG and labs. His MRI brain was normal.  Patient still having GI issues and feels his pain is getting worse. His HIDA scan showed functional gallbladder disorder.  Patient still having ptosis  and right side numbness.  Overall patient still with same symptoms and worsening abdominal pain.     Current concerns: Health Concerns        2/27/2024     1:45 AM   PHQ Assesment Total Score(s)   PHQ-9 Score 3           2/20/2024    12:43 AM   LANNY-7 Results   LANNY 7 TOTAL SCORE 0 (minimal anxiety)   LANNY-7 Total Score 0    0    0         2/27/2024     1:45 AM   PTSD Screen Score   Have you ever experienced this kind of event? No         2/27/2024     1:46 AM   PROMIS-29   PROMIS Physical Function T-Score 27 (severe dysfunction)   PROMIS Anxiety T-Score 51 (within normal limits)   PROMIS Depression T-Score 41 (within normal limits)   PROMIS Fatigue T-Score 76 (severe)   PROMIS Sleep Disturbance T-Score 52 (within normal limits)   PROMIS Ability to Participate in Social Roles & Activities T-Score 29 (severe dysfunction)   PROMIS Pain Interference T-Score 67 (moderate)   PROMIS Pain Intensity 7       Past Medical History:   Diagnosis Date    Allergic rhinitis     allergy testing positive for dustmites    Eczema     in earlier childhood    Gastroesophageal reflux disease     Uncomplicated asthma        Past Surgical History:   Procedure Laterality Date    MASTECTOMY SUBCUTANEOUS MALE BILATERAL (GYNECOMASTIA)         Family History   Problem Relation Age of Onset    Heart Disease Maternal Grandmother     Lipids Maternal Grandmother     Heart Disease Maternal Grandfather     Lipids Maternal Grandfather     Heart Disease Paternal Grandfather     Lipids Paternal Grandfather     Lipids Mother     Lipids Father     Lipids Paternal Grandmother     Thyroid Disease Maternal Grandmother         Thyroid nodule       Social History     Tobacco Use    Smoking status: Never    Smokeless tobacco: Never    Tobacco comments:     no passive exposure   Vaping Use    Vaping Use: Never used   Substance Use Topics    Alcohol use: No    Drug use: No         Current Outpatient Medications:     calcipotriene (DOVONOX) 0.005 % external ointment,  , Disp: , Rfl:     calcipotriene (DOVONOX) 0.005 % external solution, , Disp: , Rfl:     fluticasone (FLOVENT HFA) 110 MCG/ACT inhaler, Flovent  mcg/actuation aerosol inhaler  INHALE TWO PUFFS BY MOUTH TWICE A DAY, Disp: , Rfl:     gabapentin (NEURONTIN) 300 MG capsule, Take 300 mg at night, then up titrate to 300 mg in AM/600 mg at night, Disp: 90 capsule, Rfl: 4    levalbuterol (XOPENEX HFA) 45 MCG/ACT inhaler, Inhale 2 puffs into the lungs every 4 hours as needed for shortness of breath or wheezing, Disp: , Rfl:     mometasone (ELOCON) 0.1 % external cream, Apply topically daily, Disp: , Rfl:     SUMAtriptan (IMITREX) 50 MG tablet, Take 1 tablet (50 mg) by mouth at onset of headache for migraine May repeat in 2 hours. Max 4 tablets/24 hours., Disp: 9 tablet, Rfl: 5    tadalafil (CIALIS) 5 MG tablet, tadalafil 5 mg tablet  TAKE ONE TABLET BY MOUTH AT BEDTIME, Disp: , Rfl:     Review of Systems   Constitutional, HEENT, cardiovascular, pulmonary, GI, , musculoskeletal, neuro, skin, endocrine and psych systems are negative, except as otherwise noted.      Objective         Vitals:  No vitals were obtained today due to virtual visit.    Physical Exam   EYES: Eyes grossly normal to inspection.  No discharge or erythema, or obvious scleral/conjunctival abnormalities.  SKIN: Visible skin clear. No significant rash, abnormal pigmentation or lesions.  NEURO: Cranial nerves grossly intact.  Mentation and speech appropriate for age.  GENERAL: Healthy, alert and no distress  RESP: No audible wheeze, cough, or visible cyanosis.  No visible retractions or increased work of breathing.    PSYCH: Mentation appears normal, affect normal/bright, judgement and insight intact, normal speech and appearance well-groomed.        Labs:   Office Visit on 02/21/2024   Component Date Value Ref Range Status    SSA Ines IgG Instrument Value 02/21/2024 <0.5  <7.0 U/mL Final    SSA (Ro) Antibody IgG 02/21/2024 Negative  Negative Final     SSB Ines IgG Instrument Value 02/21/2024 <0.6  <7.0 U/mL Final    SSB (La) Antibody IgG 02/21/2024 Negative  Negative Final    Vitamin B1 Whole Blood Level 02/21/2024 380 (H)  70 - 180 nmol/L Final    INTERPRETIVE INFORMATION: Vitamin B1, Whole Blood    This assay measures the concentration of thiamine   diphosphate (TDP), the primary active form of vitamin B1.   Approximately 90 percent of vitamin B1 present in whole   blood is TDP. Thiamine and thiamine monophosphate, which   comprise the remaining 10 percent, are not measured.    This test was developed and its performance characteristics   determined by [facility]. It has not been cleared or   approved by the US Food and Drug Administration. This test   was performed in a CLIA certified laboratory and is   intended for clinical purposes.  Performed By: [facility]  [address] [place] [address]  : Tim Hernandez MD, PhD  CLIA Number: [id number]    Acetylcholine Binding Antibody 02/21/2024 0.0  0.0 - 0.4 nmol/L Final    Comment: INTERPRETIVE INFORMATION: Acetylcholine Binding Ab      Negative ....... 0.0 - 0.4 nmol/L    Positive ....... 0.5 nmol/L or greater    Approximately 85-90 percent of patients with myasthenia   gravis (MG) express antibodies to the acetylcholine   receptor (AChR), which can be divided into binding,   blocking, and modulating antibodies. Binding antibody can   activate complement and lead to loss of AChR. Blocking   antibody may impair binding of acetylcholine to the   receptor, leading to poor muscle contraction. Modulating   antibody causes receptor endocytosis resulting in loss of   AChR expression, which correlates most closely with   clinical severity of disease. Approximately 10-15 percent   of individuals with confirmed myasthenia gravis have no   measurable binding, blocking, or modulating antibodies.    This test was developed and its performance characteristics   determined by [facility]. It has not been cleared or   approved by the US Food and Drug Administration. This                            test   was performed in a CLIA certified laboratory and is   intended for clinical purposes.  Performed By: [facility]  [address] [place] [id number]  : Tim Hernandez MD, PhD  CLIA Number: [id number]    Striated Muscle Antibodies, IgG Sc* 02/21/2024 <1:40  <1:40 Final    Comment:   Striated Muscle Antibodies, IgG are not detected. No   further testing will be performed.  INTERPRETIVE DATA:  Striated Muscle Antibodies, IgG Screen    In the presence of acetylcholine receptor (AChR) antibody,   striated muscle antibodies, which bind in a   cross-striational pattern to skeletal and heart muscle   tissue sections, are associated with late-onset myasthenia   gravis (MG). Striated muscle antibodies recognize epitopes   on three major muscle proteins, including: titin, ryanodine   receptor (RyR) and Kv1.4 (an alpha subunit of voltage-gated   potassium channel [VGKC]). Isolated cases of striated   muscle antibodies may be seen in patients with certain   autoimmune diseases, rheumatic fever, myocardial   infarction, and following some cardiotomy procedures.    This test was developed and its performance characteristics   determined by [facility]. It has not been cleared or   approved by the US Food and Drug Administration. This test   was performed in a CLIA                            certified laboratory and is   intended for clinical purposes.  Performed By: [facility]  [address] [place] [id number]  : Tim Hernandez MD, PhD  CLIA Number: [id number]    AcetChol Modul Ines 02/21/2024 0  <=45 % Final    Comment: INTERPRETIVE INFORMATION: Acetylcholine Modulating Ab      Negative ..........  0-45 percent modulating    Positive ..........  46 percent or greater modulating    Approximately 85-90 percent of patients  with myasthenia   gravis (MG) express antibodies to the acetylcholine   receptor (AChR), which can be divided into binding,   blocking, and modulating antibodies. Binding antibody can   activate complement and lead to loss of AChR. Blocking   antibody may impair binding of acetylcholine to the   receptor, leading to poor muscle contraction. Modulating   antibody causes receptor endocytosis resulting in loss of   AChR expression, which correlates most closely with   clinical severity of disease. Approximately 10-15 percent   of individuals with confirmed myasthenia gravis have no   measurable binding, blocking, or modulating antibodies.    This test was developed and its performance characteristics   determined by [facility]. It has not been cleared or   approved by the [place] Food                            and Drug Administration. This test   was performed in a CLIA certified laboratory and is   intended for clinical purposes.  Performed By: [facility]  [address] [place] [address]  : Tim Hernandez MD, PhD  CLIA Number: [id number]    AcetChol Block Ines 02/21/2024 9  0 - 26 % Final    Comment: INTERPRETIVE INFORMATION: Acetylcholine Blocking Ab      Negative ............  0-26 percent blocking    Indeterminate ....... 27-41 percent blocking    Positive ............ 42 percent or greater blocking    Approximately 85-90 percent of patients with myasthenia   gravis (MG) express antibodies to the acetylcholine   receptor (AChR), which can be divided into binding,   blocking, and modulating antibodies. Binding antibody can   activate complement and lead to loss of AChR. Blocking   antibody may impair binding of acetylcholine to the   receptor, leading to poor muscle contraction. Modulating   antibody causes receptor endocytosis resulting in loss of   AChR expression, which correlates most closely with   clinical severity of disease. Approximately 10-15 percent   of  individuals with confirmed myasthenia gravis have no   measurable binding, blocking, or modulating antibodies.    This test was developed and its performance characteristics   determined by [facility]. It has                            not been cleared or   approved by the US Food and Drug Administration. This test   was performed in a CLIA certified laboratory and is   intended for clinical purposes.  Performed By: [facility]  [address] [place], [place] [id number]  : Tim Hernandez MD, PhD  CLIA Number: [id number]    CRP Inflammation 02/21/2024 <3.00  <5.00 mg/L Final    Muscle-Specific Kinase Antibody Sc* 02/21/2024 <1:10  <1:10 Final    Comment: MuSK Antibody, IgG is not detected. No further testing will   be performed.  INTERPRETIVE INFORMATION: MuSK IgG Ab CBA, Serum, with Rflx    Muscle-specific kinase (MuSK) antibody is found in a subset   of patients with myasthenia gravis, primarily those   seronegative for muscle acetylcholine receptor (AChR)   antibody. Decreasing antibody levels may be associated with   therapeutic response; therefore, clinical correlation must   be strongly considered. A negative test result does not   rule out a diagnosis of myasthenia gravis.    This indirect fluorescent antibody cell-based assay (CBA)   utilizes muscle-specific kinase (MuSK) transfected cells   for the detection of the MuSK IgG antibody.    This test was developed and its performance characteristics   determined by [facility]. It has not been cleared or   approved by the U.S. Food and Drug Administration. This   test was performed in a CLIA-certified laboratory and is   intended for clinical purposes.  Performed By: [facility]  [address] [place], [place] [id number]  : Tim Hernandez MD, PhD  CLIA Number: [id number]   Lab on 02/06/2024   Component Date Value Ref Range Status    Parathyroid Hormone Intact  02/06/2024 32  15 - 65 pg/mL Final    Calcium Ionized Whole Blood 02/06/2024 4.7  4.4 - 5.2 mg/dL Final    Vitamin D, Total (25-Hydroxy) 02/06/2024 43  20 - 50 ng/mL Final    optimum levels    EBV Capsid Ines IgG Instrument Value 02/06/2024 52.3 (H)  <18.0 U/mL Final    EBV Capsid Antibody IgG 02/06/2024 Positive (A)  No detectable antibody. Final    Suggests recent or past exposure.   Office Visit on 01/29/2024   Component Date Value Ref Range Status    Lyme Disease Antibodies Total 01/29/2024 0.08  <0.90 Final    Non-reactive, Absence of detectable Borrelia burgdorferi antibodies. A non-reactive result does not exclude the possibility of Borrelia burgdorferi infection. If early Lyme disease is suspected, a second sample should be collected and tested 2 to 4 weeks later.    CRP Inflammation 01/29/2024 <3.00  <5.00 mg/L Final    Erythrocyte Sedimentation Rate 01/29/2024 3  0 - 15 mm/hr Final    Rheumatoid Factor 01/29/2024 <10  <14 IU/mL Final    WILMAN interpretation 01/29/2024 Negative  Negative Final      Negative:              <1:40  Borderline Positive:   1:40 - 1:80  Positive:              >1:80    Sodium 01/29/2024 139  135 - 145 mmol/L Final    Reference intervals for this test were updated on 09/26/2023 to more accurately reflect our healthy population. There may be differences in the flagging of prior results with similar values performed with this method. Interpretation of those prior results can be made in the context of the updated reference intervals.     Potassium 01/29/2024 3.9  3.4 - 5.3 mmol/L Final    Carbon Dioxide (CO2) 01/29/2024 26  22 - 29 mmol/L Final    Anion Gap 01/29/2024 11  7 - 15 mmol/L Final    Urea Nitrogen 01/29/2024 15.9  6.0 - 20.0 mg/dL Final    Creatinine 01/29/2024 0.85  0.67 - 1.17 mg/dL Final    GFR Estimate 01/29/2024 >90  >60 mL/min/1.73m2 Final    Calcium 01/29/2024 10.6 (H)  8.6 - 10.0 mg/dL Final    Chloride 01/29/2024 102  98 - 107 mmol/L Final    Glucose 01/29/2024 92  70  - 99 mg/dL Final    Alkaline Phosphatase 01/29/2024 55  40 - 150 U/L Final    Reference intervals for this test were updated on 11/14/2023 to more accurately reflect our healthy population. There may be differences in the flagging of prior results with similar values performed with this method. Interpretation of those prior results can be made in the context of the updated reference intervals.    AST 01/29/2024 24  0 - 45 U/L Final    Reference intervals for this test were updated on 6/12/2023 to more accurately reflect our healthy population. There may be differences in the flagging of prior results with similar values performed with this method. Interpretation of those prior results can be made in the context of the updated reference intervals.    ALT 01/29/2024 38  0 - 70 U/L Final    Reference intervals for this test were updated on 6/12/2023 to more accurately reflect our healthy population. There may be differences in the flagging of prior results with similar values performed with this method. Interpretation of those prior results can be made in the context of the updated reference intervals.      Protein Total 01/29/2024 7.2  6.4 - 8.3 g/dL Final    Albumin 01/29/2024 4.9  3.5 - 5.2 g/dL Final    Bilirubin Total 01/29/2024 0.4  <=1.2 mg/dL Final    Vitamin B12 01/29/2024 817  232 - 1,245 pg/mL Final    TSH 01/29/2024 1.88  0.30 - 4.20 uIU/mL Final    Methylmalonic Acid 01/29/2024 0.11  0.00 - 0.40 umol/L Final    INTERPRETIVE INFORMATION:    Slight elevation 0.41-0.99 umol/L is consistent with mild vitamin B12 deficiency, renal insufficiency, or intravascular volume contraction.    Moderate elevation 1.00-9.99 umol/L is consistent with mild vitamin B12 deficiency.    Massive elevation 10.00 umol/L or greater is consistent with significant vitamin B12 deficiency or with inborn errors of metabolism.    CK 01/29/2024 120  39 - 308 U/L Final    WBC Count 01/29/2024 3.7 (L)  4.0 - 11.0 10e3/uL Final    RBC Count  01/29/2024 5.60  4.40 - 5.90 10e6/uL Final    Hemoglobin 01/29/2024 16.6  13.3 - 17.7 g/dL Final    Hematocrit 01/29/2024 48.3  40.0 - 53.0 % Final    MCV 01/29/2024 86  78 - 100 fL Final    MCH 01/29/2024 29.6  26.5 - 33.0 pg Final    MCHC 01/29/2024 34.4  31.5 - 36.5 g/dL Final    RDW 01/29/2024 12.9  10.0 - 15.0 % Final    Platelet Count 01/29/2024 178  150 - 450 10e3/uL Final    % Neutrophils 01/29/2024 38  % Final    % Lymphocytes 01/29/2024 49  % Final    % Monocytes 01/29/2024 9  % Final    % Eosinophils 01/29/2024 4  % Final    % Basophils 01/29/2024 1  % Final    % Immature Granulocytes 01/29/2024 0  % Final    Absolute Neutrophils 01/29/2024 1.4 (L)  1.6 - 8.3 10e3/uL Final    Absolute Lymphocytes 01/29/2024 1.8  0.8 - 5.3 10e3/uL Final    Absolute Monocytes 01/29/2024 0.3  0.0 - 1.3 10e3/uL Final    Absolute Eosinophils 01/29/2024 0.2  0.0 - 0.7 10e3/uL Final    Absolute Basophils 01/29/2024 0.0  0.0 - 0.2 10e3/uL Final    Absolute Immature Granulocytes 01/29/2024 0.0  <=0.4 10e3/uL Final   Office Visit on 01/16/2024   Component Date Value Ref Range Status    Culture 01/16/2024 No Growth   Final   Office Visit on 12/28/2023   Component Date Value Ref Range Status    HIV Antigen Antibody Combo 12/28/2023 Nonreactive  Nonreactive Final    Negative HIV-1/-2 antigen and antibody screening test results usually indicate the absence of HIV-1 and HIV-2 infection. However, such negative results do not rule-out acute HIV infection.  If acute HIV-1 or HIV-2 infection is suspected, detection of HIV-1 or HIV-2 RNA  is recommended.     Hepatitis C Antibody 12/28/2023 Nonreactive  Nonreactive Final    A nonreactive screening test result does not exclude the possibility of exposure to or infection with HCV. Nonreactive screening test results in individuals with prior exposure to HCV may be due to antibody levels below the limit of detection of this assay or lack of reactivity to the HCV antigens used in this assay.  Patients with recent HCV infections (<3 months from time of exposure) may have false-negative HCV antibody results due to the time needed for seroconversion (average of 8 to 9 weeks).    CRP Inflammation 12/28/2023 <3.00  <5.00 mg/L Final    Calprotectin Feces 12/28/2023 <5.0  0.0 - 49.9 mg/kg Final    Normal    Tissue Transglutaminase Antibody I* 12/28/2023 0.6  <7.0 U/mL Final    Negative- The tTG-IgA assay has limited utility for patients with decreased levels of IgA. Screening for celiac disease should include IgA testing to rule out selective IgA deficiency and to guide selection and interpretation of serological testing. tTG-IgG testing may be positive in celiac disease patients with IgA deficiency.    Tissue Transglutaminase Antibody I* 12/28/2023 0.9  <7.0 U/mL Final    Negative    Immunoglobulin A 12/28/2023 124  84 - 499 mg/dL Final    Neutral Fat Fecal 12/28/2023 Normal  Normal Final    Split Fat Fecal 12/28/2023 Normal  Normal Final    INTERPRETIVE INFORMATION: Fecal Fat Qualitative    Neutral fats include the monoglycerides, diglycerides, and  triglycerides while split fats are the free fatty acids  that are liberated from them. Impaired synthesis or  secretion of pancreatic enzymes or bile may cause an  increase in neutral fats while an increase in split fats   suggests impaired absorption of nutrients.  Performed By: [facility]  [address] [place] [address]  : Tim Hernandez MD, PhD  CLIA Number: [id number]    Elastase Fecal 12/28/2023 >800.0  >199.9 ug/g Final    See Scanned Result 01/03/2024 Specimen received. Reordered and sent to performing laboratory. Report to follow up on completion.   Final    Performing Laboratory 01/03/2024 [facility]   Final    Test Name 01/03/2024 Bile Acids, Feces, 48 hours   Final    Test Code 01/03/2024 [id number]   Final    [place] Result 01/03/2024 SEE NOTE   Final       Test                               Result     Flag  Unit       RefValue  ----------------------------------------------------------------------  Bile Acids, Bowel Dysfunc, 48 Hr, F    Bile Acids, % CDCA + CA, F       1.0             % total    <=9.7       Total Bile Acids, F              1544            mcmol/48h  <=2619      Stool Weight                     307             g                               -------------------ADDITIONAL INFORMATION-------------------      This test was developed and its performance characteristics       determined by [facility] in a manner consistent with CLIA       requirements. This test has not been cleared or approved by       the U.S. Food and Drug Administration.             Test Performed by:      [place] [address]      : Alan Lazo M.D. Ph.D.; CLIA# [id number]       Imaging:    I personally reviewed the following imaging results today and those on care everywhere, if indicated     Narrative & Impression   Examination: NM HEPATOBILIARY SCAN WITH GB EF AND/OR PHARM       Date: 2/8/2024 11:23 AM.     Indication: Intermittent upper abdominal pain; Abdominal pain,  generalized      Additional Information: none     Technique:     The patient received 6 mCi of Tc-99m Choletec intravenously. Images  were obtained out through 60 minutes. The patient received 28 grams of  fat in Boost drink to stimulate gall bladder contraction. An  additional 45 minutes of images were obtained after the gall bladder  contraction intervention.     Findings:     There is prompt clearance of the radionuclide from the blood pool into  the liver. By 10 minutes there is clear visualization of the  intrahepatic ducts as well as the upper common bile duct. At 15  minutes, there is emptying from the common bile duct into the small  bowel. By 30 minutes, there is visualization of the gallbladder.      After fatty meal administration, the gallbladder ejection  fraction was  measured at 18%.      Enterogastric reflux was not present.                                                                      Impression:  1. Reduced ejection fraction measured at 18%, indicative of functional  gallbladder disorder.  2. Cystic duct is patent.     =======================     The normal gallbladder ejection fraction for a 45 minute infusion is  >40%     I have personally reviewed the examination and initial interpretation  and I agree with the findings.     BEATRIZ DARBY MD      EXAM: MR BRAIN W/O and W CONTRAST  LOCATION: [facility]  DATE: 2/13/2024     INDICATION: New neurological symtpoms  Parasthesias hands and feet, vision disturbance, eye pain  COMPARISON: None.  CONTRAST: 9mL Gadavist  TECHNIQUE: Routine multiplanar multisequence head MRI without and with intravenous contrast.     FINDINGS:  INTRACRANIAL CONTENTS: No acute or subacute infarct. No mass, acute hemorrhage, or extra-axial fluid collections. Normal brain parenchymal signal. Normal ventricles and sulci. Normal position of the cerebellar tonsils. 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     SELLA: No abnormality accounting for technique.     OSSEOUS STRUCTURES/SOFT TISSUES: Normal marrow signal. The major intracranial vascular flow voids are maintained.      ORBITS: No abnormality accounting for technique.      SINUSES/MASTOIDS: No mucosal thickening scattered about the ethmoid sinuses. No middle ear or mastoid effusion.                                                                       IMPRESSION:  1.  No acute infarct, acute intracranial hemorrhage, or intracranial mass    [facility], [place]     Medical Records Reviewed:    Reviewed consults/documents from [facility], [place], and [facility] including  Rheumatology, ID and Family Practice, Neuropsychology, GI, and OT

## 2024-02-27 NOTE — NURSING NOTE
Is the patient currently in the state of [place]? YES    Visit mode:VIDEO    If the visit is dropped, the patient can be reconnected by: VIDEO VISIT: Text to cell phone:   Telephone Information:   Mobile [phone]       Will anyone else be joining the visit? NO  (If patient encounters technical issues they should call [phone]    How would you like to obtain your AVS? MyChart    Are changes needed to the allergy or medication list? No    Reason for visit: Follow Up    Laisha FOOTE

## 2024-02-27 NOTE — LETTER
2/27/2024       RE: Jorge Denton  [address]  [place] [address]         Dear Colleague,    Thank you for referring your patient, Jorge Denton, to the [facility] PHYSICAL MEDICINE AND REHABILITATION CLINIC [place] at [facility]. Please see a copy of my visit note below.    Jorge Denton is a 28 year old male who presents to be evaluated for a billable video visit.    Assessment/Impression   1. Paresthesia and pain of both upper extremities/Post-COVID chronic neurologic symptoms/ Paresthesia of both feet  Patient with new paresthesias/pain in upper extremities and on feet as well as intermittent numbness on side of face.  Patient also endorses recent visual changes including vision pain and has a history of pudendal neuralgia.  He is working with neurology and appreciate recommendations.      2. 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 Encouraged to continue energy conservation and practicing techniques given by occupational therapy.      3. Biliary dyskinesia  Patient with ongoing abdominal difficulties that sound very similar to possible gallbladder disease.  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  He has been referred for a surgical consult and encouraged to schedule. He wishes to also see another GI specialist and will placed referral to [facility].  He would also like to see Integrative medicine for options that are non surgical will refer to [facility] for healing  - Adult GI  referral; Future  - Other speciality referral; future     4. Serum calcium elevated  Patient did have mildly elevated  serum calcium.  PTH was normal will recheck CMP as it has been one month and also recheck check ionized calcium. Discussed limiting dietary calcium if still elevated.   - Complete Metabolic  Panel; Future  - Ionized Calcium; Future       Plan:  I reviewed present knowledge on long-Covid.  Education was provided and question were answered.  Orders/Referrals as above  I will advised patient on test results  I will follow up with Jorge Denton in 3 months. I will review progress and consider need for any other therapeutic interventions. If there are any questions and/or concerns he will call the clinic.      On day of encounter time spent in chart review and with patient in consultation, exam, education,coordination of care,  review of outside charts/documentation and documentation:  40 minutes     I have attempted to proof read for major spelling errors and apologize for any minor errors I may have missed.     This note was dictated using voice recognition software. Any grammatical or context distortions are unintentional and inherent to the software.  _________________________________  CONCEPCIÓN Coreas [facility] PHYSICAL MEDICINE AND REHABILITATION CLINIC [place]    Subjective   HPI   This 28 year old male presents to the [facility] Rehabilitation Medicine Post-COVID clinic as a new consult on 2/5/24 to evaluate continuing symptoms after COVID infection initially diagnosed 11/2023.  Jorge Denton initially developed  shortness of breath, headache, generalized aches and pains, brain fog, fatigue, weakness, decreased taste, and decreased smell. Treatment was symptomatic.  Jorge Denton experienced complications of chronic neurological symptoms. For full details from the initial consult please see note from 2/5/24.  Today patient returns for a follow up.  He has [facility] Rheumatology [facility] and is undergoing work up.  He was also seen by ID for his Elevated IgG for EBV.  He was seen by Neurology for his on going neurological symptoms and will be having a MRI-Cpsine, EMG and labs. His MRI brain was normal.  Patient still having GI issues and feels his pain is  getting worse. His HIDA scan showed functional gallbladder disorder.  Patient still having ptosis and right side numbness.  Overall patient still with same symptoms and worsening abdominal pain.     Current concerns: Health Concerns        2/27/2024     1:45 AM   PHQ Assesment Total Score(s)   PHQ-9 Score 3           2/20/2024    12:43 AM   LANNY-7 Results   LANNY 7 TOTAL SCORE 0 (minimal anxiety)   LANNY-7 Total Score 0    0    0         2/27/2024     1:45 AM   PTSD Screen Score   Have you ever experienced this kind of event? No         2/27/2024     1:46 AM   PROMIS-29   PROMIS Physical Function T-Score 27 (severe dysfunction)   PROMIS Anxiety T-Score 51 (within normal limits)   PROMIS Depression T-Score 41 (within normal limits)   PROMIS Fatigue T-Score 76 (severe)   PROMIS Sleep Disturbance T-Score 52 (within normal limits)   PROMIS Ability to Participate in Social Roles & Activities T-Score 29 (severe dysfunction)   PROMIS Pain Interference T-Score 67 (moderate)   PROMIS Pain Intensity 7       Past Medical History:   Diagnosis Date    Allergic rhinitis     allergy testing positive for dustmites    Eczema     in earlier childhood    Gastroesophageal reflux disease     Uncomplicated asthma        Past Surgical History:   Procedure Laterality Date    MASTECTOMY SUBCUTANEOUS MALE BILATERAL (GYNECOMASTIA)         Family History   Problem Relation Age of Onset    Heart Disease Maternal Grandmother     Lipids Maternal Grandmother     Heart Disease Maternal Grandfather     Lipids Maternal Grandfather     Heart Disease Paternal Grandfather     Lipids Paternal Grandfather     Lipids Mother     Lipids Father     Lipids Paternal Grandmother     Thyroid Disease Maternal Grandmother         Thyroid nodule       Social History     Tobacco Use    Smoking status: Never    Smokeless tobacco: Never    Tobacco comments:     no passive exposure   Vaping Use    Vaping Use: Never used   Substance Use Topics    Alcohol use: No    Drug use:  No         Current Outpatient Medications:     calcipotriene (DOVONOX) 0.005 % external ointment, , Disp: , Rfl:     calcipotriene (DOVONOX) 0.005 % external solution, , Disp: , Rfl:     fluticasone (FLOVENT HFA) 110 MCG/ACT inhaler, Flovent  mcg/actuation aerosol inhaler  INHALE TWO PUFFS BY MOUTH TWICE A DAY, Disp: , Rfl:     gabapentin (NEURONTIN) 300 MG capsule, Take 300 mg at night, then up titrate to 300 mg in AM/600 mg at night, Disp: 90 capsule, Rfl: 4    levalbuterol (XOPENEX HFA) 45 MCG/ACT inhaler, Inhale 2 puffs into the lungs every 4 hours as needed for shortness of breath or wheezing, Disp: , Rfl:     mometasone (ELOCON) 0.1 % external cream, Apply topically daily, Disp: , Rfl:     SUMAtriptan (IMITREX) 50 MG tablet, Take 1 tablet (50 mg) by mouth at onset of headache for migraine May repeat in 2 hours. Max 4 tablets/24 hours., Disp: 9 tablet, Rfl: 5    tadalafil (CIALIS) 5 MG tablet, tadalafil 5 mg tablet  TAKE ONE TABLET BY MOUTH AT BEDTIME, Disp: , Rfl:     Review of Systems   Constitutional, HEENT, cardiovascular, pulmonary, GI, , musculoskeletal, neuro, skin, endocrine and psych systems are negative, except as otherwise noted.      Objective         Vitals:  No vitals were obtained today due to virtual visit.    Physical Exam   EYES: Eyes grossly normal to inspection.  No discharge or erythema, or obvious scleral/conjunctival abnormalities.  SKIN: Visible skin clear. No significant rash, abnormal pigmentation or lesions.  NEURO: Cranial nerves grossly intact.  Mentation and speech appropriate for age.  GENERAL: Healthy, alert and no distress  RESP: No audible wheeze, cough, or visible cyanosis.  No visible retractions or increased work of breathing.    PSYCH: Mentation appears normal, affect normal/bright, judgement and insight intact, normal speech and appearance well-groomed.        Labs:   Office Visit on 02/21/2024   Component Date Value Ref Range Status    SSA Ines IgG Instrument  Value 02/21/2024 <0.5  <7.0 U/mL Final    SSA (Ro) Antibody IgG 02/21/2024 Negative  Negative Final    SSB Ines IgG Instrument Value 02/21/2024 <0.6  <7.0 U/mL Final    SSB (La) Antibody IgG 02/21/2024 Negative  Negative Final    Vitamin B1 Whole Blood Level 02/21/2024 380 (H)  70 - 180 nmol/L Final    INTERPRETIVE INFORMATION: Vitamin B1, Whole Blood    This assay measures the concentration of thiamine   diphosphate (TDP), the primary active form of vitamin B1.   Approximately 90 percent of vitamin B1 present in whole   blood is TDP. Thiamine and thiamine monophosphate, which   comprise the remaining 10 percent, are not measured.    This test was developed and its performance characteristics   determined by [facility]. It has not been cleared or   approved by the US Food and Drug Administration. This test   was performed in a CLIA certified laboratory and is   intended for clinical purposes.  Performed By: [facility]  [address] [place] [address]  : Tim Hernandez MD, PhD  CLIA Number: [id number]    Acetylcholine Binding Antibody 02/21/2024 0.0  0.0 - 0.4 nmol/L Final    Comment: INTERPRETIVE INFORMATION: Acetylcholine Binding Ab      Negative ....... 0.0 - 0.4 nmol/L    Positive ....... 0.5 nmol/L or greater    Approximately 85-90 percent of patients with myasthenia   gravis (MG) express antibodies to the acetylcholine   receptor (AChR), which can be divided into binding,   blocking, and modulating antibodies. Binding antibody can   activate complement and lead to loss of AChR. Blocking   antibody may impair binding of acetylcholine to the   receptor, leading to poor muscle contraction. Modulating   antibody causes receptor endocytosis resulting in loss of   AChR expression, which correlates most closely with   clinical severity of disease. Approximately 10-15 percent   of individuals with confirmed myasthenia gravis have no   measurable binding, blocking, or  modulating antibodies.    This test was developed and its performance characteristics   determined by [facility]. It has not been cleared or   approved by the US Food and Drug Administration. This                            test   was performed in a CLIA certified laboratory and is   intended for clinical purposes.  Performed By: [facility]  [address] [place] [address]  : Tim Hernandez MD, PhD  CLIA Number: [id number]    Striated Muscle Antibodies, IgG Sc* 02/21/2024 <1:40  <1:40 Final    Comment:   Striated Muscle Antibodies, IgG are not detected. No   further testing will be performed.  INTERPRETIVE DATA:  Striated Muscle Antibodies, IgG Screen    In the presence of acetylcholine receptor (AChR) antibody,   striated muscle antibodies, which bind in a   cross-striational pattern to skeletal and heart muscle   tissue sections, are associated with late-onset myasthenia   gravis (MG). Striated muscle antibodies recognize epitopes   on three major muscle proteins, including: titin, ryanodine   receptor (RyR) and Kv1.4 (an alpha subunit of voltage-gated   potassium channel [VGKC]). Isolated cases of striated   muscle antibodies may be seen in patients with certain   autoimmune diseases, rheumatic fever, myocardial   infarction, and following some cardiotomy procedures.    This test was developed and its performance characteristics   determined by [facility]. It has not been cleared or   approved by the US Food and Drug Administration. This test   was performed in a CLIA                            certified laboratory and is   intended for clinical purposes.  Performed By: [facility]  [address] [place] [address]  : Tim Hernandez MD, PhD  CLIA Number: [id number]    AcetChol Modul Ines 02/21/2024 0  <=45 % Final    Comment: INTERPRETIVE INFORMATION: Acetylcholine Modulating Ab      Negative ..........  0-45 percent  modulating    Positive ..........  46 percent or greater modulating    Approximately 85-90 percent of patients with myasthenia   gravis (MG) express antibodies to the acetylcholine   receptor (AChR), which can be divided into binding,   blocking, and modulating antibodies. Binding antibody can   activate complement and lead to loss of AChR. Blocking   antibody may impair binding of acetylcholine to the   receptor, leading to poor muscle contraction. Modulating   antibody causes receptor endocytosis resulting in loss of   AChR expression, which correlates most closely with   clinical severity of disease. Approximately 10-15 percent   of individuals with confirmed myasthenia gravis have no   measurable binding, blocking, or modulating antibodies.    This test was developed and its performance characteristics   determined by [facility]. It has not been cleared or   approved by the [place] Food                            and Drug Administration. This test   was performed in a CLIA certified laboratory and is   intended for clinical purposes.  Performed By: [facility]  [address] [place], [place] [id number]  : Tim Hernandez MD, PhD  CLIA Number: [id number]    AcetChol Block Ines 02/21/2024 9  0 - 26 % Final    Comment: INTERPRETIVE INFORMATION: Acetylcholine Blocking Ab      Negative ............  0-26 percent blocking    Indeterminate ....... 27-41 percent blocking    Positive ............ 42 percent or greater blocking    Approximately 85-90 percent of patients with myasthenia   gravis (MG) express antibodies to the acetylcholine   receptor (AChR), which can be divided into binding,   blocking, and modulating antibodies. Binding antibody can   activate complement and lead to loss of AChR. Blocking   antibody may impair binding of acetylcholine to the   receptor, leading to poor muscle contraction. Modulating   antibody causes receptor endocytosis resulting in loss of   AChR  expression, which correlates most closely with   clinical severity of disease. Approximately 10-15 percent   of individuals with confirmed myasthenia gravis have no   measurable binding, blocking, or modulating antibodies.    This test was developed and its performance characteristics   determined by [facility]. It has                            not been cleared or   approved by the US Food and Drug Administration. This test   was performed in a CLIA certified laboratory and is   intended for clinical purposes.  Performed By: [email or web address]  [address] [place] [address]  : Tim Hernandez MD, PhD  CLIA Number: [id number]    CRP Inflammation 02/21/2024 <3.00  <5.00 mg/L Final    Muscle-Specific Kinase Antibody Sc* 02/21/2024 <1:10  <1:10 Final    Comment: MuSK Antibody, IgG is not detected. No further testing will   be performed.  INTERPRETIVE INFORMATION: MuSK IgG Ab CBA, Serum, with Rflx    Muscle-specific kinase (MuSK) antibody is found in a subset   of patients with myasthenia gravis, primarily those   seronegative for muscle acetylcholine receptor (AChR)   antibody. Decreasing antibody levels may be associated with   therapeutic response; therefore, clinical correlation must   be strongly considered. A negative test result does not   rule out a diagnosis of myasthenia gravis.    This indirect fluorescent antibody cell-based assay (CBA)   utilizes muscle-specific kinase (MuSK) transfected cells   for the detection of the MuSK IgG antibody.    This test was developed and its performance characteristics   determined by [facility]. It has not been cleared or   approved by the U.S. Food and Drug Administration. This   test was performed in a CLIA-certified laboratory and is   intended for clinical purposes.  Performed By: [facility]  [address] [place], [place] [id number]  : Tim Hernandez MD, PhD  CLIA  Number: [id number]   Lab on 02/06/2024   Component Date Value Ref Range Status    Parathyroid Hormone Intact 02/06/2024 32  15 - 65 pg/mL Final    Calcium Ionized Whole Blood 02/06/2024 4.7  4.4 - 5.2 mg/dL Final    Vitamin D, Total (25-Hydroxy) 02/06/2024 43  20 - 50 ng/mL Final    optimum levels    EBV Capsid Ines IgG Instrument Value 02/06/2024 52.3 (H)  <18.0 U/mL Final    EBV Capsid Antibody IgG 02/06/2024 Positive (A)  No detectable antibody. Final    Suggests recent or past exposure.   Office Visit on 01/29/2024   Component Date Value Ref Range Status    Lyme Disease Antibodies Total 01/29/2024 0.08  <0.90 Final    Non-reactive, Absence of detectable Borrelia burgdorferi antibodies. A non-reactive result does not exclude the possibility of Borrelia burgdorferi infection. If early Lyme disease is suspected, a second sample should be collected and tested 2 to 4 weeks later.    CRP Inflammation 01/29/2024 <3.00  <5.00 mg/L Final    Erythrocyte Sedimentation Rate 01/29/2024 3  0 - 15 mm/hr Final    Rheumatoid Factor 01/29/2024 <10  <14 IU/mL Final    WILMAN interpretation 01/29/2024 Negative  Negative Final      Negative:              <1:40  Borderline Positive:   1:40 - 1:80  Positive:              >1:80    Sodium 01/29/2024 139  135 - 145 mmol/L Final    Reference intervals for this test were updated on 09/26/2023 to more accurately reflect our healthy population. There may be differences in the flagging of prior results with similar values performed with this method. Interpretation of those prior results can be made in the context of the updated reference intervals.     Potassium 01/29/2024 3.9  3.4 - 5.3 mmol/L Final    Carbon Dioxide (CO2) 01/29/2024 26  22 - 29 mmol/L Final    Anion Gap 01/29/2024 11  7 - 15 mmol/L Final    Urea Nitrogen 01/29/2024 15.9  6.0 - 20.0 mg/dL Final    Creatinine 01/29/2024 0.85  0.67 - 1.17 mg/dL Final    GFR Estimate 01/29/2024 >90  >60 mL/min/1.73m2 Final    Calcium 01/29/2024  10.6 (H)  8.6 - 10.0 mg/dL Final    Chloride 01/29/2024 102  98 - 107 mmol/L Final    Glucose 01/29/2024 92  70 - 99 mg/dL Final    Alkaline Phosphatase 01/29/2024 55  40 - 150 U/L Final    Reference intervals for this test were updated on 11/14/2023 to more accurately reflect our healthy population. There may be differences in the flagging of prior results with similar values performed with this method. Interpretation of those prior results can be made in the context of the updated reference intervals.    AST 01/29/2024 24  0 - 45 U/L Final    Reference intervals for this test were updated on 6/12/2023 to more accurately reflect our healthy population. There may be differences in the flagging of prior results with similar values performed with this method. Interpretation of those prior results can be made in the context of the updated reference intervals.    ALT 01/29/2024 38  0 - 70 U/L Final    Reference intervals for this test were updated on 6/12/2023 to more accurately reflect our healthy population. There may be differences in the flagging of prior results with similar values performed with this method. Interpretation of those prior results can be made in the context of the updated reference intervals.      Protein Total 01/29/2024 7.2  6.4 - 8.3 g/dL Final    Albumin 01/29/2024 4.9  3.5 - 5.2 g/dL Final    Bilirubin Total 01/29/2024 0.4  <=1.2 mg/dL Final    Vitamin B12 01/29/2024 817  232 - 1,245 pg/mL Final    TSH 01/29/2024 1.88  0.30 - 4.20 uIU/mL Final    Methylmalonic Acid 01/29/2024 0.11  0.00 - 0.40 umol/L Final    INTERPRETIVE INFORMATION:    Slight elevation 0.41-0.99 umol/L is consistent with mild vitamin B12 deficiency, renal insufficiency, or intravascular volume contraction.    Moderate elevation 1.00-9.99 umol/L is consistent with mild vitamin B12 deficiency.    Massive elevation 10.00 umol/L or greater is consistent with significant vitamin B12 deficiency or with inborn errors of metabolism.     CK 01/29/2024 120  39 - 308 U/L Final    WBC Count 01/29/2024 3.7 (L)  4.0 - 11.0 10e3/uL Final    RBC Count 01/29/2024 5.60  4.40 - 5.90 10e6/uL Final    Hemoglobin 01/29/2024 16.6  13.3 - 17.7 g/dL Final    Hematocrit 01/29/2024 48.3  40.0 - 53.0 % Final    MCV 01/29/2024 86  78 - 100 fL Final    MCH 01/29/2024 29.6  26.5 - 33.0 pg Final    MCHC 01/29/2024 34.4  31.5 - 36.5 g/dL Final    RDW 01/29/2024 12.9  10.0 - 15.0 % Final    Platelet Count 01/29/2024 178  150 - 450 10e3/uL Final    % Neutrophils 01/29/2024 38  % Final    % Lymphocytes 01/29/2024 49  % Final    % Monocytes 01/29/2024 9  % Final    % Eosinophils 01/29/2024 4  % Final    % Basophils 01/29/2024 1  % Final    % Immature Granulocytes 01/29/2024 0  % Final    Absolute Neutrophils 01/29/2024 1.4 (L)  1.6 - 8.3 10e3/uL Final    Absolute Lymphocytes 01/29/2024 1.8  0.8 - 5.3 10e3/uL Final    Absolute Monocytes 01/29/2024 0.3  0.0 - 1.3 10e3/uL Final    Absolute Eosinophils 01/29/2024 0.2  0.0 - 0.7 10e3/uL Final    Absolute Basophils 01/29/2024 0.0  0.0 - 0.2 10e3/uL Final    Absolute Immature Granulocytes 01/29/2024 0.0  <=0.4 10e3/uL Final   Office Visit on 01/16/2024   Component Date Value Ref Range Status    Culture 01/16/2024 No Growth   Final   Office Visit on 12/28/2023   Component Date Value Ref Range Status    HIV Antigen Antibody Combo 12/28/2023 Nonreactive  Nonreactive Final    Negative HIV-1/-2 antigen and antibody screening test results usually indicate the absence of HIV-1 and HIV-2 infection. However, such negative results do not rule-out acute HIV infection.  If acute HIV-1 or HIV-2 infection is suspected, detection of HIV-1 or HIV-2 RNA  is recommended.     Hepatitis C Antibody 12/28/2023 Nonreactive  Nonreactive Final    A nonreactive screening test result does not exclude the possibility of exposure to or infection with HCV. Nonreactive screening test results in individuals with prior exposure to HCV may be due to antibody  levels below the limit of detection of this assay or lack of reactivity to the HCV antigens used in this assay. Patients with recent HCV infections (<3 months from time of exposure) may have false-negative HCV antibody results due to the time needed for seroconversion (average of 8 to 9 weeks).    CRP Inflammation 12/28/2023 <3.00  <5.00 mg/L Final    Calprotectin Feces 12/28/2023 <5.0  0.0 - 49.9 mg/kg Final    Normal    Tissue Transglutaminase Antibody I* 12/28/2023 0.6  <7.0 U/mL Final    Negative- The tTG-IgA assay has limited utility for patients with decreased levels of IgA. Screening for celiac disease should include IgA testing to rule out selective IgA deficiency and to guide selection and interpretation of serological testing. tTG-IgG testing may be positive in celiac disease patients with IgA deficiency.    Tissue Transglutaminase Antibody I* 12/28/2023 0.9  <7.0 U/mL Final    Negative    Immunoglobulin A 12/28/2023 124  84 - 499 mg/dL Final    Neutral Fat Fecal 12/28/2023 Normal  Normal Final    Split Fat Fecal 12/28/2023 Normal  Normal Final    INTERPRETIVE INFORMATION: Fecal Fat Qualitative    Neutral fats include the monoglycerides, diglycerides, and  triglycerides while split fats are the free fatty acids  that are liberated from them. Impaired synthesis or  secretion of pancreatic enzymes or bile may cause an  increase in neutral fats while an increase in split fats   suggests impaired absorption of nutrients.  Performed By: [facility]  [address] [place] [id number]  : Tim Hernandez MD, PhD  CLIA Number: [id number]    Elastase Fecal 12/28/2023 >800.0  >199.9 ug/g Final    See Scanned Result 01/03/2024 Specimen received. Reordered and sent to performing laboratory. Report to follow up on completion.   Final    Performing Laboratory 01/03/2024 [facility]   Final    Test Name 01/03/2024 Bile Acids, Feces, 48 hours   Final    Test Code  01/03/2024 [id number]   Final    [place] Result 01/03/2024 SEE NOTE   Final       Test                               Result    Flag  Unit       RefValue  ----------------------------------------------------------------------  Bile Acids, Bowel Dysfunc, 48 Hr, F    Bile Acids, % CDCA + CA, F       1.0             % total    <=9.7       Total Bile Acids, F              1544            mcmol/48h  <=2619      Stool Weight                     307             g                               -------------------ADDITIONAL INFORMATION-------------------      This test was developed and its performance characteristics       determined by [facility] in a manner consistent with CLIA       requirements. This test has not been cleared or approved by       the U.S. Food and Drug Administration.             Test Performed by:      [place] [address]      : Alan Lazo M.D. Ph.D.; CLIA# [id number]       Imaging:    I personally reviewed the following imaging results today and those on care everywhere, if indicated     Narrative & Impression   Examination: NM HEPATOBILIARY SCAN WITH GB EF AND/OR PHARM       Date: 2/8/2024 11:23 AM.     Indication: Intermittent upper abdominal pain; Abdominal pain,  generalized      Additional Information: none     Technique:     The patient received 6 mCi of Tc-99m Choletec intravenously. Images  were obtained out through 60 minutes. The patient received 28 grams of  fat in Boost drink to stimulate gall bladder contraction. An  additional 45 minutes of images were obtained after the gall bladder  contraction intervention.     Findings:     There is prompt clearance of the radionuclide from the blood pool into  the liver. By 10 minutes there is clear visualization of the  intrahepatic ducts as well as the upper common bile duct. At 15  minutes, there is emptying from the common bile duct into the small  bowel. By 30  minutes, there is visualization of the gallbladder.      After fatty meal administration, the gallbladder ejection fraction was  measured at 18%.      Enterogastric reflux was not present.                                                                      Impression:  1. Reduced ejection fraction measured at 18%, indicative of functional  gallbladder disorder.  2. Cystic duct is patent.     =======================     The normal gallbladder ejection fraction for a 45 minute infusion is  >40%     I have personally reviewed the examination and initial interpretation  and I agree with the findings.     BEATRIZ DARBY MD      EXAM: MR BRAIN W/O and W CONTRAST  LOCATION: [facility]  DATE: 2/13/2024     INDICATION: New neurological symtpoms  Parasthesias hands and feet, vision disturbance, eye pain  COMPARISON: None.  CONTRAST: 9mL Gadavist  TECHNIQUE: Routine multiplanar multisequence head MRI without and with intravenous contrast.     FINDINGS:  INTRACRANIAL CONTENTS: No acute or subacute infarct. No mass, acute hemorrhage, or extra-axial fluid collections. Normal brain parenchymal signal. Normal ventricles and sulci. Normal position of the cerebellar tonsils. 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     SELLA: No abnormality accounting for technique.     OSSEOUS STRUCTURES/SOFT TISSUES: Normal marrow signal. The major intracranial vascular flow voids are maintained.      ORBITS: No abnormality accounting for technique.      SINUSES/MASTOIDS: No mucosal thickening scattered about the ethmoid sinuses. No middle ear or mastoid effusion.                                                                       IMPRESSION:  1.  No acute infarct, acute intracranial hemorrhage, or intracranial mass    Reviewed imaging from [facility] sites, [place], and Critical access hospital     Medical Records Reviewed:    Reviewed consults/documents from [facility],  [place], and [facility] including  Rheumatology, ID and Family Practice, Neuropsychology, GI, and OT         Again, thank you for allowing me to participate in the care of your patient.      Sincerely,    Norma Hurtado PA-C

## 2024-02-27 NOTE — PATIENT INSTRUCTIONS
Post COVID Self Care Suggestions:     Fatigue Management:       https://www.archives-pmr.org/action/showPdf?dlq=-[email or web address]%1497257-0       Self Care:      [email or web address].[email or web address]/pain-care/self-care/  Recovery World Health Organization:    https://apps.who.int/iris/OnRequest Imagestream/handle/72194/028960/XZV-DHUT-8695-713-06644-71812-eng.pdf  Breathing exercises:    https://www.[facility]/health/conditions-and-diseases/coronavirus/coronavirus-recovery-breathing-exercises      Assessment of sense of smell and taste    Smell training:  Flowery - Brenda  Fruity - Lemon  Spicy - Cloves  Resinous - Eucalyptus      1 - Pour a few droplets of one of the oils on to a cotton pad or ball  2 - Do not try to sniff the pad immediately; leave it for a few minutes for the fragrance to develop  3 - Hold the first stick/pad up to your nose, about an inch away.  The order in which you test the oils does not matter  4 - Relax and try to inhale naturally through the nose - sniffing too quickly and deeply is likely to result in you not being able to detect anything  5 - Try this a couple more times, then rest for five minutes  6 - Move on to the next oil and repeat as above.    After three months switch to a new set of odors: menthol, thyme, tangerine, and hira, training with them twice daily.    After another three months, switch to a third new set of odors: green tea, bergamot, rosemary, and jackson, again training with them twice daily.    Studies:   [facility] Paxlovid [place]  https://medicine.[employer].edu/cii/research/paxlc-study/?mibextid=Zxz2cZ    Cognitive Post-COVID study  z.[facility]/covid-ema    Supplements:  Fatigue- COQ10 100mg 3x day  ( side effects GI)  Brain fog-N-acetylcysteine 600 mg daily (side effects GI)

## 2024-03-06 ENCOUNTER — LAB (OUTPATIENT)
Dept: LAB | Facility: CLINIC | Age: 29
End: 2024-03-06
Payer: COMMERCIAL

## 2024-03-06 DIAGNOSIS — E83.52 SERUM CALCIUM ELEVATED: ICD-10-CM

## 2024-03-06 DIAGNOSIS — K82.8 BILIARY DYSKINESIA: ICD-10-CM

## 2024-03-06 PROCEDURE — 82330 ASSAY OF CALCIUM: CPT

## 2024-03-06 PROCEDURE — 36415 COLL VENOUS BLD VENIPUNCTURE: CPT

## 2024-03-06 PROCEDURE — 80053 COMPREHEN METABOLIC PANEL: CPT

## 2024-03-07 LAB
ALBUMIN SERPL BCG-MCNC: 4.8 G/DL (ref 3.5–5.2)
ALP SERPL-CCNC: 63 U/L (ref 40–150)
ALT SERPL W P-5'-P-CCNC: 28 U/L (ref 0–70)
ANION GAP SERPL CALCULATED.3IONS-SCNC: 11 MMOL/L (ref 7–15)
AST SERPL W P-5'-P-CCNC: 21 U/L (ref 0–45)
BILIRUB SERPL-MCNC: 0.5 MG/DL
BUN SERPL-MCNC: 10.1 MG/DL (ref 6–20)
CA-I BLD-MCNC: 4.9 MG/DL (ref 4.4–5.2)
CALCIUM SERPL-MCNC: 9.5 MG/DL (ref 8.6–10)
CHLORIDE SERPL-SCNC: 103 MMOL/L (ref 98–107)
CREAT SERPL-MCNC: 0.98 MG/DL (ref 0.67–1.17)
DEPRECATED HCO3 PLAS-SCNC: 28 MMOL/L (ref 22–29)
EGFRCR SERPLBLD CKD-EPI 2021: >90 ML/MIN/1.73M2
GLUCOSE SERPL-MCNC: 84 MG/DL (ref 70–99)
POTASSIUM SERPL-SCNC: 4.1 MMOL/L (ref 3.4–5.3)
PROT SERPL-MCNC: 7.1 G/DL (ref 6.4–8.3)
SODIUM SERPL-SCNC: 142 MMOL/L (ref 135–145)

## 2024-03-11 ENCOUNTER — TELEPHONE (OUTPATIENT)
Dept: FAMILY MEDICINE | Facility: CLINIC | Age: 29
End: 2024-03-11

## 2024-03-11 ENCOUNTER — OFFICE VISIT (OUTPATIENT)
Dept: PEDIATRICS | Facility: CLINIC | Age: 29
End: 2024-03-11
Payer: COMMERCIAL

## 2024-03-11 ENCOUNTER — VIRTUAL VISIT (OUTPATIENT)
Dept: FAMILY MEDICINE | Facility: CLINIC | Age: 29
End: 2024-03-11
Payer: COMMERCIAL

## 2024-03-11 VITALS
SYSTOLIC BLOOD PRESSURE: 119 MMHG | HEART RATE: 73 BPM | OXYGEN SATURATION: 99 % | RESPIRATION RATE: 10 BRPM | BODY MASS INDEX: 25.03 KG/M2 | WEIGHT: 195 LBS | DIASTOLIC BLOOD PRESSURE: 62 MMHG | TEMPERATURE: 98 F | HEIGHT: 74 IN

## 2024-03-11 DIAGNOSIS — N41.9 PROSTATITIS, UNSPECIFIED PROSTATITIS TYPE: Primary | ICD-10-CM

## 2024-03-11 DIAGNOSIS — R10.30 LOWER ABDOMINAL PAIN: Primary | ICD-10-CM

## 2024-03-11 DIAGNOSIS — N42.81 PROSTATE PAIN: ICD-10-CM

## 2024-03-11 DIAGNOSIS — R35.0 URINARY FREQUENCY: ICD-10-CM

## 2024-03-11 LAB
ALBUMIN UR-MCNC: 10 MG/DL
APPEARANCE UR: CLEAR
BACTERIA #/AREA URNS HPF: ABNORMAL /HPF
BILIRUB UR QL STRIP: NEGATIVE
COLOR UR AUTO: YELLOW
GLUCOSE UR STRIP-MCNC: NEGATIVE MG/DL
HGB UR QL STRIP: NEGATIVE
KETONES UR STRIP-MCNC: NEGATIVE MG/DL
LEUKOCYTE ESTERASE UR QL STRIP: NEGATIVE
MUCOUS THREADS #/AREA URNS LPF: PRESENT /LPF
NITRATE UR QL: NEGATIVE
PH UR STRIP: 6 [PH] (ref 5–7)
RBC #/AREA URNS AUTO: ABNORMAL /HPF
SKIP: ABNORMAL
SP GR UR STRIP: 1.02 (ref 1–1.03)
UROBILINOGEN UR STRIP-MCNC: NORMAL MG/DL
WBC #/AREA URNS AUTO: ABNORMAL /HPF

## 2024-03-11 PROCEDURE — 99207 REFERRAL TO ACUTE AND DIAGNOSTIC SERVICES: CPT | Mod: 95 | Performed by: FAMILY MEDICINE

## 2024-03-11 PROCEDURE — 99215 OFFICE O/P EST HI 40 MIN: CPT | Performed by: PREVENTIVE MEDICINE

## 2024-03-11 PROCEDURE — 81001 URINALYSIS AUTO W/SCOPE: CPT | Performed by: PREVENTIVE MEDICINE

## 2024-03-11 RX ORDER — DOXYCYCLINE 100 MG/1
100 TABLET ORAL 2 TIMES DAILY
Qty: 28 TABLET | Refills: 0 | Status: SHIPPED | OUTPATIENT
Start: 2024-03-11 | End: 2024-03-25

## 2024-03-11 ASSESSMENT — PAIN SCALES - GENERAL: PAINLEVEL: MODERATE PAIN (4)

## 2024-03-11 NOTE — PROGRESS NOTES
Assessment & Plan     (N41.9) Prostatitis, unspecified prostatitis type  (primary encounter diagnosis)  Consistent with prostatitis  Doxycycline 100 mg two times per day - 2 week supply given.  This antibiotic worked well last time patient   had prostatitis 3-4 months ago.    Follow up with pcp in one week for recheck    42 minutes spent by me on the date of the encounter doing chart review, history and exam, documentation and further activities per the note        See Patient Instructions    No follow-ups on file.    Subjective   Domenico is a 28 year old, presenting for the following health issues:  Abdominal Pain (Lower Abdominal/Pelvic Pain, Changes in urination )    HPI     Genitourinary - Male  Onset/Duration: 4-5 days going   Description:   Dysuria (painful urination): YES  Hematuria (blood in urine): No  Frequency: YES  Waking at night to urinate: No  Hesitancy (delay in urine): YES  Retention (unable to empty): YES  Decrease in urinary flow: YES  Incontinence: No  Progression of Symptoms:  worsening  Accompanying Signs & Symptoms:  Fever: No  Back/Flank pain: No  Urethral discharge: No  Testicle lumps/masses/pain: No  Nausea and/or vomiting: No  Abdominal pain: YES- Low Belly, Pelvic Area   History:   History of frequent UTI s: YES  History of kidney stones: No  History of hernias: No  Personal or Family history of Prostate problems: YES- Prostate Infection 2020, 2022, 2023  Sexually active: YES  Precipitating or alleviating factors: None  Therapies tried and outcome: increase fluid intake, ibuprofen, and Tylenol    This is a 29 yo man with a history of urinary urgency, hesitancy, decreased flow and pelvic pain starting 3-4 days ago.  Has had similar symptoms in the past and treated for prostatitis.  Last time was 3-4 months ago.  No fever or chills, blood in urine, blood in stool, diarrhea, constipation, nausea or vomiting, back pain.  No rash in groin, penile discharge.    Review of Systems  Constitutional,  "HEENT, cardiovascular, pulmonary, GI, , musculoskeletal, neuro, skin, endocrine and psych systems are negative, except as otherwise noted.      Objective    /62 (BP Location: Right arm, Patient Position: Sitting, Cuff Size: Adult Regular)   Pulse 73   Temp 98  F (36.7  C) (Oral)   Resp 10   Ht 1.88 m (6' 2\")   Wt 88.5 kg (195 lb)   SpO2 99%   BMI 25.04 kg/m    Body mass index is 25.04 kg/m .  Physical Exam   GENERAL: alert and no distress  EYES: Eyes grossly normal to inspection, PERRL and conjunctivae and sclerae normal  HENT: ear canals and TM's normal, nose and mouth without ulcers or lesions  NECK: no adenopathy, no asymmetry, masses, or scars  RESP: lungs clear to auscultation - no rales, rhonchi or wheezes  CV: regular rate and rhythm, normal S1 S2, no S3 or S4, no murmur, click or rub, no peripheral edema  ABDOMEN: soft, nontender, no hepatosplenomegaly, no masses and bowel sounds normal  MS: no gross musculoskeletal defects noted, no edema  SKIN: no suspicious lesions or rashes  NEURO: Normal strength and tone, mentation intact and speech normal  PSYCH: mentation appears normal, affect normal/bright  RECTAL - boggy prostate and ttp there   - nl testicular exam, no ttp/rash/penile discharge/lad/redness/warmth    Results for orders placed or performed in visit on 03/11/24   UA Macroscopic with reflex to Microscopic and Culture - Lab Collect     Status: Abnormal    Specimen: Urine, Clean Catch   Result Value Ref Range    Color Urine Yellow Colorless, Straw, Light Yellow, Yellow    Appearance Urine Clear Clear    Glucose Urine Negative Negative mg/dL    Bilirubin Urine Negative Negative    Ketones Urine Negative Negative mg/dL    Specific Gravity Urine 1.025 0.999 - 1.035    Blood Urine Negative Negative    pH Urine 6.0 5.0 - 7.0    Protein Albumin Urine 10 (A) Negative mg/dL    Urobilinogen Urine Normal Normal, 2.0 mg/dL    Nitrite Urine Negative Negative    Leukocyte Esterase Urine Negative " Negative    SKIP     UA Microscopic with Reflex to Culture     Status: Abnormal   Result Value Ref Range    Bacteria Urine Few (A) None Seen /HPF    RBC Urine None Seen 0-2 /HPF /HPF    WBC Urine None Seen 0-5 /HPF /HPF    Mucus Urine Present (A) None Seen /LPF    Narrative    Urine Culture not indicated             Signed Electronically by: Renzo Resendiz MD

## 2024-03-11 NOTE — TELEPHONE ENCOUNTER
This writer attempted to contact patient on 03/11/24.    Reason for call provider's message and left message to call clinic back at [phone].    If patient calls back:   Refer to message below and further triage symptoms.         LUIS MANUEL Flor  [facility]

## 2024-03-11 NOTE — PROGRESS NOTES
"    Instructions Relayed to Patient by Virtual Roomer:     Patient is active on [employer]:   Relayed following to patient: \"It looks like you are active on [employer], are you able to join the visit this way? If not, do you need us to send you a link now or would you like your provider to send a link via text or email when they are ready to initiate the visit?\"    Reminded patient to ensure they were logged on to virtual visit by arrival time listed. Documented in appointment notes if patient had flexibility to initiate visit sooner than arrival time. If pediatric virtual visit, ensured pediatric patient along with parent/guardian will be present for video visit.     Patient offered the website [email or web address] and/or phone number to [employer] Help line: [phone]    Domenico is a 28 year old who is being evaluated via a billable video visit.      How would you like to obtain your AVS? [employer]  If the video visit is dropped, the invitation should be resent by: Text to cell phone: [phone]  Will anyone else be joining your video visit? No          Assessment & Plan     Lower abdominal pain  Urinary frequency  Prostate pain  Rec in person visit for evaluation and labs.   Called [facility] and they are willing to see him this afternoon at 1:30. Patient is agreeable to [facility] visit.   Will no charge this visit              Go to [facility]    Subjective   Domenico is a 28 year old, presenting for the following health issues:  Prostate Problem      3/11/2024    11:08 AM   Additional Questions   Roomed by jordan   Accompanied by self     History of Present Illness       Reason for visit:  Prostate infection  Symptom onset:  1-3 days ago  Symptoms include:  Moderate-severe, lower abdominal pain, frequent urination, not knowing when bladder is full, diffilicty urinating, tight muscles in lower abdomen  Symptom intensity:  Moderate  Symptom progression:  Worsening  Had these symptoms before:  Yes  Has tried/received treatment for " these symptoms:  Yes  Previous treatment was successful:  Yes  What makes it worse:  None  What makes it better:  Mild stretching      3-4 yr hx of chronic intermittent prostate infxns    Nov was last infection- prostate.   Present at that time with changes in urination, letting urine out, lower abd pain.     Seems to be coming back. Gradual onset, now day #3 and slowly building.  Lower abd pain, changes in urination, harder to initiate stream  Similar sx's are returning.   No fever, chills.     Feels lower abd muscles get tight and hard to have bm since pain present.   Take miralax once daily and having bm after that  Bm tends to help temporarily the pain    Tested many times for sti  No new partners. Monogamous  Didn't tolerate bactrim in the past and was last on           Objective           Vitals:  No vitals were obtained today due to virtual visit.    Physical Exam   GENERAL: alert and no distress  EYES: Eyes grossly normal to inspection.  No discharge or erythema, or obvious scleral/conjunctival abnormalities.  RESP: No audible wheeze, cough, or visible cyanosis.    SKIN: Visible skin clear. No significant rash, abnormal pigmentation or lesions.  NEURO: Cranial nerves grossly intact.  Mentation and speech appropriate for age.  PSYCH: Appropriate affect, tone, and pace of words          Video-Visit Details    Type of service:  Video Visit   Video Start Time: 12:00 PM  Video End Time:12:14 PM    Originating Location (pt. Location): home    Distant Location (provider location):  On-site  Platform used for Video Visit: Ari  Signed Electronically by: Lorena Millard MD      Referral to Acute and Diagnostic Services    [phone] ([place]) [address] [address]    Transition to Acute & Diagnostic Services Clinic has been discussed with patient, and he agrees with next level of care.   Patient understands that evaluation/treatment at [facility] typically takes  significantly longer than in clinic/urgent care (>2 hours).  The [facility]ostics [facility] has been contacted by provider/staff to confirm patient acceptance.         Special issues:      None                     The following provider has assessed this patient for intervention at [facility], and directed the patient for referral: Lorena Millard MD

## 2024-03-12 NOTE — TELEPHONE ENCOUNTER
Patient seen yesterday by provider and was seen at [facility] as well.  Closing encounter.      Kristina Kjellberg, MSN, RN

## 2024-03-16 ENCOUNTER — HOSPITAL ENCOUNTER (OUTPATIENT)
Dept: MRI IMAGING | Facility: CLINIC | Age: 29
Discharge: HOME | End: 2024-03-16
Attending: STUDENT IN AN ORGANIZED HEALTH CARE EDUCATION/TRAINING PROGRAM | Admitting: STUDENT IN AN ORGANIZED HEALTH CARE EDUCATION/TRAINING PROGRAM
Payer: COMMERCIAL

## 2024-03-16 DIAGNOSIS — R20.0 NUMBNESS: ICD-10-CM

## 2024-03-16 DIAGNOSIS — R53.1 WEAKNESS OF RIGHT SIDE OF BODY: ICD-10-CM

## 2024-03-16 PROCEDURE — 72141 MRI NECK SPINE W/O DYE: CPT

## 2024-03-18 ENCOUNTER — TELEPHONE (OUTPATIENT)
Dept: OTOLARYNGOLOGY | Facility: CLINIC | Age: 29
End: 2024-03-18

## 2024-03-19 ENCOUNTER — VIRTUAL VISIT (OUTPATIENT)
Dept: FAMILY MEDICINE | Facility: CLINIC | Age: 29
End: 2024-03-19
Payer: COMMERCIAL

## 2024-03-19 DIAGNOSIS — N41.1 CHRONIC PROSTATITIS: Primary | ICD-10-CM

## 2024-03-19 PROCEDURE — 99213 OFFICE O/P EST LOW 20 MIN: CPT | Mod: 95 | Performed by: PHYSICIAN ASSISTANT

## 2024-03-19 RX ORDER — DOXYCYCLINE 100 MG/1
100 CAPSULE ORAL 2 TIMES DAILY
Qty: 60 CAPSULE | Refills: 0 | Status: SHIPPED | OUTPATIENT
Start: 2024-03-19 | End: 2024-04-18

## 2024-03-19 NOTE — PROGRESS NOTES
Domenico is a 28 year old who is being evaluated via a billable video visit.    How would you like to obtain your AVS? [employer]  If the video visit is dropped, the invitation should be resent by: Text to cell phone: [phone]  Will anyone else be joining your video visit? No          Instructions Relayed to Patient by Virtual Roomer:         Reminded patient to ensure they were logged on to virtual visit by arrival time listed. Documented in appointment notes if patient had flexibility to initiate visit sooner than arrival time. If pediatric virtual visit, ensured pediatric patient along with parent/guardian will be present for video visit.     Patient offered the website [email or web address] and/or phone number to [employer] Help line: [phone]     Assessment & Plan   Problem List Items Addressed This Visit          Urinary    Chronic prostatitis - Primary    Relevant Medications    doxycycline hyclate (VIBRAMYCIN) 100 MG capsule    Other Relevant Orders    Adult Urology  Referral      Doxycyline 100 mg twice a day for 30 days  Make appointment with urology    15 minutes spent by me on the date of the encounter doing chart review, history and exam, documentation and further activities per the note           Subjective   Domenico is a 28 year old, presenting for the following health issues:  UTI and Follow Up        3/19/2024    11:41 AM   Additional Questions   Roomed by Shira         3/19/2024    11:41 AM   Patient Reported Additional Medications   Patient reports taking the following new medications none         UTI         Genitourinary - Male  Onset/Duration: chronic   Description: prostatitis   Dysuria (painful urination): YES- little better  Hematuria (blood in urine): No  Frequency: YES- improved but still abnormal  Waking at night to urinate: YES- improved but still abnormal  Hesitancy (delay in urine): YES- improved but still abnormal  Retention (unable to empty): No  Decrease in urinary  "flow: YES  Incontinence: No  Progression of Symptoms:  improving  Accompanying Signs & Symptoms:  Fever: No  Back/Flank pain: No  Urethral discharge: No  Testicle lumps/masses/pain: No  Nausea and/or vomiting: No  Abdominal pain: YES- Improved patient states \"has gone from a 9 to a 2\"  History:   History of frequent UTI s: YES  History of kidney stones: No  History of hernias: No  Personal or Family history of Prostate problems: No  Sexually active: No  Precipitating or alleviating factors: antibiotic  Therapies tried and outcome: course of antibiotics - doxycycline for 2 weeks,           Review of Systems  Constitutional, HEENT, cardiovascular, pulmonary, gi and gu systems are negative, except as otherwise noted.      Objective           Vitals:  No vitals were obtained today due to virtual visit.    Physical Exam   GENERAL: alert and no distress  EYES: Eyes grossly normal to inspection.  No discharge or erythema, or obvious scleral/conjunctival abnormalities.  RESP: No audible wheeze, cough, or visible cyanosis.    SKIN: Visible skin clear. No significant rash, abnormal pigmentation or lesions.  NEURO: Cranial nerves grossly intact.  Mentation and speech appropriate for age.  PSYCH: Appropriate affect, tone, and pace of words    Office Visit on 03/11/2024   Component Date Value Ref Range Status    Color Urine 03/11/2024 Yellow  Colorless, Straw, Light Yellow, Yellow Final    Appearance Urine 03/11/2024 Clear  Clear Final    Glucose Urine 03/11/2024 Negative  Negative mg/dL Final    Bilirubin Urine 03/11/2024 Negative  Negative Final    Ketones Urine 03/11/2024 Negative  Negative mg/dL Final    Specific Gravity Urine 03/11/2024 1.025  0.999 - 1.035 Final    Blood Urine 03/11/2024 Negative  Negative Final    pH Urine 03/11/2024 6.0  5.0 - 7.0 Final    Protein Albumin Urine 03/11/2024 10 (A)  Negative mg/dL Final    Urobilinogen Urine 03/11/2024 Normal  Normal, 2.0 mg/dL Final    Nitrite Urine 03/11/2024 Negative  " Negative Final    Leukocyte Esterase Urine 03/11/2024 Negative  Negative Final    Bacteria Urine 03/11/2024 Few (A)  None Seen /HPF Final    RBC Urine 03/11/2024 None Seen  0-2 /HPF /HPF Final    WBC Urine 03/11/2024 None Seen  0-5 /HPF /HPF Final    Mucus Urine 03/11/2024 Present (A)  None Seen /LPF Final         Video-Visit Details    Type of service:  Video Visit   Originating Location (pt. Location): Home    Distant Location (provider location):  Off-site  Platform used for Video Visit: Ari  Signed Electronically by: Isabel Brown PA-C

## 2024-03-20 ENCOUNTER — TELEPHONE (OUTPATIENT)
Dept: OTOLARYNGOLOGY | Facility: CLINIC | Age: 29
End: 2024-03-20
Payer: COMMERCIAL

## 2024-03-22 NOTE — TELEPHONE ENCOUNTER
Action 2024 JTV 4:42 PM    Action Taken CSS sent an urgent request for images to be pushed from [facility], [facility], and [facility].     Action 2024 JTV 9:08 AM    Action Taken CSS received and resolved images from [facility].    MEDICAL RECORDS REQUEST   [facility]  Urology Clinic  [address] [place] [address]  PHONE: [phone]  Fax: [phone]        FUTURE VISIT INFORMATION                                                   Jorge Denton, : 1995 scheduled for future visit at [facility]    APPOINTMENT INFORMATION:  Date: 2024  Provider:  Joey Ferreira PA-C  Reason for Visit/Diagnosis: Chronic prostatitis    REFERRAL INFORMATION:  Referring provider:  Isabel Brown PA-C in [facility] FP/IM/PEDS      RECORDS REQUESTED FOR VISIT                                                     NOTES  STATUS/DETAILS   OFFICE NOTE from referring provider  yes, 2024 -- Isabel Brown PA-C in  FP/IM/PEDS   OFFICE NOTE from other specialist  yes   DISCHARGE REPORT from the ER  YES, 2023 -- [place] [facility]   MEDICATION LIST  yes   LABS     URINALYSIS (UA)  yes   IMAGES  yes, 2023 -- US ABD  2022 -- US TESTICULAR AND SCROTUM    [facility]   2023 -- XR CHEST    [facility]  11/15/2023 -- CT ABD PELVIS    [facility]  2023 -- MR PROSTATE    [facility]   2023 -- US TESTICULAR AND SCROTUM     PRE-VISIT CHECKLIST      Joint diagnostic appointment coordinated correctly          (ensure right order & amount of time) Yes   RECORD COLLECTION COMPLETE yes

## 2024-03-25 ENCOUNTER — VIRTUAL VISIT (OUTPATIENT)
Dept: GASTROENTEROLOGY | Facility: CLINIC | Age: 29
End: 2024-03-25
Attending: PHYSICIAN ASSISTANT
Payer: COMMERCIAL

## 2024-03-25 DIAGNOSIS — K59.09 CHRONIC CONSTIPATION: Primary | ICD-10-CM

## 2024-03-25 DIAGNOSIS — R10.84 ABDOMINAL PAIN, GENERALIZED: ICD-10-CM

## 2024-03-25 DIAGNOSIS — K82.8 BILIARY DYSKINESIA: ICD-10-CM

## 2024-03-25 PROCEDURE — 99214 OFFICE O/P EST MOD 30 MIN: CPT | Mod: 95 | Performed by: PHYSICIAN ASSISTANT

## 2024-03-25 NOTE — PROGRESS NOTES
GASTROENTEROLOGY Follow-up VIDEO VISIT    CC/REFERRING MD:    Chelsea Mireles    REASON FOR CONSULTATION:   Yonas Casey for   Chief Complaint   Patient presents with    RECHECK       HISTORY OF PRESENT ILLNESS:    Jorge Denton is a 28 year old male who is being evaluated via a billable video visit for follow-up.  To review, I initially met with patient just about 3 months ago for evaluation of right-sided abdominal and flank pain as well as altered bowel habits.  He had described having a sudden change to his bowel patterns in the fall.  He was previously going daily with formed stool and then developed some problems with constipation.  He had described having hard, small pieces of stool, needing to strain, noticed some hematochezia initially and then was seeing what looked like fat in the stool.    We pursued a broad laboratory workup that involved assessing inflammatory markers, stool testing to assess for malabsorption and inflammation.  His labs were notable for normal CRP, fecal calprotectin, negative celiac serologies, and stool tests showing normal fecal elastase, bile acid levels, and fecal fat.  Interestingly, the stool test the submitted were more normal-appearing than usual for him.  Based on his more prominent right-sided abdominal pain, as well as previously normal CT abdomen and pelvis and abdominal ultrasound, we pursued a HIDA scan, which was notable for 18% ejection fraction.  He did see general surgery and they felt that cholecystectomy would be unlikely to benefit him.  He follows up today stating that over the past month, he has really had challenges with constipation and abdominal pain, going multiple days between BMs.  He has been using MiraLAx 1 capful daily with only 40% improvement.  He remains concerned about the abrupt change in his stool patterns as well as concerns about malabsorption and his stool appearance.      I have reviewed and updated the patient's Past  Medical History, Social History, Family History and Medication List.    Exam:    General appearance:  Healthy appearing adult, in no acute distress  Eyes:  Sclera anicteric, Pupils round and reactive to light  Ears, nose, mouth and throat:  No obvious external lesions of ears and nose.  Hearing intact  Neck:  Symmetric, No obvious external lesions  Respiratory:  Normal respiration, no use of accessory muscles   MSK:  No visual upper extremity, neck or facial muscle atrophy  ABD:  No visual abdominal distention, no audible borborygmi  Skin:  No rashes or jaundice   Psychiatric:  Oriented to person, place and time, Appropriate mood and affect.   Neurologic:  Peripheral muscle function and dexterity appear to be intact      PERTINENT STUDIES have been reviewed.    ASSESSMENT/PLAN:    Jorge Denton is a 28 year old male who presents for follow up of ongoing constipation and right-sided abdominal pain.  We spent some time reviewing his symptoms and recent workup.  My impression now is that his symptoms may be more related to IBS-C versus biliary dyskinesia.  I think he would benefit from improved stool output, so we will trial linaclotide.  He will start with 1 capsule daily and discontinue MiraLAX for the first few days.  We reviewed the risks and benefits of this medication.    We also talked a little bit about his previous stool testing.  I did place repeat orders so he can submit stool that is more representative of the changes that he has seen.  At this point, I do not have any evidence of malabsorption, but repeating these tests with a more representative stool for him is reasonable.    Ultimately, the abrupt change in stool has not been fully explained.  We will get him set up for colonoscopy to assess for any subtle inflammation and anatomical issues related to the colon.    1. Chronic constipation  - linaclotide (LINZESS) 145 MCG capsule; Take 1 capsule (145 mcg) by mouth every morning (before breakfast)   Dispense: 30 capsule; Refill: 0  - Adult GI  Referral - Procedure Only; Future    2. Biliary dyskinesia    3. Abdominal pain, generalized  - Adult GI  Referral - Procedure Only; Future      Video-Visit Details    Video Visit Time: 23 minutes    Type of service:  Video Visit    Originating Location (pt. Location): Home    Distant Location (provider location):  Off-site    Platform used for Video Visit: [employer]ot[employer]    A total of 28 minutes was spent with reviewing the chart, discussing with the patient, documentation and coordination of care.    Yonas Casey PA-C  Division of Gastroenterology, Hepatology, and Nutrition  [facility]  [phone]

## 2024-03-25 NOTE — NURSING NOTE
Is the patient currently in the state of [place]? YES    Visit mode:VIDEO    If the visit is dropped, the patient can be reconnected by: VIDEO VISIT: Text to cell phone:   Telephone Information:   Mobile [phone]       Will anyone else be joining the visit? NO  (If patient encounters technical issues they should call [phone]    How would you like to obtain your AVS? MyChart    Are changes needed to the allergy or medication list? No    Reason for visit: RECHECK    Tamar FOOTE

## 2024-03-26 ENCOUNTER — VIRTUAL VISIT (OUTPATIENT)
Dept: FAMILY MEDICINE | Facility: CLINIC | Age: 29
End: 2024-03-26
Payer: COMMERCIAL

## 2024-03-26 DIAGNOSIS — R30.0 DYSURIA: Primary | ICD-10-CM

## 2024-03-26 DIAGNOSIS — R10.30 LOWER ABDOMINAL PAIN: ICD-10-CM

## 2024-03-26 PROCEDURE — 99214 OFFICE O/P EST MOD 30 MIN: CPT | Mod: 95 | Performed by: FAMILY MEDICINE

## 2024-03-26 NOTE — PROGRESS NOTES
Domenico is a 28 year old who is being evaluated via a billable video visit.        Subjective   Domenico is a 28 year old, presenting for the following health issues:  No chief complaint on file.        3/19/2024    11:41 AM   Additional Questions   Roomed by Shira     Video Start Time:  1030AM    HPI     Jorge Denton is a 28 year old male c/o discomfort with urination and lower abdominal pain for at least 2 weeks. Patient was seen and given doxycyline for presumed prostatitis. Patient stated symptoms not improving. Patient has Urology appointment scheduled on 4/9/2024. No f/c. No n/v. Patient was concerned for possible appendicitis as well.      Review of Systems  Constitutional, HEENT, cardiovascular, pulmonary, GI, , musculoskeletal, neuro, skin, endocrine and psych systems are negative, except as otherwise noted.      Objective             Physical Exam   GENERAL: alert and no distress  EYES: Eyes grossly normal to inspection.  No discharge or erythema, or obvious scleral/conjunctival abnormalities.  RESP: No audible wheeze, cough, or visible cyanosis.    SKIN: Visible skin clear. No significant rash, abnormal pigmentation or lesions.  NEURO: Cranial nerves grossly intact.  Mentation and speech appropriate for age.  PSYCH: Appropriate affect, tone, and pace of words    A/P:    (R30.0) Dysuria  (primary encounter diagnosis)  Comment:   Plan: UA with Microscopic reflex to Culture - lab         collect, NEISSERIA GONORRHOEA PCR, CHLAMYDIA         TRACHOMATIS PCR, PSA, screen, CBC with         platelets and differential        Rule out UTI, prostatitis, STI's. Patient will see Urology on 4/9/2024.    (R10.30) Lower abdominal pain  Comment:   Plan: UA with Microscopic reflex to Culture - lab         collect, NEISSERIA GONORRHOEA PCR, CHLAMYDIA         TRACHOMATIS PCR, PSA, screen, CBC with         platelets and differential        Discussed if no worsening or no improvements, patient should be seen in clinic for an exam.  Will rule out above. Check CBC for signs of serious, bacterial infection.          Video-Visit Details    Type of service:  Video Visit   Video End Time:10:40 AM  Originating Location (pt. Location): Home    Distant Location (provider location):  On-site  Platform used for Video Visit: Ari  Signed Electronically by: Orville Velasquez MD, MD

## 2024-03-27 ENCOUNTER — LAB (OUTPATIENT)
Dept: LAB | Facility: CLINIC | Age: 29
End: 2024-03-27
Payer: COMMERCIAL

## 2024-03-27 ENCOUNTER — MYC MEDICAL ADVICE (OUTPATIENT)
Dept: FAMILY MEDICINE | Facility: CLINIC | Age: 29
End: 2024-03-27

## 2024-03-27 DIAGNOSIS — R10.30 LOWER ABDOMINAL PAIN: Primary | ICD-10-CM

## 2024-03-27 DIAGNOSIS — D72.819 LEUKOPENIA, UNSPECIFIED TYPE: Primary | ICD-10-CM

## 2024-03-27 DIAGNOSIS — R30.0 DYSURIA: ICD-10-CM

## 2024-03-27 DIAGNOSIS — R10.30 LOWER ABDOMINAL PAIN: ICD-10-CM

## 2024-03-27 LAB
ALBUMIN UR-MCNC: NEGATIVE MG/DL
APPEARANCE UR: CLEAR
BACTERIA #/AREA URNS HPF: ABNORMAL /HPF
BASOPHILS # BLD AUTO: 0 10E3/UL (ref 0–0.2)
BASOPHILS NFR BLD AUTO: 1 %
BILIRUB UR QL STRIP: NEGATIVE
COLOR UR AUTO: YELLOW
EOSINOPHIL # BLD AUTO: 0.1 10E3/UL (ref 0–0.7)
EOSINOPHIL NFR BLD AUTO: 4 %
ERYTHROCYTE [DISTWIDTH] IN BLOOD BY AUTOMATED COUNT: 12.6 % (ref 10–15)
GLUCOSE UR STRIP-MCNC: NEGATIVE MG/DL
HCT VFR BLD AUTO: 46.5 % (ref 40–53)
HGB BLD-MCNC: 15.8 G/DL (ref 13.3–17.7)
HGB UR QL STRIP: NEGATIVE
IMM GRANULOCYTES # BLD: 0 10E3/UL
IMM GRANULOCYTES NFR BLD: 0 %
KETONES UR STRIP-MCNC: NEGATIVE MG/DL
LEUKOCYTE ESTERASE UR QL STRIP: NEGATIVE
LYMPHOCYTES # BLD AUTO: 1.8 10E3/UL (ref 0.8–5.3)
LYMPHOCYTES NFR BLD AUTO: 50 %
MCH RBC QN AUTO: 29.4 PG (ref 26.5–33)
MCHC RBC AUTO-ENTMCNC: 34 G/DL (ref 31.5–36.5)
MCV RBC AUTO: 86 FL (ref 78–100)
MONOCYTES # BLD AUTO: 0.2 10E3/UL (ref 0–1.3)
MONOCYTES NFR BLD AUTO: 6 %
NEUTROPHILS # BLD AUTO: 1.4 10E3/UL (ref 1.6–8.3)
NEUTROPHILS NFR BLD AUTO: 40 %
NITRATE UR QL: NEGATIVE
PH UR STRIP: 7 [PH] (ref 5–7)
PLATELET # BLD AUTO: 180 10E3/UL (ref 150–450)
RBC # BLD AUTO: 5.38 10E6/UL (ref 4.4–5.9)
RBC #/AREA URNS AUTO: ABNORMAL /HPF
SP GR UR STRIP: 1.01 (ref 1–1.03)
UROBILINOGEN UR STRIP-ACNC: 0.2 E.U./DL
WBC # BLD AUTO: 3.6 10E3/UL (ref 4–11)
WBC #/AREA URNS AUTO: ABNORMAL /HPF

## 2024-03-27 PROCEDURE — 81001 URINALYSIS AUTO W/SCOPE: CPT

## 2024-03-27 PROCEDURE — 87491 CHLMYD TRACH DNA AMP PROBE: CPT

## 2024-03-27 PROCEDURE — 85025 COMPLETE CBC W/AUTO DIFF WBC: CPT

## 2024-03-27 PROCEDURE — G0103 PSA SCREENING: HCPCS

## 2024-03-27 PROCEDURE — 36415 COLL VENOUS BLD VENIPUNCTURE: CPT

## 2024-03-27 PROCEDURE — 87591 N.GONORRHOEAE DNA AMP PROB: CPT

## 2024-03-27 NOTE — TELEPHONE ENCOUNTER
See MyChart encounter below. Routing to provider to review and advise.    Patient seen virtually yesterday with this abd pain, asking if he can get any imaging done? Please advise, thanks!      KATEY Bloom, RN  [facility]

## 2024-03-28 ENCOUNTER — TELEPHONE (OUTPATIENT)
Dept: GASTROENTEROLOGY | Facility: CLINIC | Age: 29
End: 2024-03-28

## 2024-03-28 ENCOUNTER — TELEPHONE (OUTPATIENT)
Dept: OTOLARYNGOLOGY | Facility: CLINIC | Age: 29
End: 2024-03-28

## 2024-03-28 ENCOUNTER — ANCILLARY PROCEDURE (OUTPATIENT)
Dept: ULTRASOUND IMAGING | Facility: CLINIC | Age: 29
End: 2024-03-28
Attending: FAMILY MEDICINE
Payer: COMMERCIAL

## 2024-03-28 DIAGNOSIS — R10.30 LOWER ABDOMINAL PAIN: ICD-10-CM

## 2024-03-28 DIAGNOSIS — K59.09 CHRONIC CONSTIPATION: Primary | ICD-10-CM

## 2024-03-28 LAB
C TRACH DNA SPEC QL NAA+PROBE: NEGATIVE
N GONORRHOEA DNA SPEC QL NAA+PROBE: NEGATIVE
PSA SERPL DL<=0.01 NG/ML-MCNC: 0.67 NG/ML

## 2024-03-28 PROCEDURE — 76700 US EXAM ABDOM COMPLETE: CPT

## 2024-03-28 NOTE — PROGRESS NOTES
Visit conducted via real-time audio/video technology by Joey Ferreira PA-C to the patient in their home.     Subjective      REFERRING PROVIDER  Isabel Brown PA-C    REASON FOR VISIT  Chronic pelvic pain follow-up    HISTORY OF PRESENT ILLNESS  Mr. Denton is a 28 year old male who I am speaking with today in regards to his longstanding history of pelvic pain most recently thought to be consistent with pudendal neuralgia.  He has had a very extensive workup with our urology department most recently meeting with Dr. Obando on 1/4/2024.  Here is an excerpt from his note:    Jorge Denton is a 28 year old male with history of pelvic pain.  This pain has been going on for three years and he reports it as groin, penile and testicular pain along with numbness of his thigh. Started after he got a job where he was sitting down for 10 hours a day, wearing tight jeans.. He has tried PFPT (with several different providers) with some benefit but still states the pain is horrible and is even worse during ejaculation. During the last year, he has had intermittent pain in his deep pelvis and was treated with two separate courses of abx for presumed prostatitis which he states did not help much with the pain. He underwent a urine analysis with post prostatic massage analysis (genetic assay of the fluid) which revealed no bacteria or organisms.. Underwent a prostate MRI which showed findings consistent with chronic prostatitis. He underwent retrograde and antegrade semen analysis which shows good volume and overall normal findings.  There is no evidence of ejaculatory duct obstruction on prostate imaging or on the semen analysis results.  No increased round cells (WBCs) seen on the semen analysis which would indicate active inflammatory/bacterial prostatitis.  Pain is localized to the right perineum, with some radiation towards the penis and testicle on that side, also perhaps ipsilateral upper inner thigh.  Pain is worse  with sitting, that tends to exacerbate the pain the most.  Although sitting on the toilet seat does not exacerbate this discomfort.  This is fairly pathognomonic for pudendal neuralgia.  He reports numbness in the area of the right testicle and genital region.  He has seen a pain clinic.  He has not seen a pudendal neuralgia specialist.  He has not attempted any local blocks or tried gabapentin.    He was recently seen by a provider on 3/19/2024 who once again referred him back to urology after more urine testing and STI testing.  A telephone encounter from 3/21/2024 with our department stated that there was no person in the clinic who could offer anything more and was recommended to see certain other providers who could help with this presumed diagnosis of pudendal neuralgia.  Ultimately he was scheduled with me.    Today:  Has been experiencing more recent lower abdominal pain and urge to urinate with little urine outflow  Has been waking him up at night   Seems to be associated with the constipation and GI issues     Objective      PHYSICAL EXAMINATION  Deferred given virtual visit.    LABS   Latest Reference Range & Units 03/11/24 14:22 03/27/24 14:28   Color Urine Colorless, Straw, Light Yellow, Yellow  Yellow Yellow   Appearance Urine Clear  Clear Clear   Glucose Urine Negative mg/dL Negative Negative   Bilirubin Urine Negative  Negative Negative   Ketones Urine Negative mg/dL Negative Negative   Specific Gravity Urine 1.003 - 1.035  1.025 1.010   pH Urine 5.0 - 7.0  6.0 7.0   Protein Albumin Urine Negative mg/dL 10 ! Negative   Urobilinogen mg/dL Normal, 2.0 mg/dL Normal    Urobilinogen Urine 0.2, 1.0 E.U./dL  0.2   Nitrite Urine Negative  Negative Negative   Blood Urine Negative  Negative Negative   Leukocyte Esterase Urine Negative  Negative Negative   WBC Urine 0-5 /HPF /HPF None Seen None Seen   RBC Urine 0-2 /HPF /HPF None Seen None Seen   Bacteria Urine None Seen /HPF Few ! Few !   Mucus Urine None Seen  /LPF Present !    !: Data is abnormal    STI testing associated with urinalysis from 3/27/2024 did not show any evidence of infection.    IMAGING  Abdominal ultrasound from 3/28/2024, no significant bladder or kidney findings specifically no hydronephrosis or urinary retention    Narrative & Impression   Abdominal ultrasound     Comparisons: 12/12/2023     HISTORY:    Lower abdominal pain     FINDINGS:    The liver measures a craniocaudal dimension of 13.2 cm.  No focal liver lesion. Liver echogenicity is normal. The spleen  measures 14.5 cm. The gallbladder is normal. There is no gallbladder  wall thickening or pericholecystic fluid. No sonographic Jones's sign  was elicited.  The diameter of the common bile duct measures 4mm. The  pancreas is partially obscured but otherwise appears unremarkable. The  aorta and IVC are visualized. The right and left kidneys measure  11.3cm and 12.3 cm , respectively. No solid renal mass, stone or  hydronephrosis. Urinary bladder is partially filled and appears  unremarkable. No abnormality is identified in the right lower quadrant  or left lower quadrant to explain patient's symptoms.                                                                      IMPRESSION:    Mild splenomegaly. Otherwise negative.     Assessment & Plan    Bladder spasms  Chronic pelvic floor dysfunction  Presumed pudendal neuralgia  Recent GI issues including constipation    It was my pleasure to speak with Domenico in regards to his somewhat recent concerns with intermittent bladder spasms.  After reviewing his current symptomatology, I discussed with him that I believe I have a good explanation for the suprapubic pain paired with the urge to go to the bathroom.  Namely, I believe that he is experiencing rather significant bladder spasms.  It is difficult to say with certainty the direct reason behind the bladder spasms, but I believe it to be a combination of his GI issues as well as his chronic pelvic floor  dysfunction.  With this in mind, we discussed different management options for his bladder spasms including a referral to pelvic floor physical therapy again as it is been a few years since he has been seen by them, a trial of an anticholinergic or beta 3 agonist, or consideration of a third line overactive bladder therapy such as PTNS, bladder Botox, or sacral neuromodulation.    We discussed each of these treatment options in detail, and ultimately at this time Domenico would prefer to just start with pelvic or physical therapy now that he has a better grasp on what is going on.  We will then plan to touch base virtually in about 4 months to determine the efficacy of his pelvic floor physical therapy with the understanding that he can reach out before then if he would like to pursue any other form of treatment. Mr. Denton expressed understanding and agreement to the above discussion and plan and all of his questions were answered to his satisfaction.      PLAN  Referral to pelvic floor physical therapy   Follow-up virtual visit in 4 months    SIGNED    Joey Ferreira PA-C      I spent a total of 40 minutes spent on the date of the encounter doing chart review, history and exam, documentation, and further activities as noted above.

## 2024-03-28 NOTE — TELEPHONE ENCOUNTER
Prior Authorization Retail Medication Request    Medication/Dose: linaclotide (LINZESS) 145 MCG capsule  Diagnosis and ICD code (if different than what is on RX):  Chronic constipation [K59.09]   New/renewal/insurance change PA/secondary ins. PA:  Previously Tried and Failed:    Rationale:      Insurance   Primary:   Insurance ID:      Secondary (if applicable):  Insurance ID:      Pharmacy Information (if different than what is on RX)  Name:  [facility]  Phone:  [phone]   Fax:[phone]

## 2024-03-29 ENCOUNTER — PRE VISIT (OUTPATIENT)
Dept: UROLOGY | Facility: CLINIC | Age: 29
End: 2024-03-29

## 2024-03-29 ENCOUNTER — VIRTUAL VISIT (OUTPATIENT)
Dept: UROLOGY | Facility: CLINIC | Age: 29
End: 2024-03-29
Attending: PHYSICIAN ASSISTANT
Payer: COMMERCIAL

## 2024-03-29 DIAGNOSIS — N32.89 BLADDER SPASMS: Primary | ICD-10-CM

## 2024-03-29 DIAGNOSIS — R10.2 SUPRAPUBIC PAIN: ICD-10-CM

## 2024-03-29 DIAGNOSIS — N41.1 CHRONIC PROSTATITIS: ICD-10-CM

## 2024-03-29 PROCEDURE — 99215 OFFICE O/P EST HI 40 MIN: CPT | Mod: 95 | Performed by: STUDENT IN AN ORGANIZED HEALTH CARE EDUCATION/TRAINING PROGRAM

## 2024-03-29 NOTE — LETTER
3/29/2024       RE: Jorge Denton  [address]  [place] [address]     Dear Colleague,    Thank you for referring your patient, Jorge Denton, to the [facility] [place] at [facility]. Please see a copy of my visit note below.    Visit conducted via real-time audio/video technology by Joey Ferreira PA-C to the patient in their home.     Subjective     REFERRING PROVIDER  Isabel Brown PA-C    REASON FOR VISIT  Chronic pelvic pain follow-up    HISTORY OF PRESENT ILLNESS  Mr. Denton is a 28 year old male who I am speaking with today in regards to his longstanding history of pelvic pain most recently thought to be consistent with pudendal neuralgia.  He has had a very extensive workup with our urology department most recently meeting with Dr. Obando on 1/4/2024.  Here is an excerpt from his note:    Jorge Denton is a 28 year old male with history of pelvic pain.  This pain has been going on for three years and he reports it as groin, penile and testicular pain along with numbness of his thigh. Started after he got a job where he was sitting down for 10 hours a day, wearing tight jeans.. He has tried PFPT (with several different providers) with some benefit but still states the pain is horrible and is even worse during ejaculation. During the last year, he has had intermittent pain in his deep pelvis and was treated with two separate courses of abx for presumed prostatitis which he states did not help much with the pain. He underwent a urine analysis with post prostatic massage analysis (genetic assay of the fluid) which revealed no bacteria or organisms.. Underwent a prostate MRI which showed findings consistent with chronic prostatitis. He underwent retrograde and antegrade semen analysis which shows good volume and overall normal findings.  There is no evidence of ejaculatory duct obstruction on prostate imaging or on the  semen analysis results.  No increased round cells (WBCs) seen on the semen analysis which would indicate active inflammatory/bacterial prostatitis.  Pain is localized to the right perineum, with some radiation towards the penis and testicle on that side, also perhaps ipsilateral upper inner thigh.  Pain is worse with sitting, that tends to exacerbate the pain the most.  Although sitting on the toilet seat does not exacerbate this discomfort.  This is fairly pathognomonic for pudendal neuralgia.  He reports numbness in the area of the right testicle and genital region.  He has seen a pain clinic.  He has not seen a pudendal neuralgia specialist.  He has not attempted any local blocks or tried gabapentin.    He was recently seen by a provider on 3/19/2024 who once again referred him back to urology after more urine testing and STI testing.  A telephone encounter from 3/21/2024 with our department stated that there was no person in the clinic who could offer anything more and was recommended to see certain other providers who could help with this presumed diagnosis of pudendal neuralgia.  Ultimately he was scheduled with me.    Today:  Has been experiencing more recent lower abdominal pain and urge to urinate with little urine outflow  Has been waking him up at night   Seems to be associated with the constipation and GI issues     Objective     PHYSICAL EXAMINATION  Deferred given virtual visit.    LABS   Latest Reference Range & Units 03/11/24 14:22 03/27/24 14:28   Color Urine Colorless, Straw, Light Yellow, Yellow  Yellow Yellow   Appearance Urine Clear  Clear Clear   Glucose Urine Negative mg/dL Negative Negative   Bilirubin Urine Negative  Negative Negative   Ketones Urine Negative mg/dL Negative Negative   Specific Gravity Urine 1.003 - 1.035  1.025 1.010   pH Urine 5.0 - 7.0  6.0 7.0   Protein Albumin Urine Negative mg/dL 10 ! Negative   Urobilinogen mg/dL Normal, 2.0 mg/dL Normal    Urobilinogen Urine 0.2, 1.0  E.U./dL  0.2   Nitrite Urine Negative  Negative Negative   Blood Urine Negative  Negative Negative   Leukocyte Esterase Urine Negative  Negative Negative   WBC Urine 0-5 /HPF /HPF None Seen None Seen   RBC Urine 0-2 /HPF /HPF None Seen None Seen   Bacteria Urine None Seen /HPF Few ! Few !   Mucus Urine None Seen /LPF Present !    !: Data is abnormal    STI testing associated with urinalysis from 3/27/2024 did not show any evidence of infection.    IMAGING  Abdominal ultrasound from 3/28/2024, no significant bladder or kidney findings specifically no hydronephrosis or urinary retention    Narrative & Impression   Abdominal ultrasound     Comparisons: 12/12/2023     HISTORY:    Lower abdominal pain     FINDINGS:    The liver measures a craniocaudal dimension of 13.2 cm.  No focal liver lesion. Liver echogenicity is normal. The spleen  measures 14.5 cm. The gallbladder is normal. There is no gallbladder  wall thickening or pericholecystic fluid. No sonographic Jones's sign  was elicited.  The diameter of the common bile duct measures 4mm. The  pancreas is partially obscured but otherwise appears unremarkable. The  aorta and IVC are visualized. The right and left kidneys measure  11.3cm and 12.3 cm , respectively. No solid renal mass, stone or  hydronephrosis. Urinary bladder is partially filled and appears  unremarkable. No abnormality is identified in the right lower quadrant  or left lower quadrant to explain patient's symptoms.                                                                      IMPRESSION:    Mild splenomegaly. Otherwise negative.     Assessment & Plan   Bladder spasms  Chronic pelvic floor dysfunction  Presumed pudendal neuralgia  Recent GI issues including constipation    It was my pleasure to speak with [facility] in regards to his somewhat recent concerns with intermittent bladder spasms.  After reviewing his current symptomatology, I discussed with him that I believe I have a good explanation for  the suprapubic pain paired with the urge to go to the bathroom.  Namely, I believe that he is experiencing rather significant bladder spasms.  It is difficult to say with certainty the direct reason behind the bladder spasms, but I believe it to be a combination of his GI issues as well as his chronic pelvic floor dysfunction.  With this in mind, we discussed different management options for his bladder spasms including a referral to pelvic floor physical therapy again as it is been a few years since he has been seen by them, a trial of an anticholinergic or beta 3 agonist, or consideration of a third line overactive bladder therapy such as PTNS, bladder Botox, or sacral neuromodulation.    We discussed each of these treatment options in detail, and ultimately at this time Domenico would prefer to just start with pelvic or physical therapy now that he has a better grasp on what is going on.  We will then plan to touch base virtually in about 4 months to determine the efficacy of his pelvic floor physical therapy with the understanding that he can reach out before then if he would like to pursue any other form of treatment. Mr. Denton expressed understanding and agreement to the above discussion and plan and all of his questions were answered to his satisfaction.      PLAN  Referral to pelvic floor physical therapy   Follow-up virtual visit in 4 months    SIGNED    Joey Ferreira PA-C      I spent a total of 40 minutes spent on the date of the encounter doing chart review, history and exam, documentation, and further activities as noted above.    Virtual Visit Details    Type of service:  Video Visit   Video Start Time:  1:32 PM  Video End Time:2:05 PM    Originating Location (pt. Location): Home    Distant Location (provider location):  Off-site  Platform used for Video Visit: Ari

## 2024-03-29 NOTE — NURSING NOTE
Is the patient currently in the state of [place]? YES    Visit mode:VIDEO    If the visit is dropped, the patient can be reconnected by: VIDEO VISIT: Text to cell phone:   Telephone Information:   Mobile [phone]       Will anyone else be joining the visit? NO  (If patient encounters technical issues they should call [phone]    How would you like to obtain your AVS? MyChart    Are changes needed to the allergy or medication list? No, Pt stated no changes to allergies, and Pt stated no med changes    Reason for visit: Consult    Deirdre FOOTE

## 2024-03-29 NOTE — PROGRESS NOTES
Virtual Visit Details    Type of service:  Video Visit   Video Start Time:  1:32 PM  Video End Time:2:05 PM    Originating Location (pt. Location): Home    Distant Location (provider location):  Off-site  Platform used for Video Visit: Ari

## 2024-04-01 ENCOUNTER — TELEPHONE (OUTPATIENT)
Dept: OTOLARYNGOLOGY | Facility: CLINIC | Age: 29
End: 2024-04-01
Payer: COMMERCIAL

## 2024-04-01 ENCOUNTER — TELEPHONE (OUTPATIENT)
Dept: UROLOGY | Facility: CLINIC | Age: 29
End: 2024-04-01
Payer: COMMERCIAL

## 2024-04-01 NOTE — TELEPHONE ENCOUNTER
Sent Biotectixt (1st Attempt) and Patient Contacted for the patient to call back and schedule the following:    Appointment type: Return Urology   Provider: Quique Ferreira  Return date: Late July/Early August   Specialty phone number: [phone]  Additional appointment(s) needed: N/A  Additonal Notes: 4 month VV follow up     Called pt, started introduction, but then line was disconnected. [employer] message sent with scheduling information

## 2024-04-03 DIAGNOSIS — R29.810 FACIAL WEAKNESS: Primary | ICD-10-CM

## 2024-04-05 ENCOUNTER — VIRTUAL VISIT (OUTPATIENT)
Dept: OTOLARYNGOLOGY | Facility: CLINIC | Age: 29
End: 2024-04-05
Payer: COMMERCIAL

## 2024-04-05 ENCOUNTER — THERAPY VISIT (OUTPATIENT)
Dept: PHYSICAL THERAPY | Facility: CLINIC | Age: 29
End: 2024-04-05
Payer: COMMERCIAL

## 2024-04-05 DIAGNOSIS — R10.2 SUPRAPUBIC PAIN: ICD-10-CM

## 2024-04-05 DIAGNOSIS — N32.89 BLADDER SPASMS: ICD-10-CM

## 2024-04-05 DIAGNOSIS — J38.7 LARYNGEAL HYPERFUNCTION: ICD-10-CM

## 2024-04-05 DIAGNOSIS — R07.0 THROAT PAIN: ICD-10-CM

## 2024-04-05 DIAGNOSIS — R49.0 DYSPHONIA: Primary | ICD-10-CM

## 2024-04-05 PROCEDURE — 97161 PT EVAL LOW COMPLEX 20 MIN: CPT | Mod: GP

## 2024-04-05 PROCEDURE — 92507 TX SP LANG VOICE COMM INDIV: CPT | Mod: GN | Performed by: SPEECH-LANGUAGE PATHOLOGIST

## 2024-04-05 PROCEDURE — 97110 THERAPEUTIC EXERCISES: CPT | Mod: GP

## 2024-04-05 PROCEDURE — 97140 MANUAL THERAPY 1/> REGIONS: CPT | Mod: GP

## 2024-04-05 PROCEDURE — 97112 NEUROMUSCULAR REEDUCATION: CPT | Mod: GP

## 2024-04-05 NOTE — PROGRESS NOTES
Domenico Denton is a 28 year old male who is being evaluated via a billable video visit.      Domenico has been notified and verbally consented to the following:     This video visit will be conducted between you and your provider.    Patient has opted to conduct today's video visit vs an in-person appointment.     Video visits are billed at different rates depending on your insurance coverage. Please reach out to your insurance provider with any questions.     If during the course of the call the provider feels the appointment is not appropriate, you will not be charged for this service.  Provider has received verbal consent for billable virtual visit from the patient? Yes  Will anyone else be joining your video visit? No    Call initiated at: 2:30 PM   Type of Visit Platform Used: Invenra Video  Location of provider: Home  Location of patient: [facility]  Lencho Loomis Jr., M.D., F.A.C.S.  Mariposa Monahan M.D., M.P.H.  Senait Flor M.D.  Kristen Pardo M.M. (voice), M.A., CCC-SLP  Rama Warner M.S., CCC-SLP  Stephie Bedolla, Ph.D., CCC-SLP  Cecilio Tee, Ph.D., CCC-SLP  Berta Jain, M.S., CCC-SLP  Seymour Barfield M.M., M.A., CCC-SLP  ELAINE Javed (voice), M.S., CCC-SLP    [facility]  VOICE/SPEECH/BREATHING THERAPY PROGRESS REPORT    Patient: Jorge Denton  Date of Service: 4/5/2024    Date of Last Service: 1/5/24  Referring physician: Dr. Flor  Initial evaluation: 10/12/23  Therapy Session #2     I had the pleasure of seeing Mr. Denton today, for speech therapy to address a diagnosis of:  Dysphonia (R49.0)   Throat Pain (R07.0)  Laryngeal Hyperfunction (J38.7).    PROGRESS SINCE LAST SESSION  At the last session, Mr. Denton worked on therapeutic activities to address the above diagnosis.    Regarding practice, Mr. Denton reports the following:     No practice of any exercises    Mr. Denton also states that:    Had a bad reaction to COVID; lots of hospital trips starting in January;  "he is just now starting to get back to normal    Hasn't done much talking in the past few months    Checked into his voice yesterday; there is a still a part of his upper pitch range that is gone  o It was painful to try  o There is some discomfort with talking, but much worse with singing  - Sometimes the singing feels OK in the moment, but pain comes on later     Mr. Denton presents today with the following:    Mild jaw tension  Voice quality:    Clear, WNL in speech    Pitch range is at the low end of normal, and likely lower than optimal for him, in the range from F2 to C3  o He stays for long utterances at F2    A pitch glide task today showed that Mr. Denton can vocalize down to Eb2 and up to Bb4.  o The Eb2 is shaky; he says it is tense and painful  o E4 is in an overly TA-dominant production; he states it is strained and painful  o F#4 is weak and in a falsetto production   o He continues in a breathy falsetto production up to Bb4, at which there is no phonation  o In an octave glide on \"whoo\" he gets a clear Bb4, but he cannot get phonation on \"ah\"  o Demonstrated jaw tension and inadequate mouth opening throughout much of the pitch range    THERAPEUTIC ACTIVITIES  Today Mr. Denton participated in the following therapeutic activities:    [place] techniques for applying ice, heat, stretch, massage, and splinting to the tender areas of his neck, in order to reduce pain.  o These were different from the massages and stretches he has done previously    [place] an exercise of easy pitch glides on the syllable \"whoop-whoo\"  o Concentrated on not tipping his head up or back for the high pitch  o Concentrated on forward focus  o Concentrated an open resonance space  o Practiced starting on a low pitch and insisting on a register adjustment when ascending  o Also practiced the same technique on the SOVT configuration \"hng\"  o By thew end of the session reported that he felt less tension and discomfort in the " neck/throat area    [place] concepts of an optimal regimen for practice.  o he should use an interval schedule of practice, with brief periods of practice frequently throughout each day  o [place] concepts of volitional practice to facilitate motor learning.    He took careful notes, to facilitate practice.    IMPRESSIONS/GOALS/PLAN  Mr. Denton had a productive session of speech therapy today, to address the following:  Dysphonia (R49.0)   Throat Pain (R07.0)   Laryngeal Hyperfunction (J38.7)  Speech therapy for him is medically necessary to allow  him to meet personal and professional demands and fully engage in activities of daily living.    will continue to work on his exercises on a daily basis, and work on incorporating the techniques into his daily activities.    Progress toward long-term goals: (Reporting period: 4/5/24  to 7/4/24)  Minimal at this point, as this is the second session,after a long hiatus, but good learning today    Goals for this practice period:     practice all exercises according to instructions    incorporate techniques into daily vocal activities    maintain vigilance for vocal technique    Plan: I will see Mr. Denton in two weeks to work on education, modification, and carryover of therapeutic activities to more complex activities.    Reporting period 4/5/24 - 7/4/24    TOTAL SERVICE TIME: 60 minutes  TREATMENT (05250)      Stephie Bedolla, Ph.D., [facility]-SLP  Speech-Language Pathologist  Director, [facility] Voice [facility]  She/her/hers  [phone]

## 2024-04-05 NOTE — LETTER
4/5/2024       RE: Jorge Denton  [address]  [place] [address]     Dear Colleague,    Thank you for referring your patient, Jorge Denton, to the [facility] VOICE [facility]IN[facility]. Please see a copy of my visit note below.    Domenico Denton is a 28 year old male who is being evaluated via a billable video visit.      Domenico has been notified and verbally consented to the following:   This video visit will be conducted between you and your provider.  Patient has opted to conduct today's video visit vs an in-person appointment.   Video visits are billed at different rates depending on your insurance coverage. Please reach out to your insurance provider with any questions.   If during the course of the call the provider feels the appointment is not appropriate, you will not be charged for this service.  Provider has received verbal consent for billable virtual visit from the patient? Yes  Will anyone else be joining your video visit? No    Call initiated at: 2:30 PM   Type of Visit Platform Used: Nimia Video  Location of provider: Home  Location of patient: [facility] VOICE [facility]  Lencho Loomis Jr., M.D., F.A.C.S.  Mariposa Monahan M.D., M.P.H.  Senait Flor M.D.  Kristen Pardo M.M. (voice), M.A., CCC-SLP  Rama Warner M.S., CCC-SLP  Stephie Bedolla, Ph.D., CCC-SLP  Cecilio Tee, Ph.D., CCC-SLP  Berta Jain M.S., CCC-SLP  Seymour Barfield M.M., M.A., CCC-SLP  ELAINE Javed (voice), M.S., CCC-SLP    [facility]  VOICE/SPEECH/BREATHING THERAPY PROGRESS REPORT    Patient: Jorge Denton  Date of Service: 4/5/2024    Date of Last Service: 1/5/24  Referring physician: Dr. Flor  Initial evaluation: 10/12/23  Therapy Session #2     I had the pleasure of seeing Mr. Denton today, for speech therapy to address a diagnosis of:  Dysphonia (R49.0)   Throat Pain (R07.0)  Laryngeal Hyperfunction (J38.7).    PROGRESS SINCE  "LAST SESSION  At the last session, Mr. Denton worked on therapeutic activities to address the above diagnosis.    Regarding practice, Mr. Denton reports the following:   No practice of any exercises    Mr. Denton also states that:  Had a bad reaction to COVID; lots of hospital trips starting in January; he is just now starting to get back to normal  Hasn't done much talking in the past few months  Checked into his voice yesterday; there is a still a part of his upper pitch range that is gone  It was painful to try  There is some discomfort with talking, but much worse with singing  Sometimes the singing feels OK in the moment, but pain comes on later     Mr. Denton presents today with the following:  Mild jaw tension  Voice quality:  Clear, WNL in speech  Pitch range is at the low end of normal, and likely lower than optimal for him, in the range from F2 to C3  He stays for long utterances at F2  A pitch glide task today showed that Mr. Denton can vocalize down to Eb2 and up to Bb4.  The Eb2 is shaky; he says it is tense and painful  E4 is in an overly TA-dominant production; he states it is strained and painful  F#4 is weak and in a falsetto production   He continues in a breathy falsetto production up to Bb4, at which there is no phonation  In an octave glide on \"whoo\" he gets a clear Bb4, but he cannot get phonation on \"ah\"  Demonstrated jaw tension and inadequate mouth opening throughout much of the pitch range    THERAPEUTIC ACTIVITIES  Today Mr. Denton participated in the following therapeutic activities:  [place] techniques for applying ice, heat, stretch, massage, and splinting to the tender areas of his neck, in order to reduce pain.  These were different from the massages and stretches he has done previously  [place] an exercise of easy pitch glides on the syllable \"whoop-whoo\"  Concentrated on not tipping his head up or back for the high pitch  Concentrated on forward focus  Concentrated an open resonance " "space  Practiced starting on a low pitch and insisting on a register adjustment when ascending  Also practiced the same technique on the SOVT configuration \"hng\"  By thew end of the session reported that he felt less tension and discomfort in the neck/throat area  [place] concepts of an optimal regimen for practice.  he should use an interval schedule of practice, with brief periods of practice frequently throughout each day  [place] concepts of volitional practice to facilitate motor learning.  He took careful notes, to facilitate practice.    IMPRESSIONS/GOALS/PLAN  Mr. Denton had a productive session of speech therapy today, to address the following:  Dysphonia (R49.0)   Throat Pain (R07.0)   Laryngeal Hyperfunction (J38.7)  Speech therapy for him is medically necessary to allow  him to meet personal and professional demands and fully engage in activities of daily living.    will continue to work on his exercises on a daily basis, and work on incorporating the techniques into his daily activities.    Progress toward long-term goals: (Reporting period: 4/5/24  to 7/4/24)  Minimal at this point, as this is the second session,after a long hiatus, but good learning today    Goals for this practice period:   practice all exercises according to instructions  incorporate techniques into daily vocal activities  maintain vigilance for vocal technique    Plan: I will see Mr. Denton in two weeks to work on education, modification, and carryover of therapeutic activities to more complex activities.    Reporting period 4/5/24 - 7/4/24    TOTAL SERVICE TIME: 60 minutes  TREATMENT (13559)      Stephie Bedolla, Ph.D., [facility]-SLP  Speech-Language Pathologist  Director, [facility] Voice [facility]  She/her/hers  [phone]                                                                                                 Outpatient Speech Language Therapy Evaluation  PLAN OF TREATMENT FOR OUTPATIENT REHABILITATION  (COMPLETE FOR INITIAL CLAIMS " ONLY)  Patient's Last Name, First Name, M.I.  YOB: 1995  Jorge Denton                        Provider's Name  Stephie Bedolla, SLP Medical Record No.  [id number]                               Onset Date:  1/55/24   Start of Care Date: 1/5/24     Type: Speech Language Therapy Medical Diagnosis: Dysphonia                        Therapy Diagnosis:  Dysphonia.   Visits from SOC:  1   _________________________________________________________________________________  Plan of Treatment:   Speech therapy    DURATION/FREQUENCY OF TREATMENT  Six bi-weekly, one-hour sessions, with two monthly one-hour follow-up sessions    PROGNOSIS  Good prognosis for voice improvement with speech therapy and regular practice of therapeutic activities.    BARRIERS TO LEARNING/TEACHING AND LEARNING NEEDS  None/Unremarkable    GOALS:  Patient goal:   To understand the problem and fix it as much as possible     Short-term goal(s): Within the first 4 sessions, Mr. Denton:  will demonstrate assigned laryngeal massage techniques with 80% accuracy or better with no clinician support  will accurately identify target vs. habitual voice quality during therapy tasks in 4 out of 5 trials with no clinician support  will demonstrate the ability to alternate between target and habitual voice quality given clinician cue 75% of the time during therapy tasks     Long-term goal(s): In 6 months, Mr. Denton will:  Report a week of typical vocal activities, in which dysphonia and discomfort do not exceed a level of 3 out of 10, 80% of the time   _________________________________________________________________________________    I CERTIFY THE NEED FOR THESE SERVICES FURNISHED UNDER        THIS PLAN OF TREATMENT AND WHILE UNDER MY CARE     (Physician attestation of this document indicates review and certification of the therapy plan).     Certification date from: 1/05/24  Certification date to: 4/4/24    Referring Provider: Senait Flor                                                                                         Outpatient Speech Language Therapy Evaluation  PLAN OF TREATMENT FOR OUTPATIENT REHABILITATION  (RE-CERTIFICATION FOR SPEECH THERAPY)  Patient's Last Name, First Name, M.I.  YOB: 1995  Jorge Denton                        Provider's Name  Stephie Bedolla, SLP Medical Record No.  [id number]                               Onset Date:  1/05/24   Start of Care Date: 1/05/24     Type: Speech Language Therapy Medical Diagnosis: Dysphonia                        Therapy Diagnosis:  Dysphonia.   Visits from SOC:  2   _________________________________________________________________________________  Plan of Treatment:   Speech therapy    DURATION/FREQUENCY OF TREATMENT  Six bi-weekly, one-hour sessions, with two monthly one-hour follow-up sessions    PROGNOSIS  Good prognosis for voice improvement with speech therapy and regular practice of therapeutic activities.    BARRIERS TO LEARNING/TEACHING AND LEARNING NEEDS  None/Unremarkable    GOALS:  Patient goal:   To understand the problem and fix it as much as possible     Short-term goal(s): Within the first 4 sessions, Mr. Denton:  will demonstrate assigned laryngeal massage techniques with 80% accuracy or better with no clinician support  will accurately identify target vs. habitual voice quality during therapy tasks in 4 out of 5 trials with no clinician support  will demonstrate the ability to alternate between target and habitual voice quality given clinician cue 75% of the time during therapy tasks     Long-term goal(s): In 6 months, Mr. Denton will:  Report a week of typical vocal activities, in which dysphonia and discomfort do not exceed a level of 3 out of 10, 80% of the time   _________________________________________________________________________________    I CERTIFY THE NEED FOR THESE SERVICES FURNISHED UNDER        THIS PLAN OF TREATMENT AND WHILE UNDER MY CARE      (Physician attestation of this document indicates review and certification of the therapy plan).     Certification date from: 4/05/24  Certification date to: 7/4/24    Referring Provider: Senait Flor      Again, thank you for allowing me to participate in the care of your patient.      Sincerely,    Stephie Bedolla, SLP

## 2024-04-05 NOTE — Clinical Note
Medicare Certification - Add your Attestation  1. Review the Certification Documentation below  2. Click 'QuickNote' on your toolbar  3. Add P MEDICARE CERTIFICATION-[facility] as a recipient  4. Add your attestation using [email or web address] and choose Rehab Services Attestation - Physician (at the bottom)  5. Click 'Save as QuickAction' and name the Button '[facility] Rehab Cert.' Click 'Accept.' This is a onetime set up. You will now have a [facility] Rehab Cert button on the right side of the inbasket toolbar so the next time you need to add your attestation just, click the button. You will not have to go through any other steps.  6. Click 'Sign and Route'

## 2024-04-05 NOTE — PROGRESS NOTES
Outpatient Speech Language Therapy Evaluation  PLAN OF TREATMENT FOR OUTPATIENT REHABILITATION  (COMPLETE FOR INITIAL CLAIMS ONLY)  Patient's Last Name, First Name, M.I.  YOB: 1995  Jorge Denton                        Provider's Name  Stephie Bedolla, SLP Medical Record No.  [id number]                               Onset Date:  1/55/24   Start of Care Date: 1/5/24     Type: Speech Language Therapy Medical Diagnosis: Dysphonia                        Therapy Diagnosis:  Dysphonia.   Visits from SOC:  1   _________________________________________________________________________________  Plan of Treatment:   Speech therapy    DURATION/FREQUENCY OF TREATMENT  Six bi-weekly, one-hour sessions, with two monthly one-hour follow-up sessions    PROGNOSIS  Good prognosis for voice improvement with speech therapy and regular practice of therapeutic activities.    BARRIERS TO LEARNING/TEACHING AND LEARNING NEEDS  None/Unremarkable    GOALS:  Patient goal:   1. To understand the problem and fix it as much as possible     Short-term goal(s): Within the first 4 sessions, Mr. Denton:  1. will demonstrate assigned laryngeal massage techniques with 80% accuracy or better with no clinician support  2. will accurately identify target vs. habitual voice quality during therapy tasks in 4 out of 5 trials with no clinician support  3. will demonstrate the ability to alternate between target and habitual voice quality given clinician cue 75% of the time during therapy tasks     Long-term goal(s): In 6 months, Mr. Denton will:  1. Report a week of typical vocal activities, in which dysphonia and discomfort do not exceed a level of 3 out of 10, 80% of the time   _________________________________________________________________________________    I CERTIFY THE NEED FOR THESE SERVICES FURNISHED UNDER        THIS PLAN OF TREATMENT AND WHILE UNDER  MY CARE     (Physician attestation of this document indicates review and certification of the therapy plan).     Certification date from: 1/05/24  Certification date to: 4/4/24    Referring Provider: Senait Flor

## 2024-04-05 NOTE — PROGRESS NOTES
PHYSICAL THERAPY EVALUATION  Type of Visit: Evaluation    See electronic medical record for Abuse and Falls Screening details.    Subjective       Presenting condition or subjective complaint: pelvic floor/bladder spasms. Onset March 2020/November 2023. PF PT helped in 2022 was feeling better. Initially symptoms experienced were pain in rectum, lots of tension, difficulty urinating. Hx of LBP/tailbone and piriformis pain that he has learned to deal with. November started experiencing pain/pressure at lower abdominal area and noticed slow urine stream and urinary frequency. Testing revealed prostate infection. Got Covid December 2023.Since then noticed more lower abdominal pressure. Has also been dealing with gall bladder issues and constipation. Again was given prescription to treat prostate infection but not noticing any improvement. Abdominal pain improves somewhat after using the bathroom. Will wake up and have horrible abdominal pain. Also notes any sexual activity will aggravate urinary and abdominal pain. Was not able initiate stream after intercourse until he does little squat which did help. Also notes long history of R sided numbness in perineal area and to inner and anterior quad to knee.   Date of onset: 03/29/24 (referral)    Relevant medical history: Asthma; Bladder or bowel problems; Numbness or tingling in perianal area; Pain at night or rest; Sleep disorder like apnea   Dates & types of surgery:      Prior diagnostic imaging/testing results: MRI; Other Ultrasound   Prior therapy history for the same diagnosis, illness or injury: Yes PFPT June 2020 - April 2022    Employment: Yes    Hobbies/Interests: music/guitar    Patient goals for therapy: urinate with less pain/difficulty    Pain assessment: Pain present  See objective evaluation for additional pain details     Objective      PELVIC EVALUATION  ADDITIONAL HISTORY:  Sex assigned at birth: Male  Gender identity: Male    Pronouns: He/Him/His       Bladder History:  Feels bladder filling: No  Triggers for feeling of inability to wait to go to the bathroom: No changes  How long can you wait to urinate:    Gets up at night to urinate: Yes 2-3  Can stop the flow of urine when urinating: Yes  Volume of urine usually released: Medium   Other issues: Slow or hesitant urine stream; Straining to pass urine; Trouble emptying bladder completely; Dribbling after urinating  Number of bladder infections in last 12 months:    Fluid intake per day:       Medications taken for bladder: No     Activities causing urine leak:      Amount of urine typically leaked:    Pads used to help with leaking:          Bowel History:  Frequency of bowel movement: 1/day: can be every 2-3 days.   Consistency of stool: Hard hx gallbladder issues - takes stool softener - prescription; if he doesn't use stool softener isn't able to use bathroom and then stool comes back very compacted and white.   Ignores the urge to defecate:    Other bowel issues: Pain when pooping; had some blood in stool a week ago - not a regular thing.   Length of time spent trying to have a bowel movement:      Sexual Function History:  Sexual orientation: Straight    Sexually active: No  Lubrication used: No No  Pelvic pain: Walking; Sitting; Certain positions (sitting or laying)    Pain or difficulty with orgasms/erection/ejaculation: Yes any sexual activities causes pain and urinary issues  State of menopause:    Hormone medications:             Discussed reason for referral regarding pelvic health needs and external/internal pelvic floor muscle examination with patient/guardian.  Opportunity provided to ask questions and verbal consent for assessment and intervention was given.    PAIN: Pain Level at Rest: 3/10  Pain Level with Use: 8/10  POSTURE:  R PSIS elevated; L ilac crest elevated   LUMBAR SCREEN:  max loss ext with R LBP; R rotat WFL pain at R LB; all other WFL   HIP SCREEN: ROM Excess ER: IR mod  limited dimas   Strength:   Pain: - none + mild ++ moderate +++ severe  Strength Scale: 0-5/5 Left Right   Hip Flexion 4 4   Hip Extension 4+ 4+   Hip Abduction 4+ 4+   Hip Internal Rotation 4 4   Hip External Rotation 4 4-      Functional Strength Testing: Double Leg Squat: Increased trunk lean and Improper use of glutes/hips  SLS: good stability dimas       PAIN PROVOCATION TEST:  FADIR dimas     BREATHING SYMMETRY: Decreased rib cage mobility    PELVIC EXAM  External Palpation Inspection through clothes:  At rest: Normal  With voluntary pelvic floor contraction: Present; feels nauseous to activate PFM   Relaxation of PFM: Yes  With intra-abdominal pressure: Valsalva: Perineal descent  Bearing down as defecation: Perineal descent,   TA: tendency to valsalva; improved with cues         ABDOMINAL ASSESSMENT    Abdominal Activation/Strength: SLR: min rotation dimas       Fascial Tension/Restriction/Tone: Hypomobile, Pain, R>L          Assessment & Plan   CLINICAL IMPRESSIONS  Medical Diagnosis: Bladder spasms  Suprapubic pain    Treatment Diagnosis: Bladder spasms  Suprapubic pain   Impression/Assessment: Patient is a 28 year old male with pelvic floor/bladder complaints.  The following significant findings have been identified: Pain, Decreased ROM/flexibility, Decreased strength, Decreased proprioception, and Decreased activity tolerance. These impairments interfere with their ability to perform self care tasks, recreational activities, household mobility, and community mobility as compared to previous level of function.     Clinical Decision Making (Complexity):  Clinical Presentation: Stable/Uncomplicated  Clinical Presentation Rationale: based on medical and personal factors listed in PT evaluation  Clinical Decision Making (Complexity): Low complexity    PLAN OF CARE  Treatment Interventions:  Interventions: Gait Training, Manual Therapy, Neuromuscular Re-education, Therapeutic Activity, Therapeutic Exercise    Long  Term Goals     PT Goal 1  Goal Identifier: LTG 1  Goal Description: pt will improve PFM awareness and downtraining and evidenced by reduced abdominal pain 2/10 throughout the day and improve bladder habits to empty bladder every 2 hours without pain.  Rationale: to maximize safety and independence with performance of ADLs and functional tasks  Target Date: 06/14/24      Frequency of Treatment: 1x/week taper as able  Duration of Treatment: 10 weeks    Recommended Referrals to Other Professionals: Physical Therapy  Education Assessment:   Learner/Method: Patient;Listening    Risks and benefits of evaluation/treatment have been explained.   Patient/Family/caregiver agrees with Plan of Care.     Evaluation Time:          Signing Clinician: ANA Hernandez [facility]                                                                                   OUTPATIENT PHYSICAL THERAPY      PLAN OF TREATMENT FOR OUTPATIENT REHABILITATION   Patient's Last Name, First Name, SHONA DentonJorge  R YOB: 1995   Provider's Name   [facility]   Medical Record No.  [id number]     Onset Date: 03/29/24 (referral)  Start of Care Date: 04/05/24     Medical Diagnosis:  Bladder spasms  Suprapubic pain      PT Treatment Diagnosis:  Bladder spasms  Suprapubic pain Plan of Treatment  Frequency/Duration: 1x/week taper as able/ 10 weeks    Certification date from 04/05/24 to 06/14/24         See note for plan of treatment details and functional goals     Sandra Fry PT                         I CERTIFY THE NEED FOR THESE SERVICES FURNISHED UNDER        THIS PLAN OF TREATMENT AND WHILE UNDER MY CARE     (Physician attestation of this document indicates review and certification of the therapy plan).              Referring Provider:  Joey Ferreira    Initial Assessment  See Epic Evaluation- Start of Care Date: 04/05/24

## 2024-04-08 NOTE — PROGRESS NOTES
Outpatient Speech Language Therapy Evaluation  PLAN OF TREATMENT FOR OUTPATIENT REHABILITATION  (RE-CERTIFICATION FOR SPEECH THERAPY)  Patient's Last Name, First Name, M.I.  YOB: 1995  Jorge Denton                        Provider's Name  Stephie Bedolla, SLP Medical Record No.  [id number]                               Onset Date:  1/05/24   Start of Care Date: 1/05/24     Type: Speech Language Therapy Medical Diagnosis: Dysphonia                        Therapy Diagnosis:  Dysphonia.   Visits from SOC:  2   _________________________________________________________________________________  Plan of Treatment:   Speech therapy    DURATION/FREQUENCY OF TREATMENT  Six bi-weekly, one-hour sessions, with two monthly one-hour follow-up sessions    PROGNOSIS  Good prognosis for voice improvement with speech therapy and regular practice of therapeutic activities.    BARRIERS TO LEARNING/TEACHING AND LEARNING NEEDS  None/Unremarkable    GOALS:  Patient goal:   1. To understand the problem and fix it as much as possible     Short-term goal(s): Within the first 4 sessions, Mr. Denton:  1. will demonstrate assigned laryngeal massage techniques with 80% accuracy or better with no clinician support  2. will accurately identify target vs. habitual voice quality during therapy tasks in 4 out of 5 trials with no clinician support  3. will demonstrate the ability to alternate between target and habitual voice quality given clinician cue 75% of the time during therapy tasks     Long-term goal(s): In 6 months, Mr. Denton will:  1. Report a week of typical vocal activities, in which dysphonia and discomfort do not exceed a level of 3 out of 10, 80% of the time   _________________________________________________________________________________    I CERTIFY THE NEED FOR THESE SERVICES FURNISHED UNDER        THIS PLAN OF TREATMENT AND WHILE  UNDER MY CARE     (Physician attestation of this document indicates review and certification of the therapy plan).     Certification date from: 4/05/24  Certification date to: 7/4/24    Referring Provider: Senait Flor   No

## 2024-04-09 ENCOUNTER — VIRTUAL VISIT (OUTPATIENT)
Dept: PHYSICAL MEDICINE AND REHAB | Facility: CLINIC | Age: 29
End: 2024-04-09
Payer: COMMERCIAL

## 2024-04-09 DIAGNOSIS — R29.90 POST-COVID CHRONIC NEUROLOGIC SYMPTOMS: ICD-10-CM

## 2024-04-09 DIAGNOSIS — U09.9 POST-COVID CHRONIC CONCENTRATION DEFICIT: ICD-10-CM

## 2024-04-09 DIAGNOSIS — R41.840 POST-COVID CHRONIC CONCENTRATION DEFICIT: ICD-10-CM

## 2024-04-09 DIAGNOSIS — R20.2 PARESTHESIA OF BOTH FEET: ICD-10-CM

## 2024-04-09 DIAGNOSIS — R10.84 ABDOMINAL PAIN, GENERALIZED: ICD-10-CM

## 2024-04-09 DIAGNOSIS — K82.8 BILIARY DYSKINESIA: Primary | ICD-10-CM

## 2024-04-09 DIAGNOSIS — M79.602 PARESTHESIA AND PAIN OF BOTH UPPER EXTREMITIES: ICD-10-CM

## 2024-04-09 DIAGNOSIS — G93.32 POST-COVID CHRONIC FATIGUE: ICD-10-CM

## 2024-04-09 DIAGNOSIS — U09.9 POST-COVID CHRONIC FATIGUE: ICD-10-CM

## 2024-04-09 DIAGNOSIS — R20.2 PARESTHESIA AND PAIN OF BOTH UPPER EXTREMITIES: ICD-10-CM

## 2024-04-09 DIAGNOSIS — U09.9 POST-COVID CHRONIC NEUROLOGIC SYMPTOMS: ICD-10-CM

## 2024-04-09 DIAGNOSIS — R41.841 COGNITIVE COMMUNICATION DEFICIT: ICD-10-CM

## 2024-04-09 DIAGNOSIS — M79.601 PARESTHESIA AND PAIN OF BOTH UPPER EXTREMITIES: ICD-10-CM

## 2024-04-09 PROCEDURE — 99214 OFFICE O/P EST MOD 30 MIN: CPT | Mod: 95 | Performed by: PHYSICIAN ASSISTANT

## 2024-04-09 SDOH — SOCIAL STABILITY: SOCIAL NETWORK: I HAVE TROUBLE DOING ALL OF MY REGULAR LEISURE ACTIVITIES WITH OTHERS: ALWAYS

## 2024-04-09 SDOH — SOCIAL STABILITY: SOCIAL NETWORK: PROMIS ABILITY TO PARTICIPATE IN SOCIAL ROLES & ACTIVITIES T-SCORE: 29

## 2024-04-09 SDOH — SOCIAL STABILITY: SOCIAL NETWORK

## 2024-04-09 SDOH — SOCIAL STABILITY: SOCIAL NETWORK: I HAVE TROUBLE DOING ALL OF THE FAMILY ACTIVITIES THAT I WANT TO DO: ALWAYS

## 2024-04-09 SDOH — SOCIAL STABILITY: SOCIAL NETWORK: I HAVE TROUBLE DOING ALL OF MY USUAL WORK (INCLUDE WORK AT HOME): ALWAYS

## 2024-04-09 SDOH — SOCIAL STABILITY: SOCIAL NETWORK: I HAVE TROUBLE DOING ALL OF THE ACTIVITIES WITH FRIENDS THAT I WANT TO DO: ALWAYS

## 2024-04-09 ASSESSMENT — ANXIETY QUESTIONNAIRES
8. IF YOU CHECKED OFF ANY PROBLEMS, HOW DIFFICULT HAVE THESE MADE IT FOR YOU TO DO YOUR WORK, TAKE CARE OF THINGS AT HOME, OR GET ALONG WITH OTHER PEOPLE?: NOT DIFFICULT AT ALL
1. FEELING NERVOUS, ANXIOUS, OR ON EDGE: NOT AT ALL
3. WORRYING TOO MUCH ABOUT DIFFERENT THINGS: NOT AT ALL
GAD7 TOTAL SCORE: 0
2. NOT BEING ABLE TO STOP OR CONTROL WORRYING: NOT AT ALL
5. BEING SO RESTLESS THAT IT IS HARD TO SIT STILL: NOT AT ALL
GAD7 TOTAL SCORE: 0
6. BECOMING EASILY ANNOYED OR IRRITABLE: NOT AT ALL
7. FEELING AFRAID AS IF SOMETHING AWFUL MIGHT HAPPEN: NOT AT ALL
7. FEELING AFRAID AS IF SOMETHING AWFUL MIGHT HAPPEN: NOT AT ALL
GAD7 TOTAL SCORE: 0
4. TROUBLE RELAXING: NOT AT ALL
IF YOU CHECKED OFF ANY PROBLEMS ON THIS QUESTIONNAIRE, HOW DIFFICULT HAVE THESE PROBLEMS MADE IT FOR YOU TO DO YOUR WORK, TAKE CARE OF THINGS AT HOME, OR GET ALONG WITH OTHER PEOPLE: NOT DIFFICULT AT ALL

## 2024-04-09 ASSESSMENT — PATIENT HEALTH QUESTIONNAIRE - PHQ9
10. IF YOU CHECKED OFF ANY PROBLEMS, HOW DIFFICULT HAVE THESE PROBLEMS MADE IT FOR YOU TO DO YOUR WORK, TAKE CARE OF THINGS AT HOME, OR GET ALONG WITH OTHER PEOPLE: NOT DIFFICULT AT ALL
SUM OF ALL RESPONSES TO PHQ QUESTIONS 1-9: 0
SUM OF ALL RESPONSES TO PHQ QUESTIONS 1-9: 0

## 2024-04-09 NOTE — PROGRESS NOTES
Jorge Denton is a 28 year old male who presents to be evaluated for a billable video visit.    Video-Visit Details    Video visit Start time:8:43 AM    Type of service:  Video Visit    Video End Time: 9:03 AM    Originating Location (pt. Location): Home    Distant Location (provider location):  Off- Site    Platform used for Video Visit: [employer]    Assessment/Impression   1. Paresthesia and pain of both upper extremities/Post-COVID chronic neurologic symptoms/ Paresthesia of both feet  Patient with new paresthesias/pain in upper extremities and on feet as well as intermittent numbness on side of face.  Patient also endorses recent visual changes including vision pain and has a history of pudendal neuralgia.  Encouraged to continue working with Neurology and will place referral to [facility] Natalia Arora for assistance.   -Other specialty referral;Future  -Internal medicine referral; future     2. 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 Encouraged to continue energy conservation and practicing techniques given by occupational therapy.  He also wishes to start LDN and will discussed patients case with collogues to assit with patient. Will also place referral to [place] and Natalia Musa as above     3. Biliary dyskinesia  Patient with ongoing abdominal difficulties that sound very similar to possible gallbladder disease.  Encouraged to continue working with GI.  He would also like to see Integrative medicine for options that are non surgical will refer to [facility] for healing  - Integrative referral; future       Plan:  I reviewed present knowledge on long-Covid.  Education was provided and question were answered.  Orders/Referrals as above  I will advised patient on test results   Jorge Denton will follow with Dr. Abbott for continuing care. I will review progress in the mean time  and consider need for any other therapeutic interventions. If there are any questions and/or concerns he will call the clinic.      On day of encounter time spent in chart review and with patient in consultation, exam, education,coordination of care,  review of outside charts/documentation and documentation:  30 minutes     I have attempted to proof read for major spelling errors and apologize for any minor errors I may have missed.     This note was dictated using voice recognition software. Any grammatical or context distortions are unintentional and inherent to the software.  _________________________________  CONCEPCIÓN Coreas [facility] PHYSICAL MEDICINE AND REHABILITATION CLINIC [place]    Subjective   [facility]   This 28 year old male presents to the [facility] Rehabilitation Medicine Post-COVID clinic as a new consult on 2/5/24 to evaluate continuing symptoms after COVID infection initially diagnosed 11/2023.  Jorge Denton initially developed  shortness of breath, headache, generalized aches and pains, brain fog, fatigue, weakness, decreased taste, and decreased smell. Treatment was symptomatic.  Jorge Denton experienced complications of chronic neurological symptoms. For full details from the initial consult please see note from 2/5/24.  Patient on 2/27/24 returned for a follow up.  He had [facility] Rhe[facility] and was undergoing work up.  He was also seen by ID for his Elevated IgG for EBV.  He was seen by Neurology for his on going neurological symptoms and will be having a MRI-Cpsine, EMG and labs. His MRI brain was normal.  Patient was still having GI issues and feels his pain is getting worse. His HIDA scan showed functional gallbladder disorder.  Patient still having ptosis and right side numbness.  Overall patient still with same symptoms and worsening abdominal pain.     Today 4/9/24 patient returns for a follow up.  He feels his symptoms are stable.   He saw Rheumatology and was recommenced a lip biopsy which patient did not feel was necessary. Patient has had another episode of chronic prostatitis.  He was prescribed doxycycline and started pelvic floor therapy again and is working with Urology. Patient also has been working with GI and was prescribed a laxative and is being considered for gallbladder removal. He was also recommended a colonoscopy prior to his possible cholecystectomy. Patient is having blood stool and is trying to get medication covered.  Patient is currently working with [facility]. Patient is still having pain in his right upper quadrant.  Patient had MRIs completed and is working with neurology and will be scheduling with speech therapy for his facial weakness. He cannot get EMG until May.   Patient is still having trouble with majority of his symptoms primarily his neurological symptoms and fatigue.  He is interested in LDN as a possibility for his symptoms.    Current concerns: Job Concerns, Financial Concerns, Housing Concerns, and Health Concerns        4/9/2024     8:24 AM   PHQ Assesment Total Score(s)   PHQ-9 Score 0           4/9/2024     8:25 AM   LANNY-7 Results   LANNY 7 TOTAL SCORE 0 (minimal anxiety)   LANNY-7 Total Score 0         4/9/2024     8:25 AM   PTSD Screen Score   Have you ever experienced this kind of event? No         4/9/2024     8:26 AM   PROMIS-29   PROMIS Physical Function T-Score 32 (moderate dysfunction)   PROMIS Anxiety T-Score 40 (within normal limits)   PROMIS Depression T-Score 41 (within normal limits)   PROMIS Fatigue T-Score 76 (severe)   PROMIS Sleep Disturbance T-Score 60 (mild)   PROMIS Ability to Participate in Social Roles & Activities T-Score 29 (severe dysfunction)   PROMIS Pain Interference T-Score 76 (severe)   PROMIS Pain Intensity 8       Past Medical History:   Diagnosis Date    Allergic rhinitis     allergy testing positive for dustmites    Eczema     in earlier childhood    Gastroesophageal  reflux disease     Uncomplicated asthma        Past Surgical History:   Procedure Laterality Date    MASTECTOMY SUBCUTANEOUS MALE BILATERAL (GYNECOMASTIA)         Family History   Problem Relation Age of Onset    Heart Disease Maternal Grandmother     Lipids Maternal Grandmother     Heart Disease Maternal Grandfather     Lipids Maternal Grandfather     Heart Disease Paternal Grandfather     Lipids Paternal Grandfather     Lipids Mother     Lipids Father     Lipids Paternal Grandmother     Thyroid Disease Maternal Grandmother         Thyroid nodule       Social History     Tobacco Use    Smoking status: Never    Smokeless tobacco: Never    Tobacco comments:     no passive exposure   Vaping Use    Vaping Use: Never used   Substance Use Topics    Alcohol use: No    Drug use: No         Current Outpatient Medications:     calcipotriene (DOVONOX) 0.005 % external ointment, , Disp: , Rfl:     calcipotriene (DOVONOX) 0.005 % external solution, , Disp: , Rfl:     doxycycline hyclate (VIBRAMYCIN) 100 MG capsule, Take 1 capsule (100 mg) by mouth 2 times daily for 30 days, Disp: 60 capsule, Rfl: 0    fluticasone (FLOVENT HFA) 110 MCG/ACT inhaler, Flovent  mcg/actuation aerosol inhaler  INHALE TWO PUFFS BY MOUTH TWICE A DAY, Disp: , Rfl:     gabapentin (NEURONTIN) 300 MG capsule, Take 300 mg at night, then up titrate to 300 mg in AM/600 mg at night, Disp: 90 capsule, Rfl: 4    levalbuterol (XOPENEX HFA) 45 MCG/ACT inhaler, Inhale 2 puffs into the lungs every 4 hours as needed for shortness of breath or wheezing, Disp: , Rfl:     linaclotide (LINZESS) 145 MCG capsule, Take 1 capsule (145 mcg) by mouth every morning (before breakfast), Disp: 30 capsule, Rfl: 0    mometasone (ELOCON) 0.1 % external cream, Apply topically daily, Disp: , Rfl:     SUMAtriptan (IMITREX) 50 MG tablet, Take 1 tablet (50 mg) by mouth at onset of headache for migraine May repeat in 2 hours. Max 4 tablets/24 hours., Disp: 9 tablet, Rfl: 5     tadalafil (CIALIS) 5 MG tablet, tadalafil 5 mg tablet  TAKE ONE TABLET BY MOUTH AT BEDTIME, Disp: , Rfl:     Review of Systems   Constitutional, HEENT, cardiovascular, pulmonary, GI, , musculoskeletal, neuro, skin, endocrine and psych systems are negative, except as otherwise noted.      Objective         Vitals:  No vitals were obtained today due to virtual visit.    Physical Exam   EYES: Eyes grossly normal to inspection.  No discharge or erythema, or obvious scleral/conjunctival abnormalities.  SKIN: Visible skin clear. No significant rash, abnormal pigmentation or lesions.  NEURO: Cranial nerves grossly intact.  Mentation and speech appropriate for age.  GENERAL: Healthy, alert and no distress  RESP: No audible wheeze, cough, or visible cyanosis.  No visible retractions or increased work of breathing.    PSYCH: Mentation appears normal, affect normal/bright, judgement and insight intact, normal speech and appearance well-groomed.        Labs:  Lab on 03/27/2024   Component Date Value Ref Range Status    Color Urine 03/27/2024 Yellow  Colorless, Straw, Light Yellow, Yellow Final    Appearance Urine 03/27/2024 Clear  Clear Final    Glucose Urine 03/27/2024 Negative  Negative mg/dL Final    Bilirubin Urine 03/27/2024 Negative  Negative Final    Ketones Urine 03/27/2024 Negative  Negative mg/dL Final    Specific Gravity Urine 03/27/2024 1.010  1.003 - 1.035 Final    Blood Urine 03/27/2024 Negative  Negative Final    pH Urine 03/27/2024 7.0  5.0 - 7.0 Final    Protein Albumin Urine 03/27/2024 Negative  Negative mg/dL Final    Urobilinogen Urine 03/27/2024 0.2  0.2, 1.0 E.U./dL Final    Nitrite Urine 03/27/2024 Negative  Negative Final    Leukocyte Esterase Urine 03/27/2024 Negative  Negative Final    Neisseria gonorrhoeae 03/27/2024 Negative  Negative Final    Negative for N. gonorrhoeae rRNA by transcription mediated amplification. A negative result by transcription mediated amplification does not preclude the  presence of C. trachomatis infection because results are dependent on proper and adequate collection, absence of inhibitors and sufficient rRNA to be detected.    Chlamydia trachomatis 03/27/2024 Negative  Negative Final    A negative result by transcription mediated amplification does not preclude the presence of C. trachomatis infection because results are dependent on proper and adequate collection, absence of inhibitors and sufficient rRNA to be detected.    Prostate Specific Antigen Screen 03/27/2024 0.67  ng/mL Final    No reference ranges have been established for patients under 40 years.    WBC Count 03/27/2024 3.6 (L)  4.0 - 11.0 10e3/uL Final    RBC Count 03/27/2024 5.38  4.40 - 5.90 10e6/uL Final    Hemoglobin 03/27/2024 15.8  13.3 - 17.7 g/dL Final    Hematocrit 03/27/2024 46.5  40.0 - 53.0 % Final    MCV 03/27/2024 86  78 - 100 fL Final    MCH 03/27/2024 29.4  26.5 - 33.0 pg Final    MCHC 03/27/2024 34.0  31.5 - 36.5 g/dL Final    RDW 03/27/2024 12.6  10.0 - 15.0 % Final    Platelet Count 03/27/2024 180  150 - 450 10e3/uL Final    % Neutrophils 03/27/2024 40  % Final    % Lymphocytes 03/27/2024 50  % Final    % Monocytes 03/27/2024 6  % Final    % Eosinophils 03/27/2024 4  % Final    % Basophils 03/27/2024 1  % Final    % Immature Granulocytes 03/27/2024 0  % Final    Absolute Neutrophils 03/27/2024 1.4 (L)  1.6 - 8.3 10e3/uL Final    Absolute Lymphocytes 03/27/2024 1.8  0.8 - 5.3 10e3/uL Final    Absolute Monocytes 03/27/2024 0.2  0.0 - 1.3 10e3/uL Final    Absolute Eosinophils 03/27/2024 0.1  0.0 - 0.7 10e3/uL Final    Absolute Basophils 03/27/2024 0.0  0.0 - 0.2 10e3/uL Final    Absolute Immature Granulocytes 03/27/2024 0.0  <=0.4 10e3/uL Final    Bacteria Urine 03/27/2024 Few (A)  None Seen /HPF Final    RBC Urine 03/27/2024 None Seen  0-2 /HPF /HPF Final    WBC Urine 03/27/2024 None Seen  0-5 /HPF /HPF Final   Office Visit on 03/11/2024   Component Date Value Ref Range Status    Color Urine  03/11/2024 Yellow  Colorless, Straw, Light Yellow, Yellow Final    Appearance Urine 03/11/2024 Clear  Clear Final    Glucose Urine 03/11/2024 Negative  Negative mg/dL Final    Bilirubin Urine 03/11/2024 Negative  Negative Final    Ketones Urine 03/11/2024 Negative  Negative mg/dL Final    Specific Gravity Urine 03/11/2024 1.025  0.999 - 1.035 Final    Blood Urine 03/11/2024 Negative  Negative Final    pH Urine 03/11/2024 6.0  5.0 - 7.0 Final    Protein Albumin Urine 03/11/2024 10 (A)  Negative mg/dL Final    Urobilinogen Urine 03/11/2024 Normal  Normal, 2.0 mg/dL Final    Nitrite Urine 03/11/2024 Negative  Negative Final    Leukocyte Esterase Urine 03/11/2024 Negative  Negative Final    Bacteria Urine 03/11/2024 Few (A)  None Seen /HPF Final    RBC Urine 03/11/2024 None Seen  0-2 /HPF /HPF Final    WBC Urine 03/11/2024 None Seen  0-5 /HPF /HPF Final    Mucus Urine 03/11/2024 Present (A)  None Seen /LPF Final   Lab on 03/06/2024   Component Date Value Ref Range Status    Sodium 03/06/2024 142  135 - 145 mmol/L Final    Reference intervals for this test were updated on 09/26/2023 to more accurately reflect our healthy population. There may be differences in the flagging of prior results with similar values performed with this method. Interpretation of those prior results can be made in the context of the updated reference intervals.     Potassium 03/06/2024 4.1  3.4 - 5.3 mmol/L Final    Carbon Dioxide (CO2) 03/06/2024 28  22 - 29 mmol/L Final    Anion Gap 03/06/2024 11  7 - 15 mmol/L Final    Urea Nitrogen 03/06/2024 10.1  6.0 - 20.0 mg/dL Final    Creatinine 03/06/2024 0.98  0.67 - 1.17 mg/dL Final    GFR Estimate 03/06/2024 >90  >60 mL/min/1.73m2 Final    Calcium 03/06/2024 9.5  8.6 - 10.0 mg/dL Final    Chloride 03/06/2024 103  98 - 107 mmol/L Final    Glucose 03/06/2024 84  70 - 99 mg/dL Final    Alkaline Phosphatase 03/06/2024 63  40 - 150 U/L Final    Reference intervals for this test were updated on 11/14/2023  to more accurately reflect our healthy population. There may be differences in the flagging of prior results with similar values performed with this method. Interpretation of those prior results can be made in the context of the updated reference intervals.    AST 03/06/2024 21  0 - 45 U/L Final    Reference intervals for this test were updated on 6/12/2023 to more accurately reflect our healthy population. There may be differences in the flagging of prior results with similar values performed with this method. Interpretation of those prior results can be made in the context of the updated reference intervals.    ALT 03/06/2024 28  0 - 70 U/L Final    Reference intervals for this test were updated on 6/12/2023 to more accurately reflect our healthy population. There may be differences in the flagging of prior results with similar values performed with this method. Interpretation of those prior results can be made in the context of the updated reference intervals.      Protein Total 03/06/2024 7.1  6.4 - 8.3 g/dL Final    Albumin 03/06/2024 4.8  3.5 - 5.2 g/dL Final    Bilirubin Total 03/06/2024 0.5  <=1.2 mg/dL Final    Calcium Ionized Whole Blood 03/06/2024 4.9  4.4 - 5.2 mg/dL Final   Office Visit on 02/21/2024   Component Date Value Ref Range Status    SSA Ines IgG Instrument Value 02/21/2024 <0.5  <7.0 U/mL Final    SSA (Ro) Antibody IgG 02/21/2024 Negative  Negative Final    SSB Ines IgG Instrument Value 02/21/2024 <0.6  <7.0 U/mL Final    SSB (La) Antibody IgG 02/21/2024 Negative  Negative Final    Vitamin B1 Whole Blood Level 02/21/2024 380 (H)  70 - 180 nmol/L Final    INTERPRETIVE INFORMATION: Vitamin B1, Whole Blood    This assay measures the concentration of thiamine   diphosphate (TDP), the primary active form of vitamin B1.   Approximately 90 percent of vitamin B1 present in whole   blood is TDP. Thiamine and thiamine monophosphate, which   comprise the remaining 10 percent, are not measured.    This test  was developed and its performance characteristics   determined by [employer]. It has not been cleared or   approved by the [place] Food and Drug Administration. This test   was performed in a CLIA certified laboratory and is   intended for clinical purposes.  Performed By: [facility] [facility]  [address] [place] [address]  : Tim Hernandez MD, PhD  CLIA Number: [id number]    Acetylcholine Binding Antibody 02/21/2024 0.0  0.0 - 0.4 nmol/L Final    Comment: INTERPRETIVE INFORMATION: Acetylcholine Binding Ab      Negative ....... 0.0 - 0.4 nmol/L    Positive ....... 0.5 nmol/L or greater    Approximately 85-90 percent of patients with myasthenia   gravis (MG) express antibodies to the acetylcholine   receptor (AChR), which can be divided into binding,   blocking, and modulating antibodies. Binding antibody can   activate complement and lead to loss of AChR. Blocking   antibody may impair binding of acetylcholine to the   receptor, leading to poor muscle contraction. Modulating   antibody causes receptor endocytosis resulting in loss of   AChR expression, which correlates most closely with   clinical severity of disease. Approximately 10-15 percent   of individuals with confirmed myasthenia gravis have no   measurable binding, blocking, or modulating antibodies.    This test was developed and its performance characteristics   determined by [employer]. It has not been cleared or   approved by the [place] Food and Drug Administration. This                            test   was performed in a CLIA certified laboratory and is   intended for clinical purposes.  Performed By: [facility]  [address] [place], [place] [id number]  : Tim Hernandez MD, PhD  CLIA Number: [id number]    Striated Muscle Antibodies, IgG Sc* 02/21/2024 <1:40  <1:40 Final    Comment:   Striated Muscle Antibodies, IgG are not detected. No   further testing will be  performed.  INTERPRETIVE DATA:  Striated Muscle Antibodies, IgG Screen    In the presence of acetylcholine receptor (AChR) antibody,   striated muscle antibodies, which bind in a   cross-striational pattern to skeletal and heart muscle   tissue sections, are associated with late-onset myasthenia   gravis (MG). Striated muscle antibodies recognize epitopes   on three major muscle proteins, including: titin, ryanodine   receptor (RyR) and Kv1.4 (an alpha subunit of voltage-gated   potassium channel [VGKC]). Isolated cases of striated   muscle antibodies may be seen in patients with certain   autoimmune diseases, rheumatic fever, myocardial   infarction, and following some cardiotomy procedures.    This test was developed and its performance characteristics   determined by [employer]. It has not been cleared or   approved by the US Food and Drug Administration. This test   was performed in a CLIA                            certified laboratory and is   intended for clinical purposes.  Performed By: [facility]  [address] [place] [id number]  : Tim Hernandez MD, PhD  CLIA Number: [id number]    Moy Chacon 02/21/2024 0  <=45 % Final    Comment: INTERPRETIVE INFORMATION: Acetylcholine Modulating Ab      Negative ..........  0-45 percent modulating    Positive ..........  46 percent or greater modulating    Approximately 85-90 percent of patients with myasthenia   gravis (MG) express antibodies to the acetylcholine   receptor (AChR), which can be divided into binding,   blocking, and modulating antibodies. Binding antibody can   activate complement and lead to loss of AChR. Blocking   antibody may impair binding of acetylcholine to the   receptor, leading to poor muscle contraction. Modulating   antibody causes receptor endocytosis resulting in loss of   AChR expression, which correlates most closely with   clinical severity of disease. Approximately 10-15 percent    of individuals with confirmed myasthenia gravis have no   measurable binding, blocking, or modulating antibodies.    This test was developed and its performance characteristics   determined by [email or web address]. It has not been cleared or   approved by the [place] Food                            and Drug Administration. This test   was performed in a CLIA certified laboratory and is   intended for clinical purposes.  Performed By: [facility]  [address] [place] [address]  : Tim Hernandez MD, PhD  CLIA Number: [id number]    AcetChol Block Ines 02/21/2024 9  0 - 26 % Final    Comment: INTERPRETIVE INFORMATION: Acetylcholine Blocking Ab      Negative ............  0-26 percent blocking    Indeterminate ....... 27-41 percent blocking    Positive ............ 42 percent or greater blocking    Approximately 85-90 percent of patients with myasthenia   gravis (MG) express antibodies to the acetylcholine   receptor (AChR), which can be divided into binding,   blocking, and modulating antibodies. Binding antibody can   activate complement and lead to loss of AChR. Blocking   antibody may impair binding of acetylcholine to the   receptor, leading to poor muscle contraction. Modulating   antibody causes receptor endocytosis resulting in loss of   AChR expression, which correlates most closely with   clinical severity of disease. Approximately 10-15 percent   of individuals with confirmed myasthenia gravis have no   measurable binding, blocking, or modulating antibodies.    This test was developed and its performance characteristics   determined by [email or web address]. It has                            not been cleared or   approved by the US Food and Drug Administration. This test   was performed in a CLIA certified laboratory and is   intended for clinical purposes.  Performed By: [facility]  [address] [place] [address]  : Tim Hernandez MD,  PhD  CLIA Number: [id number]    CRP Inflammation 02/21/2024 <3.00  <5.00 mg/L Final    Muscle-Specific Kinase Antibody Sc* 02/21/2024 <1:10  <1:10 Final    Comment: MuSK Antibody, IgG is not detected. No further testing will   be performed.  INTERPRETIVE INFORMATION: MuSK IgG Ab CBA, Serum, with Rflx    Muscle-specific kinase (MuSK) antibody is found in a subset   of patients with myasthenia gravis, primarily those   seronegative for muscle acetylcholine receptor (AChR)   antibody. Decreasing antibody levels may be associated with   therapeutic response; therefore, clinical correlation must   be strongly considered. A negative test result does not   rule out a diagnosis of myasthenia gravis.    This indirect fluorescent antibody cell-based assay (CBA)   utilizes muscle-specific kinase (MuSK) transfected cells   for the detection of the MuSK IgG antibody.    This test was developed and its performance characteristics   determined by [employer]. It has not been cleared or   approved by the U.S. Food and Drug Administration. This   test was performed in a CLIA-certified laboratory and is   intended for clinical purposes.  Performed By: [facility] [facility]  [address] [place] [address]  : Tim Hernandez MD, PhD  CLIA Number: [id number]      Imaging:    I personally reviewed the following imaging results today and those on care everywhere, if indicated     US abdomen 3/28/24  IMPRESSION:    Mild splenomegaly. Otherwise negative.   WILFRIDO MCCAULEY MD     MR Cervical spine wo contrast 3/16/24  IMPRESSION:  1.  Cervical height/alignment is satisfactory. No marrow or ligamentous edema. No abnormal intramedullary signal.     2.  No spinal stenosis or foraminal narrowing at any cervical level. No disc herniations.     3.  Top normal size lymph nodes with homogeneous signal characteristics level II presumed reactive. Mild prominence of the palatine  tonsillar tissue without airway narrowing also presumed reactive.    MR Brain wo/w contrast 2/13/24  IMPRESSION:  1.  No acute infarct, acute intracranial hemorrhage, or intracranial mass  Reviewed imaging from [facility] sites     Medical Records Reviewed:    Reviewed consults/documents from [facility] including  GI,Family practice, Urology, Neurology, and ENT

## 2024-04-09 NOTE — PATIENT INSTRUCTIONS
Post COVID Self Care Suggestions:     Fatigue Management:       https://www.archives-pmr.org/action/showPdf?flp=-9755%2819%6741903-9       Self Care:      [email or web address]  Recovery World Health Organization:    https://apps.who.int/iris/tritruetream/handle/12065/298209/DOV-PXMB-8821-418-56951-84082-eng.pdf  Breathing exercises:    [email or web address] [place].[facility]/health/conditions-and-diseases/[email or web address]/[email or web address]-recovery-breathing-exercises      Assessment of sense of smell and taste    Smell training:  Flowery - Brenda  Fruity - Lemon  Spicy - Cloves  Resinous - Eucalyptus      1 - Pour a few droplets of one of the oils on to a cotton pad or ball  2 - Do not try to sniff the pad immediately; leave it for a few minutes for the fragrance to develop  3 - Hold the first stick/pad up to your nose, about an inch away.  The order in which you test the oils does not matter  4 - Relax and try to inhale naturally through the nose - sniffing too quickly and deeply is likely to result in you not being able to detect anything  5 - Try this a couple more times, then rest for five minutes  6 - Move on to the next oil and repeat as above.    After three months switch to a new set of odors: menthol, thyme, tangerine, and hira, training with them twice daily.    After another three months, switch to a third new set of odors: green tea, bergamot, rosemary, and jackson, again training with them twice daily.    Studies:   [place] [facility] [place]  [email or web address]    Cognitive Post-COVID study  z.[facility]/[email or web address]-ema    Supplements:  Fatigue- COQ10 100mg 3x day  ( side effects GI)  Brain fog-N-acetylcysteine 600 mg daily (side effects GI)

## 2024-04-09 NOTE — NURSING NOTE
Is the patient currently in the state of [place]? YES    Visit mode:VIDEO    If the visit is dropped, the patient can be reconnected by: TELEPHONE VISIT: Phone number:   Telephone Information:   Mobile [phone]       Will anyone else be joining the visit? NO  (If patient encounters technical issues they should call [phone]    How would you like to obtain your AVS? MyChart    Are changes needed to the allergy or medication list? Pt stated no med changes    Are refills needed on medications prescribed by this physician? NO    Reason for visit: Video Visit (Consult for functional therapies )    Antoinette FOOTE

## 2024-04-09 NOTE — LETTER
4/9/2024       RE: Jorge Denton  [address]  [facility] [address]       Dear Colleague,    Thank you for referring your patient, Jorge Denton, to the [facility] PHYSICAL MEDICINE AND REHABILITATION CLINIC [place] at [facility]. Please see a copy of my visit note below.    Jorge Denton is a 28 year old male who presents to be evaluated for a billable video visit.    Assessment/Impression   1. Paresthesia and pain of both upper extremities/Post-COVID chronic neurologic symptoms/ Paresthesia of both feet  Patient with new paresthesias/pain in upper extremities and on feet as well as intermittent numbness on side of face.  Patient also endorses recent visual changes including vision pain and has a history of pudendal neuralgia.  Encouraged to continue working with Neurology and will place referral to [facility] for assistance.   -Other specialty referral;Future  -Internal medicine referral; future     2. 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 Encouraged to continue energy conservation and practicing techniques given by occupational therapy.  He also wishes to start LDN and will discussed patients case with collogues to assit with patient. Will also place referral to [facility] Musa as above     3. Biliary dyskinesia  Patient with ongoing abdominal difficulties that sound very similar to possible gallbladder disease.  Encouraged to continue working with GI.  He would also like to see Integrative medicine for options that are non surgical will refer to [facility] for healing  - Integrative referral; future       Plan:  I reviewed present knowledge on long-Covid.  Education was provided and question were answered.  Orders/Referrals as above  I will advised patient on test results   Jorge Denton will  follow with Dr. Abbott for continuing care. I will review progress in the mean time and consider need for any other therapeutic interventions. If there are any questions and/or concerns he will call the clinic.      On day of encounter time spent in chart review and with patient in consultation, exam, education,coordination of care,  review of outside charts/documentation and documentation:  30 minutes     I have attempted to proof read for major spelling errors and apologize for any minor errors I may have missed.     This note was dictated using voice recognition software. Any grammatical or context distortions are unintentional and inherent to the software.  _________________________________    Subjective   HPI   This 28 year old male presents to the [facility] Rehabilitation Medicine Post-COVID clinic as a new consult on 2/5/24 to evaluate continuing symptoms after COVID infection initially diagnosed 11/2023.  Jorge Detnon initially developed  shortness of breath, headache, generalized aches and pains, brain fog, fatigue, weakness, decreased taste, and decreased smell. Treatment was symptomatic.  Jorge Denton experienced complications of chronic neurological symptoms. For full details from the initial consult please see note from 2/5/24.  Patient on 2/27/24 returned for a follow up.  He had [facility] Rhe[facility] and was undergoing work up.  He was also seen by ID for his Elevated IgG for EBV.  He was seen by Neurology for his on going neurological symptoms and will be having a MRI-Cpsine, EMG and labs. His MRI brain was normal.  Patient was still having GI issues and feels his pain is getting worse. His HIDA scan showed functional gallbladder disorder.  Patient still having ptosis and right side numbness.  Overall patient still with same symptoms and worsening abdominal pain.     Today 4/9/24 patient returns for a follow up.  He feels his symptoms are stable.  He saw  Rheumatology and was recommenced a lip biopsy which patient did not feel was necessary. Patient has had another episode of chronic prostatitis.  He was prescribed doxycycline and started pelvic floor therapy again and is working with Urology. Patient also has been working with GI and was prescribed a laxative and is being considered for gallbladder removal. He was also recommended a colonoscopy prior to his possible cholecystectomy. Patient is having blood stool and is trying to get medication covered.  Patient is currently working with [facility]. Patient is still having pain in his right upper quadrant.  Patient had MRIs completed and is working with neurology and will be scheduling with speech therapy for his facial weakness. He cannot get EMG until May.   Patient is still having trouble with majority of his symptoms primarily his neurological symptoms and fatigue.  He is interested in LDN as a possibility for his symptoms.    Current concerns: Job Concerns, Financial Concerns, Housing Concerns, and Health Concerns        4/9/2024     8:24 AM   PHQ Assesment Total Score(s)   PHQ-9 Score 0           4/9/2024     8:25 AM   LANNY-7 Results   LANNY 7 TOTAL SCORE 0 (minimal anxiety)   LANNY-7 Total Score 0         4/9/2024     8:25 AM   PTSD Screen Score   Have you ever experienced this kind of event? No         4/9/2024     8:26 AM   PROMIS-29   PROMIS Physical Function T-Score 32 (moderate dysfunction)   PROMIS Anxiety T-Score 40 (within normal limits)   PROMIS Depression T-Score 41 (within normal limits)   PROMIS Fatigue T-Score 76 (severe)   PROMIS Sleep Disturbance T-Score 60 (mild)   PROMIS Ability to Participate in Social Roles & Activities T-Score 29 (severe dysfunction)   PROMIS Pain Interference T-Score 76 (severe)   PROMIS Pain Intensity 8       Past Medical History:   Diagnosis Date    Allergic rhinitis     allergy testing positive for dustmites    Eczema     in earlier childhood    Gastroesophageal reflux  disease     Uncomplicated asthma        Past Surgical History:   Procedure Laterality Date    MASTECTOMY SUBCUTANEOUS MALE BILATERAL (GYNECOMASTIA)         Family History   Problem Relation Age of Onset    Heart Disease Maternal Grandmother     Lipids Maternal Grandmother     Heart Disease Maternal Grandfather     Lipids Maternal Grandfather     Heart Disease Paternal Grandfather     Lipids Paternal Grandfather     Lipids Mother     Lipids Father     Lipids Paternal Grandmother     Thyroid Disease Maternal Grandmother         Thyroid nodule       Social History     Tobacco Use    Smoking status: Never    Smokeless tobacco: Never    Tobacco comments:     no passive exposure   Vaping Use    Vaping Use: Never used   Substance Use Topics    Alcohol use: No    Drug use: No         Current Outpatient Medications:     calcipotriene (DOVONOX) 0.005 % external ointment, , Disp: , Rfl:     calcipotriene (DOVONOX) 0.005 % external solution, , Disp: , Rfl:     doxycycline hyclate (VIBRAMYCIN) 100 MG capsule, Take 1 capsule (100 mg) by mouth 2 times daily for 30 days, Disp: 60 capsule, Rfl: 0    fluticasone (FLOVENT HFA) 110 MCG/ACT inhaler, Flovent  mcg/actuation aerosol inhaler  INHALE TWO PUFFS BY MOUTH TWICE A DAY, Disp: , Rfl:     gabapentin (NEURONTIN) 300 MG capsule, Take 300 mg at night, then up titrate to 300 mg in AM/600 mg at night, Disp: 90 capsule, Rfl: 4    levalbuterol (XOPENEX HFA) 45 MCG/ACT inhaler, Inhale 2 puffs into the lungs every 4 hours as needed for shortness of breath or wheezing, Disp: , Rfl:     linaclotide (LINZESS) 145 MCG capsule, Take 1 capsule (145 mcg) by mouth every morning (before breakfast), Disp: 30 capsule, Rfl: 0    mometasone (ELOCON) 0.1 % external cream, Apply topically daily, Disp: , Rfl:     SUMAtriptan (IMITREX) 50 MG tablet, Take 1 tablet (50 mg) by mouth at onset of headache for migraine May repeat in 2 hours. Max 4 tablets/24 hours., Disp: 9 tablet, Rfl: 5    tadalafil  (CIALIS) 5 MG tablet, tadalafil 5 mg tablet  TAKE ONE TABLET BY MOUTH AT BEDTIME, Disp: , Rfl:     Review of Systems   Constitutional, HEENT, cardiovascular, pulmonary, GI, , musculoskeletal, neuro, skin, endocrine and psych systems are negative, except as otherwise noted.      Objective         Vitals:  No vitals were obtained today due to virtual visit.    Physical Exam   EYES: Eyes grossly normal to inspection.  No discharge or erythema, or obvious scleral/conjunctival abnormalities.  SKIN: Visible skin clear. No significant rash, abnormal pigmentation or lesions.  NEURO: Cranial nerves grossly intact.  Mentation and speech appropriate for age.  GENERAL: Healthy, alert and no distress  RESP: No audible wheeze, cough, or visible cyanosis.  No visible retractions or increased work of breathing.    PSYCH: Mentation appears normal, affect normal/bright, judgement and insight intact, normal speech and appearance well-groomed.        Labs:  Lab on 03/27/2024   Component Date Value Ref Range Status    Color Urine 03/27/2024 Yellow  Colorless, Straw, Light Yellow, Yellow Final    Appearance Urine 03/27/2024 Clear  Clear Final    Glucose Urine 03/27/2024 Negative  Negative mg/dL Final    Bilirubin Urine 03/27/2024 Negative  Negative Final    Ketones Urine 03/27/2024 Negative  Negative mg/dL Final    Specific Gravity Urine 03/27/2024 1.010  1.003 - 1.035 Final    Blood Urine 03/27/2024 Negative  Negative Final    pH Urine 03/27/2024 7.0  5.0 - 7.0 Final    Protein Albumin Urine 03/27/2024 Negative  Negative mg/dL Final    Urobilinogen Urine 03/27/2024 0.2  0.2, 1.0 E.U./dL Final    Nitrite Urine 03/27/2024 Negative  Negative Final    Leukocyte Esterase Urine 03/27/2024 Negative  Negative Final    Neisseria gonorrhoeae 03/27/2024 Negative  Negative Final    Negative for N. gonorrhoeae rRNA by transcription mediated amplification. A negative result by transcription mediated amplification does not preclude the presence of  C. trachomatis infection because results are dependent on proper and adequate collection, absence of inhibitors and sufficient rRNA to be detected.    Chlamydia trachomatis 03/27/2024 Negative  Negative Final    A negative result by transcription mediated amplification does not preclude the presence of C. trachomatis infection because results are dependent on proper and adequate collection, absence of inhibitors and sufficient rRNA to be detected.    Prostate Specific Antigen Screen 03/27/2024 0.67  ng/mL Final    No reference ranges have been established for patients under 40 years.    WBC Count 03/27/2024 3.6 (L)  4.0 - 11.0 10e3/uL Final    RBC Count 03/27/2024 5.38  4.40 - 5.90 10e6/uL Final    Hemoglobin 03/27/2024 15.8  13.3 - 17.7 g/dL Final    Hematocrit 03/27/2024 46.5  40.0 - 53.0 % Final    MCV 03/27/2024 86  78 - 100 fL Final    MCH 03/27/2024 29.4  26.5 - 33.0 pg Final    MCHC 03/27/2024 34.0  31.5 - 36.5 g/dL Final    RDW 03/27/2024 12.6  10.0 - 15.0 % Final    Platelet Count 03/27/2024 180  150 - 450 10e3/uL Final    % Neutrophils 03/27/2024 40  % Final    % Lymphocytes 03/27/2024 50  % Final    % Monocytes 03/27/2024 6  % Final    % Eosinophils 03/27/2024 4  % Final    % Basophils 03/27/2024 1  % Final    % Immature Granulocytes 03/27/2024 0  % Final    Absolute Neutrophils 03/27/2024 1.4 (L)  1.6 - 8.3 10e3/uL Final    Absolute Lymphocytes 03/27/2024 1.8  0.8 - 5.3 10e3/uL Final    Absolute Monocytes 03/27/2024 0.2  0.0 - 1.3 10e3/uL Final    Absolute Eosinophils 03/27/2024 0.1  0.0 - 0.7 10e3/uL Final    Absolute Basophils 03/27/2024 0.0  0.0 - 0.2 10e3/uL Final    Absolute Immature Granulocytes 03/27/2024 0.0  <=0.4 10e3/uL Final    Bacteria Urine 03/27/2024 Few (A)  None Seen /HPF Final    RBC Urine 03/27/2024 None Seen  0-2 /HPF /HPF Final    WBC Urine 03/27/2024 None Seen  0-5 /HPF /HPF Final   Office Visit on 03/11/2024   Component Date Value Ref Range Status    Color Urine 03/11/2024 Yellow   Colorless, Straw, Light Yellow, Yellow Final    Appearance Urine 03/11/2024 Clear  Clear Final    Glucose Urine 03/11/2024 Negative  Negative mg/dL Final    Bilirubin Urine 03/11/2024 Negative  Negative Final    Ketones Urine 03/11/2024 Negative  Negative mg/dL Final    Specific Gravity Urine 03/11/2024 1.025  0.999 - 1.035 Final    Blood Urine 03/11/2024 Negative  Negative Final    pH Urine 03/11/2024 6.0  5.0 - 7.0 Final    Protein Albumin Urine 03/11/2024 10 (A)  Negative mg/dL Final    Urobilinogen Urine 03/11/2024 Normal  Normal, 2.0 mg/dL Final    Nitrite Urine 03/11/2024 Negative  Negative Final    Leukocyte Esterase Urine 03/11/2024 Negative  Negative Final    Bacteria Urine 03/11/2024 Few (A)  None Seen /HPF Final    RBC Urine 03/11/2024 None Seen  0-2 /HPF /HPF Final    WBC Urine 03/11/2024 None Seen  0-5 /HPF /HPF Final    Mucus Urine 03/11/2024 Present (A)  None Seen /LPF Final   Lab on 03/06/2024   Component Date Value Ref Range Status    Sodium 03/06/2024 142  135 - 145 mmol/L Final    Reference intervals for this test were updated on 09/26/2023 to more accurately reflect our healthy population. There may be differences in the flagging of prior results with similar values performed with this method. Interpretation of those prior results can be made in the context of the updated reference intervals.     Potassium 03/06/2024 4.1  3.4 - 5.3 mmol/L Final    Carbon Dioxide (CO2) 03/06/2024 28  22 - 29 mmol/L Final    Anion Gap 03/06/2024 11  7 - 15 mmol/L Final    Urea Nitrogen 03/06/2024 10.1  6.0 - 20.0 mg/dL Final    Creatinine 03/06/2024 0.98  0.67 - 1.17 mg/dL Final    GFR Estimate 03/06/2024 >90  >60 mL/min/1.73m2 Final    Calcium 03/06/2024 9.5  8.6 - 10.0 mg/dL Final    Chloride 03/06/2024 103  98 - 107 mmol/L Final    Glucose 03/06/2024 84  70 - 99 mg/dL Final    Alkaline Phosphatase 03/06/2024 63  40 - 150 U/L Final    Reference intervals for this test were updated on 11/14/2023 to more accurately  reflect our healthy population. There may be differences in the flagging of prior results with similar values performed with this method. Interpretation of those prior results can be made in the context of the updated reference intervals.    AST 03/06/2024 21  0 - 45 U/L Final    Reference intervals for this test were updated on 6/12/2023 to more accurately reflect our healthy population. There may be differences in the flagging of prior results with similar values performed with this method. Interpretation of those prior results can be made in the context of the updated reference intervals.    ALT 03/06/2024 28  0 - 70 U/L Final    Reference intervals for this test were updated on 6/12/2023 to more accurately reflect our healthy population. There may be differences in the flagging of prior results with similar values performed with this method. Interpretation of those prior results can be made in the context of the updated reference intervals.      Protein Total 03/06/2024 7.1  6.4 - 8.3 g/dL Final    Albumin 03/06/2024 4.8  3.5 - 5.2 g/dL Final    Bilirubin Total 03/06/2024 0.5  <=1.2 mg/dL Final    Calcium Ionized Whole Blood 03/06/2024 4.9  4.4 - 5.2 mg/dL Final   Office Visit on 02/21/2024   Component Date Value Ref Range Status    SSA Ines IgG Instrument Value 02/21/2024 <0.5  <7.0 U/mL Final    SSA (Ro) Antibody IgG 02/21/2024 Negative  Negative Final    SSB Ines IgG Instrument Value 02/21/2024 <0.6  <7.0 U/mL Final    SSB (La) Antibody IgG 02/21/2024 Negative  Negative Final    Vitamin B1 Whole Blood Level 02/21/2024 380 (H)  70 - 180 nmol/L Final    INTERPRETIVE INFORMATION: Vitamin B1, Whole Blood    This assay measures the concentration of thiamine   diphosphate (TDP), the primary active form of vitamin B1.   Approximately 90 percent of vitamin B1 present in whole   blood is TDP. Thiamine and thiamine monophosphate, which   comprise the remaining 10 percent, are not measured.    This test was developed and  its performance characteristics   determined by [facility]. It has not been cleared or   approved by the US Food and Drug Administration. This test   was performed in a CLIA certified laboratory and is   intended for clinical purposes.  Performed By: [facility]  [address] [place], [place] [id number]  : Tim Hernandez MD, PhD  CLIA Number: [id number]    Acetylcholine Binding Antibody 02/21/2024 0.0  0.0 - 0.4 nmol/L Final    Comment: INTERPRETIVE INFORMATION: Acetylcholine Binding Ab      Negative ....... 0.0 - 0.4 nmol/L    Positive ....... 0.5 nmol/L or greater    Approximately 85-90 percent of patients with myasthenia   gravis (MG) express antibodies to the acetylcholine   receptor (AChR), which can be divided into binding,   blocking, and modulating antibodies. Binding antibody can   activate complement and lead to loss of AChR. Blocking   antibody may impair binding of acetylcholine to the   receptor, leading to poor muscle contraction. Modulating   antibody causes receptor endocytosis resulting in loss of   AChR expression, which correlates most closely with   clinical severity of disease. Approximately 10-15 percent   of individuals with confirmed myasthenia gravis have no   measurable binding, blocking, or modulating antibodies.    This test was developed and its performance characteristics   determined by [facility]. It has not been cleared or   approved by the US Food and Drug Administration. This                            test   was performed in a CLIA certified laboratory and is   intended for clinical purposes.  Performed By: [facility]  [address] [place], [place] [id number]  : Tim Hernandez MD, PhD  CLIA Number: [id number]    Striated Muscle Antibodies, IgG Sc* 02/21/2024 <1:40  <1:40 Final    Comment:   Striated Muscle Antibodies, IgG are not detected. No   further testing will be performed.  INTERPRETIVE DATA:   Striated Muscle Antibodies, IgG Screen    In the presence of acetylcholine receptor (AChR) antibody,   striated muscle antibodies, which bind in a   cross-striational pattern to skeletal and heart muscle   tissue sections, are associated with late-onset myasthenia   gravis (MG). Striated muscle antibodies recognize epitopes   on three major muscle proteins, including: titin, ryanodine   receptor (RyR) and Kv1.4 (an alpha subunit of voltage-gated   potassium channel [VGKC]). Isolated cases of striated   muscle antibodies may be seen in patients with certain   autoimmune diseases, rheumatic fever, myocardial   infarction, and following some cardiotomy procedures.    This test was developed and its performance characteristics   determined by [facility]. It has not been cleared or   approved by the US Food and Drug Administration. This test   was performed in a CLIA                            certified laboratory and is   intended for clinical purposes.  Performed By: [facility]  [address] [place] [id number]  : Tim Hernandez MD, PhD  CLIA Number: [id number]    [facility] 02/21/2024 0  <=45 % Final    Comment: INTERPRETIVE INFORMATION: Acetylcholine Modulating Ab      Negative ..........  0-45 percent modulating    Positive ..........  46 percent or greater modulating    Approximately 85-90 percent of patients with myasthenia   gravis (MG) express antibodies to the acetylcholine   receptor (AChR), which can be divided into binding,   blocking, and modulating antibodies. Binding antibody can   activate complement and lead to loss of AChR. Blocking   antibody may impair binding of acetylcholine to the   receptor, leading to poor muscle contraction. Modulating   antibody causes receptor endocytosis resulting in loss of   AChR expression, which correlates most closely with   clinical severity of disease. Approximately 10-15 percent   of individuals with confirmed  myasthenia gravis have no   measurable binding, blocking, or modulating antibodies.    This test was developed and its performance characteristics   determined by [facility]. It has not been cleared or   approved by the [place] Food                            and Drug Administration. This test   was performed in a CLIA certified laboratory and is   intended for clinical purposes.  Performed By: [facility]  [address] [place] [address]  : Tim Hernandez MD, PhD  CLIA Number: [id number]    AcetChol Block Ines 02/21/2024 9  0 - 26 % Final    Comment: INTERPRETIVE INFORMATION: Acetylcholine Blocking Ab      Negative ............  0-26 percent blocking    Indeterminate ....... 27-41 percent blocking    Positive ............ 42 percent or greater blocking    Approximately 85-90 percent of patients with myasthenia   gravis (MG) express antibodies to the acetylcholine   receptor (AChR), which can be divided into binding,   blocking, and modulating antibodies. Binding antibody can   activate complement and lead to loss of AChR. Blocking   antibody may impair binding of acetylcholine to the   receptor, leading to poor muscle contraction. Modulating   antibody causes receptor endocytosis resulting in loss of   AChR expression, which correlates most closely with   clinical severity of disease. Approximately 10-15 percent   of individuals with confirmed myasthenia gravis have no   measurable binding, blocking, or modulating antibodies.    This test was developed and its performance characteristics   determined by [employer]. It has                            not been cleared or   approved by the US Food and Drug Administration. This test   was performed in a CLIA certified laboratory and is   intended for clinical purposes.  Performed By: [facility]  [address] [place] [address]  : Tim Hernandez MD, PhD  CLIA Number: [id number]     CRP Inflammation 02/21/2024 <3.00  <5.00 mg/L Final    Muscle-Specific Kinase Antibody Sc* 02/21/2024 <1:10  <1:10 Final    Comment: MuSK Antibody, IgG is not detected. No further testing will   be performed.  INTERPRETIVE INFORMATION: MuSK IgG Ab CBA, Serum, with Rflx    Muscle-specific kinase (MuSK) antibody is found in a subset   of patients with myasthenia gravis, primarily those   seronegative for muscle acetylcholine receptor (AChR)   antibody. Decreasing antibody levels may be associated with   therapeutic response; therefore, clinical correlation must   be strongly considered. A negative test result does not   rule out a diagnosis of myasthenia gravis.    This indirect fluorescent antibody cell-based assay (CBA)   utilizes muscle-specific kinase (MuSK) transfected cells   for the detection of the MuSK IgG antibody.    This test was developed and its performance characteristics   determined by [facility]. It has not been cleared or   approved by the U.[place]. Food and Drug Administration. This   test was performed in a CLIA-certified laboratory and is   intended for clinical purposes.  Performed By: [facility]  [address] [place] [id number]  : Tim Hernandez MD, PhD  CLIA Number: [id number]      Imaging:    I personally reviewed the following imaging results today and those on care everywhere, if indicated     US abdomen 3/28/24  IMPRESSION:    Mild splenomegaly. Otherwise negative.   WILFRIDO MCCAULEY MD     MR Cervical spine wo contrast 3/16/24  IMPRESSION:  1.  Cervical height/alignment is satisfactory. No marrow or ligamentous edema. No abnormal intramedullary signal.     2.  No spinal stenosis or foraminal narrowing at any cervical level. No disc herniations.     3.  Top normal size lymph nodes with homogeneous signal characteristics level II presumed reactive. Mild prominence of the palatine tonsillar tissue without airway  narrowing also presumed reactive.    MR Brain wo/w contrast 2/13/24  IMPRESSION:  1.  No acute infarct, acute intracranial hemorrhage, or intracranial mass  Reviewed imaging from [facility] sites     Medical Records Reviewed:    Reviewed consults/documents from [facility] including  GI,Family practice, Urology, Neurology, and ENT         Again, thank you for allowing me to participate in the care of your patient.      Sincerely,    Norma Hurtado PA-C

## 2024-04-10 NOTE — TELEPHONE ENCOUNTER
Retail Pharmacy Prior Authorization Team   Phone: [phone]    PA Initiation    Medication: LINZESS 145 MCG PO CAPS  Insurance Company: [employer] PMAP - Phone [phone] Fax [phone]  Pharm[place]illing the Rx: [facility] [place] [address] [address] [place] [address]  Filling Pharmacy Phone: [phone]  Filling Pharmacy Fax:    Start Date: 4/10/2024    Started PA on CMM and a response of no eligibility found.  Called pharmacy they show the patient has BC[employer] PMAP.  Called insurance, spoke with Isidra at insurance, she shows the patient is active.  She will fax over a PA form.  Fax back to [phone].

## 2024-04-12 ENCOUNTER — THERAPY VISIT (OUTPATIENT)
Dept: PHYSICAL THERAPY | Facility: CLINIC | Age: 29
End: 2024-04-12
Payer: COMMERCIAL

## 2024-04-12 DIAGNOSIS — R10.2 SUPRAPUBIC PAIN: ICD-10-CM

## 2024-04-12 DIAGNOSIS — N32.89 BLADDER SPASMS: Primary | ICD-10-CM

## 2024-04-12 PROCEDURE — 97140 MANUAL THERAPY 1/> REGIONS: CPT | Mod: GP

## 2024-04-12 PROCEDURE — 97110 THERAPEUTIC EXERCISES: CPT | Mod: GP

## 2024-04-15 ENCOUNTER — TELEPHONE (OUTPATIENT)
Dept: GASTROENTEROLOGY | Facility: CLINIC | Age: 29
End: 2024-04-15
Payer: COMMERCIAL

## 2024-04-15 DIAGNOSIS — K59.09 CHRONIC CONSTIPATION: Primary | ICD-10-CM

## 2024-04-15 DIAGNOSIS — R10.84 ABDOMINAL PAIN, GENERALIZED: ICD-10-CM

## 2024-04-15 RX ORDER — CICLOPIROX 80 MG/ML
SOLUTION TOPICAL DAILY
COMMUNITY
Start: 2023-10-19 | End: 2024-08-22

## 2024-04-15 RX ORDER — BISACODYL 5 MG/1
TABLET, DELAYED RELEASE ORAL
Qty: 4 TABLET | Refills: 0 | Status: SHIPPED | OUTPATIENT
Start: 2024-04-15 | End: 2024-08-22

## 2024-04-15 RX ORDER — FLUTICASONE FUROATE AND VILANTEROL 200; 25 UG/1; UG/1
1 POWDER RESPIRATORY (INHALATION) DAILY
COMMUNITY
End: 2024-08-01

## 2024-04-15 RX ORDER — EPINEPHRINE 0.3 MG/.3ML
0.3 INJECTION SUBCUTANEOUS PRN
COMMUNITY

## 2024-04-15 RX ORDER — BENZONATATE 100 MG/1
100 CAPSULE ORAL 3 TIMES DAILY PRN
COMMUNITY
Start: 2023-10-24 | End: 2024-08-22

## 2024-04-15 RX ORDER — AZITHROMYCIN 500 MG/1
500 TABLET, FILM COATED ORAL DAILY
COMMUNITY
End: 2024-08-22

## 2024-04-15 RX ORDER — CLOTRIMAZOLE 10 MG/1
10 LOZENGE ORAL 3 TIMES DAILY
COMMUNITY
End: 2024-08-22

## 2024-04-15 RX ORDER — DESONIDE 0.5 MG/G
OINTMENT TOPICAL PRN
COMMUNITY

## 2024-04-15 RX ORDER — FLUTICASONE PROPIONATE AND SALMETEROL 250; 50 UG/1; UG/1
1 POWDER RESPIRATORY (INHALATION) 2 TIMES DAILY
COMMUNITY
End: 2024-08-22 | Stop reason: ALTCHOICE

## 2024-04-15 RX ORDER — BUDESONIDE AND FORMOTEROL FUMARATE DIHYDRATE 80; 4.5 UG/1; UG/1
2 AEROSOL RESPIRATORY (INHALATION)
COMMUNITY
End: 2024-08-22 | Stop reason: ALTCHOICE

## 2024-04-15 RX ORDER — IBUPROFEN 200 MG
1 CAPSULE ORAL DAILY
COMMUNITY
End: 2024-08-22

## 2024-04-15 RX ORDER — ARMODAFINIL 200 MG/1
200 TABLET ORAL EVERY MORNING
COMMUNITY
End: 2024-08-22

## 2024-04-15 RX ORDER — AZELASTINE 1 MG/ML
2 SPRAY, METERED NASAL 2 TIMES DAILY
COMMUNITY

## 2024-04-15 RX ORDER — CLOBETASOL PROPIONATE 0.5 MG/G
OINTMENT TOPICAL 2 TIMES DAILY PRN
COMMUNITY
End: 2024-08-22

## 2024-04-15 RX ORDER — FINASTERIDE 1 MG/1
1 TABLET, FILM COATED ORAL DAILY
COMMUNITY

## 2024-04-15 RX ORDER — CARBOXYMETHYLCELLULOSE SODIUM AND GLYCERIN 5; 9 MG/ML; MG/ML
1 SOLUTION/ DROPS OPHTHALMIC PRN
COMMUNITY
Start: 2024-01-17

## 2024-04-15 RX ORDER — FLUTICASONE PROPIONATE AND SALMETEROL 250; 50 UG/1; UG/1
1 POWDER RESPIRATORY (INHALATION) 2 TIMES DAILY
COMMUNITY
End: 2024-04-15

## 2024-04-15 NOTE — TELEPHONE ENCOUNTER
Pre assessment completed for upcoming procedure.   (Please see previous telephone encounter notes for complete details)    Patient  returned call.       Procedure details:    Arrival time and facility location reviewed.    Pre op exam needed? N/A    Designated  policy reviewed. Instructed to have someone stay 6  hours post procedure.       Medication review:    Medications reviewed. Please see supporting documentation below. Holding recommendations discussed (if applicable).       Prep for procedure:     Procedure prep instructions reviewed.        Any additional information needed:  N/A      Patient  verbalized understanding and had no questions or concerns at this time.      Lisa Hess RN  Endoscopy Procedure Pre Assessment RN  [phone] option 4

## 2024-04-15 NOTE — TELEPHONE ENCOUNTER
"Attempted to contact patient in order to complete pre assessment questions.     No answer. Left message to return call to [phone] option 4    Callback required communication sent via CC video.      Procedure details:    Patient scheduled for Colonoscopy  on 4/22/24.     Arrival time: 1415. Procedure time 1500    Facility location: [facility] [place] [address]. Check in location: 1st Floor [facility].     Sedation type: Conscious sedation     Pre op exam needed? N/A    Indication for procedure: Chronic constipation,  Abdominal pain, generalized      Chart review:     Electronic implanted devices? No    Recent diagnosis of diverticulitis within the last 6 weeks? No    Diabetic? No      Medication review:    Anticoagulants? No    NSAIDS? Yes.  Meloxicam (Mobic).  Holding interval of 10 days. Verify,  per external med list.    Other medication HOLDING recommendations:  N/A      Prep for procedure:     Bowel prep recommendation: Extended Golytely. [facility] [place] [place] [facility] [address] [place]   Due to: constipation noted or reported. Per scheduling note: \"On a regular basis do you go 3 -5 days between bowel movements? Yes (Extended Prep)\" constipation per problem list, taking Linzess, and chronic constipation per order.    Prep instructions sent via CC video.       Chelsea Ceja RN  Endoscopy Procedure Pre Assessment RN  "

## 2024-04-15 NOTE — TELEPHONE ENCOUNTER
Called and spoke with Darryl at insurance to check on status of PA.  She shows that the medication has been denied, determination has been refaxed.

## 2024-04-15 NOTE — TELEPHONE ENCOUNTER
[facility] Call Center    Phone Message    May a detailed message be left on voicemail: yes     Reason for Call: Other: Patient calling about linaclotide (LINZESS) 145 MCG capsule and has questions. Please call patient to discuss.      Action Taken: Message routed to:  [facility] GI    Travel Screening: Not Applicable

## 2024-04-15 NOTE — TELEPHONE ENCOUNTER
Note: Due to record-high volumes, our turn-around time is taking longer than usual . We are currently 10 business days behind in the pools.   We are working diligently to submit all requests in a timely manner and in the order they are received. Please only flag TRUE URGENT requests as high priority to the pool at this time.   If you have questions - please send a note/message in the active PA encounter and send back to the [facility] PA pool [[id number]].    If you have more specific questions about our process please reach out to our supervisor Crystal Rankin.   Thank you!   RPPA (Retail Pharmacy Prior Authorization) team    We are currently submitting requests we received on 04/02/2024    PRIOR AUTHORIZATION DENIED    Medication: LINZESS 145 MCG PO CAPS  Insurance Company: [employer] PMA - Phone [phone] Fax [phone]  Denial Date: 4/15/2024  Denial Rational:           Appeal Information:   If provider would like to appeal please review the plan's reasons for denial listed above. Please utilize that information to complete letter and provide specific, detailed clinical information/rationale of your patient's health status to address their denial reasons.            Patient Notified: No

## 2024-04-15 NOTE — TELEPHONE ENCOUNTER
"Endoscopy Scheduling Screen    Have you had a positive Covid test in the last 14 days?  No    What is your communication preference for Instructions and/or Bowel Prep?   MyChart    What insurance is in the chart?  Other:  BP    Ordering/Referring Provider:   MANUELITO BARRON        (If ordering provider performs procedure, schedule with ordering provider unless otherwise instructed. )    BMI: Estimated body mass index is 25.04 kg/m  as calculated from the following:    Height as of 3/11/24: 1.88 m (6' 2\").    Weight as of 3/11/24: 88.5 kg (195 lb).     Sedation Ordered  moderate sedation.   If patient BMI > 50 do not schedule in [facility].    If patient BMI > 45 do not schedule at [facility].    Are you taking methadone or Suboxone?  No    Have you had difficulties, pain, or discomfort during past endoscopy procedures?  No    Are you taking any prescription medications for pain 3 or more times per week?   NO, No RN review required.    Do you have a history of malignant hyperthermia?  No    (Females) Are you currently pregnant?   No     Have you been diagnosed or told you have pulmonary hypertension?   No    Do you have an LVAD?  No    Have you been told you have moderate to severe sleep apnea?  Yes (RN Review required for scheduling unless scheduling in Hospital.)    Have you been told you have COPD, asthma, or any other lung disease?  Yes     What breathing problems do you have?  Asthma     Do you use home oxygen?  No    Have your breathing problems required an ED visit or hospitalization in the last year?  No    Do you have any heart conditions?  No     Have you ever had or are you waiting for an organ transplant?  No. Continue scheduling, no site restrictions.    Have you had a stroke or transient ischemic attack (TIA aka \"mini stroke\" in the last 6 months?   No    Have you been diagnosed with or been told you have cirrhosis of the liver?   No    Are you currently on dialysis?   No    Do you need assistance " Prescription sent to pharmacy.   "transferring?   No    BMI: Estimated body mass index is 25.04 kg/m  as calculated from the following:    Height as of 3/11/24: 1.88 m (6' 2\").    Weight as of 3/11/24: 88.5 kg (195 lb).     Is patients BMI > 40 and scheduling location [facility]?  No    Do you take an injectable medication for weight loss or diabetes (excluding insulin)?  No    Do you take the medication Naltrexone?  No    Do you take blood thinners?  No       Prep   Are you currently on dialysis or do you have chronic kidney disease?  No    Do you have a diagnosis of diabetes?  No    Do you have a diagnosis of cystic fibrosis (CF)?  No    On a regular basis do you go 3 -5 days between bowel movements?  Yes (Extended Prep)    BMI > 40?  No    Preferred Pharmacy:        [place] - [facility] [address] [address] [place], [place] [id number] [facility] [address]  Phone: [phone] Fax: [phone]      Final Scheduling Details     Procedure scheduled  Colonoscopy    Surgeon:  Cindy     Date of procedure:  4/22     Pre-OP / PAC:   No - Not required for this site.    Location  [facility] - Patient preference.    Sedation   Moderate Sedation - Per order.      Patient Reminders:   You will receive a call from a Nurse to review instructions and health history.  This assessment must be completed prior to your procedure.  Failure to complete the Nurse assessment may result in the procedure being cancelled.      On the day of your procedure, please designate an adult(s) who can drive you home stay with you for the next 24 hours. The medicines used in the exam will make you sleepy. You will not be able to drive.      You cannot take public transportation, ride share services, or non-medical taxi service without a responsible caregiver.  Medical transport services are allowed with the requirement that a responsible caregiver will receive you at your destination.  We require that drivers and caregivers are confirmed prior to your procedure.  "

## 2024-04-16 ENCOUNTER — THERAPY VISIT (OUTPATIENT)
Dept: PHYSICAL THERAPY | Facility: CLINIC | Age: 29
End: 2024-04-16
Payer: COMMERCIAL

## 2024-04-16 DIAGNOSIS — N32.89 BLADDER SPASMS: Primary | ICD-10-CM

## 2024-04-16 DIAGNOSIS — R10.2 SUPRAPUBIC PAIN: ICD-10-CM

## 2024-04-16 PROCEDURE — 97140 MANUAL THERAPY 1/> REGIONS: CPT | Mod: GP

## 2024-04-16 PROCEDURE — 97110 THERAPEUTIC EXERCISES: CPT | Mod: GP

## 2024-04-16 RX ORDER — LUBIPROSTONE 8 UG/1
8 CAPSULE ORAL 2 TIMES DAILY
Qty: 60 CAPSULE | Refills: 1 | Status: SHIPPED | OUTPATIENT
Start: 2024-04-16

## 2024-04-16 NOTE — TELEPHONE ENCOUNTER
It looks like they will cover lubiprostone, this was sent instead.  I messaged the patient to let them know.    Yonas Casey PA-C

## 2024-04-17 NOTE — TELEPHONE ENCOUNTER
Spoke to Domenico discussed denial of Linzess and recommendation for Amitiza as alternate, reports understanding to  Amitiza from pharmacy.

## 2024-04-18 ENCOUNTER — TELEPHONE (OUTPATIENT)
Dept: GASTROENTEROLOGY | Facility: CLINIC | Age: 29
End: 2024-04-18

## 2024-04-18 NOTE — TELEPHONE ENCOUNTER
Prior Authorization Retail Medication Request    Medication/Dose: lubiprostone (AMITIZA) 8 MCG capsule  Diagnosis and ICD code (if different than what is on RX):  Chronic constipation [K59.09]   New/renewal/insurance change PA/secondary ins. PA:  Previously Tried and Failed:    Rationale:      Insurance   Primary:   Insurance ID:      Secondary (if applicable):  Insurance ID:      Pharmacy Information (if different than what is on RX)  Name:  [facility]  Phone:  [phone]   Fax:[phone]

## 2024-04-19 ENCOUNTER — THERAPY VISIT (OUTPATIENT)
Dept: PHYSICAL THERAPY | Facility: CLINIC | Age: 29
End: 2024-04-19
Payer: COMMERCIAL

## 2024-04-19 ENCOUNTER — VIRTUAL VISIT (OUTPATIENT)
Dept: OTOLARYNGOLOGY | Facility: CLINIC | Age: 29
End: 2024-04-19
Payer: COMMERCIAL

## 2024-04-19 DIAGNOSIS — J38.7 LARYNGEAL HYPERFUNCTION: ICD-10-CM

## 2024-04-19 DIAGNOSIS — R07.0 THROAT PAIN: ICD-10-CM

## 2024-04-19 DIAGNOSIS — R49.0 DYSPHONIA: Primary | ICD-10-CM

## 2024-04-19 DIAGNOSIS — R10.2 SUPRAPUBIC PAIN: ICD-10-CM

## 2024-04-19 DIAGNOSIS — N32.89 BLADDER SPASMS: Primary | ICD-10-CM

## 2024-04-19 PROCEDURE — 97110 THERAPEUTIC EXERCISES: CPT | Mod: GP

## 2024-04-19 PROCEDURE — 97140 MANUAL THERAPY 1/> REGIONS: CPT | Mod: GP

## 2024-04-19 PROCEDURE — 92507 TX SP LANG VOICE COMM INDIV: CPT | Mod: GN | Performed by: SPEECH-LANGUAGE PATHOLOGIST

## 2024-04-19 NOTE — PROGRESS NOTES
Domenico Denton is a 28 year old male who is being evaluated via a billable video visit.      Domenico has been notified and verbally consented to the following:     This video visit will be conducted between you and your provider.    Patient has opted to conduct today's video visit vs an in-person appointment.     Video visits are billed at different rates depending on your insurance coverage. Please reach out to your insurance provider with any questions.     If during the course of the call the provider feels the appointment is not appropriate, you will not be charged for this service.  Provider has received verbal consent for billable virtual visit from the patient? Yes  Will anyone else be joining your video visit? No    Call initiated at: 2:30 PM   Type of Visit Platform Used: mohchi Video  Location of provider: Home  Location of patient: [facility]  Lencho Loomis Jr., M.D., F.A.C.S.  Mariposa Monahan M.D., M.P.H.  Senait Flor M.D.  Kristen Pardo M.M. (voice), M.A., CCC-SLP  Rama Warner M.S., CCC-SLP  Stephie Bedolla, Ph.D., CCC-SLP  Cecilio Tee, Ph.D., CCC-SLP  Berta Jain, M.S., CCC-SLP  Seymour Barfield M.M., M.A., CCC-SLP  NEELA Javed. (voice), M.S., CCC-SLP    [facility]  VOICE/SPEECH/BREATHING THERAPY PROGRESS REPORT    Patient: Jorge Denton  Date of Service: 4/19/2024    Date of Last Service: 4/5/24  Referring physician: Dr. Flor  Initial evaluation: 10/12/24  Therapy Session #3     I had the pleasure of seeing Mr. Denton today, for speech therapy to address a diagnosis of:  Dysphonia (R49.0)   Throat Pain (R07.0)  Laryngeal Hyperfunction (J38.7).    PROGRESS SINCE LAST SESSION  At the last session, Mr. Denton worked on therapeutic activities to address the above diagnosis.    Regarding practice, Mr. Denton reports the following:     More than daily practice of all the exercises and activities  o He does exercises right away in the morning  o He has been icing, and  "thinks it seems to help    At first the massage exercises seemed to elicit more upper body muscle tension  o By now he can \"get things to relax in a little while\"    The lip trills do seem to help his voice feel more free for a time    Mr. Denton also states that:    His voice/throat felt better the day after our last session  o It hasn't been that good since    All the problems are still there, but not worse    The voice hasn't changed much, but the muscles do feel a little better    It's hard to deal with the throat-localized pain when he has so many pain problems throughout his body     Mr. Denton presents today with the following:    Very mild jaw tension  Voice quality:    WNL, but frequent extended xei    Pitch is mildly low, at F2 to G2, though he starts a task at C3    THERAPEUTIC ACTIVITIES  Today Mr. Denton participated in the following therapeutic activities:    Demonstrated previous exercises.  o Demonstrated adequate technique for all exercises, but some appropriate redirection provided  o Also, some instruction provided for increased level of complexity/difficulty; see below    [place] some modifications to his stretch/massage regimen  o Begin with breathing awareness, and awareness of upper body relaxation  o Try stretching the thyrohyoid with a tip of the head straight backward  o Massage the immediate thyrohyoid area, as well as the sternocleidomastoid area; be careful not to over-massage    [place] some modifications to his voice exercises  o Do splinting of the laryngeal area while going pitch glides  o Use the puffed upper lip configuration more than lip trills  - He demonstrated register breaks as he transitioned to falsetto at around C4; I explained that this is appropriate  o Tried tongue trills as well as lip trills  o Practiced changing pitch contours on the \"whoop\" exercises, especially with longer descent  - Also encouraged to tip head slightly forward rather than backward   o Add glides on " "the neutral syllables \"you\" and \"blah\" for tongue-base and jaw flexibility  - Encouraged to allow pitch and register breaks for now  - [place] to do a story-telling task on \"blah;\" this helped elicit more flexibility  o He reported feeling \"pretty OK\" by the end    He took notes and made some audio recordings to facilitate practice.    IMPRESSIONS/GOALS/PLAN  Mr. Denton had a productive session of speech therapy today, to address the following:  Dysphonia (R49.0)   Throat Pain (R07.0)   Laryngeal Hyperfunction (J38.7)  Speech therapy for him is medically necessary to allow  him to meet personal and professional demands and fully engage in activities of daily living.    Progress toward long-term goals: (Reporting period: 4/5/24  to 7/4/24)  Adequate but incomplete progress; too early for objective measures    Goals for this practice period:     practice all exercises according to instructions    incorporate techniques into daily vocal activities    Plan: I will see Mr. Denton in two weeks to work on education, modification, and carryover of therapeutic activities to more complex activities.    Reporting period 4/5/24 - 7/5/24    TOTAL SERVICE TIME: 57 minutes  TREATMENT (03459)      Stephie Bedolla, Ph.D., [facility]-SLP  Speech-Language Pathologist  Director, [facility] Voice [facility]  She/her/hers  [phone]              "

## 2024-04-19 NOTE — TELEPHONE ENCOUNTER
Incoming call from patient.     Patient states they only received one container of Golytely. Informed patient that they will need to two containers for Extended Golytely prep. Advised patient to call pharmacy regarding second container. Patient to call pre assessment team if another script needs to be sent. Patient agreeable to plan.     Reviewed prep instructions. Advised patient to follow mychart instructions and not what is on the Golytely bottle. Patient had no further questions at this time.     Lisa Hess RN  Endoscopy Procedure Pre Assessment RN  [phone] option 4

## 2024-04-19 NOTE — LETTER
4/19/2024       RE: Jorge Denton  [address]  [place] [address]     Dear Colleague,    Thank you for referring your patient, Jorge Denton, to the [facility]ICE CLINIC [facility]. Please see a copy of my visit note below.    Domenico Denton is a 28 year old male who is being evaluated via a billable video visit.      Domenico has been notified and verbally consented to the following:   This video visit will be conducted between you and your provider.  Patient has opted to conduct today's video visit vs an in-person appointment.   Video visits are billed at different rates depending on your insurance coverage. Please reach out to your insurance provider with any questions.   If during the course of the call the provider feels the appointment is not appropriate, you will not be charged for this service.  Provider has received verbal consent for billable virtual visit from the patient? Yes  Will anyone else be joining your video visit? No    Call initiated at: 2:30 PM   Type of Visit Platform Used: [email or web address] Video  Location of provider: Home  Location of patient: [facility] VOICE [facility]  Lencho Loomis Jr., M.D., F.A.C.S.  Mariposa Monahan M.D., M.P.H.  Senait Flor M.D.  Kristen Pardo M.M. (voice), M.A., CCC-SLP  Rama Warner M.S., CCC-SLP  Stephie Bedolla, Ph.D., CCC-SLP  Cecilio Tee, Ph.D., CCC-SLP  Berta Jain M.S., CCC-SLP  Seymour Barfield M.M., M.A., CCC-SLP  ELAINE Javed (voice), M.S., CCC-SLP    [facility]  VOICE/SPEECH/BREATHING THERAPY PROGRESS REPORT    Patient: Jorge Denton  Date of Service: 4/19/2024    Date of Last Service: 4/5/24  Referring physician: Dr. Flor  Initial evaluation: 10/12/24  Therapy Session #3     I had the pleasure of seeing Mr. Denton today, for speech therapy to address a diagnosis of:  Dysphonia (R49.0)   Throat Pain (R07.0)  Laryngeal Hyperfunction (J38.7).    PROGRESS SINCE  "LAST SESSION  At the last session, Mr. Denton worked on therapeutic activities to address the above diagnosis.    Regarding practice, Mr. Denton reports the following:   More than daily practice of all the exercises and activities  He does exercises right away in the morning  He has been icing, and thinks it seems to help  At first the massage exercises seemed to elicit more upper body muscle tension  By now he can \"get things to relax in a little while\"  The lip trills do seem to help his voice feel more free for a time    Mr. Denton also states that:  His voice/throat felt better the day after our last session  It hasn't been that good since  All the problems are still there, but not worse  The voice hasn't changed much, but the muscles do feel a little better  It's hard to deal with the throat-localized pain when he has so many pain problems throughout his body     Mr. Denton presents today with the following:  Very mild jaw tension  Voice quality:  WNL, but frequent extended xie  Pitch is mildly low, at F2 to G2, though he starts a task at C3    THERAPEUTIC ACTIVITIES  Today Mr. Denton participated in the following therapeutic activities:  Demonstrated previous exercises.  Demonstrated adequate technique for all exercises, but some appropriate redirection provided  Also, some instruction provided for increased level of complexity/difficulty; see below  [place] some modifications to his stretch/massage regimen  Begin with breathing awareness, and awareness of upper body relaxation  Try stretching the thyrohyoid with a tip of the head straight backward  Massage the immediate thyrohyoid area, as well as the sternocleidomastoid area; be careful not to over-massage  [place] some modifications to his voice exercises  Do splinting of the laryngeal area while going pitch glides  Use the puffed upper lip configuration more than lip trills  He demonstrated register breaks as he transitioned to falsetto at around C4; I " "explained that this is appropriate  Tried tongue trills as well as lip trills  Practiced changing pitch contours on the \"whoop\" exercises, especially with longer descent  Also encouraged to tip head slightly forward rather than backward   Add glides on the neutral syllables \"you\" and \"blah\" for tongue-base and jaw flexibility  Encouraged to allow pitch and register breaks for now  [place] to do a story-telling task on \"blah;\" this helped elicit more flexibility  He reported feeling \"pretty OK\" by the end  He took notes and made some audio recordings to facilitate practice.    IMPRESSIONS/GOALS/PLAN  Mr. Denton had a productive session of speech therapy today, to address the following:  Dysphonia (R49.0)   Throat Pain (R07.0)   Laryngeal Hyperfunction (J38.7)  Speech therapy for him is medically necessary to allow  him to meet personal and professional demands and fully engage in activities of daily living.    Progress toward long-term goals: (Reporting period: 4/5/24  to 7/4/24)  Adequate but incomplete progress; too early for objective measures    Goals for this practice period:   practice all exercises according to instructions  incorporate techniques into daily vocal activities    Plan: I will see Mr. Denton in two weeks to work on education, modification, and carryover of therapeutic activities to more complex activities.    Reporting period 4/5/24 - 7/5/24    TOTAL SERVICE TIME: 57 minutes  TREATMENT (12103)      Stephie Bedolla, Ph.D., [facility]-SLP  Speech-Language Pathologist  Director, [facility]  She/her/hers  [phone]                Again, thank you for allowing me to participate in the care of your patient.      Sincerely,    Stephie Bedolla, SLP      "

## 2024-04-22 ENCOUNTER — HOSPITAL ENCOUNTER (OUTPATIENT)
Facility: CLINIC | Age: 29
Discharge: HOME | End: 2024-04-22
Attending: INTERNAL MEDICINE | Admitting: INTERNAL MEDICINE
Payer: COMMERCIAL

## 2024-04-22 VITALS
SYSTOLIC BLOOD PRESSURE: 107 MMHG | BODY MASS INDEX: 24.64 KG/M2 | HEIGHT: 74 IN | DIASTOLIC BLOOD PRESSURE: 65 MMHG | RESPIRATION RATE: 28 BRPM | OXYGEN SATURATION: 100 % | HEART RATE: 63 BPM | WEIGHT: 192 LBS

## 2024-04-22 LAB — COLONOSCOPY: NORMAL

## 2024-04-22 PROCEDURE — 250N000011 HC RX IP 250 OP 636: Performed by: INTERNAL MEDICINE

## 2024-04-22 PROCEDURE — 258N000003 HC RX IP 258 OP 636: Performed by: INTERNAL MEDICINE

## 2024-04-22 PROCEDURE — 88305 TISSUE EXAM BY PATHOLOGIST: CPT | Mod: TC | Performed by: INTERNAL MEDICINE

## 2024-04-22 PROCEDURE — 45380 COLONOSCOPY AND BIOPSY: CPT | Performed by: INTERNAL MEDICINE

## 2024-04-22 PROCEDURE — G0500 MOD SEDAT ENDO SERVICE >5YRS: HCPCS | Performed by: INTERNAL MEDICINE

## 2024-04-22 PROCEDURE — 88305 TISSUE EXAM BY PATHOLOGIST: CPT | Mod: 26 | Performed by: PATHOLOGY

## 2024-04-22 RX ORDER — FENTANYL CITRATE 50 UG/ML
INJECTION, SOLUTION INTRAMUSCULAR; INTRAVENOUS PRN
Status: DISCONTINUED | OUTPATIENT
Start: 2024-04-22 | End: 2024-04-22 | Stop reason: HOSPADM

## 2024-04-22 RX ORDER — ONDANSETRON 2 MG/ML
INJECTION INTRAMUSCULAR; INTRAVENOUS PRN
Status: DISCONTINUED | OUTPATIENT
Start: 2024-04-22 | End: 2024-04-22 | Stop reason: HOSPADM

## 2024-04-22 RX ORDER — SODIUM CHLORIDE 9 MG/ML
INJECTION, SOLUTION INTRAVENOUS CONTINUOUS PRN
Status: DISCONTINUED | OUTPATIENT
Start: 2024-04-22 | End: 2024-04-22 | Stop reason: HOSPADM

## 2024-04-22 ASSESSMENT — ACTIVITIES OF DAILY LIVING (ADL)
ADLS_ACUITY_SCORE: 35
ADLS_ACUITY_SCORE: 35

## 2024-04-24 ENCOUNTER — VIRTUAL VISIT (OUTPATIENT)
Dept: PHYSICAL MEDICINE AND REHAB | Facility: CLINIC | Age: 29
End: 2024-04-24
Payer: COMMERCIAL

## 2024-04-24 DIAGNOSIS — U09.9 CHRONIC POST-COVID-19 SYNDROME: Primary | ICD-10-CM

## 2024-04-24 PROCEDURE — 99215 OFFICE O/P EST HI 40 MIN: CPT | Mod: 95 | Performed by: PHYSICAL MEDICINE & REHABILITATION

## 2024-04-24 SDOH — SOCIAL STABILITY: SOCIAL NETWORK: I HAVE TROUBLE DOING ALL OF THE FAMILY ACTIVITIES THAT I WANT TO DO: ALWAYS

## 2024-04-24 SDOH — SOCIAL STABILITY: SOCIAL NETWORK: I HAVE TROUBLE DOING ALL OF MY REGULAR LEISURE ACTIVITIES WITH OTHERS: ALWAYS

## 2024-04-24 SDOH — SOCIAL STABILITY: SOCIAL NETWORK: I HAVE TROUBLE DOING ALL OF THE ACTIVITIES WITH FRIENDS THAT I WANT TO DO: ALWAYS

## 2024-04-24 SDOH — SOCIAL STABILITY: SOCIAL NETWORK

## 2024-04-24 SDOH — SOCIAL STABILITY: SOCIAL NETWORK: PROMIS ABILITY TO PARTICIPATE IN SOCIAL ROLES & ACTIVITIES T-SCORE: 29

## 2024-04-24 SDOH — SOCIAL STABILITY: SOCIAL NETWORK: I HAVE TROUBLE DOING ALL OF MY USUAL WORK (INCLUDE WORK AT HOME): ALWAYS

## 2024-04-24 ASSESSMENT — ANXIETY QUESTIONNAIRES
6. BECOMING EASILY ANNOYED OR IRRITABLE: NOT AT ALL
5. BEING SO RESTLESS THAT IT IS HARD TO SIT STILL: NOT AT ALL
7. FEELING AFRAID AS IF SOMETHING AWFUL MIGHT HAPPEN: NOT AT ALL
3. WORRYING TOO MUCH ABOUT DIFFERENT THINGS: NOT AT ALL
GAD7 TOTAL SCORE: 0
GAD7 TOTAL SCORE: 0
4. TROUBLE RELAXING: NOT AT ALL
GAD7 TOTAL SCORE: 0
IF YOU CHECKED OFF ANY PROBLEMS ON THIS QUESTIONNAIRE, HOW DIFFICULT HAVE THESE PROBLEMS MADE IT FOR YOU TO DO YOUR WORK, TAKE CARE OF THINGS AT HOME, OR GET ALONG WITH OTHER PEOPLE: NOT DIFFICULT AT ALL
8. IF YOU CHECKED OFF ANY PROBLEMS, HOW DIFFICULT HAVE THESE MADE IT FOR YOU TO DO YOUR WORK, TAKE CARE OF THINGS AT HOME, OR GET ALONG WITH OTHER PEOPLE?: NOT DIFFICULT AT ALL
1. FEELING NERVOUS, ANXIOUS, OR ON EDGE: NOT AT ALL
7. FEELING AFRAID AS IF SOMETHING AWFUL MIGHT HAPPEN: NOT AT ALL
2. NOT BEING ABLE TO STOP OR CONTROL WORRYING: NOT AT ALL

## 2024-04-24 NOTE — PATIENT INSTRUCTIONS
Arginine (2 grams BID)and vitamin C (500 mg bid)    Lipoic acid and co-q 10 combo    Start LDN at 1 mg daily     Continue the NAC and vit C and Fish oil.

## 2024-04-24 NOTE — PROGRESS NOTES
Domenico is a 28 year old who is being evaluated via a billable video visit.    How would you like to obtain your AVS? MyChart  If the video visit is dropped, the invitation should be resent by: Text to cell phone: [phone]  Will anyone else be joining your video visit? No  Answers submitted by the patient for this visit:  Patient Health Questionnaire (Submitted on 4/24/2024)  If you checked off any problems, how difficult have these problems made it for you to do your work, take care of things at home, or get along with other people?: Not difficult at all  PHQ9 TOTAL SCORE: 0  LANNY-7 (Submitted on 4/24/2024)  LANNY 7 TOTAL SCORE: 0      Assessment and plan   Jorge Denton is a 28 year old Male with history of Lyme disease who developed acute COVID which was fairly respiratory in nature and developed Long COVID leading to significant increase in fatigue, brain fog, and development of autonomic dysfunction, and immune mediated disorders such as prostatitis, gall bladder inflammation etc.   He has other neurological symptoms such as numbness and weakness which are also part of the inflammation.     Counseled patient on inflammation, immune mediated conditions, and autonomic dysfunction. Will start LDN at a small dose. Will follow up earlier than 3 months to monitor the response. Consider increasing the dose of LDN or add guanfacine.  Add supplements Arginine (2 grams BID)and vitamin C (500 mg bid) and Lipoic acid and co-q 10 combo.   Counseled on starting the medications one by one  by a few days. Monitor the side effects. Side effects of LDN and guanfacine were counseled upon.     Belle Abbott MD, [facility]   Department of Rehabilitation         Subjective   Domenico is a 28 year old, presenting for the following health issues:  No chief complaint on file.  No chief complaint on file.    Video Start Time: 09:30 AM    HPI   Jorge Denton is a 28 year old male who had acute COVID on 11/23, who was referred to me by  Norma Beltran for LC management. He was last seen by her on 4/9/24.   He notes a history of really sick 5 years ago. He was bit by a tick, was diagnosed with Lyme 6 months after the incident.   He had inflammatory concerns with chronic pain concerns.   After getting acute COVID, he developed cognitive fatigue. Right body droopiness, and numbness. He developed paresthesias, weird histamine like response.   He developed blood shot eyes.   He developed chronic prostatitis, functional gallbladder disorder,   He had [facility] Rhe[facility]ology through [facility] and was undergoing work up.  He was also seen by ID for his Elevated IgG for EBV  He was seen by Neurology, for right sided weakness, numbness and right facial weakness.   He has undergone PT     Current symptoms are vague neurological symptoms, symptoms consistent with autonomic dysficntion such as temperature changes, denies any palpitations.   Has fatigue and brain fog these have been for 4 years now much worse.     He has had bad experiences with medications, but states that he is more open to medications.   He notes that he has pain, for 4-5 years. He has pain that has generalized.     He takes  mg OD, and he has been taking it for 1 year  He was on Glycine and Corticepss for 1 year, but he stopped in november 2023. He gets a cough and airway symptoms which improve with time.   He is on vitamin D,C and fish oil.       He has sleep apnea and has poor sleep. Generally, he wakes up with poorly rested sleep.         4/24/2024     9:20 AM   PHQ Assesment Total Score(s)   PHQ-2 Score 0         4/24/2024     9:13 AM   LANNY-7 Results   LANNY 7 TOTAL SCORE 0 (minimal anxiety)   LANNY-7 Total Score 0         4/24/2024     9:14 AM   PTSD Screen Score   Have you ever experienced this kind of event? No         4/24/2024     9:15 AM   PROMIS-29   PROMIS Physical Function T-Score 32 (moderate dysfunction)   PROMIS Anxiety T-Score 40 (within normal limits)   PROMIS Depression  T-Score 41 (within normal limits)   PROMIS Fatigue T-Score 76 (severe)   PROMIS Sleep Disturbance T-Score 54 (within normal limits)   PROMIS Ability to Participate in Social Roles & Activities T-Score 29 (severe dysfunction)   PROMIS Pain Interference T-Score 76 (severe)   PROMIS Pain Intensity 8         Past Medical History:   Diagnosis Date     Allergic rhinitis     allergy testing positive for dustmites     Eczema     in earlier childhood     Gastroesophageal reflux disease      PONV (postoperative nausea and vomiting)      Sleep apnea      Uncomplicated asthma        Past Surgical History:   Procedure Laterality Date     COLONOSCOPY N/A 4/22/2024    Procedure: COLONOSCOPY, WITH POLYPECTOMY AND BIOPSY;  Surgeon: Kev White MD;  Location: [facility]     MASTECTOMY SUBCUTANEOUS MALE BILATERAL (GYNECOMASTIA)         Family History   Problem Relation Age of Onset     Heart Disease Maternal Grandmother      Lipids Maternal Grandmother      Heart Disease Maternal Grandfather      Lipids Maternal Grandfather      Heart Disease Paternal Grandfather      Lipids Paternal Grandfather      Lipids Mother      Lipids Father      Lipids Paternal Grandmother      Thyroid Disease Maternal Grandmother         Thyroid nodule       Social History     Tobacco Use     Smoking status: Never     Smokeless tobacco: Never     Tobacco comments:     no passive exposure   Vaping Use     Vaping status: Never Used   Substance Use Topics     Alcohol use: No     Drug use: No         Current Outpatient Medications:      Armodafinil 200 MG TABS, Take 200 mg by mouth every morning  armodafinil 200 mg tablet TAKE ONE-HALF TO ONE TABLET EVERY MORNING, Disp: , Rfl:      azelastine (ASTELIN) 0.1 % nasal spray, Spray 2 sprays into both nostrils 2 times daily, Disp: , Rfl:      azithromycin (ZITHROMAX) 500 MG tablet, Take 500 mg by mouth daily, Disp: , Rfl:      benzonatate (TESSALON) 100 MG capsule, Take 100 mg by mouth 3 times daily as needed  for cough, Disp: , Rfl:      bisacodyl (DULCOLAX) 5 MG EC tablet, Two days prior to exam take two (2) tablets at 4pm. One day prior to exam take two (2) tablets at 4pm, Disp: 4 tablet, Rfl: 0     budesonide-formoterol (SYMBICORT) 80-4.5 MCG/ACT Inhaler, Inhale 2 puffs into the lungs two times daily  Symbicort 80 mcg-4.5 mcg/actuation HFA aerosol inhaler    INHALE TWO PUFFS BY MOUTH TWICE A DAY. USE SPACER AND RINSE MOUTH AFTER EACH USE, Disp: , Rfl:      calcipotriene (DOVONOX) 0.005 % external ointment, , Disp: , Rfl:      calcipotriene (DOVONOX) 0.005 % external solution, , Disp: , Rfl:      calcium carbonate 500 mg, elemental, (OSCAL 500) 1250 (500 Ca) MG TABS tablet, Take 1 tablet by mouth daily, Disp: , Rfl:      carboxymethylcell-glycerin PF (REFRESH RELIEVA PF) 0.5-0.9 % ophthalmic solution, Apply 1 drop to eye as needed (Instill 1 drop into the eye), Disp: , Rfl:      ciclopirox (PENLAC) 8 % external solution, Apply topically daily  Apply nail lacquer 8% solution once daily to affected nails with applicator brush, preferably at bedtime or 8 hours before washing; remove with alcohol every 7 days, Disp: , Rfl:      clobetasol (TEMOVATE) 0.05 % external ointment, Apply topically 2 times daily as needed (APPLY TO RASH ON BODY TWO TIMES A DAY AS NEEDED)  APPLY TO RASH ON BODY TWO TIMES A DAY AS NEEDED, Disp: , Rfl:      clotrimazole (MYCELEX) 10 MG lozenge, Take 10 mg by mouth 3 times daily, Disp: , Rfl:      desonide (DESOWEN) 0.05 % external ointment, Apply topically as needed (desonide 0.05 % topical ointment    Patient sig: APPLY TO AFFECTED AREA(S) OF FACE TWICE DAILY FOR UP TO 2 WEEKS. REPEAT FOR FLARES AFTER 1 WEEK BREAK)  desonide 0.05 % topical ointment   APPLY TO AFFECTED AREA(S) OF FACE TWICE DAILY FOR UP TO 2 WEEKS. REPEAT FOR FLARES AFTER 1 WEEK BREAK, Disp: , Rfl:      EPINEPHrine (ANY BX GENERIC EQUIV) 0.3 MG/0.3ML injection 2-pack, Inject 0.3 mg into the muscle as needed for anaphylaxis (INJECT  0.3 MG INTRAMUSCULARLY AS NEEDED FOR ANAPHYLAXIS. 1 PACK = 2 PENS)  INJECT 0.3 MG INTRAMUSCULARLY AS NEEDED FOR ANAPHYLAXIS. 1 PACK = 2 PENS, Disp: , Rfl:      finasteride (PROPECIA) 1 MG tablet, Take 1 tablet by mouth daily, Disp: , Rfl:      fluticasone (FLOVENT HFA) 110 MCG/ACT inhaler, Flovent  mcg/actuation aerosol inhaler  INHALE TWO PUFFS BY MOUTH TWICE A DAY, Disp: , Rfl:      fluticasone-salmeterol (ADVAIR) 250-50 MCG/ACT inhaler, Inhale 1 puff into the lungs 2 times daily, Disp: , Rfl:      fluticasone-vilanterol (BREO ELLIPTA) 200-25 MCG/ACT inhaler, Inhale 1 puff into the lungs daily, Disp: , Rfl:      levalbuterol (XOPENEX HFA) 45 MCG/ACT inhaler, Inhale 2 puffs into the lungs every 4 hours as needed for shortness of breath or wheezing, Disp: , Rfl:      linaclotide (LINZESS) 145 MCG capsule, Take 1 capsule (145 mcg) by mouth every morning (before breakfast), Disp: 30 capsule, Rfl: 0     lubiprostone (AMITIZA) 8 MCG capsule, Take 1 capsule (8 mcg) by mouth 2 times daily, Disp: 60 capsule, Rfl: 1     mometasone (ELOCON) 0.1 % external cream, Apply topically daily, Disp: , Rfl:      polyethylene glycol (GOLYTELY) 236 g suspension, Take as directed. Two days before your exam fill the first container with water. Cover and shake until mixed well. At 5:00pm drink one 8oz glass every 10-15 minutes until half (1/2) of the first container is empty. Store the remainder in the refrigerator. One day before your exam at 5:00pm drink the second half of the first container until it is gone. Before you go to bed mix the second container with water and put in refrigerator. Six hours before your check in time drink one 8oz glass every 10-15 minutes until half of container is empty. Discard the remainder of solution., Disp: 8000 mL, Rfl: 0     tadalafil (CIALIS) 5 MG tablet, tadalafil 5 mg tablet  TAKE ONE TABLET BY MOUTH AT BEDTIME, Disp: , Rfl:       Review of Systems  Constitutional, HEENT, cardiovascular,  pulmonary, gi and gu systems are negative, except as otherwise noted.      Objective           Vitals:  No vitals were obtained today due to virtual visit.    Physical Exam   GENERAL: alert and no distress  EYES: Eyes grossly normal to inspection.  No discharge or erythema, or obvious scleral/conjunctival abnormalities.  RESP: No audible wheeze, cough, or visible cyanosis.    SKIN: Visible skin clear. No significant rash, abnormal pigmentation or lesions.  NEURO: Cranial nerves grossly intact.  Mentation and speech appropriate for age.  PSYCH: Appropriate affect, tone, and pace of words    Diagnostic Test Results   Latest Reference Range & Units 03/27/24 14:28   WBC 4.0 - 11.0 10e3/uL 3.6 (L)   Hemoglobin 13.3 - 17.7 g/dL 15.8   Hematocrit 40.0 - 53.0 % 46.5   Platelet Count 150 - 450 10e3/uL 180   RBC Count 4.40 - 5.90 10e6/uL 5.38   MCV 78 - 100 fL 86   MCH 26.5 - 33.0 pg 29.4   MCHC 31.5 - 36.5 g/dL 34.0   RDW 10.0 - 15.0 % 12.6   % Neutrophils % 40   % Lymphocytes % 50   % Monocytes % 6   % Eosinophils % 4   % Basophils % 1   Absolute Basophils 0.0 - 0.2 10e3/uL 0.0   Absolute Eosinophils 0.0 - 0.7 10e3/uL 0.1   Absolute Immature Granulocytes <=0.4 10e3/uL 0.0   Absolute Lymphocytes 0.8 - 5.3 10e3/uL 1.8   Absolute Monocytes 0.0 - 1.3 10e3/uL 0.2   % Immature Granulocytes % 0   Absolute Neutrophils 1.6 - 8.3 10e3/uL 1.4 (L)   (L): Data is abnormally low        MRI brain 2/13/24  FINDINGS:  INTRACRANIAL CONTENTS: No acute or subacute infarct. No mass, acute hemorrhage, or extra-axial fluid collections. Normal brain parenchymal signal. Normal ventricles and sulci. Normal position of the cerebellar tonsils. 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     SELLA: No abnormality accounting for technique.     OSSEOUS STRUCTURES/SOFT TISSUES: Normal marrow signal. The major intracranial vascular flow voids are maintained.      ORBITS: No abnormality accounting for technique.       SINUSES/MASTOIDS: No mucosal thickening scattered about the ethmoid sinuses. No middle ear or mastoid effusion.                                                             IMPRESSION:  1.  No acute infarct, acute intracranial hemorrhage, or intracranial mass      WILMAN was negative 1/24/24   Latest Reference Range & Units 02/21/24 12:22   CRP Inflammation <5.00 mg/L <3.00         Video-Visit Details    Type of service:  Video Visit   Video End Time:10:16 AM  Originating Location (pt. Location): Home    Distant Location (provider location):  Off-site  Platform used for Video Visit: Ari  Signed Electronically by: Belle Abbott MD

## 2024-04-24 NOTE — LETTER
4/24/2024       RE: Jorge Denton  [address]  [place] [address]       Dear Colleague,    Thank you for referring your patient, Jorge Denton, to the [facility] PHYSICAL MEDICINE AND REHABILITATION CLINIC [place] at [facility]. Please see a copy of my visit note below.    Domenico is a 28 year old who is being evaluated via a billable video visit.    How would you like to obtain your AVS? MyChart  If the video visit is dropped, the invitation should be resent by: Text to cell phone: [phone]  Will anyone else be joining your video visit? No    Answers submitted by the patient for this visit:  Patient Health Questionnaire (Submitted on 4/24/2024)  If you checked off any problems, how difficult have these problems made it for you to do your work, take care of things at home, or get along with other people?: Not difficult at all  PHQ9 TOTAL SCORE: 0  LANNY-7 (Submitted on 4/24/2024)  LANNY 7 TOTAL SCORE: 0      Assessment and plan   Jorge Denton is a 28 year old Male with history of Lyme disease who developed acute COVID which was fairly respiratory in nature and developed Long COVID leading to significant increase in fatigue, brain fog, and development of autonomic dysfunction, and immune mediated disorders such as prostatitis, gall bladder inflammation etc.   He has other neurological symptoms such as numbness and weakness which are also part of the inflammation.     Counseled patient on inflammation, immune mediated conditions, and autonomic dysfunction. Will start LDN at a small dose. Will follow up earlier than 3 months to monitor the response. Consider increasing the dose of LDN or add guanfacine.  Add supplements Arginine (2 grams BID)and vitamin C (500 mg bid) and Lipoic acid and co-q 10 combo.   Counseled on starting the medications one by one  by a few days. Monitor the side effects. Side effects of LDN and guanfacine were  counseled upon.     Belle Abbott MD, [facility]   Department of Rehabilitation         Subjective   Domenico is a 28 year old, presenting for the following health issues:  No chief complaint on file.  No chief complaint on file.    Video Start Time: 09:30 AM    HPI   Jorge Denton is a 28 year old male who had acute COVID on 11/23, who was referred to me by Norma Beltran for LC management. He was last seen by her on 4/9/24.   He notes a history of really sick 5 years ago. He was bit by a tick, was diagnosed with Lyme 6 months after the incident.   He had inflammatory concerns with chronic pain concerns.   After getting acute COVID, he developed cognitive fatigue. Right body droopiness, and numbness. He developed paresthesias, weird histamine like response.   He developed blood shot eyes.   He developed chronic prostatitis, functional gallbladder disorder,   He had [facility] Rhe[facility]ology through [facility] and was undergoing work up.  He was also seen by ID for his Elevated IgG for EBV  He was seen by Neurology, for right sided weakness, numbness and right facial weakness.   He has undergone PT     Current symptoms are vague neurological symptoms, symptoms consistent with autonomic dysficntion such as temperature changes, denies any palpitations.   Has fatigue and brain fog these have been for 4 years now much worse.     He has had bad experiences with medications, but states that he is more open to medications.   He notes that he has pain, for 4-5 years. He has pain that has generalized.     He takes  mg OD, and he has been taking it for 1 year  He was on Glycine and Corticepss for 1 year, but he stopped in november 2023. He gets a cough and airway symptoms which improve with time.   He is on vitamin D,C and fish oil.       He has sleep apnea and has poor sleep. Generally, he wakes up with poorly rested sleep.         4/24/2024     9:20 AM   PHQ Assesment Total Score(s)   PHQ-2 Score 0         4/24/2024     9:13 AM    LANNY-7 Results   LANNY 7 TOTAL SCORE 0 (minimal anxiety)   LANNY-7 Total Score 0         4/24/2024     9:14 AM   PTSD Screen Score   Have you ever experienced this kind of event? No         4/24/2024     9:15 AM   PROMIS-29   PROMIS Physical Function T-Score 32 (moderate dysfunction)   PROMIS Anxiety T-Score 40 (within normal limits)   PROMIS Depression T-Score 41 (within normal limits)   PROMIS Fatigue T-Score 76 (severe)   PROMIS Sleep Disturbance T-Score 54 (within normal limits)   PROMIS Ability to Participate in Social Roles & Activities T-Score 29 (severe dysfunction)   PROMIS Pain Interference T-Score 76 (severe)   PROMIS Pain Intensity 8         Past Medical History:   Diagnosis Date    Allergic rhinitis     allergy testing positive for dustmites    Eczema     in earlier childhood    Gastroesophageal reflux disease     PONV (postoperative nausea and vomiting)     Sleep apnea     Uncomplicated asthma        Past Surgical History:   Procedure Laterality Date    COLONOSCOPY N/A 4/22/2024    Procedure: COLONOSCOPY, WITH POLYPECTOMY AND BIOPSY;  Surgeon: Kev White MD;  Location: [facility]    MASTECTOMY SUBCUTANEOUS MALE BILATERAL (GYNECOMASTIA)         Family History   Problem Relation Age of Onset    Heart Disease Maternal Grandmother     Lipids Maternal Grandmother     Heart Disease Maternal Grandfather     Lipids Maternal Grandfather     Heart Disease Paternal Grandfather     Lipids Paternal Grandfather     Lipids Mother     Lipids Father     Lipids Paternal Grandmother     Thyroid Disease Maternal Grandmother         Thyroid nodule       Social History     Tobacco Use    Smoking status: Never    Smokeless tobacco: Never    Tobacco comments:     no passive exposure   Vaping Use    Vaping status: Never Used   Substance Use Topics    Alcohol use: No    Drug use: No         Current Outpatient Medications:     Armodafinil 200 MG TABS, Take 200 mg by mouth every morning  armodafinil 200 mg tablet TAKE  ONE-HALF TO ONE TABLET EVERY MORNING, Disp: , Rfl:     azelastine (ASTELIN) 0.1 % nasal spray, Spray 2 sprays into both nostrils 2 times daily, Disp: , Rfl:     azithromycin (ZITHROMAX) 500 MG tablet, Take 500 mg by mouth daily, Disp: , Rfl:     benzonatate (TESSALON) 100 MG capsule, Take 100 mg by mouth 3 times daily as needed for cough, Disp: , Rfl:     bisacodyl (DULCOLAX) 5 MG EC tablet, Two days prior to exam take two (2) tablets at 4pm. One day prior to exam take two (2) tablets at 4pm, Disp: 4 tablet, Rfl: 0    budesonide-formoterol (SYMBICORT) 80-4.5 MCG/ACT Inhaler, Inhale 2 puffs into the lungs two times daily  Symbicort 80 mcg-4.5 mcg/actuation HFA aerosol inhaler    INHALE TWO PUFFS BY MOUTH TWICE A DAY. USE SPACER AND RINSE MOUTH AFTER EACH USE, Disp: , Rfl:     calcipotriene (DOVONOX) 0.005 % external ointment, , Disp: , Rfl:     calcipotriene (DOVONOX) 0.005 % external solution, , Disp: , Rfl:     calcium carbonate 500 mg, elemental, (OSCAL 500) 1250 (500 Ca) MG TABS tablet, Take 1 tablet by mouth daily, Disp: , Rfl:     carboxymethylcell-glycerin PF (REFRESH RELIEVA PF) 0.5-0.9 % ophthalmic solution, Apply 1 drop to eye as needed (Instill 1 drop into the eye), Disp: , Rfl:     ciclopirox (PENLAC) 8 % external solution, Apply topically daily  Apply nail lacquer 8% solution once daily to affected nails with applicator brush, preferably at bedtime or 8 hours before washing; remove with alcohol every 7 days, Disp: , Rfl:     clobetasol (TEMOVATE) 0.05 % external ointment, Apply topically 2 times daily as needed (APPLY TO RASH ON BODY TWO TIMES A DAY AS NEEDED)  APPLY TO RASH ON BODY TWO TIMES A DAY AS NEEDED, Disp: , Rfl:     clotrimazole (MYCELEX) 10 MG lozenge, Take 10 mg by mouth 3 times daily, Disp: , Rfl:     desonide (DESOWEN) 0.05 % external ointment, Apply topically as needed (desonide 0.05 % topical ointment    Patient sig: APPLY TO AFFECTED AREA(S) OF FACE TWICE DAILY FOR UP TO 2 WEEKS. REPEAT  FOR FLARES AFTER 1 WEEK BREAK)  desonide 0.05 % topical ointment   APPLY TO AFFECTED AREA(S) OF FACE TWICE DAILY FOR UP TO 2 WEEKS. REPEAT FOR FLARES AFTER 1 WEEK BREAK, Disp: , Rfl:     EPINEPHrine (ANY BX GENERIC EQUIV) 0.3 MG/0.3ML injection 2-pack, Inject 0.3 mg into the muscle as needed for anaphylaxis (INJECT 0.3 MG INTRAMUSCULARLY AS NEEDED FOR ANAPHYLAXIS. 1 PACK = 2 PENS)  INJECT 0.3 MG INTRAMUSCULARLY AS NEEDED FOR ANAPHYLAXIS. 1 PACK = 2 PENS, Disp: , Rfl:     finasteride (PROPECIA) 1 MG tablet, Take 1 tablet by mouth daily, Disp: , Rfl:     fluticasone (FLOVENT HFA) 110 MCG/ACT inhaler, Flovent  mcg/actuation aerosol inhaler  INHALE TWO PUFFS BY MOUTH TWICE A DAY, Disp: , Rfl:     fluticasone-salmeterol (ADVAIR) 250-50 MCG/ACT inhaler, Inhale 1 puff into the lungs 2 times daily, Disp: , Rfl:     fluticasone-vilanterol (BREO ELLIPTA) 200-25 MCG/ACT inhaler, Inhale 1 puff into the lungs daily, Disp: , Rfl:     levalbuterol (XOPENEX HFA) 45 MCG/ACT inhaler, Inhale 2 puffs into the lungs every 4 hours as needed for shortness of breath or wheezing, Disp: , Rfl:     linaclotide (LINZESS) 145 MCG capsule, Take 1 capsule (145 mcg) by mouth every morning (before breakfast), Disp: 30 capsule, Rfl: 0    lubiprostone (AMITIZA) 8 MCG capsule, Take 1 capsule (8 mcg) by mouth 2 times daily, Disp: 60 capsule, Rfl: 1    mometasone (ELOCON) 0.1 % external cream, Apply topically daily, Disp: , Rfl:     polyethylene glycol (GOLYTELY) 236 g suspension, Take as directed. Two days before your exam fill the first container with water. Cover and shake until mixed well. At 5:00pm drink one 8oz glass every 10-15 minutes until half (1/2) of the first container is empty. Store the remainder in the refrigerator. One day before your exam at 5:00pm drink the second half of the first container until it is gone. Before you go to bed mix the second container with water and put in refrigerator. Six hours before your check in time  drink one 8oz glass every 10-15 minutes until half of container is empty. Discard the remainder of solution., Disp: 8000 mL, Rfl: 0    tadalafil (CIALIS) 5 MG tablet, tadalafil 5 mg tablet  TAKE ONE TABLET BY MOUTH AT BEDTIME, Disp: , Rfl:       Review of Systems  Constitutional, HEENT, cardiovascular, pulmonary, gi and gu systems are negative, except as otherwise noted.     Objective           Vitals:  No vitals were obtained today due to virtual visit.    Physical Exam   GENERAL: alert and no distress  EYES: Eyes grossly normal to inspection.  No discharge or erythema, or obvious scleral/conjunctival abnormalities.  RESP: No audible wheeze, cough, or visible cyanosis.    SKIN: Visible skin clear. No significant rash, abnormal pigmentation or lesions.  NEURO: Cranial nerves grossly intact.  Mentation and speech appropriate for age.  PSYCH: Appropriate affect, tone, and pace of words    Diagnostic Test Results   Latest Reference Range & Units 03/27/24 14:28   WBC 4.0 - 11.0 10e3/uL 3.6 (L)   Hemoglobin 13.3 - 17.7 g/dL 15.8   Hematocrit 40.0 - 53.0 % 46.5   Platelet Count 150 - 450 10e3/uL 180   RBC Count 4.40 - 5.90 10e6/uL 5.38   MCV 78 - 100 fL 86   MCH 26.5 - 33.0 pg 29.4   MCHC 31.5 - 36.5 g/dL 34.0   RDW 10.0 - 15.0 % 12.6   % Neutrophils % 40   % Lymphocytes % 50   % Monocytes % 6   % Eosinophils % 4   % Basophils % 1   Absolute Basophils 0.0 - 0.2 10e3/uL 0.0   Absolute Eosinophils 0.0 - 0.7 10e3/uL 0.1   Absolute Immature Granulocytes <=0.4 10e3/uL 0.0   Absolute Lymphocytes 0.8 - 5.3 10e3/uL 1.8   Absolute Monocytes 0.0 - 1.3 10e3/uL 0.2   % Immature Granulocytes % 0   Absolute Neutrophils 1.6 - 8.3 10e3/uL 1.4 (L)   (L): Data is abnormally low       MRI brain 2/13/24  FINDINGS:  INTRACRANIAL CONTENTS: No acute or subacute infarct. No mass, acute hemorrhage, or extra-axial fluid collections. Normal brain parenchymal signal. Normal ventricles and sulci. Normal position of the cerebellar tonsils. Femoral  developmental venous   anomaly in the inferior left frontal lobe.     SELLA: No abnormality accounting for technique.     OSSEOUS STRUCTURES/SOFT TISSUES: Normal marrow signal. The major intracranial vascular flow voids are maintained.      ORBITS: No abnormality accounting for technique.      SINUSES/MASTOIDS: No mucosal thickening scattered about the ethmoid sinuses. No middle ear or mastoid effusion.                                                             IMPRESSION:  1.  No acute infarct, acute intracranial hemorrhage, or intracranial mass      WILMAN was negative 1/24/24   Latest Reference Range & Units 02/21/24 12:22   CRP Inflammation <5.00 mg/L <3.00           Again, thank you for allowing me to participate in the care of your patient.      Sincerely,    Belle Abbott MD

## 2024-04-24 NOTE — NURSING NOTE
Is the patient currently in the state of [place]? YES    Visit mode:VIDEO    If the visit is dropped, the patient can be reconnected by: VIDEO VISIT: Text to cell phone:   Telephone Information:   Mobile [phone]    and VIDEO VISIT: Send to e-mail at: [email or web address]    Will anyone else be joining the visit? NO  (If patient encounters technical issues they should call [phone])    How would you like to obtain your AVS? MyChart    Are changes needed to the allergy or medication list? No    Are refills needed on medications prescribed by this physician? NO    Reason for visit: RECHSYLVIA FOOTE

## 2024-04-25 LAB
PATH REPORT.COMMENTS IMP SPEC: NORMAL
PATH REPORT.FINAL DX SPEC: NORMAL
PATH REPORT.GROSS SPEC: NORMAL
PATH REPORT.MICROSCOPIC SPEC OTHER STN: NORMAL
PATH REPORT.RELEVANT HX SPEC: NORMAL
PHOTO IMAGE: NORMAL

## 2024-04-26 ENCOUNTER — THERAPY VISIT (OUTPATIENT)
Dept: PHYSICAL THERAPY | Facility: CLINIC | Age: 29
End: 2024-04-26
Payer: COMMERCIAL

## 2024-04-26 DIAGNOSIS — R10.2 SUPRAPUBIC PAIN: ICD-10-CM

## 2024-04-26 DIAGNOSIS — N32.89 BLADDER SPASMS: Primary | ICD-10-CM

## 2024-04-26 PROCEDURE — 97110 THERAPEUTIC EXERCISES: CPT | Mod: GP

## 2024-04-26 PROCEDURE — 97140 MANUAL THERAPY 1/> REGIONS: CPT | Mod: GP

## 2024-04-28 NOTE — TELEPHONE ENCOUNTER
PA Initiation    Medication: LUBIPROSTONE 8 MCG PO CAPS  Insurance Company: [facility] - Phone [phone] Fax [phone]  Pharm[place] the Rx[place] [facility] [place] [address] [address] [place] [address]  Filling Pharmacy Phone: [phone]  Filling Pharmacy Fax:    Start Date: 4/28/2024  Retail Pharmacy Prior Authorization Team   Phone: [phone]

## 2024-04-29 NOTE — RESULT ENCOUNTER NOTE
Donny Acuña,    The report and subsequent biopsies from your recent colonoscopy are available for you to review.  In short, everything looks normal.  Biopsies from your colon did not show any abnormal findings.  There was a polyp removed but this turned out to just be benign colonic tissue.    Please let me know if you have any questions.    Sincerely,  Yonas PARKINSON [facility] GI, Hepatology, and Nutrition

## 2024-04-29 NOTE — TELEPHONE ENCOUNTER
KAREN dotsno. Patient notified via [employer] message. Closing encounter.    Rocio Barnes LPN

## 2024-04-29 NOTE — TELEPHONE ENCOUNTER
Prior Authorization Approval    Medication: LUBIPROSTONE 8 MCG PO CAPS  Authorization Effective Date: 1/29/2024  Authorization Expiration Date: 4/28/2025  Approved Dose/Quantity: Up to #120 Capsules per 30 days  Reference #:     Insurance Company: [employer] - Phone [phone] Fax [phone]  Expected CoPay: $    CoPay Card Available:      Financial Assistance Needed: No  Which Pharmacy is filling the prescription: [facility] [place] - [address] [address] [place] [address]  Pharmacy Notified: Yes  Patient Notified: Pharmacy will notify patient.

## 2024-05-03 ENCOUNTER — VIRTUAL VISIT (OUTPATIENT)
Dept: OTOLARYNGOLOGY | Facility: CLINIC | Age: 29
End: 2024-05-03
Payer: COMMERCIAL

## 2024-05-03 DIAGNOSIS — R07.0 THROAT PAIN: ICD-10-CM

## 2024-05-03 DIAGNOSIS — R49.0 DYSPHONIA: Primary | ICD-10-CM

## 2024-05-03 DIAGNOSIS — J38.7 LARYNGEAL HYPERFUNCTION: ICD-10-CM

## 2024-05-03 PROCEDURE — 92507 TX SP LANG VOICE COMM INDIV: CPT | Mod: GN | Performed by: SPEECH-LANGUAGE PATHOLOGIST

## 2024-05-03 NOTE — PROGRESS NOTES
Domenico Denton is a 28 year old male who is being evaluated via a billable video visit.      Domenico has been notified and verbally consented to the following:     This video visit will be conducted between you and your provider.    Patient has opted to conduct today's video visit vs an in-person appointment.     Video visits are billed at different rates depending on your insurance coverage. Please reach out to your insurance provider with any questions.     If during the course of the call the provider feels the appointment is not appropriate, you will not be charged for this service.  Provider has received verbal consent for billable virtual visit from the patient? Yes  Will anyone else be joining your video visit? No    Call initiated at: 2:35 PM   Type of Visit Platform Used: [employer] Video  Location of provider: Home  Location of patient: [facility]  Lencho Loomis Jr., M.D., F.A.C.S.  Mariposa Monahan M.D., M.P.H.  Senait Flor M.D.  Kristen Pardo M.M. (voice), M.A., CCC-SLP  Rama Warner M.S., CCC-SLP  Stephie Bedolla, Ph.D., CCC-SLP  Cecilio Tee, Ph.D., CCC-SLP  Berta Jain, M.S., CCC-SLP  Seymour Barfield M.M., M.A., CCC-SLP  NEELA Javed. (voice), M.S., CCC-SLP    [facility]  VOICE/SPEECH/BREATHING THERAPY PROGRESS REPORT    Patient: Jorge Denton  Date of Service: 5/3/2024    Date of Last Service: 4/19/24  Referring physician: Dr. Flor  Initial evaluation: 10/12/24  Therapy Session #4     I had the pleasure of seeing Mr. Denton today, for speech therapy to address a diagnosis of:  Dysphonia (R49.0)   Throat Pain (R07.0)  Laryngeal Hyperfunction (J38.7).    PROGRESS SINCE LAST SESSION  At the last session, Mr. Denton worked on therapeutic activities to address the above diagnosis.    Regarding practice, Mr. Denton reports the following:     Frequent practice of all the exercises    The exercises help for about half an hour; he sounds better and feels better; but it doesn't  "last    Mr. Denton also states that:    His voice gets immediately better when he takes Advil    He feels that his overall inflammation is part of the problem    His voice gets much worse by the end of a workday; it gets strained     Mr. Denton presents today with the following:  Voice quality:    Clear quality    Inconsistent normal to backward resonance    Pitch is rather low, at around F2 to A2; make be lower than optimal    Some short or reflexive utterances are closer to C3    THERAPEUTIC ACTIVITIES  Today Mr. Denton participated in the following therapeutic activities:    Demonstrated previous exercises.  o demonstrated improved technique on his massage exercises; he is no longer resisting the muscle movement  o He needs to splint his mouth during his lip trills, and splint his neck for the puffed upper lip exercise; this is appropriate for now  o On the \"whoo\" glides, he is breathy at C4, in an upper register production  o The \"blahs\" don't help him very much; he does them with very quick repetitions and short vowels   o appropriate redirection provided  o instruction also provided for increased level of complexity/difficulty; see below    Worked on breathing more completely and extending the vowel, for a better since of easy airflow    [place] exercises to promote a more forward sensation during phonation.  o speech material that elicits a high, forward tongue position was facilitating  o Needed considerable coaching to articulate with the tongue, not the jaw  o Needed to concentrate on airflow, reducing his pressed phonation  o Needed to concentrate on maintaining forward focus on the lower pitches at the end of the utterances  o The neutral syllables \"yeah\" and \"my-oh\" were both facilitating  o Practiced on a descending scale pattern from D3 to G2, which then sounded low to him, suggesting his typical pitch is lower than optimal  o good learning, but will need practice    He took notes to facilitate " practice.    IMPRESSIONS/GOALS/PLAN  Mr. Denton had a productive session of speech therapy today, to address the following:  Dysphonia (R49.0)   Throat Pain (R07.0)   Laryngeal Hyperfunction (J38.7)  Speech therapy for him is medically necessary to allow  him to meet personal and professional demands and fully engage in activities of daily living.    Progress toward long-term goals: (Reporting period: 4/5/24  to 7/4/24)  Adequate but incomplete progress; please see above    Goals for this practice period:     practice all exercises according to instructions    incorporate techniques into daily vocal activities    maintain vigilance for vocal technique    Plan: I will see Mr. Denton in two weeks to work on education, modification, and carryover of therapeutic activities to more complex activities.    Reporting period 4/5/24 - 7/4/24    TOTAL SERVICE TIME: 55 minutes  TREATMENT ([id number])      Stephie Bedolla, Ph.D., [facility]-SLP  Speech-Language Pathologist  Director, [facility]  She/her/hers  [phone]

## 2024-05-03 NOTE — LETTER
5/3/2024       RE: Jorge Denton  [address] [facility] [address]  [place] [facility] [facility] [address]     Dear Colleague,    Thank you for referring your patient, Jorge Denton, to the [facility] VOICE [facility]INIC [facility]. Please see a copy of my visit note below.    Domenico Denton is a 28 year old male who is being evaluated via a billable video visit.      Domenico has been notified and verbally consented to the following:   This video visit will be conducted between you and your provider.  Patient has opted to conduct today's video visit vs an in-person appointment.   Video visits are billed at different rates depending on your insurance coverage. Please reach out to your insurance provider with any questions.   If during the course of the call the provider feels the appointment is not appropriate, you will not be charged for this service.  Provider has received verbal consent for billable virtual visit from the patient? Yes  Will anyone else be joining your video visit? No    Call initiated at: 2:35 PM   Type of Visit Platform Used: One[employer] Video  Location of provider: Home  Location of patient: [facility] VOICE [facility]  Lencho Loomis Jr., M.D., F.A.C.S.  Mariposa Monahan M.D., M.P.H.  Senait Flor M.D.  Kristen Pardo M.M. (voice), M.A., CCC-SLP  Rama Warner M.S., CCC-SLP  Stephie Bedolla, Ph.D., CCC-SLP  Cecilio Tee, Ph.D., CCC-SLP  Berta Jain M.S., CCC-SLP  Seymour Barfield M.M., M.A., CCC-SLP  ELAINE Javed (voice), M.S., CCC-SLP    [facility]  VOICE/SPEECH/BREATHING THERAPY PROGRESS REPORT    Patient: Jorge Denton  Date of Service: 5/3/2024    Date of Last Service: 4/19/24  Referring physician: Dr. Flor  Initial evaluation: 10/12/24  Therapy Session #4     I had the pleasure of seeing Mr. Denton today, for speech therapy to address a diagnosis of:  Dysphonia (R49.0)   Throat Pain (R07.0)  Laryngeal Hyperfunction (J38.7).    PROGRESS SINCE  "LAST SESSION  At the last session, Mr. Denton worked on therapeutic activities to address the above diagnosis.    Regarding practice, Mr. Denton reports the following:   Frequent practice of all the exercises  The exercises help for about half an hour; he sounds better and feels better; but it doesn't last    Mr. Denton also states that:  His voice gets immediately better when he takes Advil  He feels that his overall inflammation is part of the problem  His voice gets much worse by the end of a workday; it gets strained     Mr. Denton presents today with the following:  Voice quality:  Clear quality  Inconsistent normal to backward resonance  Pitch is rather low, at around F2 to A2; make be lower than optimal  Some short or reflexive utterances are closer to C3    THERAPEUTIC ACTIVITIES  Today Mr. Denton participated in the following therapeutic activities:  Demonstrated previous exercises.  demonstrated improved technique on his massage exercises; he is no longer resisting the muscle movement  He needs to splint his mouth during his lip trills, and splint his neck for the puffed upper lip exercise; this is appropriate for now  On the \"whoo\" glides, he is breathy at C4, in an upper register production  The \"blahs\" don't help him very much; he does them with very quick repetitions and short vowels   appropriate redirection provided  instruction also provided for increased level of complexity/difficulty; see below  Worked on breathing more completely and extending the vowel, for a better since of easy airflow  [place] exercises to promote a more forward sensation during phonation.  speech material that elicits a high, forward tongue position was facilitating  Needed considerable coaching to articulate with the tongue, not the jaw  Needed to concentrate on airflow, reducing his pressed phonation  Needed to concentrate on maintaining forward focus on the lower pitches at the end of the utterances  The neutral syllables " "\"yeah\" and \"my-oh\" were both facilitating  Practiced on a descending scale pattern from D3 to G2, which then sounded low to him, suggesting his typical pitch is lower than optimal  good learning, but will need practice  He took notes to facilitate practice.    IMPRESSIONS/GOALS/PLAN  Mr. Denton had a productive session of speech therapy today, to address the following:  Dysphonia (R49.0)   Throat Pain (R07.0)   Laryngeal Hyperfunction (J38.7)  Speech therapy for him is medically necessary to allow  him to meet personal and professional demands and fully engage in activities of daily living.    Progress toward long-term goals: (Reporting period: 4/5/24  to 7/4/24)  Adequate but incomplete progress; please see above    Goals for this practice period:   practice all exercises according to instructions  incorporate techniques into daily vocal activities  maintain vigilance for vocal technique    Plan: I will see Mr. Denton in two weeks to work on education, modification, and carryover of therapeutic activities to more complex activities.    Reporting period 4/5/24 - 7/4/24    TOTAL SERVICE TIME: 55 minutes  TREATMENT (17224)      Stephie Bedolla, Ph.D., [facility]-SLP  Speech-Language Pathologist  Director, [facility] Voice [facility]  She/her/hers  [phone]                "

## 2024-05-08 ENCOUNTER — THERAPY VISIT (OUTPATIENT)
Dept: PHYSICAL THERAPY | Facility: CLINIC | Age: 29
End: 2024-05-08
Payer: COMMERCIAL

## 2024-05-08 DIAGNOSIS — R10.2 SUPRAPUBIC PAIN: ICD-10-CM

## 2024-05-08 DIAGNOSIS — N32.89 BLADDER SPASMS: Primary | ICD-10-CM

## 2024-05-08 PROCEDURE — 97140 MANUAL THERAPY 1/> REGIONS: CPT | Mod: GP

## 2024-05-08 PROCEDURE — 97110 THERAPEUTIC EXERCISES: CPT | Mod: GP

## 2024-05-08 NOTE — PROGRESS NOTES
05/08/24 0500   Appointment Info   Signing clinician's name / credentials Sandra Fry, PT, DPT   Total/Authorized Visits 10  (PT)   Visits Used 6   Medical Diagnosis Bladder spasms  Suprapubic pain   PT Tx Diagnosis Bladder spasms  Suprapubic pain   Quick Adds Certification;Pelvic Consent   Progress Note/Certification   Start of Care Date 04/05/24   Onset of illness/injury or Date of Surgery 03/29/24  (referral)   Therapy Frequency every other week   Predicted Duration 8 weeks   Certification date from 05/08/24   Certification date to 07/03/24   Progress Note Completed Date 04/05/24   PT Goal 1   Goal Identifier LTG 1   Goal Description pt will improve PFM awareness and downtraining and evidenced by reduced abdominal pain 2/10 throughout the day and improve bladder habits to empty bladder every 2 hours without pain.   Rationale to maximize safety and independence with performance of ADLs and functional tasks   Goal Progress continues to notice improvements with manual work, general pain level and bladder emptying/urine stream has improved   Target Date 07/03/24   Subjective Report   Subjective Report Patient reports in general things have been going well with urination and emptying and frequency. Last night he got up more than usual to use the bathroom but is not sure why. Currently is feeling some abdominal discomfort at midline.   Objective Measures   Objective Measures Objective Measure 1   Objective Measure 1   Objective Measure abdomen MFR: hypomobility R>L and at midline inferior to umbilicus ;   Details bladder mob hypomobile R>L   Treatment Interventions (PT)   Interventions Therapeutic Procedure/Exercise;Manual Therapy   Therapeutic Procedure/Exercise   Therapeutic Procedures: strength, endurance, ROM, flexibility minutes (87567) 8   Therapeutic Procedures Ther Proc 4   Ther Proc 2 abdomen stretch over Swiss ball   Ther Proc 2 - Details good stretch here with DBE to deepen no pain pt tolerated well    Ther Proc 3 Cobra   Ther Proc 3 - Details x 10; vc for glute squeeze to prevent LBP ;pt primarily feels in back today   Ther Proc 4 kneeling hip flexor with ipsilateral UE reach   Ther Proc 4 - Details NT   PTRx Ther Proc 1 Diaphragmatic Breathing   PTRx Ther Proc 1 - Details vc abdominal expansion ; vc sending air low into abdomen for gentle stretching ;   PTRx Ther Proc 2 Piriformis Stretch Below 90 Degrees Supine   PTRx Ther Proc 2 - Details HEP x 30 sec hold dimas vc to continue to take deep breaths here;   PTRx Ther Proc 3 Pretzel Stretch   PTRx Ther Proc 3 - Details HEP x 30 sec dimas vc to continue to take deep breaths here   PTRx Ther Proc 4  Hip Flexor Stretch Vimal Test Position   PTRx Ther Proc 4 - Details x 30 sec dimas;   Skilled Intervention improve mobility   Patient Response/Progress tolerated well   Manual Therapy   Manual Therapy: Mobilization, MFR, MLD, friction massage minutes (91140) 30   Manual Therapy Manual Therapy 2   Manual Therapy 1 abdominal MFR:   Manual Therapy 1 - Details stacking; R>L hypomobile; tender; lift technique along midline lower abdomen hypomobile   Manual Therapy 2 bladder mob   Manual Therapy 2 - Details R>L hypomobile;, tender R LQ   Patient Response/Progress responds well to manual work   Skilled Intervention improve mobility, decrease restriction   Education   Learner/Method Patient;Listening   Plan   Home program print   Plan for next session hip mobility, PF downtraining; abdominal stacking/bladder mob   Comments   Pelvic Health Informed Consent Statement Discussed with patient/guardian reason for referral regarding pelvic health needs and external/internal pelvic floor muscle examination.  Opportunity provided to ask questions and verbal consent for assessment and intervention was given.   Total Session Time   Timed Code Treatment Minutes 38   Total Treatment Time (sum of timed and untimed services) 38           [facility]                                                                                    OUTPATIENT PHYSICAL THERAPY    PLAN OF TREATMENT FOR OUTPATIENT REHABILITATION   Patient's Last Name, First Name, Jorge Lorenzo YOB: 1995   Provider's Name   [facility]   Medical Record No.  [id number]     Onset Date: 03/29/24 (referral)  Start of Care Date: 04/05/24     Medical Diagnosis:  Bladder spasms  Suprapubic pain      PT Treatment Diagnosis:  Bladder spasms  Suprapubic pain Plan of Treatment  Frequency/Duration: (P) every other week/ (P) 8 weeks    Certification date from (P) 05/08/24 to (P) 07/03/24         See note for plan of treatment details and functional goals     Sandra Fry, PT                         I CERTIFY THE NEED FOR THESE SERVICES FURNISHED UNDER        THIS PLAN OF TREATMENT AND WHILE UNDER MY CARE     (Physician attestation of this document indicates review and certification of the therapy plan).              Referring Provider:  Joey Ferreira    Initial Assessment  See Epic Evaluation- Start of Care Date: 04/05/24            PLAN  Continue therapy per current plan of care.    Beginning/End Dates of Progress Note Reporting Period:  04/05/24 to 05/08/2024    Referring Provider:  Joey Ferreira

## 2024-05-10 ENCOUNTER — OFFICE VISIT (OUTPATIENT)
Dept: FAMILY MEDICINE | Facility: CLINIC | Age: 29
End: 2024-05-10
Payer: COMMERCIAL

## 2024-05-10 VITALS
SYSTOLIC BLOOD PRESSURE: 118 MMHG | RESPIRATION RATE: 16 BRPM | OXYGEN SATURATION: 98 % | WEIGHT: 187.2 LBS | TEMPERATURE: 98.1 F | BODY MASS INDEX: 23.28 KG/M2 | DIASTOLIC BLOOD PRESSURE: 80 MMHG | HEART RATE: 72 BPM | HEIGHT: 75 IN

## 2024-05-10 DIAGNOSIS — L25.9 CONTACT DERMATITIS, UNSPECIFIED CONTACT DERMATITIS TYPE, UNSPECIFIED TRIGGER: Primary | ICD-10-CM

## 2024-05-10 DIAGNOSIS — U09.9 CHRONIC POST-COVID-19 SYNDROME: ICD-10-CM

## 2024-05-10 DIAGNOSIS — L40.9 PSORIASIS: ICD-10-CM

## 2024-05-10 PROCEDURE — 99214 OFFICE O/P EST MOD 30 MIN: CPT | Performed by: PHYSICIAN ASSISTANT

## 2024-05-10 RX ORDER — TRIAMCINOLONE ACETONIDE 1 MG/G
CREAM TOPICAL 2 TIMES DAILY
Qty: 30 G | Refills: 0 | Status: SHIPPED | OUTPATIENT
Start: 2024-05-10

## 2024-05-10 NOTE — PROGRESS NOTES
Assessment & Plan     Contact dermatitis, unspecified contact dermatitis type, unspecified trigger  Rashes consistent with contact dermatitis, unclear trigger.  Does have 1 associated we will, could be allergy mediated.  Patient does have Allegra and can try taking this.  If having more widespread itching or lesions could consider adding on hydroxyzine for itching.  Would recommend triamcinolone cream to the rashes twice a day for the next week.  - triamcinolone (KENALOG) 0.1 % external cream; Apply topically 2 times daily    Psoriasis  Unchanged from baseline.    Chronic post-COVID-19 syndrome  Having improvement with specialty follow-up, has not had similar rashes throughout his symptoms.                  Subjective   Domenico is a 28 year old, presenting for the following health issues:  Derm Problem (Hives)      5/10/2024    11:30 AM   Additional Questions   Roomed by Margaret   Accompanied by significant other         5/10/2024    11:30 AM   Patient Reported Additional Medications   Patient reports taking the following new medications No     History of Present Illness       Reason for visit:  Skin  Symptom onset:  1-3 days ago    He eats 2-3 servings of fruits and vegetables daily.He consumes 0 sweetened beverage(s) daily.He exercises with enough effort to increase his heart rate 30 to 60 minutes per day.  He exercises with enough effort to increase his heart rate 3 or less days per week.   He is taking medications regularly.         Rash  Onset/Duration: 2 days ago   Description  Location: throughout the body   Character: red, itchy   Itchin/10  Intensity:  moderate  Progression of Symptoms:  worsening, spread throughout the body    Accompanying signs and symptoms:   Fever: No  Body aches or joint pain: No  Sore throat symptoms: No  Recent cold symptoms: No  History:           Previous episodes of similar rash: None  New exposures:  None  Recent travel: No  Exposure to similar rash: No  Precipitating or  "alleviating factors: at night becomes more itchy  Therapies tried and outcome: hydrocortisone cream -  usually effective      Noticed a rash to the left side of his neck, pruritic.  Red, raised bumps.  Feels some itching throughout but often has some itching with his psoriasis.  Later in the day yesterday noticed a rash on the front of his neck and then his left elbow.  More raised with scratching.  Scratching may have come prior to rash.  Developed a hive-like lesion to his left hip.  Applying hydrocortisone cream which does offer some relief.  Does think he has some athlete's foot right now and wonders if he could have touched it and touch his neck, not putting anything on it.  Otherwise no recent changes.  Denies new medications, supplements food changes, household products, travel, pets.  Puts tacrolimus on his face for psoriasis, does not put anything on the body lesions currently.  Does have high potency steroids for these but does not like to use.    Has been dealing with specialty care for post-COVID syndrome, actually feels like this is going well with his current therapies.            Objective    /80 (BP Location: Left arm, Patient Position: Sitting, Cuff Size: Adult Regular)   Pulse 72   Temp 98.1  F (36.7  C) (Tympanic)   Resp 16   Ht 1.905 m (6' 3\")   Wt 84.9 kg (187 lb 3.2 oz)   SpO2 98%   BMI 23.40 kg/m    Body mass index is 23.4 kg/m .  Physical Exam   GENERAL: alert and no distress  SKIN: Warm, dry.  Left neck with irregular shaped maculopapular pink, blanching rash with poor defined borders, similar faint rash to anterior portion of neck.  Left elbow with raised flesh-colored fine papules.  Left neck also with well-defined pink, scaly plaque.  Left hip with 1 cm diameter pink, blanching wheal            Signed Electronically by: Carline Senior PA-C    "

## 2024-05-14 ENCOUNTER — APPOINTMENT (OUTPATIENT)
Dept: LAB | Facility: CLINIC | Age: 29
End: 2024-05-14
Payer: COMMERCIAL

## 2024-05-14 LAB
CRP SERPL-MCNC: <3 MG/L
ERYTHROCYTE [SEDIMENTATION RATE] IN BLOOD BY WESTERGREN METHOD: 3 MM/HR (ref 0–15)
FERRITIN SERPL-MCNC: 455 NG/ML (ref 31–409)
FOLATE SERPL-MCNC: 6.3 NG/ML (ref 4.6–34.8)
LDH SERPL L TO P-CCNC: 178 U/L (ref 0–250)
VIT B12 SERPL-MCNC: 896 PG/ML (ref 232–1245)

## 2024-05-14 PROCEDURE — 99000 SPECIMEN HANDLING OFFICE-LAB: CPT | Mod: 95 | Performed by: PATHOLOGY

## 2024-05-14 PROCEDURE — 82728 ASSAY OF FERRITIN: CPT | Performed by: PHYSICAL MEDICINE & REHABILITATION

## 2024-05-14 PROCEDURE — 86140 C-REACTIVE PROTEIN: CPT | Performed by: PHYSICAL MEDICINE & REHABILITATION

## 2024-05-14 PROCEDURE — 82746 ASSAY OF FOLIC ACID SERUM: CPT | Performed by: PHYSICAL MEDICINE & REHABILITATION

## 2024-05-14 PROCEDURE — 84425 ASSAY OF VITAMIN B-1: CPT | Mod: 90 | Performed by: PHYSICAL MEDICINE & REHABILITATION

## 2024-05-14 PROCEDURE — 83615 LACTATE (LD) (LDH) ENZYME: CPT | Performed by: PHYSICAL MEDICINE & REHABILITATION

## 2024-05-14 PROCEDURE — 36415 COLL VENOUS BLD VENIPUNCTURE: CPT | Mod: 95 | Performed by: PHYSICAL MEDICINE & REHABILITATION

## 2024-05-14 PROCEDURE — 85652 RBC SED RATE AUTOMATED: CPT | Mod: 95 | Performed by: PHYSICAL MEDICINE & REHABILITATION

## 2024-05-14 PROCEDURE — 82607 VITAMIN B-12: CPT | Performed by: PHYSICAL MEDICINE & REHABILITATION

## 2024-05-14 PROCEDURE — 84207 ASSAY OF VITAMIN B-6: CPT | Mod: 90 | Performed by: PHYSICAL MEDICINE & REHABILITATION

## 2024-05-15 ENCOUNTER — THERAPY VISIT (OUTPATIENT)
Dept: SPEECH THERAPY | Facility: CLINIC | Age: 29
End: 2024-05-15
Attending: STUDENT IN AN ORGANIZED HEALTH CARE EDUCATION/TRAINING PROGRAM
Payer: COMMERCIAL

## 2024-05-15 ENCOUNTER — THERAPY VISIT (OUTPATIENT)
Dept: PHYSICAL THERAPY | Facility: CLINIC | Age: 29
End: 2024-05-15
Payer: COMMERCIAL

## 2024-05-15 DIAGNOSIS — N32.89 BLADDER SPASMS: Primary | ICD-10-CM

## 2024-05-15 DIAGNOSIS — R10.2 SUPRAPUBIC PAIN: ICD-10-CM

## 2024-05-15 DIAGNOSIS — R29.810 FACIAL WEAKNESS: ICD-10-CM

## 2024-05-15 PROCEDURE — 97140 MANUAL THERAPY 1/> REGIONS: CPT | Mod: GP

## 2024-05-15 PROCEDURE — 92522 EVALUATE SPEECH PRODUCTION: CPT | Mod: GN | Performed by: SPEECH-LANGUAGE PATHOLOGIST

## 2024-05-15 PROCEDURE — 97110 THERAPEUTIC EXERCISES: CPT | Mod: GP

## 2024-05-15 NOTE — PATIENT INSTRUCTIONS
Facial Paralysis Care Kit    Pain Remedies   Warm Compress  Microwavable bean/rice bag   Heating pad   Over the counter nonsteriod anti-inflammatory drugs (i.e., aspirin, ibuprofen)     Other Considerations   Rest-- this condition is mentally and physically taxing.  You may need to take the time your body needs to spend energy on healing yourself   Exercise-- aerobic exercise promotes increased circulation, produces endorphins, and improves mental health   Eat Healthy-- Your body will fight infections best when it is nourished with foods that are rich in protein, fiber, vitamins, and minerals from natural foods   Protect your Face from the Cold/Wind or Fans/Air Conditioning-- in order to prevent your facial muscles from stiffening  Find your People-- join a support group (there are many on [employer])     Eye Care   Preservative-free eye drops  Lubricating eye gel  Sunglasses-- consider wrap around style   No-prescription glasses      Massage  The point massage is to make the muscles more pliable for exercise, improve circulation, and prevent fluid from building up the hardening.    Using the finger pads of your first and index fingers, use firm but gently pressure to massage the following areas of both sides of the face for the designated length of time:  Forehead- 2 minutes   Temples- 1 minute   Cheek- 3 minutes (upper cheek moving back towards the ear for 1 minute, mid cheek moving back towards the ear for 1 minute, and lower cheek moving back towards the ear for 1 minute)  Chin- 1 minute

## 2024-05-15 NOTE — PROGRESS NOTES
SPEECH LANGUAGE PATHOLOGY EVALUATION    See electronic medical record for Abuse and Falls Screening details.    Subjective      Presenting condition or subjective complaint: Sudden facial change, drooping, new marks. Voice slurring. New wrinkles  Date of onset:  6 months ago    Relevant medical history: Neck injury; Numbness or tingling in perianal area COVID, muscle tension dysphonia and dysphagia,   Dates & types of surgery: NA    Prior diagnostic imaging/testing results: MRI     Prior therapy history for the same diagnosis, illness or injury: No      Living Environment  Social support: With a significant other or spouse   Help at home: None  Equipment owned:       Employment: Yes   Hobbies/Interests:      Patient goals for therapy: Get treatment    Pain assessment:  pt reports pain in his neck which he has had for some time. He denies pain in his face but does endorse a tingling feeling along the nerve pathways.      Objective     Functional problems: dry eye   Previous or other treatment: chiropractic    Diagnostic tests: none   Occupation:   Sensory changes: numbness, tingling    Hearing Changes: none although reports some increased sensitivity to noise bilaterally.   Eye Problems: dry eye     [place] Facial Grading System(See Facial Paralysis flowsheet for additional details):     Voluntary Movement Score (Total x 4): 100     Resting Symmetry Score (Total x 5): 5     Total Synkinesis Score (Total): 0     [place] Facial Grading System Score: voluntary movement score - resting summetry score - synkineses score = composit score: 95/100        FaCE Scale: 46.67  SAQ: 46.67 - Pt doesn't demonstrate synkinetic features but perceives them when moving his face.            Assessment & Plan   CLINICAL IMPRESSIONS   Medical Diagnosis: facial weakness    Treatment Diagnosis: functional oral and facial movements for nonverbal communication, speech and swallowing production   Impression/Assessment: Pt is  a 28 year old male with changes in speech, right facial drooping, and drooling complaints. These symptoms have improved in the past 6 months although he reports come changes persist. The following significant findings have been identified:  functional movement of all branches of the facial nerve , characterized by adequate strength noted in all aspects of the facial nerve. No evidence of weakness or synkinesis present in any branch of the facial nerve.  Provided education and training on the function of the facial nerve, the various branches and pathways. Functional changes when the nerve is injured or damaged and current status. All questions answered for pt within scope of practice.       PLAN OF CARE  Evaluation only     Recommended Referrals to Other Professionals:   Education Assessment:   Learner/Method: Patient;No Barriers to Learning    Risks and benefits of evaluation/treatment have been explained.   Patient/Family/caregiver agrees with Plan of Care.     Evaluation Time:    Sound production (artic, phonology, apraxia, dysarthria) Minutes (63685): 30         Signing Clinician: Lisa Campbell, SLP

## 2024-05-17 ENCOUNTER — VIRTUAL VISIT (OUTPATIENT)
Dept: OTOLARYNGOLOGY | Facility: CLINIC | Age: 29
End: 2024-05-17
Payer: COMMERCIAL

## 2024-05-17 DIAGNOSIS — J38.7 LARYNGEAL HYPERFUNCTION: ICD-10-CM

## 2024-05-17 DIAGNOSIS — R07.0 THROAT PAIN: ICD-10-CM

## 2024-05-17 DIAGNOSIS — R49.0 DYSPHONIA: Primary | ICD-10-CM

## 2024-05-17 LAB — PYRIDOXAL PHOS SERPL-SCNC: 207 NMOL/L

## 2024-05-17 PROCEDURE — 92507 TX SP LANG VOICE COMM INDIV: CPT | Mod: GN | Performed by: SPEECH-LANGUAGE PATHOLOGIST

## 2024-05-17 NOTE — LETTER
5/17/2024       RE: Jorge Denton  [address]  [place] [address]     Dear Colleague,    Thank you for referring your patient, Jorge Denton, to the [facility] VOICE CLINIC [facility]. Please see a copy of my visit note below.    Domenico Denton is a 28 year old male who is being evaluated via a billable video visit.      Domenico has been notified and verbally consented to the following:   This video visit will be conducted between you and your provider.  Patient has opted to conduct today's video visit vs an in-person appointment.   Video visits are billed at different rates depending on your insurance coverage. Please reach out to your insurance provider with any questions.   If during the course of the call the provider feels the appointment is not appropriate, you will not be charged for this service.  Provider has received verbal consent for billable virtual visit from the patient? Yes  Will anyone else be joining your video visit? No    Call initiated at: 2:30 PM   Type of Visit Platform Used: [employer] Video  Location of provider: Home  Location of patient: [facility] VOICE [facility]  Lencho Loomis Jr., M.D., F.A.C.S.  Mariposa Monahan M.D., M.P.H.  Senait Flor M.D.  QUINCY Rodriguez, M.M. (voice), M.A., CCC-SLP  Rama Warner M.S., CCC-SLP  Stephie Bedolla, Ph.D., CCC-SLP  Cecilio Tee, Ph.D., CCC-SLP  Berta Jain M.S., CCC-SLP  Seymour Barfield M.M., M.A., CCC-SLP  ELAINE Javed (voice), M.S., CCC-SLP    [facility]  VOICE/SPEECH/BREATHING THERAPY PROGRESS REPORT    Patient: Jorge Denton  Date of Last Service: 5/3/24  Referring physician: Dr. Flor  Initial evaluation: 10/12/23  Therapy Session #5     I had the pleasure of seeing Mr. Denton today, for speech therapy to address a diagnosis of:  Dysphonia (R49.0)   Throat Pain (R07.0)  Laryngeal Hyperfunction (J38.7).    PROGRESS SINCE LAST SESSION  At the  "last session, Mr. Denton worked on therapeutic activities to address the above diagnosis.    Regarding practice, Mr. Denton reports the following:   Frequent practice throughout the day  The \"yeah yeah yeah\" exercise is helpful  Recordings can be helpful    Mr. Denton also states that:  In some ways he is sensing very minor improvements  His voice comes back right after each appointment, but then he can't recapture it the next day, and doesn't get it back until the next session  He is dealing with insomnia from a new medication; only an hour of sleep last night; this affects his voice     Mr. Denton presents today with the following:  Mild upper body tension early in the session; improved later  Voice quality:  The same clear, WNL quality as he has had previously  Mild sense of restricted airflow; \"driving with brakes on\"  Pitch is within normal limits, in the range from Gb2 to Bb2; mostly staying at G2  Later in the session, after doing laryngeal relaxation exercises, the same pitch range resulted in a more relaxed and flowing quality    THERAPEUTIC ACTIVITIES  Today Mr. Denton participated in the following therapeutic activities:  Demonstrated previous exercises.  demonstrated adequate technique on lip trills and massage exercises   appropriate redirection provided for starting on pitches both above and below the trouble area on the \"yeah\" glides  Descending glides become irregular and breathy as he backs away to avoid breaks  instruction provided for increased level of complexity/difficulty; see below  [place] exercises for correct register adjustment as well as relaxation and optimal airflow on glides.  [place] to allow the register breaks; they got smoother as he allowed them to happen  Used several vowels and SOVT configurations, in the range from D2 to F#4  With considerable coaching was able to find a more forward resonance and comfortable tongue and jaw position, and experienced a \"breakthrough in " "comfort\"  [place] a regimen for practice of these exercises  He took careful notes, to facilitate practice.    IMPRESSIONS/GOALS/PLAN  Mr. Denton had a productive session of speech therapy today, to address the following:  Dysphonia (R49.0)   Throat Pain (R07.0)   Laryngeal Hyperfunction (J38.7)  Speech therapy for him is medically necessary to allow  him to meet personal and professional demands and fully engage in activities of daily living.    Progress toward long-term goals: (Reporting period: 4/5/24  to 7/4/24)  Adequate but incomplete progress; please see above    Goals for this practice period:   practice all exercises according to instructions  incorporate techniques into daily vocal activities  maintain vigilance for vocal technique    Plan: I will see Mr. Denton in two weeks to work on education, modification, and carryover of therapeutic activities to more complex activities.    Reporting period 4/5/24 - 7/4/24    TOTAL SERVICE TIME: 58 minutes  TREATMENT (08221)      Stephie Bedolla, Ph.D., [facility]-SLP  Speech-Language Pathologist  Director, [facility]  She/her/hers  [phone]                Again, thank you for allowing me to participate in the care of your patient.      Sincerely,    Stephie Bedolla, SLP    "

## 2024-05-17 NOTE — PROGRESS NOTES
"Domenico Denton is a 28 year old male who is being evaluated via a billable video visit.      Domenico has been notified and verbally consented to the following:   This video visit will be conducted between you and your provider.  Patient has opted to conduct today's video visit vs an in-person appointment.   Video visits are billed at different rates depending on your insurance coverage. Please reach out to your insurance provider with any questions.   If during the course of the call the provider feels the appointment is not appropriate, you will not be charged for this service.  Provider has received verbal consent for billable virtual visit from the patient? Yes  Will anyone else be joining your video visit? No    Call initiated at: 2:30 PM   Type of Visit Platform Used: [employer] Video  Location of provider: Home  Location of patient: [facility]  Lencho Loomis Jr., M.D., F.A.C.S.  Mariposa Monahan M.D., M.P.H.  Senait Flor M.D.  ANA LUISA Rodriguez.MALLIKA., M.M. (voice), M.A., CCC-SLP  Rama Warner M.S., CCC-SLP  Stephie Bedolla, Ph.D., CCC-SLP  Cecilio Tee, Ph.D., CCC-SLP  Berta Jain M.S., CCC-SLP  Seymour Barfield M.M., M.A., CCC-SLP  ELAINE Javed (voice), M.S., [facility]  VOICE/SPEECH/BREATHING THERAPY PROGRESS REPORT    Patient: Jorge Denton  Date of Last Service: 5/3/24  Referring physician: Dr. Flor  Initial evaluation: 10/12/23  Therapy Session #5     I had the pleasure of seeing Mr. Denton today, for speech therapy to address a diagnosis of:  Dysphonia (R49.0)   Throat Pain (R07.0)  Laryngeal Hyperfunction (J38.7).    PROGRESS SINCE LAST SESSION  At the last session, Mr. Denton worked on therapeutic activities to address the above diagnosis.    Regarding practice, Mr. Detnon reports the following:   Frequent practice throughout the day  The \"yeah yeah yeah\" exercise is helpful  Recordings can be helpful    Mr. Denton also states that:  In some ways he is sensing " "very minor improvements  His voice comes back right after each appointment, but then he can't recapture it the next day, and doesn't get it back until the next session  He is dealing with insomnia from a new medication; only an hour of sleep last night; this affects his voice     Mr. Denton presents today with the following:  Mild upper body tension early in the session; improved later  Voice quality:  The same clear, WNL quality as he has had previously  Mild sense of restricted airflow; \"driving with brakes on\"  Pitch is within normal limits, in the range from Gb2 to Bb2; mostly staying at G2  Later in the session, after doing laryngeal relaxation exercises, the same pitch range resulted in a more relaxed and flowing quality    THERAPEUTIC ACTIVITIES  Today Mr. Denton participated in the following therapeutic activities:  Demonstrated previous exercises.  demonstrated adequate technique on lip trills and massage exercises   appropriate redirection provided for starting on pitches both above and below the trouble area on the \"yeah\" glides  Descending glides become irregular and breathy as he backs away to avoid breaks  instruction provided for increased level of complexity/difficulty; see below  [place] exercises for correct register adjustment as well as relaxation and optimal airflow on glides.  [place] to allow the register breaks; they got smoother as he allowed them to happen  Used several vowels and SOVT configurations, in the range from D2 to F#4  With considerable coaching was able to find a more forward resonance and comfortable tongue and jaw position, and experienced a \"breakthrough in comfort\"  [place] a regimen for practice of these exercises  He took careful notes, to facilitate practice.    IMPRESSIONS/GOALS/PLAN  Mr. Denton had a productive session of speech therapy today, to address the following:  Dysphonia (R49.0)   Throat Pain (R07.0)   Laryngeal Hyperfunction (J38.7)  Speech therapy for him is " medically necessary to allow  him to meet personal and professional demands and fully engage in activities of daily living.    Progress toward long-term goals: (Reporting period: 4/5/24  to 7/4/24)  Adequate but incomplete progress; please see above    Goals for this practice period:   practice all exercises according to instructions  incorporate techniques into daily vocal activities  maintain vigilance for vocal technique    Plan: I will see Mr. Denton in two weeks to work on education, modification, and carryover of therapeutic activities to more complex activities.    Reporting period 4/5/24 - 7/4/24    TOTAL SERVICE TIME: 58 minutes  TREATMENT (16558)      Stephie Bedolla, Ph.D., [facility]-SLP  Speech-Language Pathologist  Director, [facility] Voice [facility]  She/her/hers  [phone]

## 2024-05-18 LAB — VIT B1 PYROPHOSHATE BLD-SCNC: 138 NMOL/L

## 2024-06-03 ENCOUNTER — OFFICE VISIT (OUTPATIENT)
Dept: NEUROLOGY | Facility: CLINIC | Age: 29
End: 2024-06-03
Payer: COMMERCIAL

## 2024-06-03 ENCOUNTER — OFFICE VISIT (OUTPATIENT)
Dept: NEUROLOGY | Facility: CLINIC | Age: 29
End: 2024-06-03
Attending: STUDENT IN AN ORGANIZED HEALTH CARE EDUCATION/TRAINING PROGRAM
Payer: COMMERCIAL

## 2024-06-03 DIAGNOSIS — R20.0 NUMBNESS: Primary | ICD-10-CM

## 2024-06-03 DIAGNOSIS — R53.1 WEAKNESS OF RIGHT SIDE OF BODY: ICD-10-CM

## 2024-06-03 PROCEDURE — 88356 ANALYSIS NERVE: CPT | Performed by: PSYCHIATRY & NEUROLOGY

## 2024-06-03 PROCEDURE — 11104 PUNCH BX SKIN SINGLE LESION: CPT | Performed by: PSYCHIATRY & NEUROLOGY

## 2024-06-03 PROCEDURE — 95911 NRV CNDJ TEST 9-10 STUDIES: CPT | Performed by: PSYCHIATRY & NEUROLOGY

## 2024-06-03 PROCEDURE — 11105 PUNCH BX SKIN EA SEP/ADDL: CPT | Performed by: PSYCHIATRY & NEUROLOGY

## 2024-06-03 NOTE — PROGRESS NOTES
Skin Punch Biopsy Procedure Note  Date: 6/3/2024      Provider: Blake Stewart MD    Procedure Performed:  Skin punch biopsy    Site: Right dorsum of the foot and medium calf    Procedure:  The procedure and potential after effects were explained and informed consent was obtained from the patient.    Following Betadine preparation and local anesthesia with 1% lidocaine, the 3 mm punch device was placed and rotational force applied.  Pieces of skin were removed.  The tissue was placed in the appropriately labeled specimen vials with the [facility] tissue culture medium.  The procedure was tolerated well.  Written and verbal wound care instructions were provided.        Blake Stewart MD

## 2024-06-03 NOTE — LETTER
6/3/2024       RE: Jorge Denton  [address]  [place] [address]       Dear Colleague,    Thank you for referring your patient, Jorge Denton, to the [facility] EMG CLINIC [place] at [facility]. Please see a copy of my visit note below.    Skin Punch Biopsy Procedure Note  Date: 6/3/2024      Provider: Blake Stewart MD    Procedure Performed:  Skin punch biopsy    Site: Right dorsum of the foot and medium calf    Procedure:  The procedure and potential after effects were explained and informed consent was obtained from the patient.    Following Betadine preparation and local anesthesia with 1% lidocaine, the 3 mm punch device was placed and rotational force applied.  Pieces of skin were removed.  The tissue was placed in the appropriately labeled specimen vials with the [employer] tissue culture medium.  The procedure was tolerated well.  Written and verbal wound care instructions were provided.        Blake Stewart MD              Again, thank you for allowing me to participate in the care of your patient.      Sincerely,    Blake Stewart MD

## 2024-06-03 NOTE — PROGRESS NOTES
[facility]  Electrodiagnostic Laboratory                 Department of Neurology                                                                                                         Test Date:  6/3/2024    Patient: Domenico Denton : 1995 Physician: Blake Stewart MD   Sex: Male AGE: 28 year Ref Phys: Ephraim Griffith MD   ID#: [id number]   Technician: Kailey Perkins     History and Examination:  RUE, RLE, Weakness    Techniques:  Motor conduction studies were done with surface recording electrodes. Sensory conduction studies were performed with surface electrodes, unless indicated otherwise by (n), designating the use of subdermal recording electrodes. Temperature was monitored and recorded throughout the study. Upper extremities were maintained at a temperature of 32 degrees Centigrade or higher.  Needle EMG was deferred in consideration of the unique situation of the patient's presentation of pure sensory symptoms restricted to both feet with normal motor examination and normal motor nerve conduction studies, with no symptoms or signs of radiculopathy or plexopathy.    Results:  Evaluation of the right Fibular (EDB) motor nerve showed reduced amplitude (2.4 mV).  All remaining nerves (as indicated in the following tables) were within normal limits.      F Wave studies indicate that the right tibial F wave has prolonged minimum latency (61.2 ms).  All remaining F Wave latencies were within normal limits.        Interpretation:  This is a normal study.  There is no electrodiagnostic evidence for peripheral polyneuropathy.        ___________________________  Blake Stewart MD        Nerve Conduction Studies  Motor Sites      Latency Amplitude Neg. Amp Diff Segment Distance Velocity Neg. Dur Neg Area Diff Temperature Comment   Site (ms) Norm (mV) Norm (%)  cm m/s Norm (ms) (%) ( C)    Right Fibular (EDB) Motor   Ankle 5.3  < 6.0 2.4  > 2.5  Ankle-EDB 8   6.5  29.9    Bel  Fibular Head 13.4 - 2.0 - -17 Bel Fibular Head-Ankle 34 42  > 38 8.0 9 29.9    Pop Fossa 15.6 - 1.89 - -6 Pop Fossa-Bel Fibular Head 9.4 43  > 38 7.9 -6 29.9    Right Median (APB) Motor   Wrist 3.4  < 4.4 6.7  > 5.0  Wrist-APB 7.6   6.4  30.8    Elbow 8.1 - 7.0  > 5.0 4 Elbow-Wrist 26.5 56  > 48 7.2 9 30.6    Right Tibial (AHB) Motor   Ankle 5.1  < 6.5 8.2  > 5.0  Ankle-AH 6   6.6  29.6    Knee 15.5 - 8.2 - 0 Knee-Ankle 44.5 43  > 38 6.9 -9 29.6    Right Ulnar (ADM) Motor   Wrist 3.4  < 3.5 10.5  > 5.0  Wrist-ADM 8   5.8  30.4    Below Elbow 7.4 - 8.2 - -22 Below Elbow-Wrist 23.5 59  > 48 6.1 -11 30.1    Above Elbow 9.4 - 8.4 - 2 Above Elbow-Below Elbow 10.5 53  > 48 6.1 -2 30.1    Right Ulnar (FDI) Motor   Wrist 4.3 - 10.4 -      4.4  29.9 15.2 cm   Below Elbow 8.7 - 6.2 - -40 Below Elbow-Wrist 23.1 53  > 48 4.6 -40 29.8    Above Elbow 10.3 - 6.4 - 3 Above Elbow-Below Elbow 10.4 65  > 48 4.8 6 29.6      F-Wave Sites      Min F-Lat Max-Min F-Lat Mean F-Lat   Site (ms) (ms) (ms)   Right Median F-Wave   Wrist 30.3 3.0 31.8   Right Tibial F-Wave   Ankle 61.2 8.6 -   Right Ulnar F-Wave   Wrist 32.4 4.3 34.0     Sensory Sites      Onset Lat Peak Lat Amp (O-P) Amp (P-P) Segment Distance Velocity Temperature Comment   Site ms (ms)  V Norm ( V)  cm m/s Norm ( C)    Right Median Sensory   Wrist-Dig II 2.3 3.2 44  > 10 59 Wrist-Dig II 14 61  > 48 30.1    Right Radial Sensory   Forearm-Wrist 1.73 2.3 50  > 15 64 Forearm-Wrist 10 58 - 29.9    Right Superficial Fibular Sensory   14 cm 3.1 4.0 17  > 3 21 14 cm-Ankle 12.5 40  > 38 30.1    Right Sural Sensory   Calf-Lat Malleolus 3.0 3.9 34  > 5 39 Calf-Lat Malleolus 14 47  > 38 30    Right Ulnar Sensory   Wrist-Dig V 2.3 3.0 31  > 8 53 Wrist-Dig V 12.5 54  > 48 30.2            NCS Waveforms:    Motor                  Sensory                  F-Wave             Ultrasound Images:

## 2024-06-03 NOTE — LETTER
6/3/2024       RE: Jorge Denton  [address]  [place] [address]     Dear Colleague,    Thank you for referring your patient, Jorge Denton, to the [facility] EMG CLINIC [place] at [facility]. Please see a copy of my visit note below.                        [facility]  Electrodiagnostic Laboratory                 Department of Neurology                                                                                                         Test Date:  6/3/2024    Patient: Domenico Denton : 1995 Physician: Blake Stewart MD   Sex: Male AGE: 28 year Ref Phys: Ephraim Griffith MD   ID#: [id number]   Technician: Kailey Perkins     History and Examination:  RUE, RLE, Weakness    Techniques:  Motor conduction studies were done with surface recording electrodes. Sensory conduction studies were performed with surface electrodes, unless indicated otherwise by (n), designating the use of subdermal recording electrodes. Temperature was monitored and recorded throughout the study. Upper extremities were maintained at a temperature of 32 degrees Centigrade or higher.  Needle EMG was deferred in consideration of the unique situation of the patient's presentation of pure sensory symptoms restricted to both feet with normal motor examination and normal motor nerve conduction studies, with no symptoms or signs of radiculopathy or plexopathy.    Results:  Evaluation of the right Fibular (EDB) motor nerve showed reduced amplitude (2.4 mV).  All remaining nerves (as indicated in the following tables) were within normal limits.      F Wave studies indicate that the right tibial F wave has prolonged minimum latency (61.2 ms).  All remaining F Wave latencies were within normal limits.        Interpretation:  This is a normal study.  There is no electrodiagnostic evidence for peripheral polyneuropathy.        ___________________________  Blake  MD Terry        Nerve Conduction Studies  Motor Sites      Latency Amplitude Neg. Amp Diff Segment Distance Velocity Neg. Dur Neg Area Diff Temperature Comment   Site (ms) Norm (mV) Norm (%)  cm m/s Norm (ms) (%) ( C)    Right Fibular (EDB) Motor   Ankle 5.3  < 6.0 2.4  > 2.5  Ankle-EDB 8   6.5  29.9    Bel Fibular Head 13.4 - 2.0 - -17 Bel Fibular Head-Ankle 34 42  > 38 8.0 9 29.9    Pop Fossa 15.6 - 1.89 - -6 Pop Fossa-Bel Fibular Head 9.4 43  > 38 7.9 -6 29.9    Right Median (APB) Motor   Wrist 3.4  < 4.4 6.7  > 5.0  Wrist-APB 7.6   6.4  30.8    Elbow 8.1 - 7.0  > 5.0 4 Elbow-Wrist 26.5 56  > 48 7.2 9 30.6    Right Tibial (AHB) Motor   Ankle 5.1  < 6.5 8.2  > 5.0  Ankle-AH 6   6.6  29.6    Knee 15.5 - 8.2 - 0 Knee-Ankle 44.5 43  > 38 6.9 -9 29.6    Right Ulnar (ADM) Motor   Wrist 3.4  < 3.5 10.5  > 5.0  Wrist-ADM 8   5.8  30.4    Below Elbow 7.4 - 8.2 - -22 Below Elbow-Wrist 23.5 59  > 48 6.1 -11 30.1    Above Elbow 9.4 - 8.4 - 2 Above Elbow-Below Elbow 10.5 53  > 48 6.1 -2 30.1    Right Ulnar (FDI) Motor   Wrist 4.3 - 10.4 -      4.4  29.9 15.2 cm   Below Elbow 8.7 - 6.2 - -40 Below Elbow-Wrist 23.1 53  > 48 4.6 -40 29.8    Above Elbow 10.3 - 6.4 - 3 Above Elbow-Below Elbow 10.4 65  > 48 4.8 6 29.6      F-Wave Sites      Min F-Lat Max-Min F-Lat Mean F-Lat   Site (ms) (ms) (ms)   Right Median F-Wave   Wrist 30.3 3.0 31.8   Right Tibial F-Wave   Ankle 61.2 8.6 -   Right Ulnar F-Wave   Wrist 32.4 4.3 34.0     Sensory Sites      Onset Lat Peak Lat Amp (O-P) Amp (P-P) Segment Distance Velocity Temperature Comment   Site ms (ms)  V Norm ( V)  cm m/s Norm ( C)    Right Median Sensory   Wrist-Dig II 2.3 3.2 44  > 10 59 Wrist-Dig II 14 61  > 48 30.1    Right Radial Sensory   Forearm-Wrist 1.73 2.3 50  > 15 64 Forearm-Wrist 10 58 - 29.9    Right Superficial Fibular Sensory   14 cm 3.1 4.0 17  > 3 21 14 cm-Ankle 12.5 40  > 38 30.1    Right Sural Sensory   Calf-Lat Malleolus 3.0 3.9 34  > 5 39 Calf-Lat Malleolus 14  47  > 38 30    Right Ulnar Sensory   Wrist-Dig V 2.3 3.0 31  > 8 53 Wrist-Dig V 12.5 54  > 48 30.2            NCS Waveforms:    Motor                  Sensory                  F-Wave             Ultrasound Images:            Again, thank you for allowing me to participate in the care of your patient.      Sincerely,    Blake Stewart MD

## 2024-06-05 ENCOUNTER — THERAPY VISIT (OUTPATIENT)
Dept: PHYSICAL THERAPY | Facility: CLINIC | Age: 29
End: 2024-06-05
Payer: COMMERCIAL

## 2024-06-05 DIAGNOSIS — R10.2 SUPRAPUBIC PAIN: ICD-10-CM

## 2024-06-05 DIAGNOSIS — N32.89 BLADDER SPASMS: Primary | ICD-10-CM

## 2024-06-05 PROCEDURE — 97140 MANUAL THERAPY 1/> REGIONS: CPT | Mod: GP

## 2024-06-05 PROCEDURE — 97110 THERAPEUTIC EXERCISES: CPT | Mod: GP

## 2024-06-05 NOTE — PROGRESS NOTES
"   06/05/24 0500   Appointment Info   Signing clinician's name / credentials Sandra Fry, PT, DPT   Total/Authorized Visits 12 (PT)   Visits Used 8   Medical Diagnosis Bladder spasms  Suprapubic pain   PT Tx Diagnosis Bladder spasms  Suprapubic pain   Quick Adds Certification;Pelvic Consent   Progress Note/Certification   Start of Care Date 04/05/24   Onset of illness/injury or Date of Surgery 03/29/24  (referral)   Therapy Frequency every 3-4 weeks   Predicted Duration 90 days   Certification date from 06/05/24   Certification date to 09/03/24   Progress Note Completed Date 05/08/24   GOALS   PT Goals 2   PT Goal 1   Goal Identifier LTG 1   Goal Description pt will improve PFM awareness and downtraining and evidenced by reduced abdominal pain 2/10 throughout the day and improve bladder habits to empty bladder every 2 hours without pain.   Rationale to maximize safety and independence with performance of ADLs and functional tasks   Goal Progress this has gotten much better.   Target Date 07/03/24   Date Met 06/05/24   PT Goal 2   Goal Identifier LTG 2   Goal Description Pt will improve PFM awareness and MFR mobility to reports PL 1/10 or less when sitting for 1 hour.   Rationale to maximize safety and independence with performance of ADLs and functional tasks   Target Date 09/03/24   Subjective Report   Subjective Report Pt reports he notices a mass on one of his testicles that formed a few weeks ago. Went to urgent care. Tests came back and he said they said it wasn't anything \"scary\". Was given antibiotics for it. Since then pain has gotten a bit better since then.   Objective Measures   Objective Measures Objective Measure 1   Objective Measure 1   Objective Measure abdomen MFR: hypomobility R>L and at midline inferior to umbilicus ;   Details bladder mob hypomobile R>L; this is improving   Treatment Interventions (PT)   Interventions Therapeutic Procedure/Exercise;Manual Therapy   Therapeutic " Procedure/Exercise   Therapeutic Procedures: strength, endurance, ROM, flexibility minutes (61232) 8   Therapeutic Procedures Ther Proc 4   Ther Proc 2 abdomen stretch over Swiss ball   Ther Proc 2 - Details good stretch here with DBE to deepen no pain pt likes   Ther Proc 3 Pt education   Ther Proc 3 - Details POC, goals for PT   PTRx Ther Proc 1 Diaphragmatic Breathing   PTRx Ther Proc 1 - Details vc abdominal expansion ; vc sending air low into abdomen for gentle stretching ;   PTRx Ther Proc 2 Piriformis Stretch Below 90 Degrees Supine   PTRx Ther Proc 2 - Details HEP x 30 sec hold dimas vc to continue to take deep breaths here;   PTRx Ther Proc 3 Pretzel Stretch   PTRx Ther Proc 3 - Details HEP x 30 sec dimas vc to continue to take deep breaths here   PTRx Ther Proc 4  Hip Flexor Stretch Vimal Test Position   PTRx Ther Proc 4 - Details HEP   Skilled Intervention improve mobility   Patient Response/Progress tolerated well; pt likes abdominal stretch over Swiss   Manual Therapy   Manual Therapy: Mobilization, MFR, MLD, friction massage minutes (77819) 15   Manual Therapy Manual Therapy 2   Manual Therapy 1 abdominal MFR:   Manual Therapy 1 - Details stacking; R>L hypomobile; tender; lift technique along midline lower abdomen hypomobile   Manual Therapy 2 bladder mob   Manual Therapy 2 - Details R>L hypomobile   Skilled Intervention improve mobility, decrease restriction   Patient Response/Progress responds well to manual work   Education   Learner/Method Patient;Listening   Plan   Home program print   Plan for next session hip mobility, PF downtraining; abdominal stacking/bladder mob   Comments   Pelvic Health Informed Consent Statement Discussed with patient/guardian reason for referral regarding pelvic health needs and external/internal pelvic floor muscle examination.  Opportunity provided to ask questions and verbal consent for assessment and intervention was given.           [facility]  Services                                                                                   OUTPATIENT PHYSICAL THERAPY    PLAN OF TREATMENT FOR OUTPATIENT REHABILITATION   Patient's Last Name, First Name, Jorge Lorenzo YOB: 1995   Provider's Name   [facility]   Medical Record No.  [id number]     Onset Date: 03/29/24 (referral)  Start of Care Date: 04/05/24     Medical Diagnosis:  Bladder spasms  Suprapubic pain      PT Treatment Diagnosis:  Bladder spasms  Suprapubic pain Plan of Treatment  Frequency/Duration: (P) every 3-4 weeks/ (P) 90 days    Certification date from (P) 06/05/24 to (P) 09/03/24         See note for plan of treatment details and functional goals     Sandra Fry, PT                         I CERTIFY THE NEED FOR THESE SERVICES FURNISHED UNDER        THIS PLAN OF TREATMENT AND WHILE UNDER MY CARE     (Physician attestation of this document indicates review and certification of the therapy plan).              Referring Provider:  Joey Ferreira    Initial Assessment  See Epic Evaluation- Start of Care Date: 04/05/24            PLAN  Continue therapy per current plan of care.  Patient doing well. Decreasing frequency to continue to work towards independence       Beginning/End Dates of Progress Note Reporting Period:  05/08/24 to 06/05/2024    Referring Provider:  Joey Ferreira

## 2024-06-07 ENCOUNTER — VIRTUAL VISIT (OUTPATIENT)
Dept: OTOLARYNGOLOGY | Facility: CLINIC | Age: 29
End: 2024-06-07
Payer: COMMERCIAL

## 2024-06-07 DIAGNOSIS — R07.0 THROAT PAIN: ICD-10-CM

## 2024-06-07 DIAGNOSIS — R49.0 DYSPHONIA: Primary | ICD-10-CM

## 2024-06-07 DIAGNOSIS — J38.7 LARYNGEAL HYPERFUNCTION: ICD-10-CM

## 2024-06-07 PROCEDURE — 92507 TX SP LANG VOICE COMM INDIV: CPT | Mod: GN | Performed by: SPEECH-LANGUAGE PATHOLOGIST

## 2024-06-07 NOTE — PROGRESS NOTES
"Domenico Denton is a 28 year old male who is being evaluated via a billable video visit.      Domenico has been notified and verbally consented to the following:   This video visit will be conducted between you and your provider.  Patient has opted to conduct today's video visit vs an in-person appointment.   Video visits are billed at different rates depending on your insurance coverage. Please reach out to your insurance provider with any questions.   If during the course of the call the provider feels the appointment is not appropriate, you will not be charged for this service.  Provider has received verbal consent for billable virtual visit from the patient? Yes  Will anyone else be joining your video visit? No    Call initiated at: 2:31 PM   Type of Visit Platform Used: [employer] Video  Location of provider: Home  Location of patient: [facility]  Lencho Loomis Jr., M.D., F.A.C.S.  Mariposa Monahan M.D., M.P.H.  Senait Flor M.D.  ANA LUISA Rodriguez.MALLIKA., M.M. (voice), M.A., CCC-SLP  Rama Warner M.S., CCC-SLP  Stephie Bedolla, Ph.D., CCC-SLP  Cecilio Tee, Ph.D., CCC-SLP  Berta Jain M.S., CCC-SLP  Seymour Barfield M.M., M.A., CCC-SLP  ELAINE Javed (voice), M.S., [facility]  VOICE/SPEECH/BREATHING THERAPY PROGRESS REPORT    Patient: Jorge Denton  Date of Service: 6/7/2024    Date of Last Service: 5/17/24  Referring physician: Dr. Flor  Initial evaluation: 10/12/23  Therapy Session #6     I had the pleasure of seeing Mr. Denton today, for speech therapy to address a diagnosis of:  Dysphonia (R49.0)   Throat Pain (R07.0)  Laryngeal Hyperfunction (J38.7).    PROGRESS SINCE LAST SESSION  At the last session, Mr. Denton worked on therapeutic activities to address the above diagnosis.    Regarding practice, Mr. Denton reports the following:   Regular frequent practice following the regimen we devised last session  It \"really helps\" to promote relaxation, reduced pain, and better " "overall voice quality  voice is improved during the exercises, and there is some variable carryover to other vocal activities  Sometimes he has to do exercises more frequently in order to maintain the benefits    Mr. Denton also states that:  He has had a \"nasty\" sinus infection since his last session,  but he has been able to make some progress  \"At least the really bad days are not so bad\"  He is better overall; slight improvements, and not perfect, but noticeably better  Sleep is a continual problem that affects his voice  The new medication is helping with the inflammation, but does affect his sleep     Mr. Denton presents today with the following:   Jaw tension  Voice quality:  Variable, less airflow and more glottal xie than at his last session  Pitch drops lower than optimal  Resonance placement is variable as well    THERAPEUTIC ACTIVITIES  Today Mr. Denton participated in the following therapeutic activities:  Practiced some stretch and massage exercises of the laryngeal area, adding phonation to the massage  This provided immediate improvement in sensation  Reminder to include phonation with massage; the puffed upper lip configuration will be optimal for this  Reminder to pay attention to breathing technique  Demonstrated previous exercises.  demonstrated improved technique on glides  Some appropriate redirection provided, to do more \"yeahs\" during the slides, with more tongue movement and less jaw movement  instruction provided for increased level of complexity/difficulty; see below  [place] exercises to maintain the improved relaxation, airflow and forward focus in singing-mode exercises  did a variety of scale and arpeggio patterns on the neutral syllable \"yeah\"  Vocalized down to F2, but discovered that is too low for normal conversational speech  Had an episode of reflux while doing the exercises (his first in quite some time) but it did not set him into more pain and tension; \"that's a testament to how " "much this helps\"  Reminded him to concentrate less on accuracy of pitch and more on sense of flow  Determined that Ab2 to Eb3 is his best pitch range for speech, but again, flow is more important than the actual pitches  Developed a plan for practice; he will stay with the same 3 exercises concentrating on the new reminders.  He took notes and made an audio recording of today's therapeutic activities, to facilitate practice.    IMPRESSIONS/GOALS/PLAN  Mr. Denton had a productive session of speech therapy today, to address the following:  Dysphonia (R49.0)   Throat Pain (R07.0)   Laryngeal Hyperfunction (J38.7)  Speech therapy for him is medically necessary to allow  him to meet personal and professional demands and fully engage in activities of daily living.    Progress toward long-term goals:   Adequate but incomplete progress; please see above    Goals for this practice period:   practice all exercises according to instructions  incorporate techniques into daily vocal activities  maintain vigilance for vocal technique    Plan: I will see Mr. Denton in two weeks to work on education, modification, and carryover of therapeutic activities to more complex activities.    Reporting period 4/5/24 - 7/4/24    TOTAL SERVICE TIME: 58 minutes  TREATMENT (16310)      Stephie Bedolla, Ph.D., [facility]-SLP  Speech-Language Pathologist  Director, [facility] Voice [facility]  She/her/hers  [phone]                "

## 2024-06-07 NOTE — LETTER
6/7/2024       RE: Jorge Denton  [address]  [place] [address]     Dear Colleague,    Thank you for referring your patient, Jorge Denton, to the [facility]. Please see a copy of my visit note below.    Domenico Denton is a 28 year old male who is being evaluated via a billable video visit.      Domenico has been notified and verbally consented to the following:   This video visit will be conducted between you and your provider.  Patient has opted to conduct today's video visit vs an in-person appointment.   Video visits are billed at different rates depending on your insurance coverage. Please reach out to your insurance provider with any questions.   If during the course of the call the provider feels the appointment is not appropriate, you will not be charged for this service.  Provider has received verbal consent for billable virtual visit from the patient? Yes  Will anyone else be joining your video visit? No    Call initiated at: 2:31 PM   Type of Visit Platform Used: [employer] Video  Location of provider: Home  Location of patient: [facility] VOICE [facility]  Lencho Loomis Jr., M.D., F.A.C.S.  Mariposa Monahan M.D., M.P.H.  Senait Flor M.D.  QUINCY Rodriguez, M.M. (voice), M.A., CCC-SLP  Rama Warner M.S., CCC-SLP  Stephie Bedolla, Ph.D., CCC-SLP  Cecilio Tee, Ph.D., CCC-SLP  Berta Jain M.S., CCC-SLP  Seymour Barfield M.M., M.A., CCC-SLP  ELAINE Javed (voice), M.S., CCC-SLP    [facility]  VOICE/SPEECH/BREATHING THERAPY PROGRESS REPORT    Patient: Jorge Denton  Date of Service: 6/7/2024    Date of Last Service: 5/17/24  Referring physician: Dr. Flor  Initial evaluation: 10/12/23  Therapy Session #6     I had the pleasure of seeing Mr. Denton today, for speech therapy to address a diagnosis of:  Dysphonia (R49.0)   Throat Pain (R07.0)  Laryngeal Hyperfunction (J38.7).    PROGRESS  "SINCE LAST SESSION  At the last session, Mr. Denton worked on therapeutic activities to address the above diagnosis.    Regarding practice, Mr. Denton reports the following:   Regular frequent practice following the regimen we devised last session  It \"really helps\" to promote relaxation, reduced pain, and better overall voice quality  voice is improved during the exercises, and there is some variable carryover to other vocal activities  Sometimes he has to do exercises more frequently in order to maintain the benefits    Mr. Denton also states that:  He has had a \"nasty\" sinus infection since his last session,  but he has been able to make some progress  \"At least the really bad days are not so bad\"  He is better overall; slight improvements, and not perfect, but noticeably better  Sleep is a continual problem that affects his voice  The new medication is helping with the inflammation, but does affect his sleep     Mr. Denton presents today with the following:   Jaw tension  Voice quality:  Variable, less airflow and more glottal xie than at his last session  Pitch drops lower than optimal  Resonance placement is variable as well    THERAPEUTIC ACTIVITIES  Today Mr. Denton participated in the following therapeutic activities:  Practiced some stretch and massage exercises of the laryngeal area, adding phonation to the massage  This provided immediate improvement in sensation  Reminder to include phonation with massage; the puffed upper lip configuration will be optimal for this  Reminder to pay attention to breathing technique  Demonstrated previous exercises.  demonstrated improved technique on glides  Some appropriate redirection provided, to do more \"yeahs\" during the slides, with more tongue movement and less jaw movement  instruction provided for increased level of complexity/difficulty; see below  [place] exercises to maintain the improved relaxation, airflow and forward focus in singing-mode exercises  did a " "variety of scale and arpeggio patterns on the neutral syllable \"yeah\"  Vocalized down to F2, but discovered that is too low for normal conversational speech  Had an episode of reflux while doing the exercises (his first in quite some time) but it did not set him into more pain and tension; \"that's a testament to how much this helps\"  Reminded him to concentrate less on accuracy of pitch and more on sense of flow  Determined that Ab2 to Eb3 is his best pitch range for speech, but again, flow is more important than the actual pitches  Developed a plan for practice; he will stay with the same 3 exercises concentrating on the new reminders.  He took notes and made an audio recording of today's therapeutic activities, to facilitate practice.    IMPRESSIONS/GOALS/PLAN  Mr. Denton had a productive session of speech therapy today, to address the following:  Dysphonia (R49.0)   Throat Pain (R07.0)   Laryngeal Hyperfunction (J38.7)  Speech therapy for him is medically necessary to allow  him to meet personal and professional demands and fully engage in activities of daily living.    Progress toward long-term goals:   Adequate but incomplete progress; please see above    Goals for this practice period:   practice all exercises according to instructions  incorporate techniques into daily vocal activities  maintain vigilance for vocal technique    Plan: I will see Mr. Denton in two weeks to work on education, modification, and carryover of therapeutic activities to more complex activities.    Reporting period 4/5/24 - 7/4/24    TOTAL SERVICE TIME: 58 minutes  TREATMENT (80826)      Stephie Bedolla, Ph.D., [facility]-SLP  Speech-Language Pathologist  Director, [facility] Voice [facility]  She/her/hers  [phone]                    "

## 2024-06-12 ENCOUNTER — VIRTUAL VISIT (OUTPATIENT)
Dept: PHYSICAL MEDICINE AND REHAB | Facility: CLINIC | Age: 29
End: 2024-06-12
Payer: COMMERCIAL

## 2024-06-12 DIAGNOSIS — U09.9 CHRONIC POST-COVID-19 SYNDROME: ICD-10-CM

## 2024-06-12 PROCEDURE — 99214 OFFICE O/P EST MOD 30 MIN: CPT | Mod: 95 | Performed by: PHYSICAL MEDICINE & REHABILITATION

## 2024-06-12 RX ORDER — GUANFACINE 1 MG/1
1 TABLET ORAL AT BEDTIME
Qty: 30 TABLET | Refills: 3 | Status: SHIPPED | OUTPATIENT
Start: 2024-06-12 | End: 2024-08-22

## 2024-06-12 NOTE — NURSING NOTE
Is the patient currently in the state of [place]? YES    Visit mode:VIDEO    If the visit is dropped, the patient can be reconnected by: TELEPHONE VISIT: Phone number:   Telephone Information:   Mobile [phone]       Will anyone else be joining the visit? NO  (If patient encounters technical issues they should call [phone])    How would you like to obtain your AVS? MyChart    Are changes needed to the allergy or medication list? Pt stated no med changes    Are refills needed on medications prescribed by this physician? NO    Reason for visit: Video Visit (6 week follow-up /)    Antoinette FOOTE

## 2024-06-12 NOTE — PROGRESS NOTES
Domenico is a 28 year old who is being evaluated via a billable video visit.    How would you like to obtain your AVS? MyChart  If the video visit is dropped, the invitation should be resent by: Text to cell phone: [phone]  Will anyone else be joining your video visit? No          Subjective   Domenico is a 28 year old, presenting for the following health issues:  No chief complaint on file.  No chief complaint on file.    Video Start Time:  10:01    Assessment and plan   Jorge Denton is a 28 year old male with history Lyme's disease who presents with LC, Last seen in April this year, he was started on LDN and a supplements. He has shown good response tot he LDN, will continue for now.   He can increase the LDN dose following 3 months. He notes that he has been able to do social roles and perform activities that he was not able to do for 5 years. He reports dramatic improvement.   He is started on Guanfacine for fatigue and brain fog at 1 mg.     RTC in 3 months     HPI   Jorge Denton is a 28 year old Male with history of Lyme disease who developed acute COVID which was fairly respiratory in nature and developed Long COVID leading to significant increase in fatigue, brain fog, and development of autonomic dysfunction, and immune mediated disorders such as prostatitis, gall bladder inflammation etc.     Current Symptoms:  He states that the pain is historically severe, but the last months has been better. His pain is that of fibromyalgia, neck and face. He has tension type headache.   He has anhedonia for 4-5 years     But in the last month was much better, he has started LDN @ 1 mg now  and he was able to enjoy his life more. He has 2 tablets left.   His PEM has been dramatically better.   He has been able to go on dates again, able to go to movies.     He is also on fish oil. And Arginine (2 grams BID)and vitamin C (500 mg bid) and Lipoic acid and co-q 10 combo.     He is on  mg once a day .     He is  currently undergoing PT    He continues to have brain fog and had some slurred speech.     Current concerns:       6/12/2024     9:55 AM   PHQ Assesment Total Score(s)   PHQ-2 Score 0           4/24/2024     9:13 AM   LANNY-7 Results   LANNY 7 TOTAL SCORE 0 (minimal anxiety)   LANNY-7 Total Score 0         4/24/2024     9:14 AM   PTSD Screen Score   Have you ever experienced this kind of event? No         4/24/2024     9:15 AM   PROMIS-29   PROMIS Physical Function T-Score 32 (moderate dysfunction)   PROMIS Anxiety T-Score 40 (within normal limits)   PROMIS Depression T-Score 41 (within normal limits)   PROMIS Fatigue T-Score 76 (severe)   PROMIS Sleep Disturbance T-Score 54 (within normal limits)   PROMIS Ability to Participate in Social Roles & Activities T-Score 29 (severe dysfunction)   PROMIS Pain Interference T-Score 76 (severe)   PROMIS Pain Intensity 8           Past Medical History:   Diagnosis Date    Allergic rhinitis     allergy testing positive for dustmites    Eczema     in earlier childhood    Gastroesophageal reflux disease     PONV (postoperative nausea and vomiting)     Sleep apnea     Uncomplicated asthma        Past Surgical History:   Procedure Laterality Date    COLONOSCOPY N/A 4/22/2024    Procedure: COLONOSCOPY, WITH POLYPECTOMY AND BIOPSY;  Surgeon: Kev White MD;  Location: [facility]    MASTECTOMY SUBCUTANEOUS MALE BILATERAL (GYNECOMASTIA)         Family History   Problem Relation Age of Onset    Heart Disease Maternal Grandmother     Lipids Maternal Grandmother     Heart Disease Maternal Grandfather     Lipids Maternal Grandfather     Heart Disease Paternal Grandfather     Lipids Paternal Grandfather     Lipids Mother     Lipids Father     Lipids Paternal Grandmother     Thyroid Disease Maternal Grandmother         Thyroid nodule       Social History     Tobacco Use    Smoking status: Never    Smokeless tobacco: Never    Tobacco comments:     no passive exposure   Vaping Use    Vaping  status: Never Used   Substance Use Topics    Alcohol use: No    Drug use: No         Current Outpatient Medications:     Armodafinil 200 MG TABS, Take 200 mg by mouth every morning  armodafinil 200 mg tablet TAKE ONE-HALF TO ONE TABLET EVERY MORNING, Disp: , Rfl:     benzonatate (TESSALON) 100 MG capsule, Take 100 mg by mouth 3 times daily as needed for cough, Disp: , Rfl:     bisacodyl (DULCOLAX) 5 MG EC tablet, Two days prior to exam take two (2) tablets at 4pm. One day prior to exam take two (2) tablets at 4pm, Disp: 4 tablet, Rfl: 0    budesonide-formoterol (SYMBICORT) 80-4.5 MCG/ACT Inhaler, Inhale 2 puffs into the lungs two times daily  Symbicort 80 mcg-4.5 mcg/actuation HFA aerosol inhaler    INHALE TWO PUFFS BY MOUTH TWICE A DAY. USE SPACER AND RINSE MOUTH AFTER EACH USE, Disp: , Rfl:     calcium carbonate 500 mg, elemental, (OSCAL 500) 1250 (500 Ca) MG TABS tablet, Take 1 tablet by mouth daily, Disp: , Rfl:     carboxymethylcell-glycerin PF (REFRESH RELIEVA PF) 0.5-0.9 % ophthalmic solution, Apply 1 drop to eye as needed (Instill 1 drop into the eye), Disp: , Rfl:     ciclopirox (PENLAC) 8 % external solution, Apply topically daily  Apply nail lacquer 8% solution once daily to affected nails with applicator brush, preferably at bedtime or 8 hours before washing; remove with alcohol every 7 days, Disp: , Rfl:     clobetasol (TEMOVATE) 0.05 % external ointment, Apply topically 2 times daily as needed (APPLY TO RASH ON BODY TWO TIMES A DAY AS NEEDED)  APPLY TO RASH ON BODY TWO TIMES A DAY AS NEEDED, Disp: , Rfl:     clotrimazole (MYCELEX) 10 MG lozenge, Take 10 mg by mouth 3 times daily, Disp: , Rfl:     COMPOUNDED NON-CONTROLLED SUBSTANCE (CMPD RX) - PHARMACY TO MIX COMPOUNDED MEDICATION, Low dose Naltrexone 1 mg daily for 3 months, Disp: 30 tablet, Rfl: 3    desonide (DESOWEN) 0.05 % external ointment, Apply topically as needed (desonide 0.05 % topical ointment    Patient sig: APPLY TO AFFECTED AREA(S) OF  FACE TWICE DAILY FOR UP TO 2 WEEKS. REPEAT FOR FLARES AFTER 1 WEEK BREAK)  desonide 0.05 % topical ointment   APPLY TO AFFECTED AREA(S) OF FACE TWICE DAILY FOR UP TO 2 WEEKS. REPEAT FOR FLARES AFTER 1 WEEK BREAK, Disp: , Rfl:     EPINEPHrine (ANY BX GENERIC EQUIV) 0.3 MG/0.3ML injection 2-pack, Inject 0.3 mg into the muscle as needed for anaphylaxis (INJECT 0.3 MG INTRAMUSCULARLY AS NEEDED FOR ANAPHYLAXIS. 1 PACK = 2 PENS)  INJECT 0.3 MG INTRAMUSCULARLY AS NEEDED FOR ANAPHYLAXIS. 1 PACK = 2 PENS, Disp: , Rfl:     finasteride (PROPECIA) 1 MG tablet, Take 1 tablet by mouth daily, Disp: , Rfl:     fluticasone (FLOVENT HFA) 110 MCG/ACT inhaler, Flovent  mcg/actuation aerosol inhaler  INHALE TWO PUFFS BY MOUTH TWICE A DAY, Disp: , Rfl:     fluticasone-salmeterol (ADVAIR) 250-50 MCG/ACT inhaler, Inhale 1 puff into the lungs 2 times daily, Disp: , Rfl:     fluticasone-vilanterol (BREO ELLIPTA) 200-25 MCG/ACT inhaler, Inhale 1 puff into the lungs daily, Disp: , Rfl:     levalbuterol (XOPENEX HFA) 45 MCG/ACT inhaler, Inhale 2 puffs into the lungs every 4 hours as needed for shortness of breath or wheezing, Disp: , Rfl:     linaclotide (LINZESS) 145 MCG capsule, Take 1 capsule (145 mcg) by mouth every morning (before breakfast), Disp: 30 capsule, Rfl: 0    lubiprostone (AMITIZA) 8 MCG capsule, Take 1 capsule (8 mcg) by mouth 2 times daily, Disp: 60 capsule, Rfl: 1    mometasone (ELOCON) 0.1 % external cream, Apply topically daily, Disp: , Rfl:     polyethylene glycol (GOLYTELY) 236 g suspension, Take as directed. Two days before your exam fill the first container with water. Cover and shake until mixed well. At 5:00pm drink one 8oz glass every 10-15 minutes until half (1/2) of the first container is empty. Store the remainder in the refrigerator. One day before your exam at 5:00pm drink the second half of the first container until it is gone. Before you go to bed mix the second container with water and put in  refrigerator. Six hours before your check in time drink one 8oz glass every 10-15 minutes until half of container is empty. Discard the remainder of solution., Disp: 8000 mL, Rfl: 0    tadalafil (CIALIS) 5 MG tablet, tadalafil 5 mg tablet  TAKE ONE TABLET BY MOUTH AT BEDTIME, Disp: , Rfl:     triamcinolone (KENALOG) 0.1 % external cream, Apply topically 2 times daily, Disp: 30 g, Rfl: 0    azelastine (ASTELIN) 0.1 % nasal spray, Spray 2 sprays into both nostrils 2 times daily (Patient not taking: Reported on 5/10/2024), Disp: , Rfl:     azithromycin (ZITHROMAX) 500 MG tablet, Take 500 mg by mouth daily (Patient not taking: Reported on 5/10/2024), Disp: , Rfl:     calcipotriene (DOVONOX) 0.005 % external ointment, , Disp: , Rfl:     calcipotriene (DOVONOX) 0.005 % external solution, , Disp: , Rfl:       Review of Systems  Constitutional, HEENT, cardiovascular, pulmonary, gi and gu systems are negative, except as otherwise noted.      Objective    Vitals - Patient Reported  Pain Score: No Pain (0)        Physical Exam   GENERAL: alert and no distress  EYES: Eyes grossly normal to inspection.  No discharge or erythema, or obvious scleral/conjunctival abnormalities.  RESP: No audible wheeze, cough, or visible cyanosis.    SKIN: Visible skin clear. No significant rash, abnormal pigmentation or lesions.  NEURO: Cranial nerves grossly intact.  Mentation and speech appropriate for age.  PSYCH: Appropriate affect, tone, and pace of words    Labs   ESR 3    Latest Reference Range & Units 05/14/24 10:56   CRP Inflammation <5.00 mg/L <3.00   Ferritin 31 - 409 ng/mL 455 (H)   Folate 4.6 - 34.8 ng/mL 6.3   Lactate Dehydrogenase 0 - 250 U/L 178   Vitamin B1 Whole Blood Level 70 - 180 nmol/L 138   Vitamin B12 232 - 1,245 pg/mL 896   Vitamin B6 20.0 - 125.0 nmol/L 207.0 (H)   (H): Data is abnormally high      Video-Visit Details    Type of service:  Video Visit   Video End Time:10:22 AM  Originating Location (pt. Location):  Home    Distant Location (provider location):  Off-site  Platform used for Video Visit: Ari  Signed Electronically by: Belle Abbott MD

## 2024-06-12 NOTE — LETTER
6/12/2024       RE: Jorge Denton  [address]  [place] [address]     Dear Colleague,    Thank you for referring your patient, Jorge Denton, to the [facility] [place] at [facility]. Please see a copy of my visit note below.    Domenico is a 28 year old who is being evaluated via a billable video visit.    How would you like to obtain your AVS? MyChart  If the video visit is dropped, the invitation should be resent by: Text to cell phone: [phone]  Will anyone else be joining your video visit? No          Subjective  Domenico is a 28 year old, presenting for the following health issues:  No chief complaint on file.  No chief complaint on file.    Video Start Time:  10:01    Assessment and plan   Jorge Denton is a 28 year old male with history Lyme's disease who presents with LC, Last seen in April this year, he was started on LDN and a supplements. He has shown good response tot he LDN, will continue for now.   He can increase the LDN dose following 3 months. He notes that he has been able to do social roles and perform activities that he was not able to do for 5 years. He reports dramatic improvement.   He is started on Guanfacine for fatigue and brain fog at 1 mg.     RTC in 3 months     HPI   Jorge Denton is a 28 year old Male with history of Lyme disease who developed acute COVID which was fairly respiratory in nature and developed Long COVID leading to significant increase in fatigue, brain fog, and development of autonomic dysfunction, and immune mediated disorders such as prostatitis, gall bladder inflammation etc.     Current Symptoms:  He states that the pain is historically severe, but the last months has been better. His pain is that of fibromyalgia, neck and face. He has tension type headache.   He has anhedonia for 4-5 years     But in the last month was much better, he has started LDN @ 1  mg now  and he was able to enjoy his life more. He has 2 tablets left.   His PEM has been dramatically better.   He has been able to go on dates again, able to go to movies.     He is also on fish oil. And Arginine (2 grams BID)and vitamin C (500 mg bid) and Lipoic acid and co-q 10 combo.     He is on  mg once a day .     He is currently undergoing PT    He continues to have brain fog and had some slurred speech.     Current concerns:       6/12/2024     9:55 AM   PHQ Assesment Total Score(s)   PHQ-2 Score 0           4/24/2024     9:13 AM   LANNY-7 Results   LANNY 7 TOTAL SCORE 0 (minimal anxiety)   LANNY-7 Total Score 0         4/24/2024     9:14 AM   PTSD Screen Score   Have you ever experienced this kind of event? No         4/24/2024     9:15 AM   PROMIS-29   PROMIS Physical Function T-Score 32 (moderate dysfunction)   PROMIS Anxiety T-Score 40 (within normal limits)   PROMIS Depression T-Score 41 (within normal limits)   PROMIS Fatigue T-Score 76 (severe)   PROMIS Sleep Disturbance T-Score 54 (within normal limits)   PROMIS Ability to Participate in Social Roles & Activities T-Score 29 (severe dysfunction)   PROMIS Pain Interference T-Score 76 (severe)   PROMIS Pain Intensity 8           Past Medical History:   Diagnosis Date    Allergic rhinitis     allergy testing positive for dustmites    Eczema     in earlier childhood    Gastroesophageal reflux disease     PONV (postoperative nausea and vomiting)     Sleep apnea     Uncomplicated asthma        Past Surgical History:   Procedure Laterality Date    COLONOSCOPY N/A 4/22/2024    Procedure: COLONOSCOPY, WITH POLYPECTOMY AND BIOPSY;  Surgeon: Kev White MD;  Location:  GI    MASTECTOMY SUBCUTANEOUS MALE BILATERAL (GYNECOMASTIA)         Family History   Problem Relation Age of Onset    Heart Disease Maternal Grandmother     Lipids Maternal Grandmother     Heart Disease Maternal Grandfather     Lipids Maternal Grandfather     Heart Disease Paternal  Grandfather     Lipids Paternal Grandfather     Lipids Mother     Lipids Father     Lipids Paternal Grandmother     Thyroid Disease Maternal Grandmother         Thyroid nodule       Social History     Tobacco Use    Smoking status: Never    Smokeless tobacco: Never    Tobacco comments:     no passive exposure   Vaping Use    Vaping status: Never Used   Substance Use Topics    Alcohol use: No    Drug use: No         Current Outpatient Medications:     Armodafinil 200 MG TABS, Take 200 mg by mouth every morning  armodafinil 200 mg tablet TAKE ONE-HALF TO ONE TABLET EVERY MORNING, Disp: , Rfl:     benzonatate (TESSALON) 100 MG capsule, Take 100 mg by mouth 3 times daily as needed for cough, Disp: , Rfl:     bisacodyl (DULCOLAX) 5 MG EC tablet, Two days prior to exam take two (2) tablets at 4pm. One day prior to exam take two (2) tablets at 4pm, Disp: 4 tablet, Rfl: 0    budesonide-formoterol (SYMBICORT) 80-4.5 MCG/ACT Inhaler, Inhale 2 puffs into the lungs two times daily  Symbicort 80 mcg-4.5 mcg/actuation HFA aerosol inhaler    INHALE TWO PUFFS BY MOUTH TWICE A DAY. USE SPACER AND RINSE MOUTH AFTER EACH USE, Disp: , Rfl:     calcium carbonate 500 mg, elemental, (OSCAL 500) 1250 (500 Ca) MG TABS tablet, Take 1 tablet by mouth daily, Disp: , Rfl:     carboxymethylcell-glycerin PF (REFRESH RELIEVA PF) 0.5-0.9 % ophthalmic solution, Apply 1 drop to eye as needed (Instill 1 drop into the eye), Disp: , Rfl:     ciclopirox (PENLAC) 8 % external solution, Apply topically daily  Apply nail lacquer 8% solution once daily to affected nails with applicator brush, preferably at bedtime or 8 hours before washing; remove with alcohol every 7 days, Disp: , Rfl:     clobetasol (TEMOVATE) 0.05 % external ointment, Apply topically 2 times daily as needed (APPLY TO RASH ON BODY TWO TIMES A DAY AS NEEDED)  APPLY TO RASH ON BODY TWO TIMES A DAY AS NEEDED, Disp: , Rfl:     clotrimazole (MYCELEX) 10 MG lozenge, Take 10 mg by mouth 3 times  daily, Disp: , Rfl:     COMPOUNDED NON-CONTROLLED SUBSTANCE (CMPD RX) - PHARMACY TO MIX COMPOUNDED MEDICATION, Low dose Naltrexone 1 mg daily for 3 months, Disp: 30 tablet, Rfl: 3    desonide (DESOWEN) 0.05 % external ointment, Apply topically as needed (desonide 0.05 % topical ointment    Patient sig: APPLY TO AFFECTED AREA(S) OF FACE TWICE DAILY FOR UP TO 2 WEEKS. REPEAT FOR FLARES AFTER 1 WEEK BREAK)  desonide 0.05 % topical ointment   APPLY TO AFFECTED AREA(S) OF FACE TWICE DAILY FOR UP TO 2 WEEKS. REPEAT FOR FLARES AFTER 1 WEEK BREAK, Disp: , Rfl:     EPINEPHrine (ANY BX GENERIC EQUIV) 0.3 MG/0.3ML injection 2-pack, Inject 0.3 mg into the muscle as needed for anaphylaxis (INJECT 0.3 MG INTRAMUSCULARLY AS NEEDED FOR ANAPHYLAXIS. 1 PACK = 2 PENS)  INJECT 0.3 MG INTRAMUSCULARLY AS NEEDED FOR ANAPHYLAXIS. 1 PACK = 2 PENS, Disp: , Rfl:     finasteride (PROPECIA) 1 MG tablet, Take 1 tablet by mouth daily, Disp: , Rfl:     fluticasone (FLOVENT HFA) 110 MCG/ACT inhaler, Flovent  mcg/actuation aerosol inhaler  INHALE TWO PUFFS BY MOUTH TWICE A DAY, Disp: , Rfl:     fluticasone-salmeterol (ADVAIR) 250-50 MCG/ACT inhaler, Inhale 1 puff into the lungs 2 times daily, Disp: , Rfl:     fluticasone-vilanterol (BREO ELLIPTA) 200-25 MCG/ACT inhaler, Inhale 1 puff into the lungs daily, Disp: , Rfl:     levalbuterol (XOPENEX HFA) 45 MCG/ACT inhaler, Inhale 2 puffs into the lungs every 4 hours as needed for shortness of breath or wheezing, Disp: , Rfl:     linaclotide (LINZESS) 145 MCG capsule, Take 1 capsule (145 mcg) by mouth every morning (before breakfast), Disp: 30 capsule, Rfl: 0    lubiprostone (AMITIZA) 8 MCG capsule, Take 1 capsule (8 mcg) by mouth 2 times daily, Disp: 60 capsule, Rfl: 1    mometasone (ELOCON) 0.1 % external cream, Apply topically daily, Disp: , Rfl:     polyethylene glycol (GOLYTELY) 236 g suspension, Take as directed. Two days before your exam fill the first container with water. Cover and shake  until mixed well. At 5:00pm drink one 8oz glass every 10-15 minutes until half (1/2) of the first container is empty. Store the remainder in the refrigerator. One day before your exam at 5:00pm drink the second half of the first container until it is gone. Before you go to bed mix the second container with water and put in refrigerator. Six hours before your check in time drink one 8oz glass every 10-15 minutes until half of container is empty. Discard the remainder of solution., Disp: 8000 mL, Rfl: 0    tadalafil (CIALIS) 5 MG tablet, tadalafil 5 mg tablet  TAKE ONE TABLET BY MOUTH AT BEDTIME, Disp: , Rfl:     triamcinolone (KENALOG) 0.1 % external cream, Apply topically 2 times daily, Disp: 30 g, Rfl: 0    azelastine (ASTELIN) 0.1 % nasal spray, Spray 2 sprays into both nostrils 2 times daily (Patient not taking: Reported on 5/10/2024), Disp: , Rfl:     azithromycin (ZITHROMAX) 500 MG tablet, Take 500 mg by mouth daily (Patient not taking: Reported on 5/10/2024), Disp: , Rfl:     calcipotriene (DOVONOX) 0.005 % external ointment, , Disp: , Rfl:     calcipotriene (DOVONOX) 0.005 % external solution, , Disp: , Rfl:       Review of Systems  Constitutional, HEENT, cardiovascular, pulmonary, gi and gu systems are negative, except as otherwise noted.      Objective   Vitals - Patient Reported  Pain Score: No Pain (0)        Physical Exam   GENERAL: alert and no distress  EYES: Eyes grossly normal to inspection.  No discharge or erythema, or obvious scleral/conjunctival abnormalities.  RESP: No audible wheeze, cough, or visible cyanosis.    SKIN: Visible skin clear. No significant rash, abnormal pigmentation or lesions.  NEURO: Cranial nerves grossly intact.  Mentation and speech appropriate for age.  PSYCH: Appropriate affect, tone, and pace of words    Labs   ESR 3    Latest Reference Range & Units 05/14/24 10:56   CRP Inflammation <5.00 mg/L <3.00   Ferritin 31 - 409 ng/mL 455 (H)   Folate 4.6 - 34.8 ng/mL 6.3    Lactate Dehydrogenase 0 - 250 U/L 178   Vitamin B1 Whole Blood Level 70 - 180 nmol/L 138   Vitamin B12 232 - 1,245 pg/mL 896   Vitamin B6 20.0 - 125.0 nmol/L 207.0 (H)   (H): Data is abnormally high        Again, thank you for allowing me to participate in the care of your patient.      Sincerely,    Belle Abbott MD

## 2024-06-17 LAB — SCANNED LAB RESULT: NORMAL

## 2024-06-21 ENCOUNTER — VIRTUAL VISIT (OUTPATIENT)
Dept: OTOLARYNGOLOGY | Facility: CLINIC | Age: 29
End: 2024-06-21
Payer: COMMERCIAL

## 2024-06-21 DIAGNOSIS — R49.0 DYSPHONIA: Primary | ICD-10-CM

## 2024-06-21 DIAGNOSIS — R07.0 THROAT PAIN: ICD-10-CM

## 2024-06-21 DIAGNOSIS — J38.7 LARYNGEAL HYPERFUNCTION: ICD-10-CM

## 2024-06-21 PROCEDURE — 92507 TX SP LANG VOICE COMM INDIV: CPT | Mod: GN | Performed by: SPEECH-LANGUAGE PATHOLOGIST

## 2024-06-21 NOTE — PROGRESS NOTES
Domenico Denton is a 28 year old male who is being evaluated via a billable video visit.      Domenico has been notified and verbally consented to the following:   This video visit will be conducted between you and your provider.  Patient has opted to conduct today's video visit vs an in-person appointment.   Video visits are billed at different rates depending on your insurance coverage. Please reach out to your insurance provider with any questions.   If during the course of the call the provider feels the appointment is not appropriate, you will not be charged for this service.  Provider has received verbal consent for billable virtual visit from the patient? Yes  Will anyone else be joining your video visit? No    Call initiated at: 2:30 PM   Type of Visit Platform Used: [employer] Video  Location of provider: Home  Location of patient: [facility]  Lencho Loomis Jr., M.D., F.A.C.S.  Mariposa Monahan M.D., M.P.H.  Senait Flor M.D.  ANA LUISA Rodriguez.MALLIKA., M.M. (voice), M.A., CCC-SLP  Rama Warner M.S., CCC-SLP  Stephie Bedolla, Ph.D., CCC-SLP  Cecilio Tee, Ph.D., CCC-SLP  Berta Jain M.S., CCC-SLP  Seymour Barfield M.M., M.A., CCC-SLP  ELAINE Javed (voice), M.S., [facility]  VOICE/SPEECH/BREATHING THERAPY PROGRESS REPORT    Patient: Jorge Denton  Date of Service: 6/21/2024    Date of Last Service: 6/7/24  Referring physician: Dr. Flor  Initial evaluation: 10/12/23  Therapy Session #7     I had the pleasure of seeing Mr. Denton today, for speech therapy to address a diagnosis of:  Dysphonia (R49.0)   Throat Pain (R07.0)  Laryngeal Hyperfunction (J38.7).    PROGRESS SINCE LAST SESSION  At the last session, Mr. Denton worked on therapeutic activities to address the above diagnosis.    Regarding practice, Mr. Denton reports the following:   He does the massage exercises throughout the day  He also does the main three voice exercises; he does them multiple times throughout the  "day  Lip trill glides (trying not to tip his head back for high pitches); puffed upper lip phonation while doing thyrohyoid area massages; yeah glides  The \"yeah\" exercises on pitches are more of a challenge  voice gets more comfortable and \"less engaged\" during the exercises, but very short carryover to other vocal activities  Still, there is more carryover now than there was a month ago    Mr. Denton also states that:  He has had increased inflammation lately, as well as sinus inflammation that seems to be affecting his jaw; he thinks he subconsciously tenses up  The inflammation affects his throat, but it's a \"whole-body thing\"  He feels more confident now that if he has a flare-up, he has a protocol of things he can do  He is more reconciled to the muscular component of his voice problem, so he feels less stuck     Mr. Denton presents today with the following:  Voice quality:  The same WNL quality he always has, with variable resonance characteristics, and some variable xie    THERAPEUTIC ACTIVITIES  Today Mr. Denton participated in the following therapeutic activities:  Demonstrated the exercise that he finds challenging.  demonstrated a narrow, pressed, and overly \"twangy\" production, and noted that he feels himself tense up immediately  [place] exercises for improved relaxation, airflow, and balanced resonance during phonation.  Worked on the sensation of yawn for inhalation, then \"fogging a mirror\" on phonation  Alternated between aspirate and nasal phonemes  Concentrated on the optimal pitch range of Ab2 to C3, but also worked on varying his volume and pitch  Progressed to the descending scale passage on \"my-oh\" with much better comfort  Did some troubleshooting for his singing, which is sometimes in a genre that favors a breathy/strained quality (he demonstrated this)  He will try to do singing with his own best quality  [place] concepts of an optimal regimen for practice.  We agreed that he should not try " doing negative practice yet; his technique is still too fresh  An audio recording of today's therapeutic activities was made, to facilitate practice.    IMPRESSIONS/GOALS/PLAN  Mr. Denton had a productive session of speech therapy today, to address the following:  Dysphonia (R49.0)   Throat Pain (R07.0)   Laryngeal Hyperfunction (J38.7)  Speech therapy for him is medically necessary to allow  him to meet personal and professional demands and fully engage in activities of daily living.    Progress toward long-term goals:   Adequate but incomplete progress; please see above    Goals for this practice period:   practice all exercises according to instructions  incorporate techniques into daily vocal activities  maintain vigilance for vocal technique    Plan: I will see Mr. Denton in three weeks to work on education, modification, and carryover of therapeutic activities to more complex activities.    Reporting period 4/5/24 - 7/4/24    TOTAL SERVICE TIME: 58 minutes  TREATMENT ([id number])      Stephie Bedolla, Ph.D., [facility]-SLP  Speech-Language Pathologist  Director, [facility] Voice [facility]  She/her/hers  [phone]

## 2024-06-21 NOTE — LETTER
6/21/2024       RE: Jorge Denton  [address]  [place] [address]     Dear Colleague,    Thank you for referring your patient, Jorge Denton, to the [facility] VOICE [facility]. Please see a copy of my visit note below.    Domenico Denton is a 28 year old male who is being evaluated via a billable video visit.      Domenico has been notified and verbally consented to the following:   This video visit will be conducted between you and your provider.  Patient has opted to conduct today's video visit vs an in-person appointment.   Video visits are billed at different rates depending on your insurance coverage. Please reach out to your insurance provider with any questions.   If during the course of the call the provider feels the appointment is not appropriate, you will not be charged for this service.  Provider has received verbal consent for billable virtual visit from the patient? Yes  Will anyone else be joining your video visit? No    Call initiated at: 2:30 PM   Type of Visit Platform Used: [employer] Video  Location of provider: Home  Location of patient: [facility] VOICE [facility]  Lencho Loomis Jr., M.D., F.A.C.S.  Mariposa Monahan M.D., M.P.H.  Senait Flor M.D.  QUINCY Rodriguez, M.M. (voice), M.A., CCC-SLP  Rama Warner M.S., CCC-SLP  Stephie Bedolla, Ph.D., CCC-SLP  Cecilio Tee, Ph.D., CCC-SLP  Berta Jain M.S., CCC-SLP  Seymour Barfield M.M., M.A., CCC-SLP  ELAINE Javed (voice), M.S., CCC-SLP    [facility]  VOICE/SPEECH/BREATHING THERAPY PROGRESS REPORT    Patient: Jorge Denton  Date of Service: 6/21/2024    Date of Last Service: 6/7/24  Referring physician: Dr. Flor  Initial evaluation: 10/12/23  Therapy Session #7     I had the pleasure of seeing Mr. Denton today, for speech therapy to address a diagnosis of:  Dysphonia (R49.0)   Throat Pain (R07.0)  Laryngeal Hyperfunction (J38.7).    PROGRESS  "SINCE LAST SESSION  At the last session, Mr. Denton worked on therapeutic activities to address the above diagnosis.    Regarding practice, Mr. Denton reports the following:   He does the massage exercises throughout the day  He also does the main three voice exercises; he does them multiple times throughout the day  Lip trill glides (trying not to tip his head back for high pitches); puffed upper lip phonation while doing thyrohyoid area massages; yeah glides  The \"yeah\" exercises on pitches are more of a challenge  voice gets more comfortable and \"less engaged\" during the exercises, but very short carryover to other vocal activities  Still, there is more carryover now than there was a month ago    Mr. Denton also states that:  He has had increased inflammation lately, as well as sinus inflammation that seems to be affecting his jaw; he thinks he subconsciously tenses up  The inflammation affects his throat, but it's a \"whole-body thing\"  He feels more confident now that if he has a flare-up, he has a protocol of things he can do  He is more reconciled to the muscular component of his voice problem, so he feels less stuck     Mr. Denton presents today with the following:  Voice quality:  The same WNL quality he always has, with variable resonance characteristics, and some variable xie    THERAPEUTIC ACTIVITIES  Today Mr. Denton participated in the following therapeutic activities:  Demonstrated the exercise that he finds challenging.  demonstrated a narrow, pressed, and overly \"twangy\" production, and noted that he feels himself tense up immediately  [place] exercises for improved relaxation, airflow, and balanced resonance during phonation.  Worked on the sensation of yawn for inhalation, then \"fogging a mirror\" on phonation  Alternated between aspirate and nasal phonemes  Concentrated on the optimal pitch range of Ab2 to C3, but also worked on varying his volume and pitch  Progressed to the descending scale passage on " "\"my-oh\" with much better comfort  Did some troubleshooting for his singing, which is sometimes in a genre that favors a breathy/strained quality (he demonstrated this)  He will try to do singing with his own best quality  [place] concepts of an optimal regimen for practice.  We agreed that he should not try doing negative practice yet; his technique is still too fresh  An audio recording of today's therapeutic activities was made, to facilitate practice.    IMPRESSIONS/GOALS/PLAN  Mr. Denton had a productive session of speech therapy today, to address the following:  Dysphonia (R49.0)   Throat Pain (R07.0)   Laryngeal Hyperfunction (J38.7)  Speech therapy for him is medically necessary to allow  him to meet personal and professional demands and fully engage in activities of daily living.    Progress toward long-term goals:   Adequate but incomplete progress; please see above    Goals for this practice period:   practice all exercises according to instructions  incorporate techniques into daily vocal activities  maintain vigilance for vocal technique    Plan: I will see Mr. Denton in three weeks to work on education, modification, and carryover of therapeutic activities to more complex activities.    Reporting period 4/5/24 - 7/4/24    TOTAL SERVICE TIME: 58 minutes  TREATMENT ([id number])      Stephie Bedolla, Ph.D., [facility]-SLP  Speech-Language Pathologist  Director, [facility]  She/her/hers  [phone]              "

## 2024-06-25 ENCOUNTER — TELEPHONE (OUTPATIENT)
Dept: OTOLARYNGOLOGY | Facility: CLINIC | Age: 29
End: 2024-06-25
Payer: COMMERCIAL

## 2024-06-25 NOTE — TELEPHONE ENCOUNTER
Left Voicemail (1st Attempt) for the patient to call back and schedule the following:    Appointment type: Return SLP Voice  Provider: SAMANTHA Bedolla   Return date: around July 14, 2024  Specialty phone number: [phone]  Additional appointment(s) needed:   Additional Notes: 4-6 appts with 2-3 week inbetween each starting in mid July

## 2024-06-27 ENCOUNTER — TELEPHONE (OUTPATIENT)
Dept: OTOLARYNGOLOGY | Facility: CLINIC | Age: 29
End: 2024-06-27
Payer: COMMERCIAL

## 2024-06-27 NOTE — TELEPHONE ENCOUNTER
Left Voicemail (2nd Attempt) for the patient to call back and schedule the following:    Appointment type: Return SLP Voice  Provider: SAMANTHA Bedolla  Return date: around July 15, 2024  Specialty phone number: [phone]  Additional appointment(s) needed:   Additional Notes:4-6 more virtual therapy sessions, at 2-3 week intervals, starting mid-July

## 2024-06-29 ENCOUNTER — HEALTH MAINTENANCE LETTER (OUTPATIENT)
Age: 29
End: 2024-06-29

## 2024-07-12 NOTE — PROGRESS NOTES
"                                                                                 Outpatient Speech Language Therapy Evaluation  PLAN OF TREATMENT FOR OUTPATIENT REHABILITATION  (COMPLETE FOR INITIAL CLAIMS ONLY)  Patient's Last Name, First Name, M.I.  YOB: 1995  Jorge Denton                        Provider's Name  Stephie Bedolla, SLP Medical Record No.  [id number]                               Onset Date:  2/05/21   Start of Care Date: 2/05/21     Type: Speech Language Therapy Medical Diagnosis: Vocal cord dysfunction [J38.3]                        Therapy Diagnosis:  Vocal cord dysfunction [J38.3]   Visits from SOC:  1   _________________________________________________________________________________  Plan of Treatment:   Speech therapy  Frequency and Duration: Two weekly 1-hour sessions followed by two biweekly 1-hour sessions.      GOALS  Patient goal:   To have normal and comfortable breathing at all times, especially during exerted activities.     Long-term goal:  Within two months, Jorge will participate in an entire week of normal daily activities with no report of any difficulties breathing.  Goals:  Patient goal:   \"I'd like to learn how to breathe better so it doesn't always feel effortful and I don't feel tightness in my throat. I'd also like to learn how to use my voice so I can sing and talk without triggering my throat symptoms.\"     Short-term goal(s): Within the first 4 sessions, Mr. Denton:  will demonstrate assigned laryngeal massage techniques with 80% accuracy or better with no clinician support  will be able to independently list key factors in maintenance of good vocal hygiene with 80% accuracy, and report on their use outside the therapy room.  will utilize silent inhalation with good low-respiratory engagement 75% of the time during therapy tasks with minimal clinician support  will speak into expiratory reserve volume no more than 3 time(s) during a two minute " conversation.  will initiate Resonant Voice Therapy (RVT)     Long-term goal(s): In 3 months, Mr. Denton will:  Report a week of typical activities, in which vocal strain and effort does not exceed a level of 3 out of 10, 80% of the time  Report a week of normal physical activity with breathing restriction that does not exceed a level of 3 out of 10, 80% of the time  _________________________________________________________________________________    I CERTIFY THE NEED FOR THESE SERVICES FURNISHED UNDER        THIS PLAN OF TREATMENT AND WHILE UNDER MY CARE     (Physician attestation of this document indicates review and certification of the therapy plan).     Certification date from: 2/05/21  Certification date to: 5/6/21    Referring Provider: Chon Hicks

## 2024-07-30 ENCOUNTER — THERAPY VISIT (OUTPATIENT)
Dept: PHYSICAL THERAPY | Facility: CLINIC | Age: 29
End: 2024-07-30
Payer: COMMERCIAL

## 2024-07-30 DIAGNOSIS — R10.2 SUPRAPUBIC PAIN: ICD-10-CM

## 2024-07-30 DIAGNOSIS — N32.89 BLADDER SPASMS: Primary | ICD-10-CM

## 2024-07-30 PROCEDURE — 97110 THERAPEUTIC EXERCISES: CPT | Mod: GP

## 2024-07-30 PROCEDURE — 97140 MANUAL THERAPY 1/> REGIONS: CPT | Mod: GP

## 2024-08-01 ENCOUNTER — ANCILLARY PROCEDURE (OUTPATIENT)
Dept: GENERAL RADIOLOGY | Facility: CLINIC | Age: 29
End: 2024-08-01
Attending: PHYSICIAN ASSISTANT
Payer: COMMERCIAL

## 2024-08-01 ENCOUNTER — OFFICE VISIT (OUTPATIENT)
Dept: FAMILY MEDICINE | Facility: CLINIC | Age: 29
End: 2024-08-01
Payer: COMMERCIAL

## 2024-08-01 VITALS
TEMPERATURE: 98.1 F | OXYGEN SATURATION: 98 % | SYSTOLIC BLOOD PRESSURE: 118 MMHG | HEIGHT: 75 IN | HEART RATE: 71 BPM | RESPIRATION RATE: 16 BRPM | BODY MASS INDEX: 23 KG/M2 | WEIGHT: 185 LBS | DIASTOLIC BLOOD PRESSURE: 74 MMHG

## 2024-08-01 DIAGNOSIS — R05.1 ACUTE COUGH: Primary | ICD-10-CM

## 2024-08-01 DIAGNOSIS — R05.1 ACUTE COUGH: ICD-10-CM

## 2024-08-01 DIAGNOSIS — U09.9 CHRONIC POST-COVID-19 SYNDROME: ICD-10-CM

## 2024-08-01 PROCEDURE — 99214 OFFICE O/P EST MOD 30 MIN: CPT | Performed by: PHYSICIAN ASSISTANT

## 2024-08-01 PROCEDURE — 87635 SARS-COV-2 COVID-19 AMP PRB: CPT | Performed by: PHYSICIAN ASSISTANT

## 2024-08-01 PROCEDURE — 71046 X-RAY EXAM CHEST 2 VIEWS: CPT | Mod: TC | Performed by: RADIOLOGY

## 2024-08-01 RX ORDER — LEVALBUTEROL TARTRATE 45 UG/1
2 AEROSOL, METERED ORAL EVERY 4 HOURS PRN
Qty: 15 G | Refills: 0 | Status: SHIPPED | OUTPATIENT
Start: 2024-08-01

## 2024-08-01 ASSESSMENT — PAIN SCALES - GENERAL: PAINLEVEL: SEVERE PAIN (7)

## 2024-08-01 ASSESSMENT — ENCOUNTER SYMPTOMS: COUGH: 1

## 2024-08-01 NOTE — Clinical Note
FYI, we have long covid clinic referrals and pulmonology referrals if appropriate. Brandie Degroot MD

## 2024-08-01 NOTE — PROGRESS NOTES
Assessment & Plan     Acute cough  Suspect viral, but with worsening respiratory symptoms, xray done today and negative per my read, radiology read agrees. COVID pending, outside of treatment window. Could consider prednisone with worsening breathing, with ok oxygenation and lack of need of PAVEL at this point, risks likely outweigh benefits right now. Consider OTC cough medication at night.  - XR Chest 2 Views; Future  - Symptomatic COVID-19 Virus (Coronavirus) by PCR Nose    Chronic post-COVID-19 syndrome  Refilled levalbuterol. Testing for COVID with current illness.  - levalbuterol (XOPENEX HFA) 45 MCG/ACT inhaler; Inhale 2 puffs into the lungs every 4 hours as needed for shortness of breath or wheezing                Subjective   Domenico is a 29 year old, presenting for the following health issues:  Cough, Asthma (wheezing), and Pharyngitis        2024    11:17 AM   Additional Questions   Roomed by Cassandra     History of Present Illness       Reason for visit:  Cough wheeze phlegm breathless low blood oxygen  Symptom onset:  3-7 days ago  Symptoms include:  Cough  Symptom intensity:  Moderate  Symptom progression:  Worsening  What makes it worse:  NA  What makes it better:  Herbal teas    He eats 2-3 servings of fruits and vegetables daily.He consumes 0 sweetened beverage(s) daily.He exercises with enough effort to increase his heart rate 30 to 60 minutes per day.  He exercises with enough effort to increase his heart rate 6 days per week.   He is taking medications regularly.     A little over a week ago started to feel sick.  Cough more mild.  About a week ago started to get worse.  Congestion at baseline, has not worsened.  Feels a sharp pain from his throat to his chest during breathing and coughing.  Does not feel like a typical sore throat.  Coughing up more mucus.  Hearing wheezing at times.  Has a leave albuterol inhaler however notes that it is  and needs a new one.  Has not felt the need to  "use it yet.  Feeling fatigued.  Not sleeping well more from breathing.  Using over-the-counter supplements, tea with honey, garlic.  Works around the general public, no specific exposure.    History of post-COVID syndrome, did do an at home COVID test that was negative early in symptoms.          Objective    /74 (BP Location: Left arm, Patient Position: Sitting, Cuff Size: Adult Regular)   Pulse 71   Temp 98.1  F (36.7  C) (Temporal)   Resp 16   Ht 1.905 m (6' 3\")   Wt 83.9 kg (185 lb)   SpO2 98%   BMI 23.12 kg/m    Body mass index is 23.12 kg/m .  Physical Exam   GENERAL: alert and no distress  HENT: normal cephalic/atraumatic, ear canals and TM's normal, nasal mucosa edematous , oral mucous membranes moist, and posterior oropharynx with mild erythema, no tonsillar enlargement or exudate  NECK: no adenopathy, no asymmetry, masses, or scars  RESP: lungs clear to auscultation - no rales, rhonchi or wheezes  CV: regular rate and rhythm, normal S1 S2, no S3 or S4, no murmur, click or rub            Signed Electronically by: Carline Senior PA-C  Reviewed and agree with plan.  Brandie Degroot MD   "

## 2024-08-02 LAB — SARS-COV-2 RNA RESP QL NAA+PROBE: NEGATIVE

## 2024-08-14 ENCOUNTER — THERAPY VISIT (OUTPATIENT)
Dept: PHYSICAL THERAPY | Facility: CLINIC | Age: 29
End: 2024-08-14
Payer: COMMERCIAL

## 2024-08-14 DIAGNOSIS — N32.89 BLADDER SPASMS: Primary | ICD-10-CM

## 2024-08-14 DIAGNOSIS — R10.2 SUPRAPUBIC PAIN: ICD-10-CM

## 2024-08-14 PROCEDURE — 97110 THERAPEUTIC EXERCISES: CPT | Mod: GP

## 2024-08-14 PROCEDURE — 97140 MANUAL THERAPY 1/> REGIONS: CPT | Mod: GP

## 2024-08-21 ENCOUNTER — THERAPY VISIT (OUTPATIENT)
Dept: PHYSICAL THERAPY | Facility: CLINIC | Age: 29
End: 2024-08-21
Payer: COMMERCIAL

## 2024-08-21 DIAGNOSIS — N32.89 BLADDER SPASMS: Primary | ICD-10-CM

## 2024-08-21 DIAGNOSIS — R10.2 SUPRAPUBIC PAIN: ICD-10-CM

## 2024-08-21 PROCEDURE — 97140 MANUAL THERAPY 1/> REGIONS: CPT | Mod: GP

## 2024-08-21 PROCEDURE — 97110 THERAPEUTIC EXERCISES: CPT | Mod: GP

## 2024-08-22 ENCOUNTER — VIRTUAL VISIT (OUTPATIENT)
Dept: FAMILY MEDICINE | Facility: CLINIC | Age: 29
End: 2024-08-22
Payer: COMMERCIAL

## 2024-08-22 DIAGNOSIS — M62.89 PELVIC FLOOR DYSFUNCTION: Primary | ICD-10-CM

## 2024-08-22 DIAGNOSIS — L40.9 PSORIASIS: ICD-10-CM

## 2024-08-22 PROCEDURE — 99213 OFFICE O/P EST LOW 20 MIN: CPT | Mod: 95 | Performed by: PHYSICIAN ASSISTANT

## 2024-08-22 RX ORDER — CLOBETASOL PROPIONATE 0.5 MG/G
OINTMENT TOPICAL 2 TIMES DAILY PRN
Qty: 60 G | Refills: 0 | Status: SHIPPED | OUTPATIENT
Start: 2024-08-22

## 2024-08-22 NOTE — PATIENT INSTRUCTIONS
Establish care with primary care   I have placed the referral  Use clobetasol sparingly as needed for psoriasis   Return urgently if any change in symptoms.

## 2024-08-22 NOTE — PROGRESS NOTES
Domenico is a 29 year old who is being evaluated via a billable video visit.    How would you like to obtain your AVS? MyChart  If the video visit is dropped, the invitation should be resent by: Text to cell phone: [phone]  Will anyone else be joining your video visit? No      Assessment & Plan     Pelvic floor dysfunction  Has been dealing with this couple years- has found a particular physician he would like to see - needs a referral - has seen quit a number of providers and physical therapy   - Adult Physical Medicine and Rehab  Referral    Psoriasis  Refilled ointment   - clobetasol (TEMOVATE) 0.05 % external ointment  Dispense: 60 g; Refill: 0    Encouraged to establish care with primary care   Referral placed      Patient Instructions   Establish care with primary care   I have placed the referral  Use clobetasol sparingly as needed for psoriasis   Return urgently if any change in symptoms.             Subjective   Domenico is a 29 year old, presenting for the following health issues:  Referral (for history of Pelvic floor/pudendal neuralgia)      8/22/2024     4:15 PM   Additional Questions   Roomed by Jennifer   Accompanied by self         8/22/2024     4:15 PM   Patient Reported Additional Medications   Patient reports taking the following new medications no     Video Start Time:  427 pm     History of Present Illness       Reason for visit:  PELVIC FLOOR REFERAL    He eats 4 or more servings of fruits and vegetables daily.He consumes 0 sweetened beverage(s) daily.He exercises with enough effort to increase his heart rate 10 to 19 minutes per day.  He exercises with enough effort to increase his heart rate 4 days per week.   He is taking medications regularly.     Patient unfamiliar to me presents requesting referral to see a particular physician for pelvic floor dysfunction, bladder spasms and suprapubic pain.Not currently seeing a primary care provider  4 year history of pelvic floor dysfunction and lots  "of physical therapy.  Has \"Nerve damage kind thing - hard to find someone to treat this\".   Did find someone but needs referral through [facility] - requesting a Referral to [facility] -interventional [facility] and muscular medicine  Seen by many physical therapists over the years  Physical therapists last day yesterday   Trying to find someone to treat this for two years.  Reports was very ill with a viral thing and postviral developed pain disorder  History of psoriasis,.  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      Objective           Vitals:  No vitals were obtained today due to virtual visit.    Physical Exam   GENERAL: alert and no distress  EYES: Eyes grossly normal to inspection.  No discharge or erythema, or obvious scleral/conjunctival abnormalities.  RESP: No audible wheeze, cough, or visible cyanosis.    SKIN: Visible skin clear. No significant rash, abnormal pigmentation or lesions.  NEURO: Cranial nerves grossly intact.  Mentation and speech appropriate for age.  PSYCH: Appropriate affect, tone, and pace of words          Video-Visit Details    Type of service:  Video Visit   Video End Time:4:41 PM  Originating Location (pt. Location): Other car    Distant Location (provider location):  Off-site  Platform used for Video Visit: Ari  Signed Electronically by: Giovanna Neumann PA-C    "

## 2024-08-28 ENCOUNTER — OFFICE VISIT (OUTPATIENT)
Dept: PHYSICAL MEDICINE AND REHAB | Facility: CLINIC | Age: 29
End: 2024-08-28
Attending: PHYSICAL MEDICINE & REHABILITATION
Payer: COMMERCIAL

## 2024-08-28 VITALS — OXYGEN SATURATION: 98 % | DIASTOLIC BLOOD PRESSURE: 69 MMHG | HEART RATE: 94 BPM | SYSTOLIC BLOOD PRESSURE: 101 MMHG

## 2024-08-28 DIAGNOSIS — M62.89 PELVIC FLOOR DYSFUNCTION: ICD-10-CM

## 2024-08-28 DIAGNOSIS — R51.9 CHRONIC NONINTRACTABLE HEADACHE, UNSPECIFIED HEADACHE TYPE: ICD-10-CM

## 2024-08-28 DIAGNOSIS — G89.29 OTHER CHRONIC PAIN: ICD-10-CM

## 2024-08-28 DIAGNOSIS — M53.3 DISORDER OF SACRUM: ICD-10-CM

## 2024-08-28 DIAGNOSIS — M47.817 LUMBOSACRAL SPONDYLOSIS WITHOUT MYELOPATHY: Primary | ICD-10-CM

## 2024-08-28 DIAGNOSIS — G89.29 CHRONIC NONINTRACTABLE HEADACHE, UNSPECIFIED HEADACHE TYPE: ICD-10-CM

## 2024-08-28 DIAGNOSIS — M47.812 CERVICAL SPONDYLOSIS WITHOUT MYELOPATHY: ICD-10-CM

## 2024-08-28 DIAGNOSIS — M79.18 MYALGIA, OTHER SITE: ICD-10-CM

## 2024-08-28 PROCEDURE — 99215 OFFICE O/P EST HI 40 MIN: CPT | Performed by: PHYSICAL MEDICINE & REHABILITATION

## 2024-08-28 PROCEDURE — 99417 PROLNG OP E/M EACH 15 MIN: CPT | Performed by: PHYSICAL MEDICINE & REHABILITATION

## 2024-08-28 NOTE — PROGRESS NOTES
CC: I am seeing this patient with chronic pain involving head neck as well as the lower back and pelvis including the groin region.    Patient is a very pleasant 29-year-old male who reports being generally in good health about 5 years back.  He used to be pretty active going on trails etc. and ended up getting a tick bite.  He reports not getting treatment for a few months and was quite sick.  He would experience numbness in the thigh and groin region which would be intermittent initially but became constant 2 years back.  Apparently he was doing a job which was stressful.  He was wearing a dress pants and felt that his testicles got sore.  It affected the right genital region as well as the inner thigh which he noted was becoming numb.  He saw many providers and neurologist and initially treated it with ice and Advil.  Subsequently he was told that he could have pudendal neuralgia.  No treatment was provided.  He has also been experiencing devastating low back and pelvic pain and has undergone several courses of physical therapy.  He has also found medication such as Cialis helpful and is currently taking supplements including sertraline and arginine.  He also has been constipated having bowel movements every 3 days and difficult bowel movements to now every day and better.  He has a remote history of being evaluated for tethered cord and noted to have unremarkable MRI of the lumbar spine.    In addition to this he notes he developed COVID 10 months ago and has been dealing with its sequelae.    He is currently not doing any major lifting and noted he gave 5 years ago.  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    In addition to constipation he notes that he was evaluated to make sure his gallbladder was working well.    In addition to physical therapy he reports he has had psychology and counseling to deal with the challenges for the past 4 years.  He  has tried conservative measures including physical therapy, Advil and activity modification without significant relief.    On a scale of 0-10 he rates his pain at its worst a 10 at best a 5 and currently is 7-1/2.  It is affecting the head and neck region the lower back and the pelvis region including the groin the thigh and the buttock region.  No radicular symptoms reported.  He reports he has tried medications such as gabapentin/Lyrica without improvement.    EXAM: MR CERVICAL SPINE W/O CONTRAST  LOCATION: [facility]  DATE: 3/16/2024     INDICATION: Numbness, weakness of right side of body.  COMPARISON: Plain film imaging of the neck 12/6/2019.  TECHNIQUE: MRI Cervical Spine without IV contrast.     FINDINGS:   Normal vertebral body heights, alignment and marrow signal. No abnormal cord signal. No extraspinal abnormality. Cerebellar tonsillar position is normal. Appropriate appearance to the cervical prevertebral soft tissues. No marrow or ligamentous edema. No   blood products in the spinal canal are evident. No focal fluid collections, mass, adenopathy, or hematoma noted within the neck soft tissues. Mastoid air cells are clear. Top normal lymph nodes level II may be reactive. Mild prominence of palatine   tonsillar tissue with mineralization on the left also presumed reactive series 901 image 8. No airway compromise. No cystic or necrotic appearing adenopathy.     Craniovertebral junction and C1-C2: Normal.     C2-C3: Normal disc height. No herniation. Normal facets. No spinal canal or neural foraminal stenosis.      C3-C4: Normal disc height. No herniation. Normal facets. No spinal canal or neural foraminal stenosis.      C4-C5: Normal disc height. Minimal annular bulge. No herniation. Normal facets. No spinal canal or neural foraminal stenosis.      C5-C6: Normal disc height. No herniation. Left far lateral zone osteophyte. Normal facets. No spinal canal or neural foraminal  stenosis.      C6-C7: Normal disc height. No herniation. Normal facets. No spinal canal or neural foraminal stenosis.      C7-T1: Normal disc height. No herniation. Normal facets. No spinal canal or neural foraminal stenosis.                                                                      IMPRESSION:  1.  Cervical height/alignment is satisfactory. No marrow or ligamentous edema. No abnormal intramedullary signal.     2.  No spinal stenosis or foraminal narrowing at any cervical level. No disc herniations.     3.  Top normal size lymph nodes with homogeneous signal characteristics level II presumed reactive. Mild prominence of the palatine tonsillar tissue without airway narrowing also presumed reactive.       EXAM: CT ABDOMEN & PELVIS W/O ORAL W IV CON    DATE: 11/15/2023 10:53 PM    COMPARISON: None.    CLINICAL DATA: Generalized Abdominal Pain.    ICD 10:    TECHNIQUE: Thin-section contiguous transaxial images were obtained through the abdomen and pelvis with intravenous contrast.  No oral contrast was given.  Coronal reformations were also obtained through the abdomen and pelvis.      CONTRAST:  IOHEXOL 350 MGI/ML    Dose Given: 100 mL    FINDINGS:    ABDOMEN: Enhancement of the liver, spleen, splenic vein, pancreas and portal veins is normal. Gallbladder contracted without radiopaque stones. No adrenal lesion. Renal parenchymal enhancement is normal and symmetric. No bowel distention. No ureteral dilation.    PELVIS: Retrocecal appendix is normal diameter and partially gas-filled. Moderate stool in the sigmoid colon and rectum. No bladder stones. No pelvic free fluid.  Exam End: 11/15/23 11:01 PM       HISTORY: Prostatitis, painful urination.    COMPARISON:  None.    TECHNIQUE:  MRI of the prostate was performed utilizing a 3T magnet. T1 and T2-weighted MRI Pelvis. Diffusion-weighted imaging of the pelvis and prostate. The study was processed using [employer] multiparametric computer-aided detection system to  optimize radiologist interpretation by generating multiplanar and 3D reconstructions.    FINDINGS:  PROSTATE VOLUME: 26.2 mL.    PROSTATE: Diffuse intermediate to low T2 signal throughout the peripheral zone. No focal masslike areas of restricted diffusion or abnormal T2 signal. No abscess.    NEUROVASCULAR BUNDLE: Unremarkable.    RECTAL PROSTATE ANGLES: Unremarkable.    SEMINAL VESICLES: Unremarkable.    LYMPH NODES: No suspicious lymph nodes.    BONES: Unremarkable.    OTHER FINDINGS: No significant findings.    IMPRESSION:  1. Diffuse intermediate to low T2 signal throughout the prostate gland peripheral zone without significant restricted diffusion. Appearance suggestive of prostatitis (favored to be subacute given the absence of significant restricted diffusion).  2. No abscess.  Exam End: 06/27/23  9:46 PM     EXAM: MR BRAIN W/O and W CONTRAST  LOCATION: [facility]  DATE: 2/13/2024     INDICATION: New neurological symtpoms  Parasthesias hands and feet, vision disturbance, eye pain  COMPARISON: None.  CONTRAST: 9mL Gadavist  TECHNIQUE: Routine multiplanar multisequence head MRI without and with intravenous contrast.     FINDINGS:  INTRACRANIAL CONTENTS: No acute or subacute infarct. No mass, acute hemorrhage, or extra-axial fluid collections. Normal brain parenchymal signal. Normal ventricles and sulci. Normal position of the cerebellar tonsils. 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     SELLA: No abnormality accounting for technique.     OSSEOUS STRUCTURES/SOFT TISSUES: Normal marrow signal. The major intracranial vascular flow voids are maintained.      ORBITS: No abnormality accounting for technique.      SINUSES/MASTOIDS: No mucosal thickening scattered about the ethmoid sinuses. No middle ear or mastoid effusion.                                                                       IMPRESSION:  1.  No acute infarct, acute intracranial  hemorrhage, or intracranial mass    Examination: MRI of the Lumbar Spine without contrast, 9/16/2008.      History: Constipation, evaluate for spinal cord tethering.      Comparison: None      Technique: Sagittal T1-weighted, STIR and T2-weighted and axial   T2-weighted and T2* gradient echo images of the lumbar spine were   obtained without intravenous contrast.  Reconstructed MR myelographic   images were also obtained.      Findings: There are 5 lumbar-type vertebrae assumed for the purposes   of this dictation.  The tip of the conus medullaris is at the upper L1   level. There is normal lordotic curvature and alignment of the lumbar   spine. There is no significant disc height narrowing at any level.   Within the lumbar vertebrae, there is no abnormal signal within the   bone marrow.      There is no significant spinal canal or neural foraminal narrowing at   any level. There is no evidence for tethering of the spinal cord or   mass within the filum terminale.      The visualized paraspinous tissues anteriorly are unremarkable.      Impression:   No evidence for spinal cord tethering; normal MRI of the lumbar spine.   I have personally reviewed the image and initial interpretation and   agree with the findings.     Past Medical History:   Diagnosis Date    Allergic rhinitis     allergy testing positive for dustmites    Eczema     in earlier childhood    Gastroesophageal reflux disease     PONV (postoperative nausea and vomiting)     Sleep apnea     Uncomplicated asthma      Past Surgical History:   Procedure Laterality Date    COLONOSCOPY N/A 4/22/2024    Procedure: COLONOSCOPY, WITH POLYPECTOMY AND BIOPSY;  Surgeon: Kev White MD;  Location:  GI    MASTECTOMY SUBCUTANEOUS MALE BILATERAL (GYNECOMASTIA)       Family History       Problem (# of Occurrences) Relation (Name,Age of Onset)    Heart Disease (3) Maternal Grandmother, Maternal Grandfather, Paternal Grandfather    Lipids (6) Mother (Janessa),  Father (Christopher), Maternal Grandmother, Maternal Grandfather, Paternal Grandmother, Paternal Grandfather    Thyroid Disease (1) Maternal Grandmother: Thyroid nodule           Negative family history of: Colon Cancer, Diabetes           Social History     Socioeconomic History    Marital status: Single     Spouse name: Not on file    Number of children: Not on file    Years of education: Not on file    Highest education level: Not on file   Occupational History    Not on file   Tobacco Use    Smoking status: Never     Passive exposure: Never    Smokeless tobacco: Never    Tobacco comments:     no passive exposure   Vaping Use    Vaping status: Never Used   Substance and Sexual Activity    Alcohol use: No    Drug use: No    Sexual activity: Yes     Partners: Female, Male   Other Topics Concern     Service Not Asked    Blood Transfusions No    Caffeine Concern No    Occupational Exposure Not Asked    Hobby Hazards No    Sleep Concern No    Stress Concern Yes     Comment: pretty high lately    Weight Concern No    Special Diet Yes     Comment: Lactose intolerant    Back Care Yes     Comment: Chiro    Exercise Yes    Bike Helmet Yes    Seat Belt Yes    Self-Exams Not Asked   Social History Narrative    Jorge participates in [employer] and enjoys skiing.     Social Determinants of Health     Financial Resource Strain: Medium Risk (8/19/2024)    Received from [facility]    Financial Resource Strain     Difficulty of Paying Living Expenses: Not on file     Difficulty of Paying Living Expenses: 3   Food Insecurity: Food Insecurity Present (8/19/2024)    Received from [facility]    Food Insecurity     Worried About Running Out of Food in the Last Year: 2   Transportation Needs: No Transportation Needs (8/19/2024)    Received from [facility]    Transportation Needs     Lack of Transportation (Medical): 1    Physical Activity: Not on file   Stress: Not on file   Social Connections: Socially Isolated (8/19/2024)    Received from [facility]    Social Connections     Frequency of Communication with Friends and Family: 4   Interpersonal Safety: Not At Risk (8/12/2024)    Received from [facility]     Humiliation, Afraid, Rape, and Kick questionnaire     Fear of Current or Ex-Partner: No     Emotionally Abused: No     Physically Abused: No     Sexually Abused: No   Housing Stability: Medium Risk (8/19/2024)    Received from [facility]    Housing Stability     Unable to Pay for Housing in the Last Year: 2     Current Outpatient Medications   Medication Sig Dispense Refill    azelastine (ASTELIN) 0.1 % nasal spray Spray 2 sprays into both nostrils 2 times daily.      calcipotriene (DOVONOX) 0.005 % external ointment Apply topically.      calcipotriene (DOVONOX) 0.005 % external solution Apply topically.      carboxymethylcell-glycerin PF (REFRESH RELIEVA PF) 0.5-0.9 % ophthalmic solution Apply 1 drop to eye as needed (Instill 1 drop into the eye)      clobetasol (TEMOVATE) 0.05 % external ointment Apply topically 2 times daily as needed (APPLY TO RASH ON BODY TWO TIMES A DAY AS NEEDED). APPLY TO RASH ON BODY TWO TIMES A DAY AS NEEDED 60 g 0    COMPOUNDED NON-CONTROLLED SUBSTANCE (CMPD RX) - PHARMACY TO MIX COMPOUNDED MEDICATION Low dose Naltrexone 1 mg daily for 3 months 30 tablet 3    desonide (DESOWEN) 0.05 % external ointment Apply topically as needed (desonide 0.05 % topical ointment    Patient sig: APPLY TO AFFECTED AREA(S) OF FACE TWICE DAILY FOR UP TO 2 WEEKS. REPEAT FOR FLARES AFTER 1 WEEK BREAK)   desonide 0.05 % topical ointment    APPLY TO AFFECTED AREA(S) OF FACE TWICE DAILY FOR UP TO 2 WEEKS. REPEAT FOR FLARES AFTER 1 WEEK BREAK      EPINEPHrine (ANY BX GENERIC EQUIV) 0.3 MG/0.3ML injection 2-pack Inject 0.3 mg into the muscle as  needed for anaphylaxis (INJECT 0.3 MG INTRAMUSCULARLY AS NEEDED FOR ANAPHYLAXIS. 1 PACK = 2 PENS)  INJECT 0.3 MG INTRAMUSCULARLY AS NEEDED FOR ANAPHYLAXIS. 1 PACK = 2 PENS      finasteride (PROPECIA) 1 MG tablet Take 1 tablet by mouth daily      fluticasone (FLOVENT HFA) 110 MCG/ACT inhaler Flovent  mcg/actuation aerosol inhaler   INHALE TWO PUFFS BY MOUTH TWICE A DAY      levalbuterol (XOPENEX HFA) 45 MCG/ACT inhaler Inhale 2 puffs into the lungs every 4 hours as needed for shortness of breath or wheezing 15 g 0    lubiprostone (AMITIZA) 8 MCG capsule Take 1 capsule (8 mcg) by mouth 2 times daily 60 capsule 1    mometasone (ELOCON) 0.1 % external cream Apply topically daily      tadalafil (CIALIS) 5 MG tablet tadalafil 5 mg tablet   TAKE ONE TABLET BY MOUTH AT BEDTIME      triamcinolone (KENALOG) 0.1 % external cream Apply topically 2 times daily 30 g 0     /69 (BP Location: Left arm, Patient Position: Supine, Cuff Size: Adult Regular)   Pulse 94   SpO2 98%      On examination, patient is alert and cooperative and appears to be in no acute distress.    HEENT is negative.  Extraocular movements are intact.  Face is symmetric.  Tongue is midline.  Neck is supple.  He is able to move his upper extremities functionally.  He is able to carry out straight leg raising test.  He does have bilateral flatfeet.  Romberg is negative.  Coordination tests are intact.  He is able to walk on his heels and toes.  He finds it difficult to walk with a heel-to-toe progression.    Musculoskeletal lamination reveals positive Jon's on either side.  He has a tight hamstrings with reduced popliteal angle.    In a prone position, he is tender over the cervical facets C2-3 and C3-4 on either side.  He is tender over the lumbar facets right side worse than the left from L3-S1 on either side.  He is tender over the SI joints bilaterally and piriformis bilaterally.  He denied any tenderness over the trochanteric bursal  region.    Impression: At this point, this 29-year-old gentleman with chronic pain affecting the low back and the pelvis as well as the head and neck region for the past 4-1/2 to 5 years, has been evaluated and treated conservatively without improvement.  He has been frustrated by the pelvic issues and has also dealt with constipation as noted.    I reviewed his examination and his historical course and noted the multiple factors that could be contributing to his current problems.  In terms of treatment, I would recommend treating the observed areas of problems including SI joint and piriformis bilaterally followed by treatment of the lumbar facets from L3-S1 right alternating with left at weekly intervals and progressing to radiofrequency ablations if he gets good relief with them.  He may also need treatment of his cervical facets C2-C4 on either side for his head ache and neck pain.  He also recalls having some difficulty with voice at some point.    I encouraged him to think through this and let us know if he decides to proceed with injections and we can seek authorization.  The time course of events and the support he will require were reviewed.  All questions were answered to his satisfaction.    60 minutes visit, all of which was spent on the day of encounter, greater than 50% was for counseling on above-mentioned issues.    Thank you much for allow me to assist in his care.    Brian Peck MD

## 2024-08-28 NOTE — LETTER
8/28/2024       RE: Jorge Denton  [address]  [place] [address]     Dear Colleague,    Thank you for referring your patient, Jorge Denton, to the [facility] at [facility]. Please see a copy of my visit note below.    CC: I am seeing this patient with chronic pain involving head neck as well as the lower back and pelvis including the groin region.    Patient is a very pleasant 29-year-old male who reports being generally in good health about 5 years back.  He used to be pretty active going on trails etc. and ended up getting a tick bite.  He reports not getting treatment for a few months and was quite sick.  He would experience numbness in the thigh and groin region which would be intermittent initially but became constant 2 years back.  Apparently he was doing a job which was stressful.  He was wearing a dress pants and felt that his testicles got sore.  It affected the right genital region as well as the inner thigh which he noted was becoming numb.  He saw many providers and neurologist and initially treated it with ice and Advil.  Subsequently he was told that he could have pudendal neuralgia.  No treatment was provided.  He has also been experiencing devastating low back and pelvic pain and has undergone several courses of physical therapy.  He has also found medication such as Cialis helpful and is currently taking supplements including sertraline and arginine.  He also has been constipated having bowel movements every 3 days and difficult bowel movements to now every day and better.  He has a remote history of being evaluated for tethered cord and noted to have unremarkable MRI of the lumbar spine.    In addition to this he notes he developed COVID 10 months ago and has been dealing with its sequelae.    He is currently not doing any major lifting and noted he gave 5 years ago.  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    In addition to constipation he notes that he was evaluated to make sure his gallbladder was working well.    In addition to physical therapy he reports he has had psychology and counseling to deal with the challenges for the past 4 years.  He has tried conservative measures including physical therapy, Advil and activity modification without significant relief.    On a scale of 0-10 he rates his pain at its worst a 10 at best a 5 and currently is 7-1/2.  It is affecting the head and neck region the lower back and the pelvis region including the groin the thigh and the buttock region.  No radicular symptoms reported.  He reports he has tried medications such as gabapentin/Lyrica without improvement.    EXAM: MR CERVICAL SPINE W/O CONTRAST  LOCATION: [facility]  DATE: 3/16/2024     INDICATION: Numbness, weakness of right side of body.  COMPARISON: Plain film imaging of the neck 12/6/2019.  TECHNIQUE: MRI Cervical Spine without IV contrast.     FINDINGS:   Normal vertebral body heights, alignment and marrow signal. No abnormal cord signal. No extraspinal abnormality. Cerebellar tonsillar position is normal. Appropriate appearance to the cervical prevertebral soft tissues. No marrow or ligamentous edema. No   blood products in the spinal canal are evident. No focal fluid collections, mass, adenopathy, or hematoma noted within the neck soft tissues. Mastoid air cells are clear. Top normal lymph nodes level II may be reactive. Mild prominence of palatine   tonsillar tissue with mineralization on the left also presumed reactive series 901 image 8. No airway compromise. No cystic or necrotic appearing adenopathy.     Craniovertebral junction and C1-C2: Normal.     C2-C3: Normal disc height. No herniation. Normal facets. No spinal canal or neural foraminal stenosis.      C3-C4: Normal disc height. No herniation. Normal facets. No spinal canal  or neural foraminal stenosis.      C4-C5: Normal disc height. Minimal annular bulge. No herniation. Normal facets. No spinal canal or neural foraminal stenosis.      C5-C6: Normal disc height. No herniation. Left far lateral zone osteophyte. Normal facets. No spinal canal or neural foraminal stenosis.      C6-C7: Normal disc height. No herniation. Normal facets. No spinal canal or neural foraminal stenosis.      C7-T1: Normal disc height. No herniation. Normal facets. No spinal canal or neural foraminal stenosis.                                                                      IMPRESSION:  1.  Cervical height/alignment is satisfactory. No marrow or ligamentous edema. No abnormal intramedullary signal.     2.  No spinal stenosis or foraminal narrowing at any cervical level. No disc herniations.     3.  Top normal size lymph nodes with homogeneous signal characteristics level II presumed reactive. Mild prominence of the palatine tonsillar tissue without airway narrowing also presumed reactive.       EXAM: CT ABDOMEN & PELVIS W/O ORAL W IV CON    DATE: 11/15/2023 10:53 PM    COMPARISON: None.    CLINICAL DATA: Generalized Abdominal Pain.    ICD 10:    TECHNIQUE: Thin-section contiguous transaxial images were obtained through the abdomen and pelvis with intravenous contrast.  No oral contrast was given.  Coronal reformations were also obtained through the abdomen and pelvis.      CONTRAST:  IOHEXOL 350 MGI/ML    Dose Given: 100 mL    FINDINGS:    ABDOMEN: Enhancement of the liver, spleen, splenic vein, pancreas and portal veins is normal. Gallbladder contracted without radiopaque stones. No adrenal lesion. Renal parenchymal enhancement is normal and symmetric. No bowel distention. No ureteral dilation.    PELVIS: Retrocecal appendix is normal diameter and partially gas-filled. Moderate stool in the sigmoid colon and rectum. No bladder stones. No pelvic free fluid.  Exam End: 11/15/23 11:01 PM       HISTORY:  Prostatitis, painful urination.    COMPARISON:  None.    TECHNIQUE:  MRI of the prostate was performed utilizing a 3T magnet. T1 and T2-weighted MRI Pelvis. Diffusion-weighted imaging of the pelvis and prostate. The study was processed using backstitcharametric computer-aided detection system to optimize radiologist interpretation by generating multiplanar and 3D reconstructions.    FINDINGS:  PROSTATE VOLUME: 26.2 mL.    PROSTATE: Diffuse intermediate to low T2 signal throughout the peripheral zone. No focal masslike areas of restricted diffusion or abnormal T2 signal. No abscess.    NEUROVASCULAR BUNDLE: Unremarkable.    RECTAL PROSTATE ANGLES: Unremarkable.    SEMINAL VESICLES: Unremarkable.    LYMPH NODES: No suspicious lymph nodes.    BONES: Unremarkable.    OTHER FINDINGS: No significant findings.    IMPRESSION:  1. Diffuse intermediate to low T2 signal throughout the prostate gland peripheral zone without significant restricted diffusion. Appearance suggestive of prostatitis (favored to be subacute given the absence of significant restricted diffusion).  2. No abscess.  Exam End: 06/27/23  9:46 PM     EXAM: MR BRAIN W/O and W CONTRAST  LOCATION: [facility]  DATE: 2/13/2024     INDICATION: New neurological symtpoms  Parasthesias hands and feet, vision disturbance, eye pain  COMPARISON: None.  CONTRAST: 9mL Gadavist  TECHNIQUE: Routine multiplanar multisequence head MRI without and with intravenous contrast.     FINDINGS:  INTRACRANIAL CONTENTS: No acute or subacute infarct. No mass, acute hemorrhage, or extra-axial fluid collections. Normal brain parenchymal signal. Normal ventricles and sulci. Normal position of the cerebellar tonsils. 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     SELLA: No abnormality accounting for technique.     OSSEOUS STRUCTURES/SOFT TISSUES: Normal marrow signal. The major intracranial vascular flow voids are maintained.       ORBITS: No abnormality accounting for technique.      SINUSES/MASTOIDS: No mucosal thickening scattered about the ethmoid sinuses. No middle ear or mastoid effusion.                                                                       IMPRESSION:  1.  No acute infarct, acute intracranial hemorrhage, or intracranial mass    Examination: MRI of the Lumbar Spine without contrast, 9/16/2008.      History: Constipation, evaluate for spinal cord tethering.      Comparison: None      Technique: Sagittal T1-weighted, STIR and T2-weighted and axial   T2-weighted and T2* gradient echo images of the lumbar spine were   obtained without intravenous contrast.  Reconstructed MR myelographic   images were also obtained.      Findings: There are 5 lumbar-type vertebrae assumed for the purposes   of this dictation.  The tip of the conus medullaris is at the upper L1   level. There is normal lordotic curvature and alignment of the lumbar   spine. There is no significant disc height narrowing at any level.   Within the lumbar vertebrae, there is no abnormal signal within the   bone marrow.      There is no significant spinal canal or neural foraminal narrowing at   any level. There is no evidence for tethering of the spinal cord or   mass within the filum terminale.      The visualized paraspinous tissues anteriorly are unremarkable.      Impression:   No evidence for spinal cord tethering; normal MRI of the lumbar spine.   I have personally reviewed the image and initial interpretation and   agree with the findings.     Past Medical History:   Diagnosis Date     Allergic rhinitis     allergy testing positive for dustmites     Eczema     in earlier childhood     Gastroesophageal reflux disease      PONV (postoperative nausea and vomiting)      Sleep apnea      Uncomplicated asthma      Past Surgical History:   Procedure Laterality Date     COLONOSCOPY N/A 4/22/2024    Procedure: COLONOSCOPY, WITH POLYPECTOMY AND BIOPSY;  Surgeon:  Kev White MD;  Location: [facility]     MASTECTOMY SUBCUTANEOUS MALE BILATERAL (GYNECOMASTIA)       Family History       Problem (# of Occurrences) Relation (Name,Age of Onset)    Heart Disease (3) Maternal Grandmother, Maternal Grandfather, Paternal Grandfather    Lipids (6) Mother (Janessa), Father (James), Maternal Grandmother, Maternal Grandfather, Paternal Grandmother, Paternal Grandfather    Thyroid Disease (1) Maternal Grandmother: Thyroid nodule           Negative family history of: Colon Cancer, Diabetes           Social History     Socioeconomic History     Marital status: Single     Spouse name: Not on file     Number of children: Not on file     Years of education: Not on file     Highest education level: Not on file   Occupational History     Not on file   Tobacco Use     Smoking status: Never     Passive exposure: Never     Smokeless tobacco: Never     Tobacco comments:     no passive exposure   Vaping Use     Vaping status: Never Used   Substance and Sexual Activity     Alcohol use: No     Drug use: No     Sexual activity: Yes     Partners: Female, Male   Other Topics Concern      Service Not Asked     Blood Transfusions No     Caffeine Concern No     Occupational Exposure Not Asked     Hobby Hazards No     Sleep Concern No     Stress Concern Yes     Comment: pretty high lately     Weight Concern No     Special Diet Yes     Comment: Lactose intolerant     Back Care Yes     Comment: Chiro     Exercise Yes     Bike Helmet Yes     Seat Belt Yes     Self-Exams Not Asked   Social History Narrative    Jorge participates in martial arts and enjoys skiing.     Social Determinants of Health     Financial Resource Strain: Medium Risk (8/19/2024)    [facility]    Financial Resource Strain      Difficulty of Paying Living Expenses: Not on file      Difficulty of Paying Living Expenses: 3   Food Insecurity: Food Insecurity Present (8/19/2024)     Received [facility]    Food Insecurity      Worried About Running Out of Food in the Last Year: 2   Transportation Needs: No Transportation Needs (8/19/2024)    Received from [facility]    Transportation Needs      Lack of Transportation (Medical): 1   Physical Activity: Not on file   Stress: Not on file   Social Connections: Socially Isolated (8/19/2024)    Received from [facility]    Social Connections      Frequency of Communication with Friends and Family: 4   Interpersonal Safety: Not At Risk (8/12/2024)    Received from [facility]     Humiliation, Afraid, Rape, and Kick questionnaire      Fear of Current or Ex-Partner: No      Emotionally Abused: No      Physically Abused: No      Sexually Abused: No   Housing Stability: Medium Risk (8/19/2024)    Received from [facility]    Housing Stability      Unable to Pay for Housing in the Last Year: 2     Current Outpatient Medications   Medication Sig Dispense Refill     azelastine (ASTELIN) 0.1 % nasal spray Spray 2 sprays into both nostrils 2 times daily.       calcipotriene (DOVONOX) 0.005 % external ointment Apply topically.       calcipotriene (DOVONOX) 0.005 % external solution Apply topically.       carboxymethylcell-glycerin PF (REFRESH RELIEVA PF) 0.5-0.9 % ophthalmic solution Apply 1 drop to eye as needed (Instill 1 drop into the eye)       clobetasol (TEMOVATE) 0.05 % external ointment Apply topically 2 times daily as needed (APPLY TO RASH ON BODY TWO TIMES A DAY AS NEEDED). APPLY TO RASH ON BODY TWO TIMES A DAY AS NEEDED 60 g 0     COMPOUNDED NON-CONTROLLED SUBSTANCE (CMPD RX) - PHARMACY TO MIX COMPOUNDED MEDICATION Low dose Naltrexone 1 mg daily for 3 months 30 tablet 3     desonide (DESOWEN) 0.05 % external ointment Apply topically as needed (desonide 0.05 % topical ointment    Patient sig: APPLY TO  AFFECTED AREA(S) OF FACE TWICE DAILY FOR UP TO 2 WEEKS. REPEAT FOR FLARES AFTER 1 WEEK BREAK)   desonide 0.05 % topical ointment    APPLY TO AFFECTED AREA(S) OF FACE TWICE DAILY FOR UP TO 2 WEEKS. REPEAT FOR FLARES AFTER 1 WEEK BREAK       EPINEPHrine (ANY BX GENERIC EQUIV) 0.3 MG/0.3ML injection 2-pack Inject 0.3 mg into the muscle as needed for anaphylaxis (INJECT 0.3 MG INTRAMUSCULARLY AS NEEDED FOR ANAPHYLAXIS. 1 PACK = 2 PENS)  INJECT 0.3 MG INTRAMUSCULARLY AS NEEDED FOR ANAPHYLAXIS. 1 PACK = 2 PENS       finasteride (PROPECIA) 1 MG tablet Take 1 tablet by mouth daily       fluticasone (FLOVENT HFA) 110 MCG/ACT inhaler Flovent  mcg/actuation aerosol inhaler   INHALE TWO PUFFS BY MOUTH TWICE A DAY       levalbuterol (XOPENEX HFA) 45 MCG/ACT inhaler Inhale 2 puffs into the lungs every 4 hours as needed for shortness of breath or wheezing 15 g 0     lubiprostone (AMITIZA) 8 MCG capsule Take 1 capsule (8 mcg) by mouth 2 times daily 60 capsule 1     mometasone (ELOCON) 0.1 % external cream Apply topically daily       tadalafil (CIALIS) 5 MG tablet tadalafil 5 mg tablet   TAKE ONE TABLET BY MOUTH AT BEDTIME       triamcinolone (KENALOG) 0.1 % external cream Apply topically 2 times daily 30 g 0     /69 (BP Location: Left arm, Patient Position: Supine, Cuff Size: Adult Regular)   Pulse 94   SpO2 98%      On examination, patient is alert and cooperative and appears to be in no acute distress.    HEENT is negative.  Extraocular movements are intact.  Face is symmetric.  Tongue is midline.  Neck is supple.  He is able to move his upper extremities functionally.  He is able to carry out straight leg raising test.  He does have bilateral flatfeet.  Romberg is negative.  Coordination tests are intact.  He is able to walk on his heels and toes.  He finds it difficult to walk with a heel-to-toe progression.    Musculoskeletal lamination reveals positive Jon's on either side.  He has a tight hamstrings with  reduced popliteal angle.    In a prone position, he is tender over the cervical facets C2-3 and C3-4 on either side.  He is tender over the lumbar facets right side worse than the left from L3-S1 on either side.  He is tender over the SI joints bilaterally and piriformis bilaterally.  He denied any tenderness over the trochanteric bursal region.    Impression: At this point, this 29-year-old gentleman with chronic pain affecting the low back and the pelvis as well as the head and neck region for the past 4-1/2 to 5 years, has been evaluated and treated conservatively without improvement.  He has been frustrated by the pelvic issues and has also dealt with constipation as noted.    I reviewed his examination and his historical course and noted the multiple factors that could be contributing to his current problems.  In terms of treatment, I would recommend treating the observed areas of problems including SI joint and piriformis bilaterally followed by treatment of the lumbar facets from L3-S1 right alternating with left at weekly intervals and progressing to radiofrequency ablations if he gets good relief with them.  He may also need treatment of his cervical facets C2-C4 on either side for his head ache and neck pain.  He also recalls having some difficulty with voice at some point.    I encouraged him to think through this and let us know if he decides to proceed with injections and we can seek authorization.  The time course of events and the support he will require were reviewed.  All questions were answered to his satisfaction.    60 minutes visit, all of which was spent on the day of encounter, greater than 50% was for counseling on above-mentioned issues.    Thank you much for allow me to assist in his care.    Brian Peck MD      Again, thank you for allowing me to participate in the care of your patient.      Sincerely,    Brian Peck MD

## 2024-10-30 ENCOUNTER — OFFICE VISIT (OUTPATIENT)
Dept: FAMILY MEDICINE | Facility: CLINIC | Age: 29
End: 2024-10-30
Payer: COMMERCIAL

## 2024-10-30 VITALS
WEIGHT: 190.6 LBS | OXYGEN SATURATION: 100 % | TEMPERATURE: 97.9 F | RESPIRATION RATE: 15 BRPM | SYSTOLIC BLOOD PRESSURE: 136 MMHG | DIASTOLIC BLOOD PRESSURE: 74 MMHG | HEIGHT: 75 IN | HEART RATE: 72 BPM | BODY MASS INDEX: 23.7 KG/M2

## 2024-10-30 DIAGNOSIS — Z28.21 VACCINATION NOT CARRIED OUT BECAUSE OF PATIENT REFUSAL: ICD-10-CM

## 2024-10-30 DIAGNOSIS — B35.4 TINEA CORPORIS: Primary | ICD-10-CM

## 2024-10-30 PROCEDURE — 99213 OFFICE O/P EST LOW 20 MIN: CPT | Performed by: FAMILY MEDICINE

## 2024-10-30 RX ORDER — ECONAZOLE NITRATE 10 MG/G
CREAM TOPICAL DAILY
Qty: 15 G | Refills: 0 | Status: SHIPPED | OUTPATIENT
Start: 2024-10-30 | End: 2024-11-20

## 2024-10-30 ASSESSMENT — PATIENT HEALTH QUESTIONNAIRE - PHQ9
SUM OF ALL RESPONSES TO PHQ QUESTIONS 1-9: 0
10. IF YOU CHECKED OFF ANY PROBLEMS, HOW DIFFICULT HAVE THESE PROBLEMS MADE IT FOR YOU TO DO YOUR WORK, TAKE CARE OF THINGS AT HOME, OR GET ALONG WITH OTHER PEOPLE: NOT DIFFICULT AT ALL
SUM OF ALL RESPONSES TO PHQ QUESTIONS 1-9: 0

## 2024-10-30 ASSESSMENT — PAIN SCALES - GENERAL: PAINLEVEL_OUTOF10: NO PAIN (0)

## 2024-10-30 NOTE — PATIENT INSTRUCTIONS
At [facility], we strive to deliver an exceptional experience to you, every time we see you. If you receive a survey, please let us know what we are doing well and/or what we could improve upon, as we do value your feedback.  If you have MyChart, you can expect to receive results automatically within 24 hours of their completion.  Your provider will send a note interpreting your results as well.   If you do not have MyChart, you should receive your results in about a week by mail.    Your care team:                            Family Medicine Internal Medicine   MD Angelito Andrea, MD Brandie Herrera, MD Han Grimes, MD Thao Brown, PABalwinderC    Orville Velasquez, MD Pediatrics   Maribel Haas, MD Chichi Butler, MD Yoanna Hines, APRN CNP Irasema Bee APRN CNP   MD Eileen Tellez, MD Carisa Rivera, CNP     Misael Agosto, CNP Same-Day Provider (No follow-up visits)   LAWANDA Logan, DNP Natalya Victor, LAWANDA Kline, FNP, BC EDILMA CavazosC     Clinic hours: Monday - Thursday 7 am-6 pm; Fridays 7 am-5 pm.   Urgent care: Monday - Friday 10 am- 8 pm; Saturday and Sunday 9 am-5 pm.    Clinic: [phone]       [facility]: Monday - Thursday 8 am - 7 pm; Friday 8 am - 6 pm  [facility]: [phone]

## 2024-10-30 NOTE — PROGRESS NOTES
"  Assessment & Plan     (B35.4) Tinea corporis  (primary encounter diagnosis)  Comment: A small single lesion of ringworm versus a patch of nummular eczema  Plan: econazole nitrate 1 % external cream        Discussed the possibility of nummular eczema with the patient, which incidentally would be successfully treated with the same cream if he only has one lesion, but if he develops multiple, he would need to moisturize aggressively this winter. Return in about 3 weeks (around 11/20/2024) for recheck if symptoms fail to resolve by then.        (Z28.21) Vaccination not carried out because of patient refusal  Comment: not today.  Plan:                Subjective   Domenico is a 29 year old, presenting for the following health issues:  Derm Problem (ringworm)        10/30/2024     2:14 PM   Additional Questions   Roomed by Elli   Accompanied by self     History of Present Illness       Reason for visit:  Possible ringworm  Symptom onset:  1-3 days ago  Symptoms include:  Rash  Symptom intensity:  Mild  Symptom progression:  Staying the same  Had these symptoms before:  No  What makes it worse:  Na  What makes it better:  Na   He is taking medications regularly.         No symptoms. No medications. First noticed last night.           Objective    /74 (BP Location: Left arm, Patient Position: Sitting, Cuff Size: Adult Regular)   Pulse 72   Temp 97.9  F (36.6  C) (Temporal)   Resp 15   Ht 1.905 m (6' 3\")   Wt 86.5 kg (190 lb 9.6 oz)   SpO2 100%   BMI 23.82 kg/m    Body mass index is 23.82 kg/m .  Physical Exam   GENERAL: healthy, alert and no distress  EYES: Eyes grossly normal to inspection, PERRL, EOMI, sclerae white and conjunctivae normal  MS: no gross musculoskeletal defects noted, no edema  SKIN: 10x7 mm oval on the right anterior thigh, salmon colored with scale, the border is slightly more erythematous.  NEURO: Normal strength and tone, sensory exam grossly normal, mentation intact, oriented times 3 and " cranial nerves 2-12 intact  PSYCH: mentation appears normal, affect normal/bright           Signed Electronically by: Tim Theodore MD

## 2024-12-16 ENCOUNTER — VIRTUAL VISIT (OUTPATIENT)
Dept: INTERNAL MEDICINE | Facility: CLINIC | Age: 29
End: 2024-12-16
Payer: COMMERCIAL

## 2024-12-16 DIAGNOSIS — L64.9 MALE PATTERN BALDNESS: Primary | ICD-10-CM

## 2024-12-16 PROCEDURE — 99213 OFFICE O/P EST LOW 20 MIN: CPT | Mod: 95

## 2024-12-16 RX ORDER — FINASTERIDE 1 MG/1
1 TABLET, FILM COATED ORAL DAILY
Qty: 30 TABLET | Refills: 0 | Status: SHIPPED | OUTPATIENT
Start: 2024-12-16

## 2024-12-16 NOTE — PROGRESS NOTES
Domenico is a 29 year old who is being evaluated via a billable video visit.    How would you like to obtain your AVS? MyChart  If the video visit is dropped, the invitation should be resent by:   Will anyone else be joining your video visit? No      Assessment & Plan     (L64.9) Male pattern baldness  (primary encounter diagnosis)  Comment: Patient wanting refill of medication has been on for 3 years per patient. Denies any side effects form medication. Discussed as I have never treated patient before would provide one refill and recommend he follow up with PCP for additional refills.   Plan: finasteride (PROPECIA) 1 MG tablet        Prescription sent to pharmacy                     Subjective   Domenico is a 29 year old, presenting for the following health issues:  No chief complaint on file.      Video Start Time:  4:25    HPI             Review of Systems  Constitutional, HEENT, cardiovascular, pulmonary, gi and gu systems are negative, except as otherwise noted.      Objective           Vitals:  No vitals were obtained today due to virtual visit.    Physical Exam   GENERAL: alert and no distress  EYES: Eyes grossly normal to inspection.  No discharge or erythema, or obvious scleral/conjunctival abnormalities.  RESP: No audible wheeze, cough, or visible cyanosis.    SKIN: Visible skin clear. No significant rash, abnormal pigmentation or lesions.  NEURO: Cranial nerves grossly intact.  Mentation and speech appropriate for age.  PSYCH: Appropriate affect, tone, and pace of words          Video-Visit Details    Type of service:  Video Visit   Video End Time: 4:30  Originating Location (pt. Location): Home    Distant Location (provider location):  On-site  Platform used for Video Visit: Ari  Signed Electronically by: LAWANDA Wilson CNP

## 2024-12-19 ENCOUNTER — VIRTUAL VISIT (OUTPATIENT)
Dept: FAMILY MEDICINE | Facility: CLINIC | Age: 29
End: 2024-12-19
Payer: COMMERCIAL

## 2024-12-19 DIAGNOSIS — M62.89 PELVIC FLOOR DYSFUNCTION: Primary | ICD-10-CM

## 2024-12-19 DIAGNOSIS — M26.609 TEMPOROMANDIBULAR JOINT DISORDER: ICD-10-CM

## 2024-12-19 NOTE — PROGRESS NOTES
"Domenico is a 29 year old who is being evaluated via a billable video visit.    How would you like to obtain your AVS? MyChart  If the video visit is dropped, the invitation should be resent by: Text to cell phone: [phone]  Will anyone else be joining your video visit? No      Assessment & Plan     Pelvic floor dysfunction  Established with urology and with [facility]. Needing referral for continuity of care with [facility]. This was placed. Cont with specialists.   - Physical Therapy  Referral; Future    Temporomandibular joint disorder  Ref back to PT - to the location covered in his insurance network. He declines any prescription management. Using nsaids prn- take with food and hydrate well.             Follow Up: see above. Additionally patient was instructed to contact clinic for worsening symptoms, non-improvement in time frame discussed, and for questions regarding treatment plan.   For virtual visits, the patient was advised to be seen for in person evaluation if symptoms or condition are worsening or non-improvement as expected.   Maribell Abbott PA-C    Subjective   Domenico is a 29 year old, presenting for the following health issues:  Referral and Recheck Medication        10/30/2024     2:14 PM   Additional Questions   Roomed by Elli   Accompanied by self       Video Start Time:  2:10pm    History of Present Illness       Reason for visit:  Referrals   He is taking medications regularly.       Needs referrals for physical therapy-2 different one  Pelvic floor-[facility] in [place]  TMJ-Ortho rehab specialist     ---    4 yr hx of pelvic floor dysfunction- Sees Pelvic floor PT. I need a referral so I can continue with my therapist. [facility]- Jocelyn Cohen. Babar Reyna. Has seen \"every urologist in the state\". Most recently with [facility]. Found a hip labral tear. Nerve pain, difficulty urinating.       TMJ pain- Started 3 yr ago after extensive dental work. Has had issues since. Waking most " days with jaw stuck, has to manually get back in place. Was hard to relocate it it this morning. Has done PT for this in the past and it helped.  Would like referral to TMJ clinic. Pain both sides. [facility]- fax- [phone]. Takes ibuprofen sometimes but doesn't like to take stronger meds.             Review of Systems  Constitutional, HEENT, cardiovascular, pulmonary, gi and gu systems are negative, except as otherwise noted.      Objective           Vitals:  No vitals were obtained today due to virtual visit.    Physical Exam   GENERAL: alert and no distress  EYES: Eyes grossly normal to inspection.  No discharge or erythema, or obvious scleral/conjunctival abnormalities.  HENT: Normal cephalic/atraumatic.  External ears, nose and mouth without ulcers or lesions.  No nasal drainage visible.  RESP: No audible wheeze, cough, or visible cyanosis.    SKIN: Visible skin clear. No significant rash, abnormal pigmentation or lesions.  NEURO: Cranial nerves grossly intact.  Mentation and speech appropriate for age.  PSYCH: Appropriate affect, tone, and pace of words          Video-Visit Details    Type of service:  Video Visit   Video End Time:2:28 PM  Originating Location (pt. Location): Home    Distant Location (provider location):  Off-site  Platform used for Video Visit: Ari  Signed Electronically by: Maribell Abbott PA-C

## 2024-12-19 NOTE — NURSING NOTE
Referrals faxed as stated below:      Check-out Note   1. Fax PT referral to- [phone] - [facility]; 2. Fax PT ref to [facility]- fax- [phone]     Nidhi Arreola CMA ([facility])

## 2025-01-02 ENCOUNTER — OFFICE VISIT (OUTPATIENT)
Dept: FAMILY MEDICINE | Facility: CLINIC | Age: 30
End: 2025-01-02
Payer: COMMERCIAL

## 2025-01-02 VITALS
SYSTOLIC BLOOD PRESSURE: 137 MMHG | HEART RATE: 76 BPM | OXYGEN SATURATION: 100 % | WEIGHT: 189.5 LBS | HEIGHT: 73 IN | DIASTOLIC BLOOD PRESSURE: 77 MMHG | BODY MASS INDEX: 25.12 KG/M2 | RESPIRATION RATE: 12 BRPM | TEMPERATURE: 97.5 F

## 2025-01-02 DIAGNOSIS — R30.0 DYSURIA: ICD-10-CM

## 2025-01-02 DIAGNOSIS — R33.9 URINARY RETENTION: Primary | ICD-10-CM

## 2025-01-02 DIAGNOSIS — K59.00 CONSTIPATION, UNSPECIFIED CONSTIPATION TYPE: ICD-10-CM

## 2025-01-02 LAB
ALBUMIN UR-MCNC: NEGATIVE MG/DL
APPEARANCE UR: CLEAR
BILIRUB UR QL STRIP: NEGATIVE
COLOR UR AUTO: YELLOW
ERYTHROCYTE [DISTWIDTH] IN BLOOD BY AUTOMATED COUNT: 12.3 % (ref 10–15)
GLUCOSE UR STRIP-MCNC: NEGATIVE MG/DL
HCT VFR BLD AUTO: 45.3 % (ref 40–53)
HGB BLD-MCNC: 15.6 G/DL (ref 13.3–17.7)
HGB UR QL STRIP: NEGATIVE
KETONES UR STRIP-MCNC: NEGATIVE MG/DL
LEUKOCYTE ESTERASE UR QL STRIP: NEGATIVE
MCH RBC QN AUTO: 28.7 PG (ref 26.5–33)
MCHC RBC AUTO-ENTMCNC: 34.4 G/DL (ref 31.5–36.5)
MCV RBC AUTO: 83 FL (ref 78–100)
NITRATE UR QL: NEGATIVE
PH UR STRIP: 6 [PH] (ref 5–7)
PLATELET # BLD AUTO: 180 10E3/UL (ref 150–450)
RBC # BLD AUTO: 5.44 10E6/UL (ref 4.4–5.9)
SP GR UR STRIP: 1.02 (ref 1–1.03)
UROBILINOGEN UR STRIP-ACNC: 0.2 E.U./DL
WBC # BLD AUTO: 4.6 10E3/UL (ref 4–11)

## 2025-01-02 ASSESSMENT — PAIN SCALES - GENERAL: PAINLEVEL_OUTOF10: EXTREME PAIN (8)

## 2025-01-02 NOTE — PROGRESS NOTES
ASSESSMENT AND PLAN  1. Urinary retention (Primary)  Overall most likely related to stool back up (has had problems with this in the past even though does not feel constipation at the moment) since no medications or apparent infection causing (I.e. normal prostate exam and no dysuria and hard stool in rectum.)      Recommend fleets enema tonight followed by miralax 3-4 times daily tomorrow (non-absorbable liquid).     In 3-4 days if effective start senna one tab twice daily for next several weeks for bowel re-training.     Check ua and abdominal xray and if changes plan I will call you.     If not improving over next several days then next steps would be abdominal ct scan and/or bladder scan.     If no urination in 24 hours then seek urgent medical care for  urinary retention.     No current medication culprits prescribed or over the counter which would cause urinary retention.     Reviewed foods which can cause constipation and which can help.     2. Dysuria    - UA reflex to Microscopic - lab collect; Future  - Urine Culture Aerobic Bacterial - lab collect; Future    3. Constipation, unspecified constipation type                Subjective   Domenico is a 29 year old, presenting for the following health issues:  Abdominal Pain (/)      1/2/2025     3:18 PM   Additional Questions   Roomed by Hussein BAILEY     History of Present Illness       Reason for visit:  Lower abdominal pain, frequent urination and difficulty initiating stream.history of reoccourant UTI/PROSTATE INFECTIONS AND PELVIC FLOOR DYSFUNCTION  Symptom onset:  1-2 weeks ago  Symptoms include:  Low abdominal worsening frequent urination difficulty initiating stream  Symptom intensity:  Moderate  Symptom progression:  Worsening  Had these symptoms before:  No  What makes it worse:  NA  What makes it better:  NA   He is taking medications regularly.     Sharp, campy, gas GI pains  Muscle spasms in abdomen  NO GI ISSUES  Lower abdominal pain wakes him at  night  Cold nerve pain in abdomen  Urinary issue is the worst he has ever experienced  Symptoms overall are the worst he has ever had with history of GI/abdominal issue     Main issue now is difficulty urinating.  Is able to go but feels blocked, has to strain very hard.      H/o severe constipation in past although not feeling hard stools now, having daily stools but smaller.                   Objective    There were no vitals taken for this visit.  There is no height or weight on file to calculate BMI.  Physical Exam   Appears well today.   No m/g/rubs  Lungs clear  Abdomen with BS present.  Not distanded. Lower and mid abdominal discomfort with palpation.   Rectal exam with normal sized firm prostate without tenderness.   Hard stool in rectal vault    Results for orders placed or performed in visit on 01/02/25   UA reflex to Microscopic - lab collect     Status: Normal   Result Value Ref Range    Color Urine Yellow Colorless, Straw, Light Yellow, Yellow    Appearance Urine Clear Clear    Glucose Urine Negative Negative mg/dL    Bilirubin Urine Negative Negative    Ketones Urine Negative Negative mg/dL    Specific Gravity Urine 1.025 1.003 - 1.035    Blood Urine Negative Negative    pH Urine 6.0 5.0 - 7.0    Protein Albumin Urine Negative Negative mg/dL    Urobilinogen Urine 0.2 0.2, 1.0 E.U./dL    Nitrite Urine Negative Negative    Leukocyte Esterase Urine Negative Negative    Narrative    Microscopic not indicated   CBC with platelets     Status: Normal   Result Value Ref Range    WBC Count 4.6 4.0 - 11.0 10e3/uL    RBC Count 5.44 4.40 - 5.90 10e6/uL    Hemoglobin 15.6 13.3 - 17.7 g/dL    Hematocrit 45.3 40.0 - 53.0 %    MCV 83 78 - 100 fL    MCH 28.7 26.5 - 33.0 pg    MCHC 34.4 31.5 - 36.5 g/dL    RDW 12.3 10.0 - 15.0 %    Platelet Count 180 150 - 450 10e3/uL           Signed Electronically by: Joel Daniel Wegener, MD

## 2025-01-02 NOTE — PATIENT INSTRUCTIONS
ASSESSMENT AND PLAN  1. Urinary retention (Primary)  Overall most likely related to stool back up (has had problems with this in the past even though does not feel constipation at the moment) since no medications or apparent infection causing (I.e. normal prostate exam and no dysuria and hard stool in rectum.)      Recommend fleets enema tonight followed by miralax 3-4 times daily tomorrow (non-absorbable liquid).     In 3-4 days if effective start senna one tab twice daily for next several weeks for bowel re-training.     Check ua and abdominal xray and if changes plan I will call you.     If not improving over next several days then next steps would be abdominal ct scan and/or bladder scan.     If no urination in 24 hours then seek urgent medical care for  urinary retention.     No current medication culprits prescribed or over the counter which would cause urinary retention.     Reviewed foods which can cause constipation and which can help.     2. Dysuria    - UA reflex to Microscopic - lab collect; Future  - Urine Culture Aerobic Bacterial - lab collect; Future    3. Constipation, unspecified constipation type

## 2025-01-03 ENCOUNTER — ANCILLARY PROCEDURE (OUTPATIENT)
Dept: GENERAL RADIOLOGY | Facility: CLINIC | Age: 30
End: 2025-01-03
Attending: FAMILY MEDICINE
Payer: COMMERCIAL

## 2025-01-03 DIAGNOSIS — K59.00 CONSTIPATION, UNSPECIFIED CONSTIPATION TYPE: ICD-10-CM

## 2025-01-03 DIAGNOSIS — R33.9 URINARY RETENTION: ICD-10-CM

## 2025-01-03 DIAGNOSIS — R30.0 DYSURIA: ICD-10-CM

## 2025-01-03 PROCEDURE — 74019 RADEX ABDOMEN 2 VIEWS: CPT | Mod: TC | Performed by: RADIOLOGY

## 2025-01-04 LAB — BACTERIA UR CULT: NO GROWTH

## 2025-01-07 ENCOUNTER — ANCILLARY PROCEDURE (OUTPATIENT)
Dept: CT IMAGING | Facility: CLINIC | Age: 30
End: 2025-01-07
Attending: FAMILY MEDICINE
Payer: COMMERCIAL

## 2025-01-07 ENCOUNTER — LAB (OUTPATIENT)
Dept: LAB | Facility: CLINIC | Age: 30
End: 2025-01-07
Payer: COMMERCIAL

## 2025-01-07 DIAGNOSIS — K92.1 MELENA: ICD-10-CM

## 2025-01-07 DIAGNOSIS — R33.9 URINARY RETENTION: ICD-10-CM

## 2025-01-07 DIAGNOSIS — R10.2 PELVIC PAIN: ICD-10-CM

## 2025-01-07 PROCEDURE — 74177 CT ABD & PELVIS W/CONTRAST: CPT | Mod: GC | Performed by: RADIOLOGY

## 2025-01-07 RX ORDER — IOPAMIDOL 755 MG/ML
105 INJECTION, SOLUTION INTRAVASCULAR ONCE
Status: COMPLETED | OUTPATIENT
Start: 2025-01-07 | End: 2025-01-07

## 2025-01-07 RX ADMIN — IOPAMIDOL 105 ML: 755 INJECTION, SOLUTION INTRAVASCULAR at 13:27

## 2025-01-08 ENCOUNTER — ANCILLARY PROCEDURE (OUTPATIENT)
Dept: ULTRASOUND IMAGING | Facility: CLINIC | Age: 30
End: 2025-01-08
Attending: FAMILY MEDICINE
Payer: COMMERCIAL

## 2025-01-08 DIAGNOSIS — R33.9 URINARY RETENTION: ICD-10-CM

## 2025-01-08 DIAGNOSIS — R10.2 PELVIC PAIN: ICD-10-CM

## 2025-01-15 ENCOUNTER — VIRTUAL VISIT (OUTPATIENT)
Dept: FAMILY MEDICINE | Facility: CLINIC | Age: 30
End: 2025-01-15
Payer: COMMERCIAL

## 2025-01-15 DIAGNOSIS — J01.90 ACUTE SINUSITIS WITH SYMPTOMS > 10 DAYS: Primary | ICD-10-CM

## 2025-01-15 PROCEDURE — 98005 SYNCH AUDIO-VIDEO EST LOW 20: CPT | Performed by: FAMILY MEDICINE

## 2025-01-15 ASSESSMENT — ENCOUNTER SYMPTOMS: COUGH: 1

## 2025-01-15 NOTE — PROGRESS NOTES
"Domenico is a 29 year old who is being evaluated via a billable video visit.        If patient has telephone visit, have they been educated on video visit as preferred visit method and offered to change to video visit? N/A        Instructions Relayed to Patient by Virtual Roomer:     Patient is active on [employer]:   Relayed following to patient: \"It looks like you are active on [employer], are you able to join the visit this way? If not, do you need us to send you a link now or would you like your provider to send a link via text or email when they are ready to initiate the visit?\"      Patient Confirmed they will join visit via: GroupTie  Reminded patient to ensure they were logged on to virtual visit by arrival time listed.   Asked if patient has flexibility to initiate visit sooner than arrival time: patient stated yes, documented in appointment notes availability to initiate visit earlier than arrival time     If pediatric virtual visit, ensured pediatric patient along with parent/guardian will be present for video visit.     Patient offered the website [email or web address] and/or phone number to [employer] Help line: [phone]    How would you like to obtain your AVS? GroupTie  If the video visit is dropped, the invitation should be resent by: Text to cell phone: [phone]  Will anyone else be joining your video visit? No      Assessment & Plan     (J01.90) Acute sinusitis with symptoms > 10 days  (primary encounter diagnosis)  Comment: Likely secondary to viral URI.   Plan: amoxicillin-clavulanate (AUGMENTIN) 875-125 MG         tablet        Return in about 2 weeks (around 1/29/2025) for recheck if symptoms fail to resolve by then.      Subjective   Domenico is a 29 year old, presenting for the following health issues:  Cough        1/2/2025     3:18 PM   Additional Questions   Roomed by Hussein [employer]     Video Start Time: 5:30 PM    Cough    History of Present Illness       Reason for visit:  Lower abdominal " pain, frequent urination and difficulty initiating stream.history of reoccourant UTI/PROSTATE INFECTIONS AND PELVIC FLOOR DYSFUNCTION  Symptom onset:  1-2 weeks ago  Symptoms include:  Low abdominal worsening frequent urination difficulty initiating stream  Symptom intensity:  Moderate  Symptom progression:  Worsening  Had these symptoms before:  No  What makes it worse:  NA  What makes it better:  NA   He is taking medications regularly.     The pelvic floor and prostate symptoms described above were not addressed during today's visit. His concern is cold symptoms that have persisted for about 15 days, namely a cough and production of green phlegm with odor/bad taste.        Objective       Vitals:  No vitals were obtained today due to virtual visit.    Physical Exam   GENERAL: alert and no distress  EYES: Eyes grossly normal to inspection.  No discharge or erythema, or obvious scleral/conjunctival abnormalities.  RESP: No audible wheeze, cough, or visible cyanosis.    SKIN: Visible skin clear. No significant rash, abnormal pigmentation or lesions.  NEURO: Cranial nerves grossly intact.  Mentation and speech appropriate for age.  PSYCH: Appropriate affect, tone, and pace of words        Video-Visit Details    Type of service:  Video Visit   Total video time: 6 minutes   Video End Time:5:35 PM  Originating Location (pt. Location): Home    Distant Location (provider location):  On-site  Platform used for Video Visit: [employer]    20 minutes spent by me on the date of the encounter doing chart review, patient visit, and documentation     This document serves as a record of the services and decisions personally performed and made by Dr. Theodore. It was created on his behalf by Valentina De Luna, a trained medical scribe. The creation of this document is based the provider's statements to the medical scribe.  Valentina De Luna, 5:23 PM         Signed Electronically by: Tim Theodore MD

## 2025-01-17 ENCOUNTER — VIRTUAL VISIT (OUTPATIENT)
Dept: FAMILY MEDICINE | Facility: CLINIC | Age: 30
End: 2025-01-17
Payer: COMMERCIAL

## 2025-01-17 DIAGNOSIS — R33.9 URINARY RETENTION: ICD-10-CM

## 2025-01-17 DIAGNOSIS — K59.00 CONSTIPATION, UNSPECIFIED CONSTIPATION TYPE: Primary | ICD-10-CM

## 2025-01-17 DIAGNOSIS — R59.9 ENLARGED LYMPH NODES: ICD-10-CM

## 2025-01-17 DIAGNOSIS — R16.1 SPLENOMEGALY: ICD-10-CM

## 2025-01-17 PROCEDURE — 98005 SYNCH AUDIO-VIDEO EST LOW 20: CPT | Performed by: FAMILY MEDICINE

## 2025-01-17 NOTE — PATIENT INSTRUCTIONS
"    Constipation/urinary retention:  if uses mirilax often both improve but when decreases both return.     Is in the midst of sjogren's evaluation which could be causing these symptoms.     Reviewed ct with diverticulosis but no signs of infection/diverticulitis. Reactive mesenteric/gastric lymh nodes. Appendix slightly enlarged but no evidence of infection. Splenomegally stable on ct  no apparent pathologic cause, normal blood counts.  Discussed no overs evidence of lymphoma but would recommend repeat ct in 3-6 months.     He is planning to follow up with his GI provider at [facility] that he worked with last year which I feel is a good idea so they understand the extent of his symptoms.  Could inquire if working with a colorectal provider would also be helpful.     I discussed once feels bowels are more cleaned out with miralax that he likely needs bowel \"re-training\" with regular senna/stimulant use.  Once cleaned out I would titrate to one senna three times daily for several months to help re-gain bowel strength.      Follow up with me 3-4 months for check in and general physical since he is looking for new primary care provider (does not have one now. )   "

## 2025-01-17 NOTE — PROGRESS NOTES
"Domenico is a 29 year old who is being evaluated via a billable video visit.    How would you like to obtain your AVS? MyChart  If the video visit is dropped, the invitation should be resent by: Text to cell phone: [phone]  Will anyone else be joining your video visit? No      Assessment & Plan       Constipation/urinary retention:  if uses mirilax often both improve but when decreases both return.     Is in the midst of sjogren's evaluation which could be causing these symptoms.     Reviewed ct with diverticulosis but no signs of infection/diverticulitis. Reactive mesenteric/gastric lymh nodes. Appendix slightly enlarged but no evidence of infection. Splenomegally stable on ct  no apparent pathologic cause, normal blood counts.  Discussed no overs evidence of lymphoma but would recommend repeat ct in 3-6 months.     He is planning to follow up with his GI provider at [facility] that he worked with last year which I feel is a good idea so they understand the extent of his symptoms.  Could inquire if working with a colorectal provider would also be helpful.     I discussed once feels bowels are more cleaned out with miralax that he likely needs bowel \"re-training\" with regular senna/stimulant use.  Once cleaned out I would titrate to one senna three times daily for several months to help re-gain bowel strength.      Follow up with me 3-4 months for check in and general physical since he is looking for new primary care provider (does not have one now. )   Constipation, unspecified constipation type      Urinary retention      Splenomegaly      Enlarged lymph nodes    The longitudinal plan of care for the diagnosis(es)/condition(s) as documented were addressed during this visit. Due to the added complexity in care, I will continue to support Domenico in the subsequent management and with ongoing continuity of care.          BMI  Estimated body mass index is 25.17 kg/m  as calculated from the following:    Height as of 1/2/25: " "1.848 m (6' 0.75\").    Weight as of 1/2/25: 86 kg (189 lb 8 oz).             Subjective   Domenico is a 29 year old, presenting for the following health issues:  No chief complaint on file.      Video Start Time:  4:28    HPI   Pt requesting to review lab results from 1/2/2025 and 1/9/2025.   Pt reports abdominal pain 6/10 in lower, medial abdomen \"near pelvic region\".                 Objective           Vitals:  No vitals were obtained today due to virtual visit.    Physical Exam   GENERAL: alert and no distress  EYES: Eyes grossly normal to inspection.  No discharge or erythema, or obvious scleral/conjunctival abnormalities.  RESP: No audible wheeze, cough, or visible cyanosis.    SKIN: Visible skin clear. No significant rash, abnormal pigmentation or lesions.  NEURO: Cranial nerves grossly intact.  Mentation and speech appropriate for age.  PSYCH: Appropriate affect, tone, and pace of words          Video-Visit Details    Type of service:  Video Visit   Video End Time: 4:50  Originating Location (pt. Location): Home    Distant Location (provider location):  On-site  Platform used for Video Visit: Well  4:28-4:50  Signed Electronically by: Joel Daniel Wegener, MD    "

## 2025-02-28 ENCOUNTER — TRANSFERRED RECORDS (OUTPATIENT)
Dept: HEALTH INFORMATION MANAGEMENT | Facility: CLINIC | Age: 30
End: 2025-02-28

## 2025-03-05 ENCOUNTER — VIRTUAL VISIT (OUTPATIENT)
Dept: FAMILY MEDICINE | Facility: CLINIC | Age: 30
End: 2025-03-05
Payer: COMMERCIAL

## 2025-03-05 DIAGNOSIS — U09.9 CHRONIC POST-COVID-19 SYNDROME: ICD-10-CM

## 2025-03-05 DIAGNOSIS — R06.2 WHEEZING: Primary | ICD-10-CM

## 2025-03-05 PROCEDURE — 98005 SYNCH AUDIO-VIDEO EST LOW 20: CPT

## 2025-03-05 RX ORDER — LEVALBUTEROL TARTRATE 45 UG/1
2 AEROSOL, METERED ORAL EVERY 4 HOURS PRN
Qty: 15 G | Refills: 2 | Status: SHIPPED | OUTPATIENT
Start: 2025-03-05

## 2025-03-05 RX ORDER — FLUTICASONE PROPIONATE 110 UG/1
2 AEROSOL, METERED RESPIRATORY (INHALATION) 2 TIMES DAILY
Qty: 12 G | Refills: 1 | Status: SHIPPED | OUTPATIENT
Start: 2025-03-05

## 2025-03-05 ASSESSMENT — ENCOUNTER SYMPTOMS
WHEEZING: 1
COUGH: 1

## 2025-03-05 NOTE — PROGRESS NOTES
"Domenico is a 29 year old who is being evaluated via a billable video visit.    How would you like to obtain your AVS? [employer]  If the video visit is dropped, the invitation should be resent by: Send to e-mail at: [email or web address]  Will anyone else be joining your video visit? No    If patient has telephone visit, have they been educated on video visit as preferred visit method and offered to change to video visit? NOT APPLICABLE    Instructions Relayed to Patient by Virtual Roomer:     Patient is active on [employer]:   Relayed following to patient: \"It looks like you are active on [employer], are you able to join the visit this way? If not, do you need us to send you a link now or would you like your provider to send a link via text or email when they are ready to initiate the visit?\"    Patient Confirmed they will join visit via: Email   Reminded patient to ensure they were logged on to virtual visit by arrival time listed.   Asked if patient has flexibility to initiate visit sooner than arrival time: patient stated yes, documented in appointment notes availability to initiate visit earlier than arrival time     If pediatric virtual visit, ensured pediatric patient along with parent/guardian will be present for video visit.     Patient offered the website [email or web address] and/or phone number to [employer] Help line: [phone]     Assessment & Plan     Chronic post-COVID-19 syndrome  - Worsening of cough and wheezing after recent viral illness. Refilled inhaler.  - levalbuterol (XOPENEX HFA) 45 MCG/ACT inhaler  Dispense: 15 g; Refill: 2    Wheezing  - Increase fluticasone from one puff daily to 2 puffs twice daily. Would recommend in-person appointment and CXR if symptoms worsen or do not improve.   - fluticasone (FLOVENT HFA) 110 MCG/ACT inhaler  Dispense: 12 g; Refill: 1  - XR Chest 2 Views    BMI  Estimated body mass index is 25.17 kg/m  as calculated from the following:    Height as of 1/2/25: " "1.848 m (6' 0.75\").    Weight as of 1/2/25: 86 kg (189 lb 8 oz).     Return to care if symptoms worsen or fail to improve.     Subjective   Domenico is a 29 year old, presenting for the following health issues:  Wheezing and Cough      3/5/2025    12:01 PM   Additional Questions   Roomed by novak   Accompanied by self         3/5/2025    12:01 PM   Patient Reported Additional Medications   Patient reports taking the following new medications No     Video Start Time: 2:59 PM    Wheezing  Associated symptoms include coughing.   Cough  Associated symptoms include wheezing.   History of Present Illness       Reason for visit:  Cough and wheezing   He is taking medications regularly.        Concern - Cough and wheezing   Onset: 2 months   Description: coughing and wheezing has been going on for 2 months after having a cold   Intensity: moderate  Progression of Symptoms:  worsening  Accompanying Signs & Symptoms: shortness of breath, blood oxygen level is low   Previous history of similar problem: none   Precipitating factors:        Worsened by: none   Alleviating factors:        Improved by: inhaler and steam   Therapies tried and outcome: Antibiotics- Amoxicillin and Doxycyline: helped with the illness but not the cough.      Symptoms started early January with viral URI that developed thick, bloody mucus, sinus infection. Has been treated with amoxicillin and doxycycline.     Has continued to cough up green phlegm for about 2 months. Feeling short of breath the past few nights. He has been hearing a lot of wheezing. Using fluticasone inhaler BID, levalbuterol infrequently.     Review of Systems  Constitutional, HEENT, cardiovascular, pulmonary, gi and gu systems are negative, except as otherwise noted.      Objective       Vitals:  No vitals were obtained today due to virtual visit.    Physical Exam   GENERAL: alert and no distress  EYES: Eyes grossly normal to inspection.  No discharge or erythema, or obvious " scleral/conjunctival abnormalities.  RESP: No audible wheeze, cough, or visible cyanosis.    SKIN: Visible skin clear. No significant rash, abnormal pigmentation or lesions.  NEURO: Cranial nerves grossly intact.  Mentation and speech appropriate for age.  PSYCH: Appropriate affect, tone, and pace of words      Video-Visit Details    Type of service:  Video Visit   Video End Time:3:09 PM  Originating Location (pt. Location): Home  Distant Location (provider location):  On-site  Platform used for Video Visit: Ari    Signed Electronically by: LAWANDA Rawls CNP

## 2025-03-06 ENCOUNTER — ANCILLARY PROCEDURE (OUTPATIENT)
Dept: GENERAL RADIOLOGY | Facility: CLINIC | Age: 30
End: 2025-03-06
Payer: COMMERCIAL

## 2025-03-06 DIAGNOSIS — R06.2 WHEEZING: ICD-10-CM

## 2025-04-18 NOTE — TELEPHONE ENCOUNTER
Called patient and told to set up appointment in October Ene Sahni LPN Staff Nurse     normal/clear to auscultation bilaterally/no wheezes/no rales/no rhonchi

## 2025-04-21 NOTE — NURSING NOTE
"Chief Complaint  Establish Care and Rash (Itches and burns but not as bad now, turning into blisters, about a week )    Subjective      Kristi Ibrahim is a 75 y.o. female who presents to [facility]     History of Present Illness  The patient presents for evaluation of a rash, hyperlipidemia, hip pain, and gas.    A rash has been present for approximately one week, suspected to be an allergic reaction to a new perfume. Initially, small blisters appeared, accompanied by significant pain, which has since improved. The rash is managed by keeping the area clean and dry, without the use of powders. One blister ruptured and bled after being rubbed due to itchiness.  Patient believes that wearing rough material pants last Monday may have exacerbated the irritation.     A lipid panel is due in June 2025, for hyperlipidemia patient does not want to be on statin, he is trying to improve her cholesterol level with omega-3 fatty acid and citrus supplements.    Hip pain has been present since January 2025, initially locking up during a trip, requiring acute care and an orthopedic doctor. An injection and physical therapy were administered, and physical therapy is ongoing. LifeWave patches were started for hip pain. Chair yoga has been started.    A probiotic powder is taken every morning in coffee.     A colonoscopy was performed in 2023, with follow-up by Dr. De La Rosa, who will notify when the next one is due.       Patient Care Team:  Micaela Peralta MD as PCP - General (Family Medicine)    Review of Systems      Objective   Vital Signs:   Vitals:    04/21/25 1411   BP: 127/84   BP Location: Left arm   Patient Position: Sitting   Cuff Size: Adult   Pulse: 84   Temp: 97.6 °F (36.4 °C)   TempSrc: Temporal   SpO2: 97%   Weight: 82.2 kg (181 lb 3.2 oz)   Height: 161.3 cm (63.5\")     Body mass index is 31.59 kg/m².    Wt Readings from Last 3 Encounters:   04/21/25 82.2 kg (181 lb 3.2 oz)   12/17/24 " Is the patient currently in the state of [place]? YES    Visit mode:VIDEO    If the visit is dropped, the patient can be reconnected by: VIDEO VISIT: Text to cell phone:   Telephone Information:   Mobile [phone]       Will anyone else be joining the visit? NO  (If patient encounters technical issues they should call [phone]    How would you like to obtain your AVS? MyChart    Are changes needed to the allergy or medication list? Pt stated no changes to allergies and Pt stated no med changes    Reason for visit: Consult    Crystal FONSECAF       80.2 kg (176 lb 12.8 oz)   10/21/24 81.6 kg (180 lb)     BP Readings from Last 3 Encounters:   04/21/25 127/84   12/17/24 142/81   10/21/24 139/85       Health Maintenance   Topic Date Due    DXA SCAN  03/26/2024    COVID-19 Vaccine (6 - 2024-25 season) 05/05/2025 (Originally 9/1/2024)    ZOSTER VACCINE (1 of 2) 05/05/2025 (Originally 5/21/2000)    Pneumococcal Vaccine 50+ (2 of 2 - PPSV23) 10/18/2025 (Originally 11/16/2017)    TDAP/TD VACCINES (1 - Tdap) 10/18/2025 (Originally [birth year]/1969)    INFLUENZA VACCINE  07/01/2025    ANNUAL WELLNESS VISIT  10/21/2025    LIPID PANEL  03/18/2026    MAMMOGRAM  09/16/2026    COLORECTAL CANCER SCREENING  01/11/2033    HEPATITIS C SCREENING  Completed       Physical Exam  Constitutional:       Appearance: Normal appearance.      Comments: Tremors of bilateral hands noted   HENT:      Head: Normocephalic and atraumatic.   Cardiovascular:      Rate and Rhythm: Normal rate.      Pulses: Normal pulses.      Heart sounds: Normal heart sounds.   Pulmonary:      Effort: Pulmonary effort is normal.      Breath sounds: Normal breath sounds.   Skin:     Findings: Rash present.      Comments: Erythematous rash in left pannus.  In right pannus there is erythematous rash with circular bumpy rash appears like secondary bacterial infection/impetigo   Neurological:      General: No focal deficit present.      Mental Status: She is alert and oriented to person, place, and time.   Psychiatric:         Mood and Affect: Mood normal.         Behavior: Behavior normal.         Physical Exam  Skin: Rash with blister-like lesions, some of which appear infected. Possible yeast and bacterial infection noted.       Result Review   The following data was reviewed by: Micaela Peralta MD on 04/21/2025:  [x]  Tests & Results  []  Hospitalization/Emergency Department/Urgent Care  []  Internal/External Consultant Notes      Results  Labs   - Total Cholesterol: 198 mg/dL   - LDL: 90 mg/dL   - Triglycerides:  Decreased   - HDL: Normal       ASSESSMENT/PLAN  Diagnoses and all orders for this visit:    1. Intertrigo (Primary)  -     fluconazole (Diflucan) 150 MG tablet; Take 1 tablet by mouth 1 (One) Time for 1 dose.  Dispense: 1 tablet; Refill: 0    2. Impetigo  -     cephalexin (KEFLEX) 500 MG capsule; Take 1 capsule by mouth 2 (Two) Times a Day for 7 days.  Dispense: 14 capsule; Refill: 0    3. Postmenopause  -     DEXA Bone Density Axial; Future    4. Mixed hyperlipidemia    5. Parkinson's disease with dyskinesia and fluctuating manifestations    6. Restless leg syndrome          Assessment & Plan  1. Rash.  - The rash appears to be a yeast infection on the left side, with potential bacterial involvement on the right side, possibly impetigo. There may also be a viral component contributing to the condition.  - The use of the new perfume may have increased moisture and sensitivity in the area, leading to the yeast infection  - Advised to keep the area dry and apply an over-the-counter topical antifungal powder. Avoid wearing pants at night for at least a week and use a towel or similar item to elevate the abdomen to create a gap between the affected areas, allowing them to dry.   - Continue probiotic regimen as the antibiotics may cause stomach upset. Use a fresh towel each morning and wash it with regular detergent soap, avoiding bleach. Prescription for fluconazole and cephalexin will be sent to [facility] pharmacy.    2. Hyperlipidemia.  - Lipid panel was last conducted in 03/2025. Total cholesterol was 198, LDL was 90, triglycerides have decreased, and HDL is good.  - Total cholesterol decreased from 229 to 185 over a period of 10 months with citrus bergamot and fish oil.  - A lipid panel will be ordered in 06/2025.    3. Hip pain.  - Dealing with hip pain since 01/2025, which locked up during a trip, requiring acute care and an orthopedic doctor. Had an injection and physical therapy and is still in physical  therapy.  - Started chair yoga.  - Acupuncture recommended as an additional treatment option.    Follow-up  - Follow up in 3 months.               Kristi Ibrahim  reports that she has never smoked. She has never used smokeless tobacco.            FOLLOW UP  Return in about 3 months (around 7/21/2025).  Patient was given instructions and counseling regarding her condition or for health maintenance advice. Please see specific information pulled into the AVS if appropriate.       Micaela Peralta MD  04/21/25  16:29 EDT    Patient or patient representative verbalized consent for the use of Ambient Listening during the visit with  Micaela Peralta MD for chart documentation. 4/21/2025  16:30 EDT

## 2025-04-22 ENCOUNTER — THERAPY VISIT (OUTPATIENT)
Dept: PHYSICAL THERAPY | Facility: CLINIC | Age: 30
End: 2025-04-22
Attending: OTOLARYNGOLOGY
Payer: COMMERCIAL

## 2025-04-22 DIAGNOSIS — R29.898 NECK TIGHTNESS: Primary | ICD-10-CM

## 2025-04-22 DIAGNOSIS — J38.3 VOCAL CORD DYSFUNCTION: ICD-10-CM

## 2025-04-22 PROCEDURE — 97140 MANUAL THERAPY 1/> REGIONS: CPT | Mod: GP | Performed by: PHYSICAL THERAPIST

## 2025-04-22 PROCEDURE — 97161 PT EVAL LOW COMPLEX 20 MIN: CPT | Mod: GP | Performed by: PHYSICAL THERAPIST
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
## 2025-04-23 PROBLEM — R29.898 NECK TIGHTNESS: Status: ACTIVE | Noted: 2025-04-23

## 2025-04-23 PROBLEM — J38.3 VOCAL CORD DYSFUNCTION: Status: ACTIVE | Noted: 2025-04-23

## 2025-05-08 ENCOUNTER — MYC MEDICAL ADVICE (OUTPATIENT)
Dept: FAMILY MEDICINE | Facility: CLINIC | Age: 30
End: 2025-05-08
Payer: COMMERCIAL
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
## 2025-05-19 ENCOUNTER — TELEPHONE (OUTPATIENT)
Dept: FAMILY MEDICINE | Facility: CLINIC | Age: 30
End: 2025-05-19
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
## 2025-05-19 NOTE — TELEPHONE ENCOUNTER
Because theses symptoms have been going on for a number of months, patient can keep appointment but can offer ADS if patient would like. Patient should be seen in person as I have only seen him virtually and he should be assessed. Can wait until tomorrow.
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
## 2025-05-20 ENCOUNTER — OFFICE VISIT (OUTPATIENT)
Dept: FAMILY MEDICINE | Facility: CLINIC | Age: 30
End: 2025-05-20
Payer: COMMERCIAL

## 2025-05-20 VITALS
HEIGHT: 74 IN | TEMPERATURE: 98 F | OXYGEN SATURATION: 100 % | HEART RATE: 72 BPM | WEIGHT: 190.6 LBS | DIASTOLIC BLOOD PRESSURE: 69 MMHG | SYSTOLIC BLOOD PRESSURE: 127 MMHG | BODY MASS INDEX: 24.46 KG/M2 | RESPIRATION RATE: 12 BRPM

## 2025-05-20 DIAGNOSIS — R20.0 NUMBNESS: Primary | ICD-10-CM

## 2025-05-20 DIAGNOSIS — R27.0 ATAXIA: ICD-10-CM

## 2025-05-20 DIAGNOSIS — R29.90 ALTERATION IN NEUROLOGIC FUNCTION: ICD-10-CM

## 2025-05-20 PROCEDURE — 99214 OFFICE O/P EST MOD 30 MIN: CPT

## 2025-05-20 ASSESSMENT — ENCOUNTER SYMPTOMS: NEUROLOGIC COMPLAINT: 1
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
## 2025-05-21 ENCOUNTER — PATIENT OUTREACH (OUTPATIENT)
Dept: CARE COORDINATION | Facility: CLINIC | Age: 30
End: 2025-05-21

## 2025-06-04 ENCOUNTER — ANCILLARY PROCEDURE (OUTPATIENT)
Dept: MRI IMAGING | Facility: CLINIC | Age: 30
End: 2025-06-04
Payer: COMMERCIAL

## 2025-06-04 DIAGNOSIS — R29.90 ALTERATION IN NEUROLOGIC FUNCTION: ICD-10-CM

## 2025-06-04 DIAGNOSIS — R27.0 ATAXIA: ICD-10-CM

## 2025-06-04 DIAGNOSIS — R20.0 NUMBNESS: ICD-10-CM

## 2025-06-04 PROCEDURE — 70551 MRI BRAIN STEM W/O DYE: CPT | Performed by: RADIOLOGY

## 2025-06-06 ENCOUNTER — RESULTS FOLLOW-UP (OUTPATIENT)
Dept: FAMILY MEDICINE | Facility: CLINIC | Age: 30
End: 2025-06-06

## 2025-06-18 ENCOUNTER — MYC MEDICAL ADVICE (OUTPATIENT)
Dept: FAMILY MEDICINE | Facility: CLINIC | Age: 30
End: 2025-06-18
Payer: COMMERCIAL
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
## 2025-06-18 NOTE — TELEPHONE ENCOUNTER
Routing to provider who has seen him for this issue.     It may also be good to clarify if he wants to continue with me as his primary provider as he has received most care at other clinics. Joel Wegener,MD

## 2025-07-08 ENCOUNTER — OFFICE VISIT (OUTPATIENT)
Dept: FAMILY MEDICINE | Facility: CLINIC | Age: 30
End: 2025-07-08
Payer: COMMERCIAL

## 2025-07-08 VITALS
WEIGHT: 183.5 LBS | HEIGHT: 74 IN | OXYGEN SATURATION: 100 % | RESPIRATION RATE: 16 BRPM | BODY MASS INDEX: 23.55 KG/M2 | TEMPERATURE: 97.5 F | DIASTOLIC BLOOD PRESSURE: 67 MMHG | HEART RATE: 68 BPM | SYSTOLIC BLOOD PRESSURE: 108 MMHG

## 2025-07-08 DIAGNOSIS — H92.01 RIGHT EAR PAIN: ICD-10-CM

## 2025-07-08 DIAGNOSIS — J45.50 SEVERE PERSISTENT ASTHMA WITHOUT COMPLICATION (H): ICD-10-CM

## 2025-07-08 DIAGNOSIS — G93.32 CHRONIC FATIGUE SYNDROME: ICD-10-CM

## 2025-07-08 DIAGNOSIS — R09.82 POST-NASAL DRIP: ICD-10-CM

## 2025-07-08 DIAGNOSIS — R05.3 CHRONIC COUGH: ICD-10-CM

## 2025-07-08 DIAGNOSIS — N52.9 ERECTILE DYSFUNCTION, UNSPECIFIED ERECTILE DYSFUNCTION TYPE: ICD-10-CM

## 2025-07-08 DIAGNOSIS — R53.83 OTHER FATIGUE: ICD-10-CM

## 2025-07-08 DIAGNOSIS — R42 VERTIGO: ICD-10-CM

## 2025-07-08 DIAGNOSIS — Z13.21 ENCOUNTER FOR VITAMIN DEFICIENCY SCREENING: ICD-10-CM

## 2025-07-08 DIAGNOSIS — Z13.1 SCREENING FOR DIABETES MELLITUS: ICD-10-CM

## 2025-07-08 DIAGNOSIS — J34.89 SINUS PRESSURE: ICD-10-CM

## 2025-07-08 DIAGNOSIS — G43.109 VERTIGINOUS MIGRAINE: Primary | ICD-10-CM

## 2025-07-08 LAB
ALBUMIN SERPL BCG-MCNC: 4.7 G/DL (ref 3.5–5.2)
ALP SERPL-CCNC: 50 U/L (ref 40–150)
ALT SERPL W P-5'-P-CCNC: 38 U/L (ref 0–70)
ANION GAP SERPL CALCULATED.3IONS-SCNC: 11 MMOL/L (ref 7–15)
AST SERPL W P-5'-P-CCNC: 28 U/L (ref 0–45)
BILIRUB SERPL-MCNC: 0.5 MG/DL
BUN SERPL-MCNC: 13.9 MG/DL (ref 6–20)
CALCIUM SERPL-MCNC: 9.7 MG/DL (ref 8.8–10.4)
CHLORIDE SERPL-SCNC: 102 MMOL/L (ref 98–107)
CREAT SERPL-MCNC: 0.92 MG/DL (ref 0.67–1.17)
EGFRCR SERPLBLD CKD-EPI 2021: >90 ML/MIN/1.73M2
ERYTHROCYTE [DISTWIDTH] IN BLOOD BY AUTOMATED COUNT: 12.3 % (ref 10–15)
EST. AVERAGE GLUCOSE BLD GHB EST-MCNC: 105 MG/DL
GLUCOSE SERPL-MCNC: 95 MG/DL (ref 70–99)
HBA1C MFR BLD: 5.3 % (ref 0–5.6)
HCO3 SERPL-SCNC: 26 MMOL/L (ref 22–29)
HCT VFR BLD AUTO: 46.5 % (ref 40–53)
HGB BLD-MCNC: 16.1 G/DL (ref 13.3–17.7)
MCH RBC QN AUTO: 29.1 PG (ref 26.5–33)
MCHC RBC AUTO-ENTMCNC: 34.6 G/DL (ref 31.5–36.5)
MCV RBC AUTO: 84 FL (ref 78–100)
PLATELET # BLD AUTO: 190 10E3/UL (ref 150–450)
POTASSIUM SERPL-SCNC: 4.1 MMOL/L (ref 3.4–5.3)
PROT SERPL-MCNC: 7.3 G/DL (ref 6.4–8.3)
RBC # BLD AUTO: 5.53 10E6/UL (ref 4.4–5.9)
SHBG SERPL-SCNC: 33 NMOL/L (ref 11–80)
SODIUM SERPL-SCNC: 139 MMOL/L (ref 135–145)
TSH SERPL DL<=0.005 MIU/L-ACNC: 3.05 UIU/ML (ref 0.3–4.2)
VIT B12 SERPL-MCNC: 881 PG/ML (ref 232–1245)
VIT D+METAB SERPL-MCNC: 38 NG/ML (ref 20–50)
WBC # BLD AUTO: 4.3 10E3/UL (ref 4–11)

## 2025-07-08 PROCEDURE — 86003 ALLG SPEC IGE CRUDE XTRC EA: CPT | Mod: 59 | Performed by: FAMILY MEDICINE

## 2025-07-08 PROCEDURE — 82785 ASSAY OF IGE: CPT | Performed by: FAMILY MEDICINE

## 2025-07-08 PROCEDURE — 99214 OFFICE O/P EST MOD 30 MIN: CPT | Performed by: FAMILY MEDICINE

## 2025-07-08 PROCEDURE — 82607 VITAMIN B-12: CPT | Performed by: FAMILY MEDICINE

## 2025-07-08 PROCEDURE — 83036 HEMOGLOBIN GLYCOSYLATED A1C: CPT | Performed by: FAMILY MEDICINE

## 2025-07-08 PROCEDURE — 84443 ASSAY THYROID STIM HORMONE: CPT | Performed by: FAMILY MEDICINE

## 2025-07-08 PROCEDURE — 84270 ASSAY OF SEX HORMONE GLOBUL: CPT | Performed by: FAMILY MEDICINE

## 2025-07-08 PROCEDURE — 82306 VITAMIN D 25 HYDROXY: CPT | Performed by: FAMILY MEDICINE

## 2025-07-08 PROCEDURE — G2211 COMPLEX E/M VISIT ADD ON: HCPCS | Performed by: FAMILY MEDICINE

## 2025-07-08 PROCEDURE — 85027 COMPLETE CBC AUTOMATED: CPT | Performed by: FAMILY MEDICINE

## 2025-07-08 PROCEDURE — 84403 ASSAY OF TOTAL TESTOSTERONE: CPT | Performed by: FAMILY MEDICINE

## 2025-07-08 PROCEDURE — 36415 COLL VENOUS BLD VENIPUNCTURE: CPT | Performed by: FAMILY MEDICINE

## 2025-07-08 PROCEDURE — 80053 COMPREHEN METABOLIC PANEL: CPT | Performed by: FAMILY MEDICINE

## 2025-07-08 ASSESSMENT — PAIN SCALES - GENERAL: PAINLEVEL_OUTOF10: MODERATE PAIN (6)

## 2025-07-08 ASSESSMENT — ENCOUNTER SYMPTOMS: HEADACHES: 1

## 2025-07-08 NOTE — PROGRESS NOTES
Assessment & Plan     Constellation of chronic fatigue, chronic cough/wheeze, vertigo, sometimes headaches more right side face than left, sinus congestion .  Likely post covid syndrome and diagnosed with astham previously by pulmonologist.     Does feel has had some benefit from allegra daily.  Has not used rizatriptan yet since had significant long-lasting side effects from fluoxetine/prozac and is very careful about medications and knows triptans affect serotonin to some degree.     Plan: blood Allergy testing today to guide therapy along with other labs to evaluate for potential underlying causes of chronic fatigue.  Consider endocrinology consultation in future.     Allegra has been somewhat helpful for possible vertiginous migraine symptoms and allergies. Could consider adding psuedoephedrine to this (allegra D) for fatigue, nasal congestion/post nasal drip although has  had side effects from psuedoephedrine in the past.     Read about trigeminal neuralgia to see if this syndrome fits any of your symptpoms.     Chronic fatigue syndrome:  planning to re-try low-dose naltrexone with another provider.  Could consider ritalin trial in future as well.     Consider sinus ct would like to talk with ENT /neurology.     Sleep apnea:  has been unable to tolerate mask or oral devices.  Would recommend using [facility]    Follow up with pulmonology/ Repeat lung function testing, consider singulair trial.     Follow up with me schedule July 24th.   Vertiginous migraine      Chronic fatigue syndrome    - Comprehensive metabolic panel; Future  - Comprehensive metabolic panel    Severe persistent asthma without complication (H)    - [place] Resp Allergen Panel; Future  - [place] Resp Allergen Panel    Chronic cough    - [place] Resp Allergen Panel; Future  - [place] Resp Allergen Panel    Sinus pressure    - [place] Resp Allergen Panel; Future  - [place] Resp Allergen Panel    Right ear pain      Encounter for vitamin  "deficiency screening    - 25- [place]-Vitamin D; Future  - 25- OH-Vitamin D    Erectile dysfunction, unspecified erectile dysfunction type    - Testosterone Free and Total; Future  - Testosterone Free and Total    Screening for diabetes mellitus    - Hemoglobin A1c; Future  - Hemoglobin A1c    Other fatigue    - CBC with platelets; Future  - Comprehensive metabolic panel; Future  - TSH with free T4 reflex; Future  - 25- [place]-Vitamin D; Future  - Hemoglobin A1c; Future  - Testosterone Free and Total; Future  - Vitamin B12; Future  - CBC with platelets  - Comprehensive metabolic panel  - TSH with free T4 reflex  - 25- OH-Vitamin D  - Hemoglobin A1c  - Testosterone Free and Total  - Vitamin B12    Vertigo    - CBC with platelets; Future  - Vitamin B12; Future  - CBC with platelets  - Vitamin B12    Post-nasal drip    - [place] Resp Allergen Panel; Future  - [place] Resp Allergen Panel    35 minutes spent on the date of the encounter doing chart review, history and exam, negotiating and explaining the plan with the patient, documentation and further activities as noted above.      The longitudinal plan of care for the diagnosis(es)/condition(s) as documented were addressed during this visit. Due to the added complexity in care, I will continue to support Domenico in the subsequent management and with ongoing continuity of care.      Subjective   Domenico is a 29 year old, presenting for the following health issues:  Headache        7/8/2025    10:11 AM   Additional Questions   Roomed by Gabi VELASCO   Accompanied by n/a     Headache     History of Present Illness       Headaches:   Since the patient's last clinic visit, headaches are: worsened  The patient is getting headaches:  Daily  He is not able to do normal daily activities when he has a migraine.  The patient is taking the following rescue/relief medications:  Ibuprofen (Advil, Motrin) and other   Patient states \"The relief is inconsistent\" from the rescue/relief medications.   The " "patient is taking the following medications to prevent migraines:  Other  In the past 4 weeks, the patient has gone to an Urgent Care or Emergency Room 0 times times due to headaches.    Reason for visit:  Vestibular issues/general health issues    He eats 2-3 servings of fruits and vegetables daily.He consumes 0 sweetened beverage(s) daily.He exercises with enough effort to increase his heart rate 30 to 60 minutes per day.  He exercises with enough effort to increase his heart rate 4 days per week.   He is taking medications regularly.        Pt states over six months he having right ear pain followed by dizziness, cough                 Objective    /67   Pulse 68   Temp 97.5  F (36.4  C) (Temporal)   Resp 16   Ht 1.88 m (6' 2\")   Wt 83.2 kg (183 lb 8 oz)   SpO2 100%   BMI 23.56 kg/m    Body mass index is 23.56 kg/m .  Physical Exam   Appears well today but fatigued.   No m/g/rubs  Lungs clear with normal breathing today without crackles or wheezing or ronchi but does have expiratory wheeze with forced expiration.             Signed Electronically by: Joel Daniel Wegener, MD    "

## 2025-07-08 NOTE — PATIENT INSTRUCTIONS
Constellation of chronic fatigue, chronic cough/wheeze, vertigo, sometimes headaches more right side face than left, sinus congestion .  Likely post covid syndrome and diagnosed with astham previously by pulmonologist.     Does feel has had some benefit from allegra daily.  Has not used rizatriptan yet since had significant long-lasting side effects from fluoxetine/prozac and is very careful about medications and knows triptans affect serotonin to some degree.     Plan: blood Allergy testing today to guide therapy along with other labs to evaluate for potential underlying causes of chronic fatigue.  Consider endocrinology consultation in future.     Allegra has been somewhat helpful for possible vertiginous migraine symptoms and allergies. Could consider adding psuedoephedrine to this (allegra D) for fatigue, nasal congestion/post nasal drip although has  had side effects from psuedoephedrine in the past.     Read about trigeminal neuralgia to see if this syndrome fits any of your symptpoms.     Chronic fatigue syndrome:  planning to re-try low-dose naltrexone with another provider.  Could consider ritalin trial in future as well.     Consider sinus ct would like to talk with ENT /neurology.     Sleep apnea:  has been unable to tolerate mask or oral devices.  Would recommend using [facility]    Follow up with pulmonology/ Repeat lung function testing, consider singulair trial.     Follow up with me schedule July 24th.

## 2025-07-09 LAB
A ALTERNATA IGE QN: <0.1 KU(A)/L
A FUMIGATUS IGE QN: <0.1 KU(A)/L
BERMUDA GRASS IGE QN: <0.1 KU(A)/L
C HERBARUM IGE QN: 0.23 KU(A)/L
CAT DANDER IGG QN: 15.8 KU(A)/L
CEDAR IGE QN: <0.1 KU(A)/L
COMMON RAGWEED IGE QN: <0.1 KU(A)/L
COTTONWOOD IGE QN: <0.1 KU(A)/L
D FARINAE IGE QN: <0.1 KU(A)/L
D PTERONYSS IGE QN: <0.1 KU(A)/L
DOG DANDER+EPITH IGE QN: 4.13 KU(A)/L
IGE SERPL-ACNC: 37 KU/L (ref 0–114)
MAPLE IGE QN: <0.1 KU(A)/L
MARSH ELDER IGE QN: <0.1 KU(A)/L
MOUSE URINE PROT IGE QN: <0.1 KU(A)/L
NETTLE IGE QN: <0.1 KU(A)/L
P NOTATUM IGE QN: <0.1 KU(A)/L
ROACH IGE QN: <0.1 KU(A)/L
SALTWORT IGE QN: <0.1 KU(A)/L
SILVER BIRCH IGE QN: <0.1 KU(A)/L
TIMOTHY IGE QN: <0.1 KU(A)/L
WHITE ASH IGE QN: <0.1 KU(A)/L
WHITE ELM IGE QN: <0.1 KU(A)/L
WHITE MULBERRY IGE QN: <0.1 KU(A)/L
WHITE OAK IGE QN: <0.1 KU(A)/L

## 2025-07-10 ENCOUNTER — TRANSCRIBE ORDERS (OUTPATIENT)
Dept: OTHER | Age: 30
End: 2025-07-10

## 2025-07-10 DIAGNOSIS — R42 VERTIGO: Primary | ICD-10-CM

## 2025-07-10 LAB
TESTOST FREE SERPL-MCNC: 17.11 NG/DL
TESTOST SERPL-MCNC: 755 NG/DL (ref 240–950)

## 2025-07-14 ENCOUNTER — PATIENT OUTREACH (OUTPATIENT)
Dept: CARE COORDINATION | Facility: CLINIC | Age: 30
End: 2025-07-14
Payer: COMMERCIAL

## 2025-07-15 ENCOUNTER — TELEPHONE (OUTPATIENT)
Dept: FAMILY MEDICINE | Facility: CLINIC | Age: 30
End: 2025-07-15
Payer: COMMERCIAL

## 2025-07-15 NOTE — TELEPHONE ENCOUNTER
Patient Quality Outreach    Patient is due for the following:   Asthma  -  ACT needed and AAP      Topic Date Due    Hepatitis B Vaccine (2 of 3 - 3-dose series) 08/27/2004    Pneumococcal Vaccine (1 of 2 - PCV) Never done    COVID-19 Vaccine (4 - 2024-25 season) 09/01/2024       Action(s) Taken:   Patient was assigned appropriate questionnaire to complete    Type of outreach:    Sent PostedIn message.    Questions for provider review:    None         Sandhya Constantino MA  Chart routed to None.

## 2025-07-18 PROBLEM — R10.2 PELVIC PAIN: Status: RESOLVED | Noted: 2023-10-19 | Resolved: 2025-07-18

## 2025-07-18 PROBLEM — R10.2 SUPRAPUBIC PAIN: Status: RESOLVED | Noted: 2024-04-05 | Resolved: 2025-07-18

## 2025-07-18 PROBLEM — R29.898 NECK TIGHTNESS: Status: RESOLVED | Noted: 2025-04-23 | Resolved: 2025-07-18

## 2025-07-18 PROBLEM — R20.0 NUMBNESS: Status: RESOLVED | Noted: 2023-10-19 | Resolved: 2025-07-18

## 2025-07-29 ENCOUNTER — TELEPHONE (OUTPATIENT)
Dept: OTOLARYNGOLOGY | Facility: CLINIC | Age: 30
End: 2025-07-29
Payer: COMMERCIAL

## 2025-07-29 NOTE — TELEPHONE ENCOUNTER
Health Call Center    Phone Message    May a detailed message be left on voicemail: yes     Reason for Call: Appointment Intake    Referring Provider Name: HREBER WALSH with [facility] NEUROLOGY, [facility]  Diagnosis and/or Symptoms:  R42 (ICD-10-CM) - Vertigo, ertigo would like Electronystagmogram     Patient wants to be seen at [facility] [place] location. Please review and call to assist.     Please call at [phone]. Did confirm phone number and insurance. Thanks!    Action Taken: Message routed to:  [facility] ENT     Travel Screening: Not Applicable

## (undated) RX ORDER — ONDANSETRON 2 MG/ML
INJECTION INTRAMUSCULAR; INTRAVENOUS
Status: DISPENSED
Start: 2024-04-22

## (undated) RX ORDER — FENTANYL CITRATE 50 UG/ML
INJECTION, SOLUTION INTRAMUSCULAR; INTRAVENOUS
Status: DISPENSED
Start: 2024-04-22

## (undated) RX ORDER — LIDOCAINE HYDROCHLORIDE 20 MG/ML
SOLUTION OROPHARYNGEAL
Status: DISPENSED
Start: 2021-06-24

## (undated) RX ORDER — LIDOCAINE HYDROCHLORIDE 10 MG/ML
INJECTION, SOLUTION EPIDURAL; INFILTRATION; INTRACAUDAL; PERINEURAL
Status: DISPENSED
Start: 2024-06-03